# Patient Record
Sex: MALE | Race: WHITE | NOT HISPANIC OR LATINO | Employment: PART TIME | URBAN - METROPOLITAN AREA
[De-identification: names, ages, dates, MRNs, and addresses within clinical notes are randomized per-mention and may not be internally consistent; named-entity substitution may affect disease eponyms.]

---

## 2019-01-06 ENCOUNTER — OFFICE VISIT (OUTPATIENT)
Dept: URGENT CARE | Facility: CLINIC | Age: 24
End: 2019-01-06
Payer: COMMERCIAL

## 2019-01-06 VITALS
OXYGEN SATURATION: 98 % | DIASTOLIC BLOOD PRESSURE: 72 MMHG | TEMPERATURE: 101.9 F | BODY MASS INDEX: 21.13 KG/M2 | WEIGHT: 156 LBS | RESPIRATION RATE: 18 BRPM | SYSTOLIC BLOOD PRESSURE: 113 MMHG | HEART RATE: 119 BPM | HEIGHT: 72 IN

## 2019-01-06 DIAGNOSIS — J20.9 ACUTE BRONCHITIS, UNSPECIFIED ORGANISM: Primary | ICD-10-CM

## 2019-01-06 PROCEDURE — 99203 OFFICE O/P NEW LOW 30 MIN: CPT | Performed by: FAMILY MEDICINE

## 2019-01-06 RX ORDER — PANTOPRAZOLE SODIUM 40 MG/1
40 TABLET, DELAYED RELEASE ORAL DAILY
COMMUNITY
End: 2019-12-26

## 2019-01-06 RX ORDER — AZITHROMYCIN 250 MG/1
TABLET, FILM COATED ORAL
Qty: 6 TABLET | Refills: 0 | Status: SHIPPED | OUTPATIENT
Start: 2019-01-06 | End: 2019-01-11

## 2019-01-06 NOTE — PROGRESS NOTES
Assessment/Plan:         Diagnoses and all orders for this visit:    Acute bronchitis, unspecified organism  -     azithromycin (ZITHROMAX) 250 mg tablet; Take 2 tablets today then 1 tablet daily x 4 days    Other orders  -     pantoprazole (PROTONIX) 40 mg tablet; Take 40 mg by mouth daily          Subjective:      Patient ID: Eduar Garnica is a 26 y o  male  Patient presents with:  Cold Like Symptoms: nasal and chest congestion   cough very hard and this am there was blood in it            The following portions of the patient's history were reviewed and updated as appropriate: allergies, current medications, past family history, past medical history, past social history, past surgical history and problem list     Review of Systems   Constitutional: Positive for activity change, fatigue and fever  HENT: Positive for congestion, rhinorrhea, sinus pain, sore throat and voice change  Eyes: Negative  Respiratory: Positive for cough  Cardiovascular: Negative  Gastrointestinal: Negative  Endocrine: Negative  Genitourinary: Negative  Musculoskeletal: Negative  Skin: Negative  Allergic/Immunologic: Negative  Neurological: Negative  Hematological: Negative  Psychiatric/Behavioral: Negative  All other systems reviewed and are negative  Objective:      /72   Pulse (!) 119   Temp (!) 101 9 °F (38 8 °C) (Tympanic)   Resp 18   Ht 6' (1 829 m)   Wt 70 8 kg (156 lb)   SpO2 98%   BMI 21 16 kg/m²          Physical Exam   Constitutional: He is oriented to person, place, and time  He appears well-developed and well-nourished  HENT:   Head: Normocephalic and atraumatic  Right Ear: External ear normal    Left Ear: External ear normal    Nose: Nose normal    Mouth/Throat: Oropharynx is clear and moist    Eyes: Pupils are equal, round, and reactive to light  Conjunctivae and EOM are normal    Neck: Normal range of motion  Neck supple     Cardiovascular: Normal rate, regular rhythm and normal heart sounds  Pulmonary/Chest: Effort normal  He has wheezes  He has rales  Abdominal: Soft  Bowel sounds are normal    Musculoskeletal: Normal range of motion  Neurological: He is alert and oriented to person, place, and time  He has normal reflexes  Skin: Skin is warm and dry  Psychiatric: He has a normal mood and affect  His behavior is normal    Nursing note and vitals reviewed

## 2019-12-26 ENCOUNTER — OFFICE VISIT (OUTPATIENT)
Dept: URGENT CARE | Facility: CLINIC | Age: 24
End: 2019-12-26
Payer: COMMERCIAL

## 2019-12-26 VITALS
SYSTOLIC BLOOD PRESSURE: 149 MMHG | HEART RATE: 102 BPM | WEIGHT: 181.88 LBS | BODY MASS INDEX: 26.04 KG/M2 | DIASTOLIC BLOOD PRESSURE: 92 MMHG | HEIGHT: 70 IN | RESPIRATION RATE: 16 BRPM | TEMPERATURE: 98 F

## 2019-12-26 DIAGNOSIS — M79.672 LEFT FOOT PAIN: Primary | ICD-10-CM

## 2019-12-26 PROCEDURE — 99213 OFFICE O/P EST LOW 20 MIN: CPT | Performed by: PHYSICIAN ASSISTANT

## 2019-12-26 RX ORDER — SIMETHICONE 125 MG
125 TABLET,CHEWABLE ORAL EVERY 6 HOURS PRN
COMMUNITY

## 2019-12-26 NOTE — LETTER
December 26, 2019     Patient: Estephania Baron   YOB: 1995   Date of Visit: 12/26/2019       To Whom It May Concern: It is my medical opinion that Estephania Baron may return to work on 12/27/2019  If you have any questions or concerns, please don't hesitate to call           Sincerely,        Melvina Patient, SRIRAM    CC: No Recipients

## 2019-12-26 NOTE — PROGRESS NOTES
[facility]        NAME: Lisette Pace is a 22 y o  male  : 1995    MRN: [id number]  DATE: 2019  TIME: 5:06 PM    Assessment and Plan   Left foot pain [M79 672]  1  Left foot pain  XR foot 3+ vw left     XR: no fracture or dislocation  Patient Instructions     Tylenol/Ibuprofen for pain  Wear shoe arch support for foot injuries (ex: superfeet insoles)  Ice 20 minutes 3-4 times per day for 3 days  Insulate the skin from the ice to prevent frostbite  Rest and Elevate  Follow up with PCP in 3-5 days   Go to ED if symptoms worsen  Chief Complaint     Chief Complaint   Patient presents with    Foot Injury     pain and swelling in left foot  fell down cellar steps 2 weeks ago but foot didn't start to hurt until 5 dyas ago         History of Present Illness       Ankle Pain    Incident onset: 2 weeks  Pain began 5 days ago  The injury mechanism was a fall (2 cellar steps)  Pain location: L foot  The quality of the pain is described as aching  The pain is moderate  Pertinent negatives include no inability to bear weight, loss of motion, loss of sensation, numbness or tingling  Review of Systems   Review of Systems   Musculoskeletal: Positive for joint swelling  Negative for gait problem  Skin: Negative for color change  Neurological: Negative for tingling, weakness and numbness           Current Medications       Current Outpatient Medications:     simethicone (MYLICON) 268 MG chewable tablet, Chew 125 mg every 6 (six) hours as needed for flatulence, Disp: , Rfl:     Current Allergies     Allergies as of 2019 - Reviewed 2019   Allergen Reaction Noted    Pepto-bismol [bismuth subsalicylate] GI Intolerance 2019    Amoxicillin Rash 2019    Latex Rash 2019            The following portions of the patient's history were reviewed and updated as appropriate: allergies, current medications, past family history, past medical history, past social history, past surgical history and problem list      Past Medical History:   Diagnosis Date    Gastric paresis     Known health problems: none        Past Surgical History:   Procedure Laterality Date    EYE SURGERY         Family History   Problem Relation Age of Onset    No Known Problems Mother     No Known Problems Father          Medications have been verified  Objective   /92   Pulse 102   Temp 98 °F (36 7 °C) (Tympanic)   Resp 16   Ht 5' 10" (1 778 m)   Wt 82 5 kg (181 lb 14 1 oz)   BMI 26 10 kg/m²        Physical Exam     Physical Exam   Constitutional: He appears well-developed and well-nourished  No distress  HENT:   Head: Normocephalic and atraumatic  Cardiovascular: Normal rate, regular rhythm, normal heart sounds and intact distal pulses  Exam reveals no gallop and no friction rub  No murmur heard  Pulmonary/Chest: Effort normal and breath sounds normal  No respiratory distress  He has no wheezes  He has no rales  He exhibits no tenderness  Musculoskeletal: Normal range of motion  He exhibits tenderness (L foot mid 1,2,4,5th metatarsal)  He exhibits no edema or deformity  Neurological: He is alert  No sensory deficit  LE light touch sensation intact bilaterally  Skin: Skin is warm  Psychiatric: He has a normal mood and affect  His behavior is normal  Judgment and thought content normal    Vitals reviewed

## 2019-12-26 NOTE — PATIENT INSTRUCTIONS
Tylenol/Ibuprofen for pain  Wear shoe arch support for foot injuries (ex: superfeet insoles)  Ice 20 minutes 3-4 times per day for 3 days  Insulate the skin from the ice to prevent frostbite  Rest and Elevate  Follow up with PCP in 3-5 days   Go to ED if symptoms worsen  Foot Contusion   WHAT YOU NEED TO KNOW:   A foot contusion is a bruise to the foot  DISCHARGE INSTRUCTIONS:   Medicines:   · NSAIDs:  These medicines decrease swelling and pain  NSAIDs are available without a doctor's order  Ask your healthcare provider which medicine is right for you  Ask how much to take and when to take it  Take as directed  NSAIDs can cause stomach bleeding and kidney problems if not taken correctly  · Take your medicine as directed  Contact your healthcare provider if you think your medicine is not helping or if you have side effects  Tell him of her if you are allergic to any medicine  Keep a list of the medicines, vitamins, and herbs you take  Include the amounts, and when and why you take them  Bring the list or the pill bottles to follow-up visits  Carry your medicine list with you in case of an emergency  Follow up with your healthcare provider as directed:  Write down your questions so you remember to ask them during your visits  Care for your foot: Follow your treatment plan to help decrease your pain and improve your muscle movement  · Rest:  You will need to rest your foot for 1 to 2 days after your injury  This will help decrease the risk of more damage  · Ice:  Ice helps decrease swelling and pain  Ice may also help prevent tissue damage  Use an ice pack, or put crushed ice in a plastic bag  Cover it with a towel and place it on your foot for 15 to 20 minutes every hour or as directed  · Compression:  Compression (tight hold) provides support and helps decrease swelling and movement so your foot can heal  You may be told to keep your foot wrapped with a tight elastic bandage   Follow instructions about how to apply your bandage  Do not massage your foot  You could cause more damage or pain  · Elevation:  Keep your foot raised above the level of your heart while you are sitting or lying down  This will help decrease or limit swelling  Use pillows, blankets, or rolled towels to elevate your foot comfortably  Exercise your foot:  You may be given gentle exercises to improve your foot movement and help decrease stiffness  Ask when you can return to your normal activities or sports  Prevent another injury:   · Wear equipment to protect yourself when you play sports  · Make sure your shoes fit properly  · Always wear shoes on streets or sidewalks  · Clean spills off the floor right away to avoid slipping or hitting your foot  · Make sure your home is well lit when you get up during the night  This will help you avoid hurting your foot in the dark  Contact your healthcare provider if:   · You have increased swelling on your foot  · You have severe foot pain  · You are not able to move your foot  · You have questions or concerns about your injury or treatment  © 2017 [facility] [facility] Information is for End User's use only and may not be sold, redistributed or otherwise used for commercial purposes  All illustrations and images included in CareNotes® are the copyrighted property of [employer]  or [employer]  The above information is an  only  It is not intended as medical advice for individual conditions or treatments  Talk to your doctor, nurse or pharmacist before following any medical regimen to see if it is safe and effective for you

## 2020-12-18 ENCOUNTER — OFFICE VISIT (OUTPATIENT)
Dept: URGENT CARE | Facility: CLINIC | Age: 25
End: 2020-12-18
Payer: COMMERCIAL

## 2020-12-18 VITALS
HEART RATE: 90 BPM | RESPIRATION RATE: 16 BRPM | WEIGHT: 185 LBS | TEMPERATURE: 97.9 F | HEIGHT: 70 IN | OXYGEN SATURATION: 99 % | BODY MASS INDEX: 26.48 KG/M2

## 2020-12-18 DIAGNOSIS — Z11.59 SCREENING FOR VIRAL DISEASE: Primary | ICD-10-CM

## 2020-12-18 PROCEDURE — U0003 INFECTIOUS AGENT DETECTION BY NUCLEIC ACID (DNA OR RNA); SEVERE ACUTE RESPIRATORY SYNDROME CORONAVIRUS 2 (SARS-COV-2) (CORONAVIRUS DISEASE [COVID-19]), AMPLIFIED PROBE TECHNIQUE, MAKING USE OF HIGH THROUGHPUT TECHNOLOGIES AS DESCRIBED BY CMS-2020-01-R: HCPCS | Performed by: PHYSICIAN ASSISTANT

## 2020-12-18 PROCEDURE — 99213 OFFICE O/P EST LOW 20 MIN: CPT | Performed by: PHYSICIAN ASSISTANT

## 2020-12-18 RX ORDER — LORATADINE 10 MG/1
TABLET ORAL
COMMUNITY

## 2020-12-19 LAB — SARS-COV-2 RNA SPEC QL NAA+PROBE: NOT DETECTED

## 2021-03-10 DIAGNOSIS — Z23 ENCOUNTER FOR IMMUNIZATION: ICD-10-CM

## 2021-04-13 ENCOUNTER — OFFICE VISIT (OUTPATIENT)
Dept: URGENT CARE | Facility: CLINIC | Age: 26
End: 2021-04-13
Payer: COMMERCIAL

## 2021-04-13 VITALS
TEMPERATURE: 98.6 F | WEIGHT: 195 LBS | OXYGEN SATURATION: 99 % | HEART RATE: 86 BPM | BODY MASS INDEX: 28.88 KG/M2 | HEIGHT: 69 IN | RESPIRATION RATE: 16 BRPM

## 2021-04-13 DIAGNOSIS — J06.9 VIRAL URI: Primary | ICD-10-CM

## 2021-04-13 PROCEDURE — U0005 INFEC AGEN DETEC AMPLI PROBE: HCPCS | Performed by: PHYSICIAN ASSISTANT

## 2021-04-13 PROCEDURE — U0003 INFECTIOUS AGENT DETECTION BY NUCLEIC ACID (DNA OR RNA); SEVERE ACUTE RESPIRATORY SYNDROME CORONAVIRUS 2 (SARS-COV-2) (CORONAVIRUS DISEASE [COVID-19]), AMPLIFIED PROBE TECHNIQUE, MAKING USE OF HIGH THROUGHPUT TECHNOLOGIES AS DESCRIBED BY CMS-2020-01-R: HCPCS | Performed by: PHYSICIAN ASSISTANT

## 2021-04-13 PROCEDURE — 99213 OFFICE O/P EST LOW 20 MIN: CPT | Performed by: PHYSICIAN ASSISTANT

## 2021-04-13 NOTE — PROGRESS NOTES
[facility]        NAME: Tona Jc is a 23 y o  male  : 1995    MRN: [id number]  DATE: 2021  TIME: 3:08 PM    Assessment and Plan   Viral URI [J06 9]  1  Viral URI  Novel Coron[facility]Covid-19),[facility]       Consider antibiotic if negative for COVID-19  Patient Instructions   Covid 19 results will return in a 3-5 days  We will call you with both negative or positive results  Prophylactically self quarantine  Department of health's newest recommendations state patient should self quarantine for 10 days since symptom onset or 24 hours fever free without the use of fever reducing drugs (Tylenol and ibuprofen), whichever is longer AND overall improvement of symptoms  Drink lots of fluids to maintain hydration  Do not touch your face, wash hands often, and practice social distancing  Call your family doctor to have a follow-up appointment in next few days  Go to ER if he began experiencing chest pain, shortness of breath, fever that is not responding to antipyretics or other severe symptoms  Follow up with PCP in 3-5 days  Proceed to  ER if symptoms worsen  Chief Complaint     Chief Complaint   Patient presents with    COVID-19     Nasal congestion  Can'tsmell or taste anything  History of Present Illness        Patient is a 26-year-old male with no significant past medical history presents the office complaining of congestion, rhinorrhea, mild cough and intermittently decreased sense loss of taste and smell for 6 days  He denies fever, chills, SOB, CP, difficulty breathing, or weakness  Denies any known exposure to COVID-19  Denies prior COVID-19 infection  He has been using over-the-counter Coricidin cold and flu with relief of symptoms  Review of Systems   Review of Systems   Constitutional: Positive for fatigue  Negative for chills and fever  HENT: Positive for congestion, postnasal drip and rhinorrhea   Negative for sore throat  Respiratory: Positive for cough  Negative for shortness of breath  Cardiovascular: Negative for chest pain and palpitations  Gastrointestinal: Negative for abdominal pain, diarrhea, nausea and vomiting  Musculoskeletal: Negative for myalgias  Neurological: Negative for dizziness, light-headedness and headaches  Current Medications       Current Outpatient Medications:     loratadine (CLARITIN) 10 mg tablet, Take by mouth, Disp: , Rfl:     simethicone (MYLICON) 880 MG chewable tablet, Chew 125 mg every 6 (six) hours as needed for flatulence, Disp: , Rfl:     Current Allergies     Allergies as of 04/13/2021 - Reviewed 04/13/2021   Allergen Reaction Noted    Pepto-bismol [bismuth subsalicylate] GI Intolerance 01/06/2019    Amoxicillin Rash 01/06/2019    Latex Rash 01/06/2019            The following portions of the patient's history were reviewed and updated as appropriate: allergies, current medications, past family history, past medical history, past social history, past surgical history and problem list      Past Medical History:   Diagnosis Date    Gastric paresis     Known health problems: none        Past Surgical History:   Procedure Laterality Date    EYE SURGERY      FOOT SURGERY         Family History   Problem Relation Age of Onset    No Known Problems Mother     No Known Problems Father          Medications have been verified  Objective   Pulse 86   Temp 98 6 °F (37 °C)   Resp 16   Ht 5' 9" (1 753 m)   Wt 88 5 kg (195 lb)   SpO2 99%   BMI 28 80 kg/m²   No LMP for male patient  Physical Exam     Physical Exam  Vitals signs and nursing note reviewed  Constitutional:       Appearance: Normal appearance  He is well-developed  HENT:      Head: Normocephalic and atraumatic  Right Ear: Tympanic membrane, ear canal and external ear normal       Left Ear: Tympanic membrane, ear canal and external ear normal       Nose: Congestion present  Mouth/Throat:      Pharynx: Uvula midline  Eyes:      General: Lids are normal       Conjunctiva/sclera: Conjunctivae normal       Pupils: Pupils are equal, round, and reactive to light  Neck:      Musculoskeletal: Neck supple  Cardiovascular:      Rate and Rhythm: Normal rate and regular rhythm  Heart sounds: Normal heart sounds  No murmur  No friction rub  No gallop  Pulmonary:      Effort: Pulmonary effort is normal       Breath sounds: Normal breath sounds  No stridor  No wheezing or rales  Musculoskeletal: Normal range of motion  Skin:     General: Skin is warm and dry  Capillary Refill: Capillary refill takes less than 2 seconds  Neurological:      Mental Status: He is alert

## 2021-04-13 NOTE — PATIENT INSTRUCTIONS
Covid 19 results will return in a 3-5 days  We will call you with both negative or positive results  Prophylactically self quarantine  Department of health's newest recommendations state patient should self quarantine for 10 days since symptom onset or 24 hours fever free without the use of fever reducing drugs (Tylenol and ibuprofen), whichever is longer AND overall improvement of symptoms  Drink lots of fluids to maintain hydration  Do not touch your face, wash hands often, and practice social distancing  Call your family doctor to have a follow-up appointment in next few days  Go to ER if he began experiencing chest pain, shortness of breath, fever that is not responding to antipyretics or other severe symptoms  COVID-19 (Coronavirus Disease 2019)   WHAT YOU NEED TO KNOW:   COVID-19 is the disease caused by the novel (new) coronavirus first discovered in December 2019  Coronaviruses generally cause upper respiratory (nose, throat, and lung) infections, such as a cold  The new virus can also cause serious lower respiratory conditions, such as pneumonia or acute respiratory distress syndrome (ARDS)  Anyone can develop serious problems from the new virus, but your risk is higher if you are 65 or older  A weak immune system, diabetes, or a heart or lung condition can also increase your risk  DISCHARGE INSTRUCTIONS:   If you think you or someone you know may be infected:  Do the following to protect others:  · If emergency care is needed,  tell the  about the possible infection, or call ahead and tell the emergency department  · Call a healthcare provider  for instructions if symptoms are mild  Anyone who may be infected should not  arrive without calling first  The provider will need to protect staff members and other patients  · The person who may be infected needs to wear a face covering  while getting medical care  This will help lower the risk of infecting others   Coverings are not used for anyone who is younger than 2 years, has breathing problems, or cannot remove it  The provider can give you instructions for anyone who cannot wear a covering  Call your local emergency number (911 in the [address] [address]) or go to the emergency department if:   · You have trouble breathing or shortness of breath at rest     · You have chest pain or pressure that lasts longer than 5 minutes  · You become confused or hard to wake  · Your lips or face are blue  · You have a fever of 104°F (40°C) or higher  Call your doctor if:   · You do not  have symptoms of COVID-19 but had close physical contact within 14 days with someone who tested positive  · You have questions or concerns about your condition or care  Medicines: You may need any of the following for mild symptoms:  · Decongestants  help reduce nasal congestion and help you breathe more easily  If you take decongestant pills, they may make you feel restless or cause problems with your sleep  Do not use decongestant sprays for more than a few days  · Cough suppressants  help reduce coughing  Ask your healthcare provider which type of cough medicine is best for you  · Acetaminophen  decreases pain and fever  It is available without a doctor's order  Ask how much to take and how often to take it  Follow directions  Read the labels of all other medicines you are using to see if they also contain acetaminophen, or ask your doctor or pharmacist  Acetaminophen can cause liver damage if not taken correctly  Do not use more than 4 grams (4,000 milligrams) total of acetaminophen in one day  · NSAIDs , such as ibuprofen, help decrease swelling, pain, and fever  NSAIDs can cause stomach bleeding or kidney problems in certain people  If you take blood thinner medicine, always ask your healthcare provider if NSAIDs are safe for you  Always read the medicine label and follow directions  · Take your medicine as directed    Contact your healthcare provider if you think your medicine is not helping or if you have side effects  Tell him or her if you are allergic to any medicine  Keep a list of the medicines, vitamins, and herbs you take  Include the amounts, and when and why you take them  Bring the list or the pill bottles to follow-up visits  Carry your medicine list with you in case of an emergency  How the 2019 coronavirus spreads: The virus spreads quickly and easily  You can become infected if you are in contact with a large amount of the virus, even for a short time  You can also become infected by being around a small amount of virus for a long time  The following are ways the virus is thought to spread, but more information may be coming:  · Droplets are the most common way all coronaviruses spread  The virus can travel in droplets that form when a person talks, coughs, or sneezes  Anyone who breathes in the droplets or gets them in his or her eyes can become infected with the virus  Close personal contact with an infected person is thought to be the main way the virus spreads  Close personal contact means you are within 6 feet (2 meters) of the person  · Person-to-person contact can spread the virus  For example, a person with the virus on his or her hands can spread it by shaking hands with someone  At this time, it does not appear that the virus can be passed to a baby during pregnancy or delivery  The baby can be infected after he or she is born through person-to-person contact  The virus also does not appear to spread in breast milk  If you are pregnant or breastfeeding, talk to your healthcare provider or obstetrician about any concerns you have  · The virus can stay on objects and surfaces  A person can get the virus on his or her hands by touching the object or surface  Infection happens if the person then touches his or her eyes or mouth with unwashed hands  It is not yet known how long the virus can stay on an object or surface   That is why it is important to clean all surfaces that are used regularly  · An infected animal may be able to infect a person who touches it  This may happen at live markets or on a farm  How everyone can lower the risk for COVID-19:  The best way to prevent infection is to avoid anyone who is infected, but this can be hard to do  An infected person can spread the virus before signs or symptoms begin, or even if signs or symptoms never develop  The following can help lower the risk for infection:      · Wash your hands often throughout the day  Use soap and water  Rub your soapy hands together, lacing your fingers  Wash the front and back of each hand, and in between your fingers  Use the fingers of one hand to scrub under the fingernails of the other hand  Wash for at least 20 seconds  Rinse with warm, running water for several seconds  Then dry your hands with a clean towel or paper towel  Use hand  that contains alcohol if soap and water are not available  Do not touch your eyes, nose, or mouth without washing your hands first  Teach children how to wash their hands and use hand   · Cover a sneeze or cough  This prevents droplets from traveling from you to others  Turn your face away and cover your mouth and nose with a tissue  Throw the tissue away  Use the bend of your arm if a tissue is not available  Then wash your hands well with soap and water or use hand   Turn and cover your face if you are around someone who is sneezing or coughing  Teach children how to cover a cough or sneeze  · Follow worldwide, national, and local social distancing guidelines  Social distancing means people avoid close physical contact so the virus cannot spread from one person to another  Keep at least 6 feet (2 meters) between you and others  Also keep this distance from anyone who comes to your home, such as someone making a delivery  · Make a habit of not touching your face    It is not known how long the virus can stay on objects and surfaces  If you get the virus on your hands, you can transfer it to your eyes, nose, or mouth and become infected  You can also transfer it to objects, surfaces, or people  Be aware of what you touch when you go out  Examples include handrails and elevator buttons  Try not to touch anything with bare hands unless it is necessary  Wash your hands before you leave your home and when you return  · Clean and disinfect high-touch surfaces and objects often  Use a disinfecting solution or wipes  You can make a solution by diluting 4 teaspoons of bleach in 1 quart (4 cups) of water  Clean and disinfect even if you think no one living in or coming to your home is infected with the virus  You can wipe items with a disinfecting cloth before you bring them into your home  Wash your hands after you handle anything you bring into your home  · Make your immune system as healthy as possible  A weakened immune system makes you more vulnerable to the new coronavirus  No COVID-19 vaccine is available yet  Vaccines such as the flu and pneumonia vaccines can help your immune system  Your healthcare provider can tell you which vaccines to get, and when to get them  Keep your immune system as strong as possible  Do not smoke  Eat healthy foods, exercise regularly, and try to manage stress  Go to bed and wake up at the same times each day  Social distancing guidelines:  National and local social distancing rules vary  Rules may change over time as restrictions are lifted  Restrictions may return if an outbreak happens where you live  It is important to know and follow all current social distancing rules in your area  The following are general guidelines:  · Limit trips out of your home  You may be able to have food, medicines, and other supplies delivered  If possible, have delivered items left at your door or other area  Try not to have someone hand you an item   You will be so close to the person that the virus can spread between you  · Do not have close physical contact with anyone who does not live in your home  Do not shake hands with, hug, or kiss a person as a greeting  Stand or walk as far from others as possible  If you must use public transportation (such as a bus or subway), try to sit or stand away from others  You can stay safely connected with others through phone calls, e-mail messages, social media websites, and video chats  Check in on anyone who may be having a hard time socially distancing, or who lives alone  Ask administrators at nursing homes or long-term care facilities how you can safely communicate with someone living there  · Wear a cloth face covering around others who do not live in your home  Face coverings help prevent the virus from spreading to others in droplets  You can use a clear face covering if someone needs to read your lips  This is a cloth covering that has plastic over the mouth area so your lips can be seen  Do not use coverings that have breathing valves or vents  The virus can travel out of the valve or vent and be spread to others  Do not take your covering off to talk, cough, or sneeze  Do not use coverings on children younger than 2 years or on anyone who has breathing problems or cannot remove it  · Only allow medical or other necessary professionals into your home  Wear your face covering, and remind professionals to wear a face covering  Remind them to wash their hands when they arrive and before they leave  Do not  let anyone who does not live in your home in, even if the person is not sick  A person can pass the virus to others before symptoms of COVID-19 begin  Some people never even develop symptoms  Children commonly have mild symptoms or no symptoms  It may be hard to tell a child not to hug or kiss you  Explain that this is how he or she can help you stay healthy      · Do not go to someone else's home unless it is necessary  Do not go over to visit, even if the person is lonely  Only go if you need to help him or her  Make sure you both wear face coverings while you are there  · Avoid large gatherings and crowds  Gatherings or crowds of 10 or more individuals can cause the virus to spread  Examples of gatherings include parties, sporting events, Hol[employer] services, and conferences  Crowds may form at beaches, chen, and tourist attractions  Protect yourself by staying away from large gatherings and crowds  · Ask your healthcare provider for other ways to have appointments  You may be able to have appointments without having to go into the provider's office  Some providers offer phone, video, or other types of appointments  You may also be able to get prescriptions for a few months of your medicines at a time  · Stay safe if you must go out to work  You may have a job that can only be done outside your home  Keep physical distance between you and other workers as much as possible  Follow your employer's rules so everyone stays safe  If you have COVID-19 and are recovering at home:  Healthcare providers will give you specific instructions to follow  The following are general guidelines to remind you how to keep others safe until you are well:  · Wash your hands often  Use soap and water as much as possible  You can use hand  that contains alcohol if soap and water are not available  Do not share towels with anyone  If you use paper towels, throw them away in a lined trash can kept in your room or area  Use a covered trash can, if possible  · Do not go out of your home unless it is necessary  You may have to go to your healthcare provider's office for check-ups or to get prescription refills  Do not arrive at the provider's office without an appointment  Providers have to make their offices safe for staff and other patients  · Do not have close physical contact with anyone unless it is necessary  Only have close physical contact with a person giving direct care, or a baby or child you must care for  Family members and friends should not visit you  If possible, stay in a separate area or room of your home if you live with others  No one should go into the area or room except to give you care  You can visit with others by phone, video chat, e-mail, or similar systems  It is important to stay connected with others in your life while you recover  · Wear a face covering while others are near you  This can help prevent droplets from spreading the virus when you talk, sneeze, or cough  Put the covering on before anyone comes into your room or area  Remind the person to cover his or her nose and mouth before going in to provide care for you  · Do not share items  Do not share dishes, towels, or other items with anyone  Items need to be washed after you use them  · Protect your baby  Wash your hands with soap and water often throughout the day  Wear a clean face covering while you breastfeed, or while you express or pump breast milk  If possible, ask someone who is well to care for your baby  You can put breast milk in bottles for the person to use, if needed  Talk to your healthcare provider if you have any questions or concerns about caring for or bonding with your baby  He or she will tell you when to bring your baby in for check-ups and vaccines  He or she will also tell you what to do if you think your baby was infected with the new virus  · Do not handle live animals  Until more is known, it is best not to touch, play with, or handle live animals  Some animals, including pets, have been infected with the new coronavirus  Do not handle or care for animals until you are well  Care includes feeding, petting, and cuddling your pet  Do not let your pet lick you or share your food  Ask someone who is not infected to take care of your pet, if possible  If you must care for a pet, wear a face covering  Wash your hands before and after you give care  · Follow directions from your healthcare provider for being around others after you recover  You will need to wait at least 10 days after symptoms first appeared  Then you will need to have no fever for 24 hours without fever medicine, and no other symptoms  A loss of taste or smell may continue for several months  It is considered okay to be around others if this is your only symptom  It is not known for sure if or for how long a recovered person can pass the virus to others  Your provider may give you instructions, such as continuing social distancing or wearing a face covering around others  How to take care of someone who has COVID-19:  If the person lives in another home, arrange for a time to give care  Remember to bring a few pairs of disposable gloves and a cloth face covering  The following are general guidelines to help you safely care for anyone who has COVID-19:  · Wash your hands often  Wash before and after you go into the person's home, area, or room  Throw paper towels away in a lined trash can that has a lid, if possible  · Do not allow others to go near the person  No one should come into the person's home unless it is necessary  If possible, the person should be in a separate area or room if he or she lives with others  Keep the room's door shut unless you need to go in or out  Have others call, video chat, or e-mail the person if he or she is feeling well enough  The person may feel lonely if he or she is kept separate for a long period of time  Safe communication can help him or her stay connected to family and friends  · Make sure the person's room has good air flow  You may be able to open the window if the weather allows  An air conditioner can also be turned on to help air move  · Contact the person before you go in to give care  Make sure the person is wearing a face covering   Remind him or her to wash his or her hands with soap and water  He or she can use hand  that contains alcohol if soap and water are not available  Put on a face covering before you go in to give care  · Wear gloves while you give care and clean  Clean items the person uses often  Clean countertops, cooking surfaces, and the fronts and insides of the microwave and refrigerator  Clean the shower, toilet, the area around the toilet, the sink, the area around the sink, and faucets  Gather used laundry or bedding  Wash and dry items on the warmest settings the fabric allows  Wash dishes and silverware in hot, soapy water or in a   · Anything you throw away needs to go into a lined trash can  When you need to empty the trash, close the open end of the lining and tie it closed  This helps prevent items the virus is on from spilling out of the trash  Remove your gloves and throw them away  Wash your hands  Follow up with your doctor as directed:  Write down your questions so you remember to ask them during your visits  For more information:   · Centers for Disease Control and Prevention  [address] , [address]  Phone: 4- 296 - 089-6359  Web Address: [employer] com br    © Copyright [employer] Information is for End User's use only and may not be sold, redistributed or otherwise used for commercial purposes  All illustrations and images included in CareNotes® are the copyrighted property of A D A M [employer] Inc  or [facility] Darrick Franco   The above information is an  only  It is not intended as medical advice for individual conditions or treatments  Talk to your doctor, nurse or pharmacist before following any medical regimen to see if it is safe and effective for you

## 2021-04-13 NOTE — LETTER
April 13, 2021     Patient: Sathish Miranda   YOB: 1995   Date of Visit: 4/13/2021       To Whom It May Concern: It is my medical opinion that Sathish Miranda should remain out of work for 10 days since symptom onset or 24 hours fever free without the use of fever reducing drugs, whichever is longer AND overall general improvement in symptoms OR 10 days since last exposure OR negative results  If you have any questions or concerns, please don't hesitate to call           Sincerely,        Sheryle Chambers, PA-C

## 2021-04-14 LAB — SARS-COV-2 RNA RESP QL NAA+PROBE: POSITIVE

## 2021-04-18 ENCOUNTER — HOSPITAL ENCOUNTER (EMERGENCY)
Facility: HOSPITAL | Age: 26
Discharge: HOME | End: 2021-04-18
Attending: EMERGENCY MEDICINE
Payer: COMMERCIAL

## 2021-04-18 VITALS
SYSTOLIC BLOOD PRESSURE: 110 MMHG | HEART RATE: 72 BPM | TEMPERATURE: 98.4 F | OXYGEN SATURATION: 98 % | BODY MASS INDEX: 26.94 KG/M2 | WEIGHT: 181.88 LBS | HEIGHT: 69 IN | DIASTOLIC BLOOD PRESSURE: 62 MMHG | RESPIRATION RATE: 16 BRPM

## 2021-04-18 DIAGNOSIS — U07.1 COVID-19: Primary | ICD-10-CM

## 2021-04-18 PROCEDURE — 99283 EMERGENCY DEPT VISIT LOW MDM: CPT

## 2021-04-18 PROCEDURE — 99284 EMERGENCY DEPT VISIT MOD MDM: CPT | Performed by: PHYSICIAN ASSISTANT

## 2021-04-18 RX ORDER — ONDANSETRON 4 MG/1
4 TABLET, ORALLY DISINTEGRATING ORAL ONCE
Status: COMPLETED | OUTPATIENT
Start: 2021-04-18 | End: 2021-04-18

## 2021-04-18 RX ADMIN — ONDANSETRON 4 MG: 4 TABLET, ORALLY DISINTEGRATING ORAL at 18:28

## 2021-04-18 NOTE — ED PROVIDER NOTES
History  Chief Complaint   Patient presents with    Generalized Body Aches     pt is covid positive and c/o body aches and loss of taste and smell  taking tylenol and zofran at home     27-year-old male presents the emergency department with grandfather who is primary caretaker for evaluation of generalized body aches  Patient with past medical history of autism  Tested positive for COVID last week  Had 1 day of vomiting this has resolved  Continues with nausea and body aches  Reports decreased sense of taste and smell  States he took Tylenol this morning at 8:00 a m  Which only results symptoms for approximately 10 minutes  Additionally took Zofran once did not improve symptoms  [facility] for the same complaint  Patient denies any chest pain, shortness of breath, difficulty breathing  He denies any cough congestion  He denies any abdominal pain  Denies diarrhea  Grandfather reports patient is eating at home however less than normal         History provided by:  Patient and caregiver  Generalized Body Aches  Location:  Generalized  Severity:  Mild  Onset quality:  Gradual  Progression:  Unchanged  Chronicity:  New  Context:  COVID  Ineffective treatments:  Tylenol  Associated symptoms: myalgias and nausea    Associated symptoms: no abdominal pain, no chest pain, no congestion, no cough, no diarrhea, no ear pain, no fatigue, no fever, no headaches, no loss of consciousness, no rash, no rhinorrhea, no shortness of breath, no sore throat, no vomiting and no wheezing        Prior to Admission Medications   Prescriptions Last Dose Informant Patient Reported?  Taking?   loratadine (CLARITIN) 10 mg tablet   Yes Yes   Sig: Take by mouth   simethicone (MYLICON) 176 MG chewable tablet   Yes Yes   Sig: Chew 125 mg every 6 (six) hours as needed for flatulence      Facility-Administered Medications: None       Past Medical History:   Diagnosis Date    Autistic disorder     Gastric paresis     Known health problems: none        Past Surgical History:   Procedure Laterality Date    EYE SURGERY      FOOT SURGERY         Family History   Problem Relation Age of Onset    No Known Problems Mother     No Known Problems Father      I have reviewed and agree with the history as documented  E-Cigarette/Vaping    E-Cigarette Use Never User      E-Cigarette/Vaping Substances     Social History     Tobacco Use    Smoking status: Never Smoker    Smokeless tobacco: Never Used   Substance Use Topics    Alcohol use: Not Currently    Drug use: Never       Review of Systems   Constitutional: Positive for appetite change (Decreased)  Negative for chills, diaphoresis, fatigue and fever  HENT: Negative for congestion, ear pain, rhinorrhea and sore throat  Decreased taste and smell   Eyes: Negative for visual disturbance  Respiratory: Negative  Negative for cough, choking, chest tightness, shortness of breath, wheezing and stridor  Cardiovascular: Negative  Negative for chest pain, palpitations and leg swelling  Gastrointestinal: Positive for nausea  Negative for abdominal distention, abdominal pain, anal bleeding, blood in stool, constipation, diarrhea, rectal pain and vomiting  Genitourinary: Negative  Musculoskeletal: Positive for myalgias  Negative for arthralgias, back pain, gait problem, joint swelling, neck pain and neck stiffness  Skin: Negative  Negative for color change, pallor, rash and wound  Neurological: Negative  Negative for loss of consciousness and headaches  All other systems reviewed and are negative  Physical Exam  Physical Exam  Vitals signs and nursing note reviewed  Constitutional:       General: He is not in acute distress  Appearance: Normal appearance  He is normal weight  He is not ill-appearing or toxic-appearing  HENT:      Head: Normocephalic and atraumatic  Nose: Nose normal  No congestion or rhinorrhea  Mouth/Throat:      Mouth: Mucous membranes are moist       Pharynx: Oropharynx is clear  No oropharyngeal exudate or posterior oropharyngeal erythema  Eyes:      Extraocular Movements: Extraocular movements intact  Conjunctiva/sclera: Conjunctivae normal       Pupils: Pupils are equal, round, and reactive to light  Neck:      Musculoskeletal: Normal range of motion and neck supple  No muscular tenderness  Cardiovascular:      Rate and Rhythm: Normal rate and regular rhythm  Heart sounds: No murmur  Pulmonary:      Effort: Pulmonary effort is normal  No respiratory distress  Breath sounds: Normal breath sounds  No stridor  No wheezing, rhonchi or rales  Chest:      Chest wall: No tenderness  Abdominal:      General: Abdomen is flat  Bowel sounds are normal  There is no distension  Palpations: Abdomen is soft  Tenderness: There is no abdominal tenderness  There is no right CVA tenderness, left CVA tenderness or guarding  Musculoskeletal: Normal range of motion  General: No swelling, tenderness or deformity  Right lower leg: No edema  Skin:     General: Skin is warm and dry  Capillary Refill: Capillary refill takes less than 2 seconds  Coloration: Skin is not pale  Findings: No bruising, erythema or rash  Neurological:      General: No focal deficit present  Mental Status: He is alert and oriented to person, place, and time  Sensory: No sensory deficit  Motor: No weakness        Coordination: Coordination normal       Deep Tendon Reflexes: Reflexes normal    Psychiatric:         Mood and Affect: Mood normal          Behavior: Behavior normal          Vital Signs  ED Triage Vitals   Temperature Pulse Respirations Blood Pressure SpO2   04/18/21 1746 04/18/21 1745 04/18/21 1746 04/18/21 1745 04/18/21 1745   98 4 °F (36 9 °C) 75 16 117/71 100 %      Temp Source Heart Rate Source Patient Position - Orthostatic VS BP Location FiO2 (%) 04/18/21 1746 04/18/21 1746 04/18/21 1746 04/18/21 1746 --   Temporal Monitor Lying Left arm       Pain Score       --                  Vitals:    04/18/21 1745 04/18/21 1746 04/18/21 1858   BP: 117/71 117/71 110/62   Pulse: 75 75 72   Patient Position - Orthostatic VS:  Lying Lying         Visual Acuity      ED Medications  Medications   ondansetron (ZOFRAN-ODT) dispersible tablet 4 mg (4 mg Oral Given 4/18/21 1828)       Diagnostic Studies  Results Reviewed     None                 No orders to display              Procedures  Procedures         ED Course  ED Course as of Apr 18 1925   Sun Apr 18, 2021 1824 Reviewed patient's blood work from [facility] emergency department yesterday  CBC and chemistry unremarkable  Troponin negative  Chest x-ray report from yesterday negative for acute findings      1844 Patient tolerated PO intake  I discussed all results and findings with patient and grandfather  Patient clinically and hemodynamically stable for discharge  Oxygen saturation 98% on room air  Afebrile  Normal heart rate  Normotensive  We discussed symptomatic control at home and symptoms that require prompt return to the ED for further evaluation both verbalized understanding  We discussed taking vitamin-D, vitamin-C, but multivitamin  We discussed adequate rest, alternate Tylenol and ibuprofen as well as uses Zofran which patient has at home  SBIRT 22yo+      Most Recent Value   SBIRT (22 yo +)   In order to provide better care to our patients, we are screening all of our patients for alcohol and drug use  Would it be okay to ask you these screening questions? No Filed at: 04/18/2021 1805                    MDM  Number of Diagnoses or Management Options  COVID-19: new and requires workup  Diagnosis management comments: 69-year-old male presents emergency department with caretaker for evaluation of body aches  Patient known COVID positive    Seen in local emergency department x2 for same complaints  Has Zofran at home  Vitals and medical record reviewed  Lungs clear auscultation  Abdomen soft and nontender  Heart regular rate rhythm  Labs reviewed from patient's ED visit yesterday, CBC, CMP unremarkable  Patient had troponin which was negative  Chest x-ray report negative for acute findings  Patient treated with Zofran  Able tolerate p o  Intake  We discussed results and findings, symptomatic treatment and symptoms that require prompt return to the ED for further evaluation and both patient and caretaker verbalized understanding  Amount and/or Complexity of Data Reviewed  Review and summarize past medical records: yes        Disposition  Final diagnoses:   COVID-19     Time reflects when diagnosis was documented in both MDM as applicable and the Disposition within this note     Time User Action Codes Description Comment    4/18/2021  6:44 PM Rice Fillers Add [U07 1] COVID-19       ED Disposition     ED Disposition Condition Date/Time Comment    Discharge Stable Sun Apr 18, 2021  6:44 PM Camryn Cease discharge to home/self care  Follow-up Information     Follow up With Specialties Details Why 1 Adam Kan MD Family Medicine In 1 week continued care and evaluation [address] [address]  [phone]            Discharge Medication List as of 4/18/2021  6:47 PM      CONTINUE these medications which have NOT CHANGED    Details   loratadine (CLARITIN) 10 mg tablet Take by mouth, Historical Med      simethicone (MYLICON) 355 MG chewable tablet Chew 125 mg every 6 (six) hours as needed for flatulence, Historical Med           No discharge procedures on file      PDMP Review     None          ED Provider  Electronically Signed by           Sander Middleton PA-C  04/18/21 1925

## 2021-04-19 NOTE — ED ATTENDING ATTESTATION
4/18/2021  I, Lavone Angelucci, DO, saw and evaluated the patient  I have discussed the patient with the resident/non-physician practitioner and agree with the resident's/non-physician practitioner's findings, Plan of Care, and MDM as documented in the resident's/non-physician practitioner's note, except where noted  All available labs and Radiology studies were reviewed  I was present for key portions of any procedure(s) performed by the resident/non-physician practitioner and I was immediately available to provide assistance  At this point I agree with the current assessment done in the Emergency Department    I have conducted an independent evaluation of this patient a history and physical  Very comfortable appearing, conversational, no distress  Grandfather notes eating but not full meals, drinking urinating, but no little darker  Has ondansetron from prior emergency visit voices good understanding of use, will return immediately if worse or new symptoms

## 2021-09-01 ENCOUNTER — HOSPITAL ENCOUNTER (EMERGENCY)
Facility: HOSPITAL | Age: 26
Discharge: HOME | End: 2021-09-01
Attending: EMERGENCY MEDICINE
Payer: COMMERCIAL

## 2021-09-01 ENCOUNTER — APPOINTMENT (EMERGENCY)
Dept: CT IMAGING | Facility: HOSPITAL | Age: 26
End: 2021-09-01
Payer: COMMERCIAL

## 2021-09-01 VITALS
WEIGHT: 198.19 LBS | HEART RATE: 60 BPM | RESPIRATION RATE: 18 BRPM | HEIGHT: 70 IN | BODY MASS INDEX: 28.37 KG/M2 | SYSTOLIC BLOOD PRESSURE: 111 MMHG | DIASTOLIC BLOOD PRESSURE: 68 MMHG | OXYGEN SATURATION: 99 % | TEMPERATURE: 98.3 F

## 2021-09-01 DIAGNOSIS — M54.50 ACUTE BILATERAL LOW BACK PAIN WITHOUT SCIATICA: Primary | ICD-10-CM

## 2021-09-01 LAB
ANION GAP SERPL CALCULATED.3IONS-SCNC: 6 MMOL/L (ref 4–13)
BASOPHILS # BLD AUTO: 0.01 THOUSANDS/ΜL (ref 0–0.1)
BASOPHILS NFR BLD AUTO: 0 % (ref 0–1)
BILIRUB UR QL STRIP: NEGATIVE
BUN SERPL-MCNC: 9 MG/DL (ref 5–25)
CALCIUM SERPL-MCNC: 8.5 MG/DL (ref 8.3–10.1)
CHLORIDE SERPL-SCNC: 105 MMOL/L (ref 100–108)
CLARITY UR: CLEAR
CO2 SERPL-SCNC: 30 MMOL/L (ref 21–32)
COLOR UR: YELLOW
CREAT SERPL-MCNC: 1 MG/DL (ref 0.6–1.3)
EOSINOPHIL # BLD AUTO: 0 THOUSAND/ΜL (ref 0–0.61)
EOSINOPHIL NFR BLD AUTO: 0 % (ref 0–6)
ERYTHROCYTE [DISTWIDTH] IN BLOOD BY AUTOMATED COUNT: 12.2 % (ref 11.6–15.1)
GFR SERPL CREATININE-BSD FRML MDRD: 104 ML/MIN/1.73SQ M
GLUCOSE SERPL-MCNC: 118 MG/DL (ref 65–140)
GLUCOSE UR STRIP-MCNC: NEGATIVE MG/DL
HCT VFR BLD AUTO: 43.6 % (ref 36.5–49.3)
HGB BLD-MCNC: 15 G/DL (ref 12–17)
HGB UR QL STRIP.AUTO: NEGATIVE
IMM GRANULOCYTES # BLD AUTO: 0.01 THOUSAND/UL (ref 0–0.2)
IMM GRANULOCYTES NFR BLD AUTO: 0 % (ref 0–2)
KETONES UR STRIP-MCNC: NEGATIVE MG/DL
LEUKOCYTE ESTERASE UR QL STRIP: NEGATIVE
LYMPHOCYTES # BLD AUTO: 1.55 THOUSANDS/ΜL (ref 0.6–4.47)
LYMPHOCYTES NFR BLD AUTO: 26 % (ref 14–44)
MCH RBC QN AUTO: 30.5 PG (ref 26.8–34.3)
MCHC RBC AUTO-ENTMCNC: 34.4 G/DL (ref 31.4–37.4)
MCV RBC AUTO: 89 FL (ref 82–98)
MONOCYTES # BLD AUTO: 0.42 THOUSAND/ΜL (ref 0.17–1.22)
MONOCYTES NFR BLD AUTO: 7 % (ref 4–12)
NEUTROPHILS # BLD AUTO: 3.98 THOUSANDS/ΜL (ref 1.85–7.62)
NEUTS SEG NFR BLD AUTO: 67 % (ref 43–75)
NITRITE UR QL STRIP: NEGATIVE
NRBC BLD AUTO-RTO: 0 /100 WBCS
PH UR STRIP.AUTO: 6.5 [PH]
PLATELET # BLD AUTO: 158 THOUSANDS/UL (ref 149–390)
PMV BLD AUTO: 10.4 FL (ref 8.9–12.7)
POTASSIUM SERPL-SCNC: 3.8 MMOL/L (ref 3.5–5.3)
PROT UR STRIP-MCNC: NEGATIVE MG/DL
RBC # BLD AUTO: 4.92 MILLION/UL (ref 3.88–5.62)
SODIUM SERPL-SCNC: 141 MMOL/L (ref 136–145)
SP GR UR STRIP.AUTO: <=1.005 (ref 1–1.03)
UROBILINOGEN UR QL STRIP.AUTO: 0.2 E.U./DL
WBC # BLD AUTO: 5.97 THOUSAND/UL (ref 4.31–10.16)

## 2021-09-01 PROCEDURE — 96361 HYDRATE IV INFUSION ADD-ON: CPT

## 2021-09-01 PROCEDURE — 96374 THER/PROPH/DIAG INJ IV PUSH: CPT

## 2021-09-01 PROCEDURE — 85025 COMPLETE CBC W/AUTO DIFF WBC: CPT | Performed by: EMERGENCY MEDICINE

## 2021-09-01 PROCEDURE — 99284 EMERGENCY DEPT VISIT MOD MDM: CPT | Performed by: EMERGENCY MEDICINE

## 2021-09-01 PROCEDURE — 74176 CT ABD & PELVIS W/O CONTRAST: CPT

## 2021-09-01 PROCEDURE — 36415 COLL VENOUS BLD VENIPUNCTURE: CPT | Performed by: EMERGENCY MEDICINE

## 2021-09-01 PROCEDURE — 99284 EMERGENCY DEPT VISIT MOD MDM: CPT

## 2021-09-01 PROCEDURE — 80048 BASIC METABOLIC PNL TOTAL CA: CPT | Performed by: EMERGENCY MEDICINE

## 2021-09-01 PROCEDURE — 81003 URINALYSIS AUTO W/O SCOPE: CPT | Performed by: EMERGENCY MEDICINE

## 2021-09-01 RX ORDER — KETOROLAC TROMETHAMINE 30 MG/ML
15 INJECTION, SOLUTION INTRAMUSCULAR; INTRAVENOUS ONCE
Status: COMPLETED | OUTPATIENT
Start: 2021-09-01 | End: 2021-09-01

## 2021-09-01 RX ORDER — CYCLOBENZAPRINE HCL 5 MG
5 TABLET ORAL 3 TIMES DAILY PRN
Qty: 15 TABLET | Refills: 0 | Status: SHIPPED | OUTPATIENT
Start: 2021-09-01

## 2021-09-01 RX ORDER — CYCLOBENZAPRINE HCL 10 MG
10 TABLET ORAL ONCE
Status: COMPLETED | OUTPATIENT
Start: 2021-09-01 | End: 2021-09-01

## 2021-09-01 RX ORDER — KETOROLAC TROMETHAMINE 10 MG/1
10 TABLET, FILM COATED ORAL EVERY 6 HOURS PRN
Qty: 15 TABLET | Refills: 0 | Status: SHIPPED | OUTPATIENT
Start: 2021-09-01

## 2021-09-01 RX ADMIN — KETOROLAC TROMETHAMINE 15 MG: 30 INJECTION, SOLUTION INTRAMUSCULAR at 20:51

## 2021-09-01 RX ADMIN — CYCLOBENZAPRINE HYDROCHLORIDE 10 MG: 10 TABLET, FILM COATED ORAL at 21:28

## 2021-09-01 RX ADMIN — SODIUM CHLORIDE 1000 ML: 0.9 INJECTION, SOLUTION INTRAVENOUS at 20:51

## 2021-09-02 NOTE — ED PROVIDER NOTES
History  Chief Complaint   Patient presents with    Back Pain     pt c/o lower back pain beginning yesterday that doesn't radiate  states he has black dots in his urine as well and a burning sensation when he pees     Patient complained of lower back pain for the past 3 days  Worse with movement  No known trauma  No fevers or chills  No nausea vomiting or diarrhea  States it hurts when he urinates  Not diabetic  No chronic steroid use  No IV drug abuse  No loss of bladder or bowel functions  No radiation of the pain  No shortness of breath  No cough or cold symptoms  Seen by the family doctor today who told to take Tylenol  Took 1 dose of Tylenol without any relief  States when he urinates he does not have any burning sensation but has pain in his back  Notices black dots in his urine when this happens  History provided by:  Patient   used: No    Back Pain  Location:  Lumbar spine  Quality:  Aching  Radiates to:  Does not radiate  Pain severity:  Mild  Pain is:  Same all the time  Onset quality:  Gradual  Duration:  3 days  Timing:  Constant  Progression:  Unchanged  Chronicity:  New  Context: not lifting heavy objects, not pedestrian accident and not recent illness    Relieved by:  Nothing  Exacerbated by: Hurts to urinate  Ineffective treatments: Tylenol  Associated symptoms: no abdominal pain, no bladder incontinence, no bowel incontinence, no chest pain, no dysuria, no fever, no headaches, no leg pain, no perianal numbness and no tingling        Prior to Admission Medications   Prescriptions Last Dose Informant Patient Reported?  Taking?   loratadine (CLARITIN) 10 mg tablet 8/31/2021 at Unknown time  Yes Yes   Sig: Take by mouth   simethicone (MYLICON) 213 MG chewable tablet 8/31/2021 at Unknown time  Yes Yes   Sig: Chew 125 mg every 6 (six) hours as needed for flatulence      Facility-Administered Medications: None       Past Medical History:   Diagnosis Date    Autistic disorder     Gastric paresis     Known health problems: none        Past Surgical History:   Procedure Laterality Date    EYE SURGERY      FOOT SURGERY         Family History   Problem Relation Age of Onset    No Known Problems Mother     No Known Problems Father      I have reviewed and agree with the history as documented  E-Cigarette/Vaping    E-Cigarette Use Never User      E-Cigarette/Vaping Substances     Social History     Tobacco Use    Smoking status: Never Smoker    Smokeless tobacco: Never Used   Vaping Use    Vaping Use: Never used   Substance Use Topics    Alcohol use: Not Currently    Drug use: Never       Review of Systems   Constitutional: Negative for chills and fever  HENT: Negative for ear pain, hearing loss, sore throat, trouble swallowing and voice change  Eyes: Negative for pain and discharge  Respiratory: Negative for cough, shortness of breath and wheezing  Cardiovascular: Negative for chest pain and palpitations  Gastrointestinal: Negative for abdominal pain, blood in stool, bowel incontinence, constipation, diarrhea, nausea and vomiting  Genitourinary: Negative for bladder incontinence, dysuria, flank pain, frequency and hematuria  Musculoskeletal: Positive for back pain  Negative for joint swelling, neck pain and neck stiffness  Skin: Negative for rash and wound  Neurological: Negative for dizziness, tingling, seizures, syncope, facial asymmetry and headaches  Psychiatric/Behavioral: Negative for hallucinations, self-injury and suicidal ideas  All other systems reviewed and are negative  Physical Exam  Physical Exam  Vitals and nursing note reviewed  Constitutional:       General: He is not in acute distress  Appearance: He is well-developed  HENT:      Head: Normocephalic and atraumatic  Right Ear: External ear normal       Left Ear: External ear normal    Eyes:      General: No scleral icterus          Right eye: No discharge  Left eye: No discharge  Extraocular Movements: Extraocular movements intact  Conjunctiva/sclera: Conjunctivae normal    Cardiovascular:      Rate and Rhythm: Normal rate and regular rhythm  Heart sounds: Normal heart sounds  No murmur heard  Pulmonary:      Effort: Pulmonary effort is normal       Breath sounds: Normal breath sounds  No wheezing or rales  Abdominal:      General: Bowel sounds are normal  There is no distension  Palpations: Abdomen is soft  Tenderness: There is no abdominal tenderness  There is no guarding or rebound  Musculoskeletal:         General: No deformity  Normal range of motion  Cervical back: Normal range of motion and neck supple  Comments: Diffusely tender in the paralumbar region  Skin:     General: Skin is warm and dry  Findings: No rash  Neurological:      General: No focal deficit present  Mental Status: He is alert and oriented to person, place, and time  Cranial Nerves: No cranial nerve deficit  Psychiatric:         Mood and Affect: Mood normal          Behavior: Behavior normal          Thought Content:  Thought content normal          Judgment: Judgment normal          Vital Signs  ED Triage Vitals   Temperature Pulse Respirations Blood Pressure SpO2   09/01/21 2016 09/01/21 2016 09/01/21 2016 09/01/21 2016 09/01/21 2016   98 3 °F (36 8 °C) 83 18 127/73 98 %      Temp Source Heart Rate Source Patient Position - Orthostatic VS BP Location FiO2 (%)   09/01/21 2016 09/01/21 2016 09/01/21 2016 09/01/21 2016 --   Oral Monitor Lying Left arm       Pain Score       09/01/21 2051       9           Vitals:    09/01/21 2016 09/01/21 2100   BP: 127/73 111/68   Pulse: 83 60   Patient Position - Orthostatic VS: Lying          Visual Acuity      ED Medications  Medications   sodium chloride 0 9 % bolus 1,000 mL (1,000 mL Intravenous New Bag 9/1/21 2051)   cyclobenzaprine (FLEXERIL) tablet 10 mg (has no administration in time range)   ketorolac (TORADOL) injection 15 mg (15 mg Intravenous Given 9/1/21 2051)       Diagnostic Studies  Results Reviewed     Procedure Component Value Units Date/Time    Basic metabolic panel [[id number]] Collected: 09/01/21 2045    Lab Status: Final result Specimen: Blood from Arm, Left Updated: 09/01/21 2116     Sodium 141 mmol/L      Potassium 3 8 mmol/L      Chloride 105 mmol/L      CO2 30 mmol/L      ANION GAP 6 mmol/L      BUN 9 mg/dL      Creatinine 1 00 mg/dL      Glucose 118 mg/dL      Calcium 8 5 mg/dL      eGFR 104 ml/min/1 73sq m     Narrative:      [place] guidelines for Chronic Kidney Disease (CKD):     Stage 1 with normal or high GFR (GFR > 90 mL/min/1 73 square meters)    Stage 2 Mild CKD (GFR = 60-89 mL/min/1 73 square meters)    Stage 3A Moderate CKD (GFR = 45-59 mL/min/1 73 square meters)    Stage 3B Moderate CKD (GFR = 30-44 mL/min/1 73 square meters)    Stage 4 Severe CKD (GFR = 15-29 mL/min/1 73 square meters)    Stage 5 End Stage CKD (GFR <15 mL/min/1 73 square meters)  Note: GFR calculation is accurate only with a steady state creatinine    UA w Reflex to Microscopic w Reflex to Culture [[id number]] Collected: 09/01/21 2045    Lab Status: Final result Specimen: Urine, Clean Catch Updated: 09/01/21 2052     Color, UA Yellow     Clarity, UA Clear     Specific Gravity, UA <=1 005     pH, UA 6 5     Leukocytes, UA Negative     Nitrite, UA Negative     Protein, UA Negative mg/dl      Glucose, UA Negative mg/dl      Ketones, UA Negative mg/dl      Urobilinogen, UA 0 2 E U /dl      Bilirubin, UA Negative     Blood, UA Negative    CBC and differential [[id number]] Collected: 09/01/21 2045    Lab Status: Final result Specimen: Blood from Arm, Left Updated: 09/01/21 2052     WBC 5 97 Thousand/uL      RBC 4 92 Million/uL      Hemoglobin 15 0 g/dL      Hematocrit 43 6 %      MCV 89 fL      MCH 30 5 pg      MCHC 34 4 g/dL      RDW 12 2 %      MPV 10 4 fL      Platelets 164 Thousands/uL      nRBC 0 /100 WBCs      Neutrophils Relative 67 %      Immat GRANS % 0 %      Lymphocytes Relative 26 %      Monocytes Relative 7 %      Eosinophils Relative 0 %      Basophils Relative 0 %      Neutrophils Absolute 3 98 Thousands/µL      Immature Grans Absolute 0 01 Thousand/uL      Lymphocytes Absolute 1 55 Thousands/µL      Monocytes Absolute 0 42 Thousand/µL      Eosinophils Absolute 0 00 Thousand/µL      Basophils Absolute 0 01 Thousands/µL                  CT abdomen pelvis wo contrast   Final Result by Olga Good MD (09/01 2101)         1  No evidence of bowel obstruction, colitis or diverticulitis  Normal appendix  2   No nephrolithiasis or obstructive uropathy  Workstation performed: [id number]         CT recon only lumbar spine   Final Result by Olga Good MD (09/01 2057)      No evidence of lumbar spine fracture or disc herniation  Workstation performed: [id number]                    Procedures  Procedures         ED Course  ED Course as of Sep 01 2120   Wed Sep 01, 2021   2116 Patient tolerated IV Toradol well  Improved his symptoms slightly  Symptoms are reproducible  Most likely musculoskeletal in origin  SBIRT 20yo+      Most Recent Value   SBIRT (24 yo +)   In order to provide better care to our patients, we are screening all of our patients for alcohol and drug use  Would it be okay to ask you these screening questions? Yes Filed at: 09/01/2021 2018   Initial Alcohol Screen: US AUDIT-C    1  How often do you have a drink containing alcohol?  0 Filed at: 09/01/2021 2018   2  How many drinks containing alcohol do you have on a typical day you are drinking? 0 Filed at: 09/01/2021 2018   3a  Male UNDER 65: How often do you have five or more drinks on one occasion? 0 Filed at: 09/01/2021 2018   3b  FEMALE Any Age, or MALE 65+:  How often do you have 4 or more drinks on one occassion? 0 Filed at: 09/01/2021 2018   Audit-C Score  0 Filed at: 09/01/2021 2018   BATSHEVA: How many times in the past year have you    Used an illegal drug or used a prescription medication for non-medical reasons? Never Filed at: 09/01/2021 2018                    [facility]  Number of Diagnoses or Management Options     Amount and/or Complexity of Data Reviewed  Clinical lab tests: reviewed  Review and summarize past medical records: yes        Disposition  Final diagnoses:   Acute bilateral low back pain without sciatica     Time reflects when diagnosis was documented in both MDM as applicable and the Disposition within this note     Time User Action Codes Description Comment    9/1/2021  9:18 PM Leslye Villasenor Eis Add [M54 5] Acute bilateral low back pain without sciatica       ED Disposition     ED Disposition Condition Date/Time Comment    Discharge Stable Wed Sep 1, 2021  9:18 PM Frances Matters discharge to home/self care  Follow-up Information     Follow up With Specialties Details Why 1 Adam Kan MD Family Medicine Call in 2 days  [address]  [phone]            Patient's Medications   Discharge Prescriptions    CYCLOBENZAPRINE (FLEXERIL) 5 MG TABLET    Take 1 tablet (5 mg total) by mouth 3 (three) times a day as needed for muscle spasms for up to 15 doses       Start Date: 9/1/2021  End Date: --       Order Dose: 5 mg       Quantity: 15 tablet    Refills: 0    KETOROLAC (TORADOL) 10 MG TABLET    Take 1 tablet (10 mg total) by mouth every 6 (six) hours as needed for moderate pain for up to 15 doses       Start Date: 9/1/2021  End Date: --       Order Dose: 10 mg       Quantity: 15 tablet    Refills: 0     No discharge procedures on file      PDMP Review     None          ED Provider  Electronically Signed by           Domingo Sotelo MD  09/01/21 0625

## 2022-02-20 ENCOUNTER — HOSPITAL ENCOUNTER (EMERGENCY)
Facility: HOSPITAL | Age: 27
Discharge: HOME | End: 2022-02-20
Attending: EMERGENCY MEDICINE | Admitting: EMERGENCY MEDICINE
Payer: COMMERCIAL

## 2022-02-20 VITALS
HEART RATE: 76 BPM | TEMPERATURE: 98.4 F | BODY MASS INDEX: 28.6 KG/M2 | WEIGHT: 188.71 LBS | HEIGHT: 68 IN | OXYGEN SATURATION: 99 % | SYSTOLIC BLOOD PRESSURE: 114 MMHG | DIASTOLIC BLOOD PRESSURE: 72 MMHG | RESPIRATION RATE: 18 BRPM

## 2022-02-20 DIAGNOSIS — R11.2 NAUSEA AND VOMITING: Primary | ICD-10-CM

## 2022-02-20 LAB
ALBUMIN SERPL BCP-MCNC: 4.3 G/DL (ref 3.5–5)
ALP SERPL-CCNC: 73 U/L (ref 46–116)
ALT SERPL W P-5'-P-CCNC: 33 U/L (ref 12–78)
ANION GAP SERPL CALCULATED.3IONS-SCNC: 9 MMOL/L (ref 4–13)
AST SERPL W P-5'-P-CCNC: 16 U/L (ref 5–45)
BASOPHILS # BLD AUTO: 0.01 THOUSANDS/ΜL (ref 0–0.1)
BASOPHILS NFR BLD AUTO: 0 % (ref 0–1)
BILIRUB SERPL-MCNC: 0.83 MG/DL (ref 0.2–1)
BUN SERPL-MCNC: 16 MG/DL (ref 5–25)
CALCIUM SERPL-MCNC: 9 MG/DL (ref 8.3–10.1)
CHLORIDE SERPL-SCNC: 103 MMOL/L (ref 100–108)
CO2 SERPL-SCNC: 30 MMOL/L (ref 21–32)
CREAT SERPL-MCNC: 0.98 MG/DL (ref 0.6–1.3)
EOSINOPHIL # BLD AUTO: 0.01 THOUSAND/ΜL (ref 0–0.61)
EOSINOPHIL NFR BLD AUTO: 0 % (ref 0–6)
ERYTHROCYTE [DISTWIDTH] IN BLOOD BY AUTOMATED COUNT: 12.3 % (ref 11.6–15.1)
GFR SERPL CREATININE-BSD FRML MDRD: 106 ML/MIN/1.73SQ M
GLUCOSE SERPL-MCNC: 110 MG/DL (ref 65–140)
HCT VFR BLD AUTO: 44 % (ref 36.5–49.3)
HGB BLD-MCNC: 15.2 G/DL (ref 12–17)
IMM GRANULOCYTES # BLD AUTO: 0.01 THOUSAND/UL (ref 0–0.2)
IMM GRANULOCYTES NFR BLD AUTO: 0 % (ref 0–2)
LIPASE SERPL-CCNC: 48 U/L (ref 73–393)
LYMPHOCYTES # BLD AUTO: 1.4 THOUSANDS/ΜL (ref 0.6–4.47)
LYMPHOCYTES NFR BLD AUTO: 25 % (ref 14–44)
MCH RBC QN AUTO: 30.4 PG (ref 26.8–34.3)
MCHC RBC AUTO-ENTMCNC: 34.5 G/DL (ref 31.4–37.4)
MCV RBC AUTO: 88 FL (ref 82–98)
MONOCYTES # BLD AUTO: 0.41 THOUSAND/ΜL (ref 0.17–1.22)
MONOCYTES NFR BLD AUTO: 7 % (ref 4–12)
NEUTROPHILS # BLD AUTO: 3.8 THOUSANDS/ΜL (ref 1.85–7.62)
NEUTS SEG NFR BLD AUTO: 68 % (ref 43–75)
NRBC BLD AUTO-RTO: 0 /100 WBCS
PLATELET # BLD AUTO: 157 THOUSANDS/UL (ref 149–390)
PMV BLD AUTO: 10.1 FL (ref 8.9–12.7)
POTASSIUM SERPL-SCNC: 4 MMOL/L (ref 3.5–5.3)
PROT SERPL-MCNC: 7.5 G/DL (ref 6.4–8.2)
RBC # BLD AUTO: 5 MILLION/UL (ref 3.88–5.62)
SODIUM SERPL-SCNC: 142 MMOL/L (ref 136–145)
WBC # BLD AUTO: 5.64 THOUSAND/UL (ref 4.31–10.16)

## 2022-02-20 PROCEDURE — 99284 EMERGENCY DEPT VISIT MOD MDM: CPT | Performed by: EMERGENCY MEDICINE

## 2022-02-20 PROCEDURE — 36415 COLL VENOUS BLD VENIPUNCTURE: CPT | Performed by: EMERGENCY MEDICINE

## 2022-02-20 PROCEDURE — 83690 ASSAY OF LIPASE: CPT | Performed by: EMERGENCY MEDICINE

## 2022-02-20 PROCEDURE — 96361 HYDRATE IV INFUSION ADD-ON: CPT

## 2022-02-20 PROCEDURE — 96374 THER/PROPH/DIAG INJ IV PUSH: CPT

## 2022-02-20 PROCEDURE — 80053 COMPREHEN METABOLIC PANEL: CPT | Performed by: EMERGENCY MEDICINE

## 2022-02-20 PROCEDURE — C9113 INJ PANTOPRAZOLE SODIUM, VIA: HCPCS | Performed by: EMERGENCY MEDICINE

## 2022-02-20 PROCEDURE — 85025 COMPLETE CBC W/AUTO DIFF WBC: CPT | Performed by: EMERGENCY MEDICINE

## 2022-02-20 PROCEDURE — 96375 TX/PRO/DX INJ NEW DRUG ADDON: CPT

## 2022-02-20 PROCEDURE — 99283 EMERGENCY DEPT VISIT LOW MDM: CPT

## 2022-02-20 RX ORDER — PANTOPRAZOLE SODIUM 40 MG/1
40 INJECTION, POWDER, FOR SOLUTION INTRAVENOUS ONCE
Status: COMPLETED | OUTPATIENT
Start: 2022-02-20 | End: 2022-02-20

## 2022-02-20 RX ORDER — KETOROLAC TROMETHAMINE 30 MG/ML
15 INJECTION, SOLUTION INTRAMUSCULAR; INTRAVENOUS ONCE
Status: COMPLETED | OUTPATIENT
Start: 2022-02-20 | End: 2022-02-20

## 2022-02-20 RX ORDER — METOCLOPRAMIDE HYDROCHLORIDE 5 MG/ML
10 INJECTION INTRAMUSCULAR; INTRAVENOUS ONCE
Status: COMPLETED | OUTPATIENT
Start: 2022-02-20 | End: 2022-02-20

## 2022-02-20 RX ORDER — METOCLOPRAMIDE 10 MG/1
10 TABLET ORAL EVERY 6 HOURS PRN
Qty: 30 TABLET | Refills: 0 | Status: SHIPPED | OUTPATIENT
Start: 2022-02-20

## 2022-02-20 RX ORDER — ONDANSETRON 2 MG/ML
4 INJECTION INTRAMUSCULAR; INTRAVENOUS ONCE
Status: COMPLETED | OUTPATIENT
Start: 2022-02-20 | End: 2022-02-20

## 2022-02-20 RX ADMIN — SODIUM CHLORIDE 1000 ML: 0.9 INJECTION, SOLUTION INTRAVENOUS at 21:33

## 2022-02-20 RX ADMIN — KETOROLAC TROMETHAMINE 15 MG: 30 INJECTION, SOLUTION INTRAMUSCULAR at 22:56

## 2022-02-20 RX ADMIN — PANTOPRAZOLE SODIUM 40 MG: 40 INJECTION, POWDER, FOR SOLUTION INTRAVENOUS at 22:06

## 2022-02-20 RX ADMIN — ONDANSETRON 4 MG: 2 INJECTION INTRAMUSCULAR; INTRAVENOUS at 21:30

## 2022-02-20 RX ADMIN — METOCLOPRAMIDE HYDROCHLORIDE 10 MG: 5 INJECTION INTRAMUSCULAR; INTRAVENOUS at 22:05

## 2022-02-21 NOTE — ED PROVIDER NOTES
History  Chief Complaint   Patient presents with    Vomiting     for past 24 hrs patient has N/V, cannot keep food or drink down, patient states vomit most recently was "red and then had coffee ground appearance" patient's mom was just sick with stomach bug earlier in denise week      Very pleasant 28-year-old male presents emergency room with chief complaint of nausea vomiting for last 24 hours  Reports he cannot keep down any food or drink and has notice what he believes to be coffee-ground appearance of his vomit  Patient reports that this occurred after he noticed some red streaks after several episodes of vomiting  Denies any diarrhea  Denies any fever  Was having a mild abdominal discomfort which has since subsided  Reports that his mother was ill with same  Denies any fevers chills  No urinary complaints  Denies any other significant past medical history other than some but surgery in the past   Patient appears to be resting comfortably in the emergency room at this time the no acute distress  History provided by:  Patient  Vomiting  Severity:  Moderate  Duration:  24 hours  Timing:  Intermittent  Number of daily episodes:  Cannot specify  Quality:  Unable to specify  Able to tolerate:  Liquids and solids  Progression:  Unchanged  Chronicity:  New  Recent urination:  Normal  Relieved by:  None tried  Worsened by:  Nothing  Ineffective treatments:  None tried  Associated symptoms: abdominal pain    Associated symptoms: no arthralgias, no chills, no cough, no diarrhea, no headaches and no myalgias    Abdominal pain:     Location:  Generalized    Progression:  Resolved      Prior to Admission Medications   Prescriptions Last Dose Informant Patient Reported? Taking?    cyclobenzaprine (FLEXERIL) 5 mg tablet Past Week at Unknown time  No Yes   Sig: Take 1 tablet (5 mg total) by mouth 3 (three) times a day as needed for muscle spasms for up to 15 doses   ketorolac (TORADOL) 10 mg tablet Past Week at Unknown time  No Yes   Sig: Take 1 tablet (10 mg total) by mouth every 6 (six) hours as needed for moderate pain for up to 15 doses   loratadine (CLARITIN) 10 mg tablet 2/19/2022 at Unknown time  Yes Yes   Sig: Take by mouth   simethicone (MYLICON) 454 MG chewable tablet 2/19/2022 at Unknown time  Yes Yes   Sig: Chew 125 mg every 6 (six) hours as needed for flatulence      Facility-Administered Medications: None       Past Medical History:   Diagnosis Date    Autistic disorder     Gastric paresis     Known health problems: none        Past Surgical History:   Procedure Laterality Date    EYE SURGERY      FOOT SURGERY         Family History   Problem Relation Age of Onset    No Known Problems Mother     No Known Problems Father      I have reviewed and agree with the history as documented  E-Cigarette/Vaping    E-Cigarette Use Never User      E-Cigarette/Vaping Substances     Social History     Tobacco Use    Smoking status: Never Smoker    Smokeless tobacco: Never Used   Vaping Use    Vaping Use: Never used   Substance Use Topics    Alcohol use: Not Currently    Drug use: Never       Review of Systems   Constitutional: Negative  Negative for chills  HENT: Negative  Eyes: Negative  Respiratory: Negative  Negative for cough, shortness of breath and wheezing  Cardiovascular: Negative  Gastrointestinal: Positive for abdominal pain, nausea and vomiting  Negative for constipation and diarrhea  Endocrine: Negative  Genitourinary: Negative for dysuria, flank pain and frequency  Musculoskeletal: Negative for arthralgias, back pain and myalgias  Skin: Negative  Negative for pallor and rash  Allergic/Immunologic: Negative  Neurological: Negative for headaches  Hematological: Negative  Psychiatric/Behavioral: Negative  All other systems reviewed and are negative  Physical Exam  Physical Exam  Vitals and nursing note reviewed     Constitutional:       General: He is not in acute distress  Appearance: Normal appearance  He is well-developed  He is not toxic-appearing  HENT:      Head: Normocephalic and atraumatic  Hair is normal       Jaw: No pain on movement  Right Ear: External ear normal       Left Ear: External ear normal       Nose: Nose normal       Mouth/Throat:      Mouth: Mucous membranes are moist       Pharynx: Oropharynx is clear  Eyes:      General: Lids are normal  No scleral icterus  Extraocular Movements: Extraocular movements intact  Conjunctiva/sclera: Conjunctivae normal       Pupils: Pupils are equal, round, and reactive to light  Cardiovascular:      Rate and Rhythm: Normal rate and regular rhythm  Heart sounds: Normal heart sounds  No murmur heard  Pulmonary:      Effort: Pulmonary effort is normal  No respiratory distress  Breath sounds: Normal breath sounds  No decreased breath sounds, wheezing, rhonchi or rales  Abdominal:      General: Abdomen is flat  There is no distension  Palpations: Abdomen is soft  Abdomen is not rigid  Tenderness: There is no abdominal tenderness  Musculoskeletal:         General: No deformity or signs of injury  Normal range of motion  Cervical back: Normal range of motion and neck supple  Skin:     General: Skin is warm and dry  Coloration: Skin is not pale  Findings: No rash  Neurological:      General: No focal deficit present  Mental Status: He is alert and oriented to person, place, and time  Mental status is at baseline     Psychiatric:         Attention and Perception: Attention normal          Mood and Affect: Mood normal          Speech: Speech normal          Behavior: Behavior normal          Vital Signs  ED Triage Vitals [02/20/22 2101]   Temperature Pulse Respirations Blood Pressure SpO2   98 4 °F (36 9 °C) 60 18 126/73 100 %      Temp Source Heart Rate Source Patient Position - Orthostatic VS BP Location FiO2 (%)   Temporal Monitor Lying Left arm --      Pain Score       10 - Worst Possible Pain           Vitals:    02/20/22 2101 02/20/22 2145 02/20/22 2200   BP: 126/73 113/70 114/72   Pulse: 60 60 76   Patient Position - Orthostatic VS: Lying           Visual Acuity      ED Medications  Medications   sodium chloride 0 9 % bolus 1,000 mL (0 mL Intravenous Stopped 2/20/22 2316)   ondansetron (ZOFRAN) injection 4 mg (4 mg Intravenous Given 2/20/22 2130)   pantoprazole (PROTONIX) injection 40 mg (40 mg Intravenous Given 2/20/22 2206)   metoclopramide (REGLAN) injection 10 mg (10 mg Intravenous Given 2/20/22 2205)   ketorolac (TORADOL) injection 15 mg (15 mg Intravenous Given 2/20/22 2256)       Diagnostic Studies  Results Reviewed     Procedure Component Value Units Date/Time    Comprehensive metabolic panel [[id number]] Collected: 02/20/22 2130    Lab Status: Final result Specimen: Blood from Arm, Right Updated: 02/20/22 2204     Sodium 142 mmol/L      Potassium 4 0 mmol/L      Chloride 103 mmol/L      CO2 30 mmol/L      ANION GAP 9 mmol/L      BUN 16 mg/dL      Creatinine 0 98 mg/dL      Glucose 110 mg/dL      Calcium 9 0 mg/dL      AST 16 U/L      ALT 33 U/L      Alkaline Phosphatase 73 U/L      Total Protein 7 5 g/dL      Albumin 4 3 g/dL      Total Bilirubin 0 83 mg/dL      eGFR 106 ml/min/1 73sq m     Narrative:      [place] guidelines for Chronic Kidney Disease (CKD):     Stage 1 with normal or high GFR (GFR > 90 mL/min/1 73 square meters)    Stage 2 Mild CKD (GFR = 60-89 mL/min/1 73 square meters)    Stage 3A Moderate CKD (GFR = 45-59 mL/min/1 73 square meters)    Stage 3B Moderate CKD (GFR = 30-44 mL/min/1 73 square meters)    Stage 4 Severe CKD (GFR = 15-29 mL/min/1 73 square meters)    Stage 5 End Stage CKD (GFR <15 mL/min/1 73 square meters)  Note: GFR calculation is accurate only with a steady state creatinine    Lipase [[id number]]  (Abnormal) Collected: 02/20/22 2130    Lab Status: Final result Specimen: Blood from Arm, Right Updated: 22     Lipase 48 u/L     CBC and differential [[id number]] Collected: 22    Lab Status: Final result Specimen: Blood from Arm, Right Updated: 22     WBC 5 64 Thousand/uL      RBC 5 00 Million/uL      Hemoglobin 15 2 g/dL      Hematocrit 44 0 %      MCV 88 fL      MCH 30 4 pg      MCHC 34 5 g/dL      RDW 12 3 %      MPV 10 1 fL      Platelets 297 Thousands/uL      nRBC 0 /100 WBCs      Neutrophils Relative 68 %      Immat GRANS % 0 %      Lymphocytes Relative 25 %      Monocytes Relative 7 %      Eosinophils Relative 0 %      Basophils Relative 0 %      Neutrophils Absolute 3 80 Thousands/µL      Immature Grans Absolute 0 01 Thousand/uL      Lymphocytes Absolute 1 40 Thousands/µL      Monocytes Absolute 0 41 Thousand/µL      Eosinophils Absolute 0 01 Thousand/µL      Basophils Absolute 0 01 Thousands/µL                  No orders to display              Procedures  Procedures         ED Course  ED Course as of 22   Sun  Patient feels better  Will try p o  Challenge and if tolerates p o  Will be discharged home                               SBIRT 20yo+      Most Recent Value   SBIRT (24 yo +)    In order to provide better care to our patients, we are screening all of our patients for alcohol and drug use  Would it be okay to ask you these screening questions? No Filed at: 2022                    MDM      Disposition  Final diagnoses:   Nausea and vomiting     Time reflects when diagnosis was documented in both MDM as applicable and the Disposition within this note     Time User Action Codes Description Comment    2022 11:02 PM Lo  Add [R11 2] Nausea and vomiting       ED Disposition     ED Disposition Condition Date/Time Comment    Discharge Stable Sun 2022 11:02 PM Corrinne Malay discharge to home/self care              Follow-up Information     Follow up With Specialties Details Why Contact Info Brian Ornelas MD Family Medicine  [facility]  [facility]  [phone]            Discharge Medication List as of 2/20/2022 11:04 PM      START taking these medications    Details   metoclopramide (Reglan) 10 mg tablet Take 1 tablet (10 mg total) by mouth every 6 (six) hours as needed (Nausea or vomiting), Starting Sun 2/20/2022, Normal         CONTINUE these medications which have NOT CHANGED    Details   cyclobenzaprine (FLEXERIL) 5 mg tablet Take 1 tablet (5 mg total) by mouth 3 (three) times a day as needed for muscle spasms for up to 15 doses, Starting Wed 9/1/2021, Normal      ketorolac (TORADOL) 10 mg tablet Take 1 tablet (10 mg total) by mouth every 6 (six) hours as needed for moderate pain for up to 15 doses, Starting Wed 9/1/2021, Normal      loratadine (CLARITIN) 10 mg tablet Take by mouth, Historical Med      simethicone (MYLICON) 701 MG chewable tablet Chew 125 mg every 6 (six) hours as needed for flatulence, Historical Med             No discharge procedures on file      PDMP Review     None          ED Provider  Electronically Signed by           Stewart Gonzales DO  02/21/22 1995

## 2022-02-21 NOTE — DISCHARGE INSTRUCTIONS
Your symptoms are likely due to a viral illness  Have a light diet consisting of bland foods for the next several days and then slowly advance  Use the medications as prescribed      You may also use medications over-the-counter such as Pepcid Complete

## 2022-02-22 ENCOUNTER — HOSPITAL ENCOUNTER (EMERGENCY)
Facility: HOSPITAL | Age: 27
Discharge: HOME | End: 2022-02-22
Attending: EMERGENCY MEDICINE
Payer: COMMERCIAL

## 2022-02-22 VITALS
HEART RATE: 92 BPM | WEIGHT: 188 LBS | TEMPERATURE: 98.2 F | RESPIRATION RATE: 20 BRPM | HEIGHT: 68 IN | BODY MASS INDEX: 28.49 KG/M2 | OXYGEN SATURATION: 98 % | SYSTOLIC BLOOD PRESSURE: 120 MMHG | DIASTOLIC BLOOD PRESSURE: 77 MMHG

## 2022-02-22 DIAGNOSIS — K29.70 GASTRITIS: Primary | ICD-10-CM

## 2022-02-22 LAB
ALBUMIN SERPL BCP-MCNC: 4.4 G/DL (ref 3.5–5)
ALP SERPL-CCNC: 76 U/L (ref 46–116)
ALT SERPL W P-5'-P-CCNC: 30 U/L (ref 12–78)
ANION GAP SERPL CALCULATED.3IONS-SCNC: 11 MMOL/L (ref 4–13)
APTT PPP: 30 SECONDS (ref 23–37)
AST SERPL W P-5'-P-CCNC: 15 U/L (ref 5–45)
BASOPHILS # BLD AUTO: 0.01 THOUSANDS/ΜL (ref 0–0.1)
BASOPHILS NFR BLD AUTO: 0 % (ref 0–1)
BILIRUB SERPL-MCNC: 0.7 MG/DL (ref 0.2–1)
BUN SERPL-MCNC: 16 MG/DL (ref 5–25)
CALCIUM SERPL-MCNC: 9 MG/DL (ref 8.3–10.1)
CHLORIDE SERPL-SCNC: 103 MMOL/L (ref 100–108)
CO2 SERPL-SCNC: 26 MMOL/L (ref 21–32)
CREAT SERPL-MCNC: 0.93 MG/DL (ref 0.6–1.3)
EOSINOPHIL # BLD AUTO: 0.01 THOUSAND/ΜL (ref 0–0.61)
EOSINOPHIL NFR BLD AUTO: 0 % (ref 0–6)
ERYTHROCYTE [DISTWIDTH] IN BLOOD BY AUTOMATED COUNT: 12.2 % (ref 11.6–15.1)
GFR SERPL CREATININE-BSD FRML MDRD: 112 ML/MIN/1.73SQ M
GLUCOSE SERPL-MCNC: 89 MG/DL (ref 65–140)
HCT VFR BLD AUTO: 44 % (ref 36.5–49.3)
HGB BLD-MCNC: 15.5 G/DL (ref 12–17)
IMM GRANULOCYTES # BLD AUTO: 0.01 THOUSAND/UL (ref 0–0.2)
IMM GRANULOCYTES NFR BLD AUTO: 0 % (ref 0–2)
INR PPP: 1.03 (ref 0.84–1.19)
LACTATE SERPL-SCNC: 0.8 MMOL/L (ref 0.5–2)
LIPASE SERPL-CCNC: 80 U/L (ref 73–393)
LYMPHOCYTES # BLD AUTO: 1.23 THOUSANDS/ΜL (ref 0.6–4.47)
LYMPHOCYTES NFR BLD AUTO: 22 % (ref 14–44)
MCH RBC QN AUTO: 30.9 PG (ref 26.8–34.3)
MCHC RBC AUTO-ENTMCNC: 35.2 G/DL (ref 31.4–37.4)
MCV RBC AUTO: 88 FL (ref 82–98)
MONOCYTES # BLD AUTO: 0.46 THOUSAND/ΜL (ref 0.17–1.22)
MONOCYTES NFR BLD AUTO: 8 % (ref 4–12)
NEUTROPHILS # BLD AUTO: 3.83 THOUSANDS/ΜL (ref 1.85–7.62)
NEUTS SEG NFR BLD AUTO: 70 % (ref 43–75)
NRBC BLD AUTO-RTO: 0 /100 WBCS
PLATELET # BLD AUTO: 169 THOUSANDS/UL (ref 149–390)
PMV BLD AUTO: 10.1 FL (ref 8.9–12.7)
POTASSIUM SERPL-SCNC: 4.1 MMOL/L (ref 3.5–5.3)
PROT SERPL-MCNC: 7.7 G/DL (ref 6.4–8.2)
PROTHROMBIN TIME: 13.4 SECONDS (ref 11.6–14.5)
RBC # BLD AUTO: 5.02 MILLION/UL (ref 3.88–5.62)
SODIUM SERPL-SCNC: 140 MMOL/L (ref 136–145)
WBC # BLD AUTO: 5.55 THOUSAND/UL (ref 4.31–10.16)

## 2022-02-22 PROCEDURE — 83690 ASSAY OF LIPASE: CPT | Performed by: EMERGENCY MEDICINE

## 2022-02-22 PROCEDURE — 96375 TX/PRO/DX INJ NEW DRUG ADDON: CPT

## 2022-02-22 PROCEDURE — 85730 THROMBOPLASTIN TIME PARTIAL: CPT | Performed by: EMERGENCY MEDICINE

## 2022-02-22 PROCEDURE — 99284 EMERGENCY DEPT VISIT MOD MDM: CPT | Performed by: EMERGENCY MEDICINE

## 2022-02-22 PROCEDURE — C9113 INJ PANTOPRAZOLE SODIUM, VIA: HCPCS | Performed by: EMERGENCY MEDICINE

## 2022-02-22 PROCEDURE — 80053 COMPREHEN METABOLIC PANEL: CPT | Performed by: EMERGENCY MEDICINE

## 2022-02-22 PROCEDURE — 83605 ASSAY OF LACTIC ACID: CPT | Performed by: EMERGENCY MEDICINE

## 2022-02-22 PROCEDURE — 85025 COMPLETE CBC W/AUTO DIFF WBC: CPT | Performed by: EMERGENCY MEDICINE

## 2022-02-22 PROCEDURE — 85610 PROTHROMBIN TIME: CPT | Performed by: EMERGENCY MEDICINE

## 2022-02-22 PROCEDURE — 99284 EMERGENCY DEPT VISIT MOD MDM: CPT

## 2022-02-22 PROCEDURE — 96374 THER/PROPH/DIAG INJ IV PUSH: CPT

## 2022-02-22 PROCEDURE — 96361 HYDRATE IV INFUSION ADD-ON: CPT

## 2022-02-22 PROCEDURE — 36415 COLL VENOUS BLD VENIPUNCTURE: CPT | Performed by: EMERGENCY MEDICINE

## 2022-02-22 RX ORDER — ONDANSETRON 2 MG/ML
4 INJECTION INTRAMUSCULAR; INTRAVENOUS ONCE
Status: COMPLETED | OUTPATIENT
Start: 2022-02-22 | End: 2022-02-22

## 2022-02-22 RX ORDER — PANTOPRAZOLE SODIUM 40 MG/1
40 INJECTION, POWDER, FOR SOLUTION INTRAVENOUS ONCE
Status: COMPLETED | OUTPATIENT
Start: 2022-02-22 | End: 2022-02-22

## 2022-02-22 RX ORDER — ONDANSETRON 4 MG/1
4 TABLET, FILM COATED ORAL EVERY 6 HOURS
Qty: 12 TABLET | Refills: 0 | Status: SHIPPED | OUTPATIENT
Start: 2022-02-22

## 2022-02-22 RX ORDER — PANTOPRAZOLE SODIUM 20 MG/1
20 TABLET, DELAYED RELEASE ORAL DAILY
Qty: 20 TABLET | Refills: 0 | Status: SHIPPED | OUTPATIENT
Start: 2022-02-22

## 2022-02-22 RX ADMIN — SODIUM CHLORIDE 1000 ML: 0.9 INJECTION, SOLUTION INTRAVENOUS at 17:40

## 2022-02-22 RX ADMIN — ONDANSETRON 4 MG: 2 INJECTION INTRAMUSCULAR; INTRAVENOUS at 17:39

## 2022-02-22 RX ADMIN — PANTOPRAZOLE SODIUM 40 MG: 40 INJECTION, POWDER, FOR SOLUTION INTRAVENOUS at 17:40

## 2022-02-22 NOTE — ED PROVIDER NOTES
History  Chief Complaint   Patient presents with    Vomiting Blood     Patient vomiting for past 4 days, states was black when vomited today  C/O abdominal pain, no diarrhea, denies burning/pain with urination     Patient was seen here 2 days ago for nausea vomiting  States that today when he vomited was dark in color  Last bowel was a few days ago and normal   Took regular without any relief  No fevers or chills  No diarrhea  History provided by:  Patient   used: No    Vomiting  Severity:  Mild  Duration:  4 days  Timing:  Intermittent  Quality:  Coffee grounds  Progression:  Unchanged  Chronicity:  New  Recent urination:  Normal  Relieved by:  Nothing  Worsened by:  Nothing  Associated symptoms: abdominal pain    Associated symptoms: no chills, no cough, no diarrhea, no fever, no headaches, no myalgias, no sore throat and no URI        Prior to Admission Medications   Prescriptions Last Dose Informant Patient Reported? Taking?    cyclobenzaprine (FLEXERIL) 5 mg tablet   No No   Sig: Take 1 tablet (5 mg total) by mouth 3 (three) times a day as needed for muscle spasms for up to 15 doses   ketorolac (TORADOL) 10 mg tablet   No No   Sig: Take 1 tablet (10 mg total) by mouth every 6 (six) hours as needed for moderate pain for up to 15 doses   loratadine (CLARITIN) 10 mg tablet   Yes No   Sig: Take by mouth   metoclopramide (Reglan) 10 mg tablet   No No   Sig: Take 1 tablet (10 mg total) by mouth every 6 (six) hours as needed (Nausea or vomiting)   simethicone (MYLICON) 908 MG chewable tablet   Yes No   Sig: Chew 125 mg every 6 (six) hours as needed for flatulence      Facility-Administered Medications: None       Past Medical History:   Diagnosis Date    Autistic disorder     Gastric paresis     Known health problems: none        Past Surgical History:   Procedure Laterality Date    EYE SURGERY      FOOT SURGERY         Family History   Problem Relation Age of Onset    No Known Problems Mother     No Known Problems Father      I have reviewed and agree with the history as documented  E-Cigarette/Vaping    E-Cigarette Use Never User      E-Cigarette/Vaping Substances     Social History     Tobacco Use    Smoking status: Never Smoker    Smokeless tobacco: Never Used   Vaping Use    Vaping Use: Never used   Substance Use Topics    Alcohol use: Not Currently    Drug use: Never       Review of Systems   Constitutional: Negative for chills and fever  HENT: Negative for ear pain, hearing loss, sore throat, trouble swallowing and voice change  Eyes: Negative for pain and discharge  Respiratory: Negative for cough, shortness of breath and wheezing  Cardiovascular: Negative for chest pain and palpitations  Gastrointestinal: Positive for abdominal pain and vomiting  Negative for blood in stool, constipation, diarrhea and nausea  Genitourinary: Negative for dysuria, flank pain, frequency and hematuria  Musculoskeletal: Negative for joint swelling, myalgias, neck pain and neck stiffness  Skin: Negative for rash and wound  Neurological: Negative for dizziness, seizures, syncope, facial asymmetry and headaches  Psychiatric/Behavioral: Negative for hallucinations, self-injury and suicidal ideas  All other systems reviewed and are negative  Physical Exam  Physical Exam  Vitals and nursing note reviewed  Constitutional:       General: He is not in acute distress  Appearance: He is well-developed  HENT:      Head: Normocephalic and atraumatic  Right Ear: External ear normal       Left Ear: External ear normal    Eyes:      General: No scleral icterus  Right eye: No discharge  Left eye: No discharge  Extraocular Movements: Extraocular movements intact  Conjunctiva/sclera: Conjunctivae normal    Cardiovascular:      Rate and Rhythm: Normal rate and regular rhythm  Heart sounds: Normal heart sounds   No murmur heard       Pulmonary:      Effort: Pulmonary effort is normal       Breath sounds: Normal breath sounds  No wheezing or rales  Abdominal:      General: Bowel sounds are normal  There is no distension  Palpations: Abdomen is soft  Tenderness: There is abdominal tenderness (Tender epigastric region  )  There is no guarding or rebound  Musculoskeletal:         General: No deformity  Normal range of motion  Cervical back: Normal range of motion and neck supple  Skin:     General: Skin is warm and dry  Findings: No rash  Neurological:      General: No focal deficit present  Mental Status: He is alert and oriented to person, place, and time  Cranial Nerves: No cranial nerve deficit  Psychiatric:         Mood and Affect: Mood normal          Behavior: Behavior normal          Thought Content:  Thought content normal          Judgment: Judgment normal          Vital Signs  ED Triage Vitals [02/22/22 1614]   Temperature Pulse Respirations Blood Pressure SpO2   98 2 °F (36 8 °C) 92 20 120/77 98 %      Temp Source Heart Rate Source Patient Position - Orthostatic VS BP Location FiO2 (%)   Temporal Monitor Sitting Right arm --      Pain Score       10 - Worst Possible Pain           Vitals:    02/22/22 1614   BP: 120/77   Pulse: 92   Patient Position - Orthostatic VS: Sitting         Visual Acuity      ED Medications  Medications   sodium chloride 0 9 % bolus 1,000 mL (1,000 mL Intravenous New Bag 2/22/22 1740)   pantoprazole (PROTONIX) injection 40 mg (40 mg Intravenous Given 2/22/22 1740)   ondansetron (ZOFRAN) injection 4 mg (4 mg Intravenous Given 2/22/22 1739)       Diagnostic Studies  Results Reviewed     Procedure Component Value Units Date/Time    Lactic acid [[id number]]  (Normal) Collected: 02/22/22 1739    Lab Status: Final result Specimen: Blood from Arm, Right Updated: 02/22/22 1816     LACTIC ACID 0 8 mmol/L     Narrative:      Result may be elevated if tourniquet was used during collection      Lipase [[id number]]  (Normal) Collected: 02/22/22 1739    Lab Status: Final result Specimen: Blood from Arm, Right Updated: 02/22/22 1811     Lipase 80 u/L     Comprehensive metabolic panel [[id number]] Collected: 02/22/22 1739    Lab Status: Final result Specimen: Blood from Arm, Right Updated: 02/22/22 1811     Sodium 140 mmol/L      Potassium 4 1 mmol/L      Chloride 103 mmol/L      CO2 26 mmol/L      ANION GAP 11 mmol/L      BUN 16 mg/dL      Creatinine 0 93 mg/dL      Glucose 89 mg/dL      Calcium 9 0 mg/dL      AST 15 U/L      ALT 30 U/L      Alkaline Phosphatase 76 U/L      Total Protein 7 7 g/dL      Albumin 4 4 g/dL      Total Bilirubin 0 70 mg/dL      eGFR 112 ml/min/1 73sq m     Narrative:      [place] guidelines for Chronic Kidney Disease (CKD):     Stage 1 with normal or high GFR (GFR > 90 mL/min/1 73 square meters)    Stage 2 Mild CKD (GFR = 60-89 mL/min/1 73 square meters)    Stage 3A Moderate CKD (GFR = 45-59 mL/min/1 73 square meters)    Stage 3B Moderate CKD (GFR = 30-44 mL/min/1 73 square meters)    Stage 4 Severe CKD (GFR = 15-29 mL/min/1 73 square meters)    Stage 5 End Stage CKD (GFR <15 mL/min/1 73 square meters)  Note: GFR calculation is accurate only with a steady state creatinine    Protime-INR [[id number]]  (Normal) Collected: 02/22/22 1739    Lab Status: Final result Specimen: Blood from Arm, Right Updated: 02/22/22 1802     Protime 13 4 seconds      INR 1 03    APTT [[id number]]  (Normal) Collected: 02/22/22 1739    Lab Status: Final result Specimen: Blood from Arm, Right Updated: 02/22/22 1802     PTT 30 seconds     CBC and differential [[id number]] Collected: 02/22/22 1739    Lab Status: Final result Specimen: Blood from Arm, Right Updated: 02/22/22 1749     WBC 5 55 Thousand/uL      RBC 5 02 Million/uL      Hemoglobin 15 5 g/dL      Hematocrit 44 0 %      MCV 88 fL      MCH 30 9 pg      MCHC 35 2 g/dL      RDW 12 2 %      MPV 10 1 fL Platelets 073 Thousands/uL      nRBC 0 /100 WBCs      Neutrophils Relative 70 %      Immat GRANS % 0 %      Lymphocytes Relative 22 %      Monocytes Relative 8 %      Eosinophils Relative 0 %      Basophils Relative 0 %      Neutrophils Absolute 3 83 Thousands/µL      Immature Grans Absolute 0 01 Thousand/uL      Lymphocytes Absolute 1 23 Thousands/µL      Monocytes Absolute 0 46 Thousand/µL      Eosinophils Absolute 0 01 Thousand/µL      Basophils Absolute 0 01 Thousands/µL                  No orders to display              Procedures  Procedures         ED Course  ED Course as of 02/22/22 1818   Tue Feb 22, 2022   1816 Patient is not tachycardic  Blood pressure is stable  Hemoglobin is stable  Okay for discharge  MDM  Number of Diagnoses or Management Options     Amount and/or Complexity of Data Reviewed  Clinical lab tests: reviewed  Review and summarize past medical records: yes        Disposition  Final diagnoses:   Gastritis     Time reflects when diagnosis was documented in both MDM as applicable and the Disposition within this note     Time User Action Codes Description Comment    2/22/2022  6:08 PM Alysa Vásquez Add [K29 70] Gastritis       ED Disposition     ED Disposition Condition Date/Time Comment    Discharge Stable Tue Feb 22, 2022  6:08 PM Caro Mckoy discharge to home/self care              Follow-up Information     Follow up With Specialties Details Why 1 Adam Kan MD Family Medicine Call in 2 days  [address]  [phone]            Patient's Medications   Discharge Prescriptions    ONDANSETRON (ZOFRAN) 4 MG TABLET    Take 1 tablet (4 mg total) by mouth every 6 (six) hours       Start Date: 2/22/2022 End Date: --       Order Dose: 4 mg       Quantity: 12 tablet    Refills: 0    PANTOPRAZOLE (PROTONIX) 20 MG TABLET    Take 1 tablet (20 mg total) by mouth daily       Start Date: 2/22/2022 End Date: --       Order Dose: 20 mg       Quantity: 20 tablet    Refills: 0       No discharge procedures on file      PDMP Review     None          ED Provider  Electronically Signed by           Victor Manuel Bosch MD  02/22/22 0330

## 2022-09-03 ENCOUNTER — APPOINTMENT (EMERGENCY)
Dept: CT IMAGING | Facility: HOSPITAL | Age: 27
DRG: 720 | End: 2022-09-03
Payer: COMMERCIAL

## 2022-09-03 ENCOUNTER — HOSPITAL ENCOUNTER (OUTPATIENT)
Facility: HOSPITAL | Age: 27
Setting detail: OBSERVATION
Discharge: HOME | DRG: 720 | End: 2022-09-06
Attending: INTERNAL MEDICINE | Admitting: INTERNAL MEDICINE
Payer: COMMERCIAL

## 2022-09-03 DIAGNOSIS — R11.2 N&V (NAUSEA AND VOMITING): ICD-10-CM

## 2022-09-03 DIAGNOSIS — R11.10 VOMITING: ICD-10-CM

## 2022-09-03 DIAGNOSIS — R10.13 EPIGASTRIC PAIN: Primary | ICD-10-CM

## 2022-09-03 DIAGNOSIS — K29.70 GASTRITIS: ICD-10-CM

## 2022-09-03 DIAGNOSIS — K92.0 COFFEE GROUND EMESIS: ICD-10-CM

## 2022-09-03 DIAGNOSIS — R33.9 URINARY RETENTION: ICD-10-CM

## 2022-09-03 LAB
ABO GROUP BLD: NORMAL
ABO GROUP BLD: NORMAL
ALBUMIN SERPL BCP-MCNC: 4.3 G/DL (ref 3.5–5)
ALP SERPL-CCNC: 67 U/L (ref 46–116)
ALT SERPL W P-5'-P-CCNC: 25 U/L (ref 12–78)
ANION GAP SERPL CALCULATED.3IONS-SCNC: 15 MMOL/L (ref 4–13)
APTT PPP: 28 SECONDS (ref 23–37)
AST SERPL W P-5'-P-CCNC: 19 U/L (ref 5–45)
BASOPHILS # BLD AUTO: 0.01 THOUSANDS/ΜL (ref 0–0.1)
BASOPHILS NFR BLD AUTO: 0 % (ref 0–1)
BILIRUB SERPL-MCNC: 0.71 MG/DL (ref 0.2–1)
BILIRUB UR QL STRIP: ABNORMAL
BLD GP AB SCN SERPL QL: NEGATIVE
BUN SERPL-MCNC: 17 MG/DL (ref 5–25)
CALCIUM SERPL-MCNC: 9.1 MG/DL (ref 8.3–10.1)
CHLORIDE SERPL-SCNC: 102 MMOL/L (ref 96–108)
CLARITY UR: CLEAR
CO2 SERPL-SCNC: 24 MMOL/L (ref 21–32)
COLOR UR: YELLOW
CREAT SERPL-MCNC: 0.98 MG/DL (ref 0.6–1.3)
EOSINOPHIL # BLD AUTO: 0.01 THOUSAND/ΜL (ref 0–0.61)
EOSINOPHIL NFR BLD AUTO: 0 % (ref 0–6)
ERYTHROCYTE [DISTWIDTH] IN BLOOD BY AUTOMATED COUNT: 12.2 % (ref 11.6–15.1)
FLUAV RNA RESP QL NAA+PROBE: NEGATIVE
FLUBV RNA RESP QL NAA+PROBE: NEGATIVE
GFR SERPL CREATININE-BSD FRML MDRD: 105 ML/MIN/1.73SQ M
GLUCOSE SERPL-MCNC: 82 MG/DL (ref 65–140)
GLUCOSE UR STRIP-MCNC: NEGATIVE MG/DL
HCT VFR BLD AUTO: 45 % (ref 36.5–49.3)
HGB BLD-MCNC: 15.4 G/DL (ref 12–17)
HGB UR QL STRIP.AUTO: NEGATIVE
IMM GRANULOCYTES # BLD AUTO: 0.01 THOUSAND/UL (ref 0–0.2)
IMM GRANULOCYTES NFR BLD AUTO: 0 % (ref 0–2)
INR PPP: 0.98 (ref 0.84–1.19)
KETONES UR STRIP-MCNC: ABNORMAL MG/DL
LACTATE SERPL-SCNC: 1 MMOL/L (ref 0.5–2)
LEUKOCYTE ESTERASE UR QL STRIP: NEGATIVE
LIPASE SERPL-CCNC: 61 U/L (ref 73–393)
LYMPHOCYTES # BLD AUTO: 1.32 THOUSANDS/ΜL (ref 0.6–4.47)
LYMPHOCYTES NFR BLD AUTO: 24 % (ref 14–44)
MAGNESIUM SERPL-MCNC: 2.2 MG/DL (ref 1.6–2.6)
MCH RBC QN AUTO: 30.2 PG (ref 26.8–34.3)
MCHC RBC AUTO-ENTMCNC: 34.2 G/DL (ref 31.4–37.4)
MCV RBC AUTO: 88 FL (ref 82–98)
MONOCYTES # BLD AUTO: 0.48 THOUSAND/ΜL (ref 0.17–1.22)
MONOCYTES NFR BLD AUTO: 9 % (ref 4–12)
NEUTROPHILS # BLD AUTO: 3.66 THOUSANDS/ΜL (ref 1.85–7.62)
NEUTS SEG NFR BLD AUTO: 67 % (ref 43–75)
NITRITE UR QL STRIP: NEGATIVE
NRBC BLD AUTO-RTO: 0 /100 WBCS
PH UR STRIP.AUTO: 5.5 [PH]
PLATELET # BLD AUTO: 167 THOUSANDS/UL (ref 149–390)
PMV BLD AUTO: 10 FL (ref 8.9–12.7)
POTASSIUM SERPL-SCNC: 3.9 MMOL/L (ref 3.5–5.3)
PROT SERPL-MCNC: 7.6 G/DL (ref 6.4–8.4)
PROT UR STRIP-MCNC: NEGATIVE MG/DL
PROTHROMBIN TIME: 13.1 SECONDS (ref 11.6–14.5)
RBC # BLD AUTO: 5.1 MILLION/UL (ref 3.88–5.62)
RH BLD: POSITIVE
RH BLD: POSITIVE
RSV RNA RESP QL NAA+PROBE: NEGATIVE
SARS-COV-2 RNA RESP QL NAA+PROBE: NEGATIVE
SODIUM SERPL-SCNC: 141 MMOL/L (ref 135–147)
SP GR UR STRIP.AUTO: 1.02 (ref 1–1.03)
SPECIMEN EXPIRATION DATE: NORMAL
UROBILINOGEN UR QL STRIP.AUTO: 0.2 E.U./DL
WBC # BLD AUTO: 5.49 THOUSAND/UL (ref 4.31–10.16)

## 2022-09-03 PROCEDURE — 85025 COMPLETE CBC W/AUTO DIFF WBC: CPT | Performed by: PHYSICIAN ASSISTANT

## 2022-09-03 PROCEDURE — 96375 TX/PRO/DX INJ NEW DRUG ADDON: CPT

## 2022-09-03 PROCEDURE — 83605 ASSAY OF LACTIC ACID: CPT | Performed by: PHYSICIAN ASSISTANT

## 2022-09-03 PROCEDURE — 83735 ASSAY OF MAGNESIUM: CPT | Performed by: PHYSICIAN ASSISTANT

## 2022-09-03 PROCEDURE — 96361 HYDRATE IV INFUSION ADD-ON: CPT

## 2022-09-03 PROCEDURE — G1004 CDSM NDSC: HCPCS

## 2022-09-03 PROCEDURE — 36415 COLL VENOUS BLD VENIPUNCTURE: CPT | Performed by: PHYSICIAN ASSISTANT

## 2022-09-03 PROCEDURE — 96365 THER/PROPH/DIAG IV INF INIT: CPT

## 2022-09-03 PROCEDURE — 85610 PROTHROMBIN TIME: CPT | Performed by: PHYSICIAN ASSISTANT

## 2022-09-03 PROCEDURE — 99285 EMERGENCY DEPT VISIT HI MDM: CPT

## 2022-09-03 PROCEDURE — 99285 EMERGENCY DEPT VISIT HI MDM: CPT | Performed by: PHYSICIAN ASSISTANT

## 2022-09-03 PROCEDURE — 86901 BLOOD TYPING SEROLOGIC RH(D): CPT | Performed by: PHYSICIAN ASSISTANT

## 2022-09-03 PROCEDURE — 85730 THROMBOPLASTIN TIME PARTIAL: CPT | Performed by: PHYSICIAN ASSISTANT

## 2022-09-03 PROCEDURE — 86850 RBC ANTIBODY SCREEN: CPT | Performed by: PHYSICIAN ASSISTANT

## 2022-09-03 PROCEDURE — 86900 BLOOD TYPING SEROLOGIC ABO: CPT | Performed by: PHYSICIAN ASSISTANT

## 2022-09-03 PROCEDURE — 74177 CT ABD & PELVIS W/CONTRAST: CPT

## 2022-09-03 PROCEDURE — 96366 THER/PROPH/DIAG IV INF ADDON: CPT

## 2022-09-03 PROCEDURE — 81003 URINALYSIS AUTO W/O SCOPE: CPT | Performed by: PHYSICIAN ASSISTANT

## 2022-09-03 PROCEDURE — 80053 COMPREHEN METABOLIC PANEL: CPT | Performed by: PHYSICIAN ASSISTANT

## 2022-09-03 PROCEDURE — 0241U HB NFCT DS VIR RESP RNA 4 TRGT: CPT | Performed by: PHYSICIAN ASSISTANT

## 2022-09-03 PROCEDURE — C9113 INJ PANTOPRAZOLE SODIUM, VIA: HCPCS | Performed by: PHYSICIAN ASSISTANT

## 2022-09-03 PROCEDURE — 83690 ASSAY OF LIPASE: CPT | Performed by: PHYSICIAN ASSISTANT

## 2022-09-03 RX ORDER — METOCLOPRAMIDE HYDROCHLORIDE 5 MG/ML
10 INJECTION INTRAMUSCULAR; INTRAVENOUS ONCE
Status: COMPLETED | OUTPATIENT
Start: 2022-09-03 | End: 2022-09-03

## 2022-09-03 RX ORDER — MORPHINE SULFATE 4 MG/ML
4 INJECTION, SOLUTION INTRAMUSCULAR; INTRAVENOUS ONCE
Status: COMPLETED | OUTPATIENT
Start: 2022-09-03 | End: 2022-09-03

## 2022-09-03 RX ORDER — ONDANSETRON 2 MG/ML
4 INJECTION INTRAMUSCULAR; INTRAVENOUS ONCE
Status: DISCONTINUED | OUTPATIENT
Start: 2022-09-03 | End: 2022-09-03

## 2022-09-03 RX ORDER — ONDANSETRON 2 MG/ML
4 INJECTION INTRAMUSCULAR; INTRAVENOUS ONCE
Status: COMPLETED | OUTPATIENT
Start: 2022-09-03 | End: 2022-09-03

## 2022-09-03 RX ORDER — DIPHENHYDRAMINE HYDROCHLORIDE 50 MG/ML
25 INJECTION INTRAMUSCULAR; INTRAVENOUS ONCE
Status: COMPLETED | OUTPATIENT
Start: 2022-09-03 | End: 2022-09-03

## 2022-09-03 RX ADMIN — SODIUM CHLORIDE 80 MG: 9 INJECTION, SOLUTION INTRAVENOUS at 20:57

## 2022-09-03 RX ADMIN — METOCLOPRAMIDE 10 MG: 5 INJECTION, SOLUTION INTRAMUSCULAR; INTRAVENOUS at 23:02

## 2022-09-03 RX ADMIN — ONDANSETRON 4 MG: 2 INJECTION INTRAMUSCULAR; INTRAVENOUS at 19:29

## 2022-09-03 RX ADMIN — DIPHENHYDRAMINE HYDROCHLORIDE 25 MG: 50 INJECTION, SOLUTION INTRAMUSCULAR; INTRAVENOUS at 23:01

## 2022-09-03 RX ADMIN — MORPHINE SULFATE 4 MG: 4 INJECTION INTRAVENOUS at 20:51

## 2022-09-03 RX ADMIN — SODIUM CHLORIDE 8 MG/HR: 9 INJECTION, SOLUTION INTRAVENOUS at 21:46

## 2022-09-03 RX ADMIN — SODIUM CHLORIDE 1000 ML: 0.9 INJECTION, SOLUTION INTRAVENOUS at 22:57

## 2022-09-03 RX ADMIN — IOHEXOL 64 ML: 350 INJECTION, SOLUTION INTRAVENOUS at 21:34

## 2022-09-03 RX ADMIN — SODIUM CHLORIDE 1000 ML: 0.9 INJECTION, SOLUTION INTRAVENOUS at 19:24

## 2022-09-03 NOTE — LETTER
[facility]  [address] [place] [address]  Dept: [phone]    September 6, 2022     Patient: Aria Gomes   YOB: 1995   Date of Visit: 9/3/2022       To Whom it May Concern:    Aria Gomes is under my professional care  He was seen in the hospital from 9/3/2022   to 09/06/22  He may return to work on 9/7 without limitations  If you have any questions or concerns, please don't hesitate to call           Sincerely,          Andrey Claros MD

## 2022-09-04 PROBLEM — K29.00 ACUTE GASTRITIS: Status: ACTIVE | Noted: 2022-09-04

## 2022-09-04 LAB
ANION GAP SERPL CALCULATED.3IONS-SCNC: 15 MMOL/L (ref 4–13)
BUN SERPL-MCNC: 15 MG/DL (ref 5–25)
CALCIUM SERPL-MCNC: 8 MG/DL (ref 8.3–10.1)
CHLORIDE SERPL-SCNC: 107 MMOL/L (ref 96–108)
CO2 SERPL-SCNC: 20 MMOL/L (ref 21–32)
CREAT SERPL-MCNC: 0.95 MG/DL (ref 0.6–1.3)
ERYTHROCYTE [DISTWIDTH] IN BLOOD BY AUTOMATED COUNT: 12.3 % (ref 11.6–15.1)
GFR SERPL CREATININE-BSD FRML MDRD: 110 ML/MIN/1.73SQ M
GLUCOSE P FAST SERPL-MCNC: 69 MG/DL (ref 65–99)
GLUCOSE SERPL-MCNC: 69 MG/DL (ref 65–140)
HCT VFR BLD AUTO: 39.1 % (ref 36.5–49.3)
HGB BLD-MCNC: 13.1 G/DL (ref 12–17)
MAGNESIUM SERPL-MCNC: 2 MG/DL (ref 1.6–2.6)
MCH RBC QN AUTO: 29.9 PG (ref 26.8–34.3)
MCHC RBC AUTO-ENTMCNC: 33.5 G/DL (ref 31.4–37.4)
MCV RBC AUTO: 89 FL (ref 82–98)
PHOSPHATE SERPL-MCNC: 3.9 MG/DL (ref 2.7–4.5)
PLATELET # BLD AUTO: 140 THOUSANDS/UL (ref 149–390)
PMV BLD AUTO: 9.9 FL (ref 8.9–12.7)
POTASSIUM SERPL-SCNC: 3.9 MMOL/L (ref 3.5–5.3)
RBC # BLD AUTO: 4.38 MILLION/UL (ref 3.88–5.62)
SODIUM SERPL-SCNC: 142 MMOL/L (ref 135–147)
WBC # BLD AUTO: 4.91 THOUSAND/UL (ref 4.31–10.16)

## 2022-09-04 PROCEDURE — C9113 INJ PANTOPRAZOLE SODIUM, VIA: HCPCS | Performed by: INTERNAL MEDICINE

## 2022-09-04 PROCEDURE — 85027 COMPLETE CBC AUTOMATED: CPT | Performed by: NURSE PRACTITIONER

## 2022-09-04 PROCEDURE — 99220 PR INITIAL OBSERVATION CARE/DAY 70 MINUTES: CPT | Performed by: INTERNAL MEDICINE

## 2022-09-04 PROCEDURE — 84100 ASSAY OF PHOSPHORUS: CPT | Performed by: NURSE PRACTITIONER

## 2022-09-04 PROCEDURE — 83735 ASSAY OF MAGNESIUM: CPT | Performed by: NURSE PRACTITIONER

## 2022-09-04 PROCEDURE — C9113 INJ PANTOPRAZOLE SODIUM, VIA: HCPCS | Performed by: PHYSICIAN ASSISTANT

## 2022-09-04 PROCEDURE — 80048 BASIC METABOLIC PNL TOTAL CA: CPT | Performed by: NURSE PRACTITIONER

## 2022-09-04 RX ORDER — PANTOPRAZOLE SODIUM 40 MG/10ML
40 INJECTION, POWDER, LYOPHILIZED, FOR SOLUTION INTRAVENOUS EVERY 12 HOURS SCHEDULED
Status: DISCONTINUED | OUTPATIENT
Start: 2022-09-04 | End: 2022-09-06

## 2022-09-04 RX ORDER — ACETAMINOPHEN 325 MG/1
650 TABLET ORAL EVERY 6 HOURS PRN
Status: DISCONTINUED | OUTPATIENT
Start: 2022-09-04 | End: 2022-09-06 | Stop reason: HOSPADM

## 2022-09-04 RX ORDER — TAMSULOSIN HYDROCHLORIDE 0.4 MG/1
0.4 CAPSULE ORAL
Status: DISCONTINUED | OUTPATIENT
Start: 2022-09-04 | End: 2022-09-06 | Stop reason: HOSPADM

## 2022-09-04 RX ORDER — SODIUM CHLORIDE 9 MG/ML
100 INJECTION, SOLUTION INTRAVENOUS CONTINUOUS
Status: DISCONTINUED | OUTPATIENT
Start: 2022-09-04 | End: 2022-09-06

## 2022-09-04 RX ORDER — ONDANSETRON 2 MG/ML
4 INJECTION INTRAMUSCULAR; INTRAVENOUS EVERY 6 HOURS PRN
Status: DISCONTINUED | OUTPATIENT
Start: 2022-09-04 | End: 2022-09-06 | Stop reason: HOSPADM

## 2022-09-04 RX ADMIN — ACETAMINOPHEN 650 MG: 325 TABLET ORAL at 20:34

## 2022-09-04 RX ADMIN — PANTOPRAZOLE SODIUM 40 MG: 40 INJECTION, POWDER, FOR SOLUTION INTRAVENOUS at 12:11

## 2022-09-04 RX ADMIN — SODIUM CHLORIDE 125 ML/HR: 0.9 INJECTION, SOLUTION INTRAVENOUS at 00:25

## 2022-09-04 RX ADMIN — ONDANSETRON 4 MG: 2 INJECTION INTRAMUSCULAR; INTRAVENOUS at 06:16

## 2022-09-04 RX ADMIN — SODIUM CHLORIDE 8 MG/HR: 9 INJECTION, SOLUTION INTRAVENOUS at 08:22

## 2022-09-04 RX ADMIN — PANTOPRAZOLE SODIUM 40 MG: 40 INJECTION, POWDER, FOR SOLUTION INTRAVENOUS at 20:34

## 2022-09-04 RX ADMIN — SODIUM CHLORIDE 125 ML/HR: 0.9 INJECTION, SOLUTION INTRAVENOUS at 08:22

## 2022-09-04 RX ADMIN — SODIUM CHLORIDE 125 ML/HR: 0.9 INJECTION, SOLUTION INTRAVENOUS at 16:05

## 2022-09-04 RX ADMIN — SODIUM CHLORIDE 125 ML/HR: 0.9 INJECTION, SOLUTION INTRAVENOUS at 23:30

## 2022-09-04 RX ADMIN — TAMSULOSIN HYDROCHLORIDE 0.4 MG: 0.4 CAPSULE ORAL at 16:05

## 2022-09-04 RX ADMIN — ACETAMINOPHEN 650 MG: 325 TABLET ORAL at 12:14

## 2022-09-04 NOTE — PLAN OF CARE
Problem: PAIN - ADULT  Goal: Verbalizes/displays adequate comfort level or baseline comfort level  Description: Interventions:  - Encourage patient to monitor pain and request assistance  - Assess pain using appropriate pain scale  - Administer analgesics based on type and severity of pain and evaluate response  - Implement non-pharmacological measures as appropriate and evaluate response  - Consider cultural and social influences on pain and pain management  - Notify physician/advanced practitioner if interventions unsuccessful or patient reports new pain  Outcome: Progressing     Problem: INFECTION - ADULT  Goal: Absence or prevention of progression during hospitalization  Description: INTERVENTIONS:  - Assess and monitor for signs and symptoms of infection  - Monitor lab/diagnostic results  - Monitor all insertion sites, i e  indwelling lines, tubes, and drains  - Monitor endotracheal if appropriate and nasal secretions for changes in amount and color  - [place] appropriate cooling/warming therapies per order  - Administer medications as ordered  - Instruct and encourage patient and family to use good hand hygiene technique  - Identify and instruct in appropriate isolation precautions for identified infection/condition  Outcome: Progressing  Goal: Absence of fever/infection during neutropenic period  Description: INTERVENTIONS:  - Monitor WBC    Outcome: Progressing     Problem: SAFETY ADULT  Goal: Patient will remain free of falls  Description: INTERVENTIONS:  - Educate patient/family on patient safety including physical limitations  - Instruct patient to call for assistance with activity   - Consult OT/PT to assist with strengthening/mobility   - Keep Call bell within reach  - Keep bed low and locked with side rails adjusted as appropriate  - Keep care items and personal belongings within reach  - Initiate and maintain comfort rounds  - Make Fall Risk Sign visible to staff  - Offer Toileting every  Hours, [facility]... in advance of need  - Initiate/Maintain alarm  - Obtain necessary fall risk management equipment:   - Apply yellow socks and bracelet for high fall risk patients  - Consider moving patient to room near nurses station  Outcome: Progressing  Goal: Maintain or return to baseline ADL function  Description: INTERVENTIONS:  -  Assess patient's ability to carry out ADLs; assess patient's baseline for ADL function and identify physical deficits which impact ability to perform ADLs (bathing, care of mouth/teeth, toileting, grooming, dressing, etc )  - Assess/evaluate cause of self-care deficits   - Assess range of motion  - Assess patient's mobility; develop plan if impaired  - Assess patient's need for assistive devices and provide as appropriate  - Encourage maximum independence but intervene and supervise when necessary  - Involve family in performance of ADLs  - Assess for home care needs following discharge   - Consider OT consult to assist with ADL evaluation and planning for discharge  - Provide patient education as appropriate  Outcome: Progressing  Goal: Maintains/Returns to pre admission functional level  Description: INTERVENTIONS:  - Perform BMAT or MOVE assessment daily    - Set and communicate daily mobility goal to care team and patient/family/caregiver  - Collaborate with rehabilitation services on mobility goals if consulted  - Perform Range of Motion  times a day  - Reposition patient every  hours    - Dangle patient  times a day  - Stand patient  times a day  - Ambulate patient  times a day  - Out of bed to chair  times a day   - Out of bed for meals  times a day  - Out of bed for toileting  - Record patient progress and toleration of activity level   Outcome: Progressing     Problem: DISCHARGE PLANNING  Goal: Discharge to home or other facility with appropriate resources  Description: INTERVENTIONS:  - Identify barriers to discharge w/patient and caregiver  - Arrange for needed discharge resources and transportation as appropriate  - Identify discharge learning needs (meds, wound care, etc )  - Arrange for interpretive services to assist at discharge as needed  - Refer to Case Management Department for coordinating discharge planning if the patient needs post-hospital services based on physician/advanced practitioner order or complex needs related to functional status, cognitive ability, or social support system  Outcome: Progressing     Problem: Knowledge Deficit  Goal: Patient/family/caregiver demonstrates understanding of disease process, treatment plan, medications, and discharge instructions  Description: Complete learning assessment and assess knowledge base    Interventions:  - Provide teaching at level of understanding  - Provide teaching via preferred learning methods  Outcome: Progressing

## 2022-09-04 NOTE — ED PROVIDER NOTES
History  Chief Complaint   Patient presents with    Vomiting Blood     Pt c/o vomiting blood upon awakening this morning at 0700  Pt also c/o weakness since and knees giving out falling to knees  Denies hitting head/loc  Pt seen at urgent care and instructed to come to ED per further evaluation  The patient is a 44-year-old male with a past medical history of acid reflux, who presents emergency department today with a chief complaint of epigastric abdominal pain with nausea vomiting  Patient has had 2 bouts of coffee-ground like emesis today prior to arrival   Patient states that he is having significant pain in his epigastric area of his abdomen that radiates into his periumbilical area  Patient states that the pain was so bad today his knees gave out and he fell to his knees on the ground  Patient is escorted to the emergency department with family member  No head injury or loss of consciousness  Patient is on any blood thinners  Patient has had a past medical history of acid reflux and has had this happen in the past   Has not taken anything prior to arrival for his symptoms  Unable to tolerate foods at this time per family  Patient denies any hematemesis, hematochezia, melena stools, rectal pain diarrhea constipation  Denies any chest pain shortness of breath  Abdominal Pain  Pain location:  Epigastric  Pain radiates to:  Periumbilical region  Pain severity:  Unable to specify  Onset quality:  Gradual  Duration:  1 day  Timing:  Constant  Progression:  Worsening  Chronicity:  New  Relieved by:  Nothing  Worsened by:   Movement, palpation, position changes, vomiting and eating  Ineffective treatments:  Lying down and liquids  Associated symptoms: vomiting    Associated symptoms: no anorexia, no belching, no constipation, no cough, no diarrhea, no dysuria, no fatigue, no fever, no hematochezia and no hematuria    Vomiting:     Quality:  Coffee grounds    Number of occurrences:  2    Severity: Mild    Duration:  1 day    Timing:  Unable to specify    Progression:  Unable to specify  Risk factors: not elderly, not obese and no recent hospitalization        Prior to Admission Medications   Prescriptions Last Dose Informant Patient Reported? Taking? cyclobenzaprine (FLEXERIL) 5 mg tablet   No No   Sig: Take 1 tablet (5 mg total) by mouth 3 (three) times a day as needed for muscle spasms for up to 15 doses   ketorolac (TORADOL) 10 mg tablet   No No   Sig: Take 1 tablet (10 mg total) by mouth every 6 (six) hours as needed for moderate pain for up to 15 doses   loratadine (CLARITIN) 10 mg tablet   Yes No   Sig: Take by mouth   metoclopramide (Reglan) 10 mg tablet   No No   Sig: Take 1 tablet (10 mg total) by mouth every 6 (six) hours as needed (Nausea or vomiting)   ondansetron (ZOFRAN) 4 mg tablet   No No   Sig: Take 1 tablet (4 mg total) by mouth every 6 (six) hours   pantoprazole (PROTONIX) 20 mg tablet   No No   Sig: Take 1 tablet (20 mg total) by mouth daily   simethicone (MYLICON) 553 MG chewable tablet   Yes No   Sig: Chew 125 mg every 6 (six) hours as needed for flatulence      Facility-Administered Medications: None       Past Medical History:   Diagnosis Date    Autistic disorder     Gastric paresis     Known health problems: none        Past Surgical History:   Procedure Laterality Date    EYE SURGERY      FOOT SURGERY         Family History   Problem Relation Age of Onset    No Known Problems Mother     No Known Problems Father      I have reviewed and agree with the history as documented  E-Cigarette/Vaping    E-Cigarette Use Never User      E-Cigarette/Vaping Substances     Social History     Tobacco Use    Smoking status: Never Smoker    Smokeless tobacco: Never Used   Vaping Use    Vaping Use: Never used   Substance Use Topics    Alcohol use: Not Currently    Drug use: Never       Review of Systems   Constitutional: Negative for fatigue and fever     Respiratory: Negative for cough  Gastrointestinal: Positive for abdominal pain and vomiting  Negative for anorexia, constipation, diarrhea and hematochezia  Genitourinary: Negative for dysuria and hematuria  All other systems reviewed and are negative  Physical Exam  Physical Exam  Vitals and nursing note reviewed  Constitutional:       Appearance: He is well-developed  HENT:      Head: Normocephalic and atraumatic  Nose: Nose normal       Mouth/Throat:      Mouth: Mucous membranes are dry  Eyes:      Extraocular Movements: Extraocular movements intact  Conjunctiva/sclera: Conjunctivae normal       Pupils: Pupils are equal, round, and reactive to light  Cardiovascular:      Rate and Rhythm: Normal rate and regular rhythm  Pulses: Normal pulses  Heart sounds: No murmur heard  Pulmonary:      Effort: Pulmonary effort is normal  No respiratory distress  Breath sounds: Normal breath sounds  Abdominal:      Palpations: Abdomen is soft  Tenderness: There is abdominal tenderness in the epigastric area  There is no right CVA tenderness or left CVA tenderness  Musculoskeletal:      Cervical back: Normal range of motion and neck supple  Right lower leg: No edema  Left lower leg: No edema  Skin:     General: Skin is warm and dry  Capillary Refill: Capillary refill takes less than 2 seconds  Neurological:      General: No focal deficit present  Mental Status: He is alert and oriented to person, place, and time           Vital Signs  ED Triage Vitals [09/03/22 1857]   Temperature Pulse Respirations Blood Pressure SpO2   98 °F (36 7 °C) 75 17 108/84 97 %      Temp Source Heart Rate Source Patient Position - Orthostatic VS BP Location FiO2 (%)   Temporal Monitor Sitting Right arm --      Pain Score       10 - Worst Possible Pain           Vitals:    09/03/22 2100 09/03/22 2200 09/03/22 2300 09/04/22 0000   BP: 112/66 113/63 112/59 111/66   Pulse: 81 82 73 82   Patient Position - Orthostatic VS:             Visual Acuity      ED Medications  Medications   pantoprazole (PROTONIX) 80 mg in sodium chloride 0 9 % 100 mL infusion (8 mg/hr Intravenous New Bag 9/3/22 2146)   sodium chloride 0 9 % bolus 1,000 mL (0 mL Intravenous Stopped 9/3/22 2047)   ondansetron (ZOFRAN) injection 4 mg (4 mg Intravenous Given 9/3/22 1929)   pantoprazole (PROTONIX) 80 mg in sodium chloride 0 9 % 100 mL IVPB (0 mg Intravenous Stopped 9/3/22 2119)   morphine injection 4 mg (4 mg Intravenous Given 9/3/22 2051)   iohexol (OMNIPAQUE) 350 MG/ML injection (MULTI-DOSE) 64 mL (64 mL Intravenous Given 9/3/22 2134)   sodium chloride 0 9 % bolus 1,000 mL (1,000 mL Intravenous New Bag 9/3/22 2257)   metoclopramide (REGLAN) injection 10 mg (10 mg Intravenous Given 9/3/22 2302)   diphenhydrAMINE (BENADRYL) injection 25 mg (25 mg Intravenous Given 9/3/22 2301)       Diagnostic Studies  Results Reviewed     Procedure Component Value Units Date/Time    FLU/RSV/COVID - if FLU/RSV clinically relevant [[id number]]  (Normal) Collected: 09/03/22 1920    Lab Status: Final result Specimen: Nares from Nose Updated: 09/03/22 2110     SARS-CoV-2 Negative     INFLUENZA A PCR Negative     INFLUENZA B PCR Negative     RSV PCR Negative    Narrative:      FOR PEDIATRIC PATIENTS - copy/paste COVID Guidelines URL to browser: [email or web address]/  [employer]    SARS-CoV-2 assay is a Nucleic Acid Amplification assay intended for the  qualitative detection of nucleic acid from SARS-CoV-2 in nasopharyngeal  swabs  Results are for the presumptive identification of SARS-CoV-2 RNA  Positive results are indicative of infection with SARS-CoV-2, the virus  causing COVID-19, but do not rule out bacterial infection or co-infection  with other viruses  Laboratories within the [place] and its  territories are required to report all positive results to the appropriate  public health authorities   Negative results do not preclude SARS-CoV-2  infection and should not be used as the sole basis for treatment or other  patient management decisions  Negative results must be combined with  clinical observations, patient history, and epidemiological information  This test has not been FDA cleared or approved  This test has been authorized by FDA under an Emergency Use Authorization  (EUA)  This test is only authorized for the duration of time the  declaration that circumstances exist justifying the authorization of the  emergency use of an in vitro diagnostic tests for detection of SARS-CoV-2  virus and/or diagnosis of COVID-19 infection under section 564(b)(1) of  the Act, 21 U  S C  433HKD-6(K)(6), unless the authorization is terminated  or revoked sooner  The test has been validated but independent review by FDA  and CLIA is pending  Test performed using [employer] Gene[employer]pert: This RT-PCR assay targets N2,  a region unique to SARS-CoV-2  A conserved region in the E-gene was chosen  for pan-Sarbecovirus detection which includes SARS-CoV-2      Comprehensive metabolic panel [[id number]]  (Abnormal) Collected: 09/03/22 1920    Lab Status: Final result Specimen: Blood from Arm, Left Updated: 09/03/22 2032     Sodium 141 mmol/L      Potassium 3 9 mmol/L      Chloride 102 mmol/L      CO2 24 mmol/L      ANION GAP 15 mmol/L      BUN 17 mg/dL      Creatinine 0 98 mg/dL      Glucose 82 mg/dL      Calcium 9 1 mg/dL      AST 19 U/L      ALT 25 U/L      Alkaline Phosphatase 67 U/L      Total Protein 7 6 g/dL      Albumin 4 3 g/dL      Total Bilirubin 0 71 mg/dL      eGFR 105 ml/min/1 73sq m     Narrative:      [place] guidelines for Chronic Kidney Disease (CKD):     Stage 1 with normal or high GFR (GFR > 90 mL/min/1 73 square meters)    Stage 2 Mild CKD (GFR = 60-89 mL/min/1 73 square meters)    Stage 3A Moderate CKD (GFR = 45-59 mL/min/1 73 square meters)    Stage 3B Moderate CKD (GFR = 30-44 mL/min/1 73 square meters)    Stage 4 Severe CKD (GFR = 15-29 mL/min/1 73 square meters)    Stage 5 End Stage CKD (GFR <15 mL/min/1 73 square meters)  Note: GFR calculation is accurate only with a steady state creatinine    Magnesium [[id number]]  (Normal) Collected: 09/03/22 1920    Lab Status: Final result Specimen: Blood from Arm, Left Updated: 09/03/22 2032     Magnesium 2 2 mg/dL     Lipase [[id number]]  (Abnormal) Collected: 09/03/22 1920    Lab Status: Final result Specimen: Blood from Arm, Left Updated: 09/03/22 2032     Lipase 61 u/L     Lactic acid [[id number]]  (Normal) Collected: 09/03/22 1920    Lab Status: Final result Specimen: Blood from Arm, Left Updated: 09/03/22 2025     LACTIC ACID 1 0 mmol/L     Narrative:      Result may be elevated if tourniquet was used during collection      Protime-INR [[id number]]  (Normal) Collected: 09/03/22 1920    Lab Status: Final result Specimen: Blood from Arm, Left Updated: 09/03/22 2016     Protime 13 1 seconds      INR 0 98    APTT [[id number]]  (Normal) Collected: 09/03/22 1920    Lab Status: Final result Specimen: Blood from Arm, Left Updated: 09/03/22 2016     PTT 28 seconds     UA w Reflex to Microscopic w Reflex to Culture [[id number]]  (Abnormal) Collected: 09/03/22 1929    Lab Status: Final result Specimen: Urine, Clean Catch Updated: 09/03/22 2013     Color, UA Yellow     Clarity, UA Clear     Specific Gravity, UA 1 025     pH, UA 5 5     Leukocytes, UA Negative     Nitrite, UA Negative     Protein, UA Negative mg/dl      Glucose, UA Negative mg/dl      Ketones, UA >=80 (3+) mg/dl      Urobilinogen, UA 0 2 E U /dl      Bilirubin, UA Small     Occult Blood, UA Negative    CBC and differential [[id number]] Collected: 09/03/22 1920    Lab Status: Final result Specimen: Blood from Arm, Left Updated: 09/03/22 2002     WBC 5 49 Thousand/uL      RBC 5 10 Million/uL      Hemoglobin 15 4 g/dL      Hematocrit 45 0 %      MCV 88 fL      MCH 30 2 pg      MCHC 34 2 g/dL      RDW 12 2 % MPV 10 0 fL      Platelets 338 Thousands/uL      nRBC 0 /100 WBCs      Neutrophils Relative 67 %      Immat GRANS % 0 %      Lymphocytes Relative 24 %      Monocytes Relative 9 %      Eosinophils Relative 0 %      Basophils Relative 0 %      Neutrophils Absolute 3 66 Thousands/µL      Immature Grans Absolute 0 01 Thousand/uL      Lymphocytes Absolute 1 32 Thousands/µL      Monocytes Absolute 0 48 Thousand/µL      Eosinophils Absolute 0 01 Thousand/µL      Basophils Absolute 0 01 Thousands/µL                  CT abdomen pelvis with contrast   Final Result by Shanel Jackson MD (09/03 2155)      No evidence of acute intra-abdominal or pelvic pathology            Workstation performed: [id number]                    Procedures  Procedures         ED Course  ED Course as of 09/04/22 0012   Sat Sep 03, 2022   2146 Hemoglobin: 15 4   2248 Reaching out to Dr Arabella Brooks with GI    2346 Recommendation from GI was to observe the patient at least overnight with IV Protonix, IV fluids and IV antiemetics  Patient had no recurrent bouts of coffee-ground emesis in the emergency department, did have 1 bout of emesis which was clear  Patient was discussed with hospitalist service for observation and was in agreement with observation at this time  SBIRT 20yo+    Flowsheet Row Most Recent Value   SBIRT (23 yo +)    In order to provide better care to our patients, we are screening all of our patients for alcohol and drug use  Would it be okay to ask you these screening questions? Yes Filed at: 09/03/2022 2115   Initial Alcohol Screen: US AUDIT-C     1  How often do you have a drink containing alcohol? 0 Filed at: 09/03/2022 2115   2  How many drinks containing alcohol do you have on a typical day you are drinking? 0 Filed at: 09/03/2022 2115   3a  Male UNDER 65: How often do you have five or more drinks on one occasion? 0 Filed at: 09/03/2022 2115   3b  FEMALE Any Age, or MALE 65+:  How often do you have 4 or more drinks on one occassion? 0 Filed at: 09/03/2022 2115   Audit-C Score 0 Filed at: 09/03/2022 2115   B[place]HEVA: How many times in the past year have you    Used an illegal drug or used a prescription medication for non-medical reasons? Never Filed at: 09/03/2022 2115                    [facility]  Number of Diagnoses or Management Options  Coffee ground emesis  Epigastric pain  N&V (nausea and vomiting)  Vomiting  Diagnosis management comments: Patient had 1 bout of emesis emergency department, clear no coffee-ground emesis  Patient was hemodynamically stable  Patient was discussed with GI, Dr Felicia Orona and recommendation was not to transfer at this time  Was recommended to observe, fluids, IV Protonix, IV antiemetics and observation    Discussed with hospitalist service observation patient and family in agreement treatment plan       Amount and/or Complexity of Data Reviewed  Clinical lab tests: reviewed and ordered  Tests in the radiology section of CPT®: ordered and reviewed  Obtain history from someone other than the patient: yes  Review and summarize past medical records: yes  Discuss the patient with other providers: yes  Independent visualization of images, tracings, or specimens: yes    Risk of Complications, Morbidity, and/or Mortality  Presenting problems: moderate  Diagnostic procedures: moderate  Management options: moderate    Patient Progress  Patient progress: stable      Disposition  Final diagnoses:   Epigastric pain   Coffee ground emesis   Vomiting   N&V (nausea and vomiting)     Time reflects when diagnosis was documented in both MDM as applicable and the Disposition within this note     Time User Action Codes Description Comment    9/3/2022 10:44 PM Laroy Pottier Add [K59 00] Constipation     9/3/2022 11:44 PM Josef Des [K59 00] Constipation     9/3/2022 11:44 PM Laroy Pottier Add [R10 13] Epigastric pain     9/3/2022 11:44 PM Laroy Pottier Add [K92 0] Coffee ground emesis     9/3/2022 11:45 PM Joyce Lundborg Add [R11 10] Vomiting     9/3/2022 11:45 PM Isiah Troybasil Add [R11 2] N&V (nausea and vomiting)       ED Disposition     ED Disposition   Admit    Condition   Stable    Date/Time   Sat Sep 3, 2022 11:45 PM    Comment   Case was discussed with rajan roach and the patient's admission status was agreed to be Admission Status: observation status to the service of Dr Anna Metzger    Follow-up Information    None         Patient's Medications   Discharge Prescriptions    No medications on file       No discharge procedures on file      PDMP Review     None          ED Provider  Electronically Signed by           Otto Islas PA-C  09/04/22 0013

## 2022-09-04 NOTE — ASSESSMENT & PLAN NOTE
· Chronic gastritis and GERD  · Taking Protonix daily without relief  · Presents to ED with severe intractable abdominal pain, vomiting with coffee-ground emesis over the past few days  · Recent EGD 4/26 reported normal in Care everywhere  · Admit to medicine service  · Clear liquids  · IV PPI- as discussed with Gastroenterology by ER provider  · IV hydration  · Trend hemoglobin  · Advanced diet as tolerated

## 2022-09-04 NOTE — NURSING NOTE
Pt bladder scanned at approximately 1229 for 481 after voiding  Pt does not want to be catheterized again  I verbalized understanding and asked pt if we could try and void again in about an hour or so and then repeat the bladder scan  Explained that if pt unable to void and/or bladder scan remains over 400 ml, I would encourage that we catheterize him  Pt verbalized understanding and is agreeable

## 2022-09-04 NOTE — ED NOTES
Correction - soap suds enema not completed  Richi Max   [facility]  09/03/22 5380

## 2022-09-04 NOTE — H&P
[address]  H&P- Lisa Freitas 1995, 33 y o  male MRN: [id number]  Unit/Bed#: -01 Encounter: [id number]  Primary Care Provider: Lopez Maria MD   Date and time admitted to hospital: 9/3/2022  7:00 PM    * Acute gastritis  Assessment & Plan  · Chronic gastritis and GERD  · Taking Protonix daily without relief  · Presents to ED with severe intractable abdominal pain, vomiting with coffee-ground emesis over the past few days  · Recent EGD 4/26 reported normal in Care everywhere  · Admit to medicine service  · Clear liquids  · IV PPI- as discussed with Gastroenterology by ER provider  · IV hydration  · Trend hemoglobin  · Advanced diet as tolerated    VTE Pharmacologic Prophylaxis: VTE Score: 1 Low Risk (Score 0-2) - Encourage Ambulation  Code Status: Level 1 - Full Code     Anticipated Length of Stay: Patient will be admitted on an observation basis with an anticipated length of stay of less than 2 midnights secondary to Vomiting  Total Time for Visit, including Counseling / Coordination of Care: 30 minutes Greater than 50% of this total time spent on direct patient counseling and coordination of care  Chief Complaint:  Vomiting abdominal pain    History of Present Illness:  Lisa Freitas is a 33 y o  male with a PMH of autistic disorder, gastric paresis, GERD, anxiety, recurrent abdominal pain with gastritis presents with severe abdominal pain with intractable vomiting over the past few days  Patient reports significant amount of coffee-ground emesis and was evaluated in urgent care with referral to ED  History of gastritis with coffee-ground emesis and underwent upper endoscopy in April, report available in care everywhere is normal   Admitted to the medicine service for intractable vomiting unrelieved by multiple antiemetics, continue IV hydration and pain control    Patient was discussed with on-call Gastroenterology by ER provider due to reported coffee-ground emesis, recommendations for PPI and monitoring       Review of Systems:  Review of Systems   Constitutional: Positive for activity change and appetite change  Negative for chills and fever  HENT: Negative for ear pain and sore throat  Eyes: Negative for pain and visual disturbance  Respiratory: Negative for cough and shortness of breath  Cardiovascular: Negative for chest pain and palpitations  Gastrointestinal: Positive for abdominal pain, nausea and vomiting  Genitourinary: Negative for dysuria and hematuria  Musculoskeletal: Positive for arthralgias and myalgias  Negative for back pain  Skin: Negative for color change and rash  Neurological: Negative for seizures and syncope  All other systems reviewed and are negative  Past Medical and Surgical History:   Past Medical History:   Diagnosis Date    Autistic disorder     Gastric paresis     GERD (gastroesophageal reflux disease)     Known health problems: none        Past Surgical History:   Procedure Laterality Date    EYE SURGERY      FOOT SURGERY         Meds/Allergies:  Prior to Admission medications    Medication Sig Start Date End Date Taking?  Authorizing Provider   cyclobenzaprine (FLEXERIL) 5 mg tablet Take 1 tablet (5 mg total) by mouth 3 (three) times a day as needed for muscle spasms for up to 15 doses 9/1/21   Bee Lane MD   ketorolac (TORADOL) 10 mg tablet Take 1 tablet (10 mg total) by mouth every 6 (six) hours as needed for moderate pain for up to 15 doses 9/1/21   Bee Lane MD   loratadine (CLARITIN) 10 mg tablet Take by mouth    Historical Provider, MD   metoclopramide (Reglan) 10 mg tablet Take 1 tablet (10 mg total) by mouth every 6 (six) hours as needed (Nausea or vomiting) 2/20/22   Marija Montes DO   ondansetron (ZOFRAN) 4 mg tablet Take 1 tablet (4 mg total) by mouth every 6 (six) hours 2/22/22   Bee Lane MD   pantoprazole (PROTONIX) 20 mg tablet Take 1 tablet (20 mg total) by mouth daily 2/22/22   Sherine Christine MD   simethicone (MYLICON) 845 MG chewable tablet Chew 125 mg every 6 (six) hours as needed for flatulence    Historical Provider, MD     I have reviewed home medications with patient personally  Allergies: Allergies   Allergen Reactions    Pepto-Bismol [Bismuth Subsalicylate] GI Intolerance    Amoxicillin Rash    Latex Rash       Social History:  Marital Status: Single   Patient Pre-hospital Living Situation: Home  Patient Pre-hospital Level of Mobility: walks  Patient Pre-hospital Diet Restrictions:  None  Substance Use History:   Social History     Substance and Sexual Activity   Alcohol Use Not Currently     Social History     Tobacco Use   Smoking Status Never Smoker   Smokeless Tobacco Never Used     Social History     Substance and Sexual Activity   Drug Use Never       Family History:  Family History   Problem Relation Age of Onset    No Known Problems Mother     No Known Problems Father        Physical Exam:     Vitals:   Blood Pressure: 128/70 (09/04/22 0029)  Pulse: 70 (09/04/22 0029)  Temperature: 98 3 °F (36 8 °C) (09/04/22 0029)  Temp Source: Temporal (09/03/22 1857)  Respirations: 16 (09/04/22 0029)  Height: 5' 8" (172 7 cm) (09/03/22 1857)  Weight - Scale: 85 5 kg (188 lb 6 4 oz) (09/04/22 0019)  SpO2: 96 % (09/04/22 0029)    Physical Exam  Vitals and nursing note reviewed  Constitutional:       Appearance: Normal appearance  He is well-developed  HENT:      Head: Normocephalic and atraumatic  Mouth/Throat:      Mouth: Mucous membranes are dry  Eyes:      Conjunctiva/sclera: Conjunctivae normal    Cardiovascular:      Rate and Rhythm: Normal rate and regular rhythm  Heart sounds: No murmur heard  Pulmonary:      Effort: Pulmonary effort is normal  No respiratory distress  Breath sounds: Normal breath sounds  Abdominal:      Palpations: Abdomen is soft  Tenderness: There is abdominal tenderness   There is no guarding or rebound  Hernia: No hernia is present  Comments: Generalized abdominal tenderness below umbilicus   Musculoskeletal:         General: No swelling or tenderness  Normal range of motion  Cervical back: Neck supple  Skin:     General: Skin is warm and dry  Capillary Refill: Capillary refill takes less than 2 seconds  Neurological:      General: No focal deficit present  Mental Status: He is alert and oriented to person, place, and time  Cranial Nerves: No cranial nerve deficit  Psychiatric:         Mood and Affect: Mood normal          Behavior: Behavior normal         Additional Data:     Lab Results:  Results from last 7 days   Lab Units 09/03/22  1920   WBC Thousand/uL 5 49   HEMOGLOBIN g/dL 15 4   HEMATOCRIT % 45 0   PLATELETS Thousands/uL 167   NEUTROS PCT % 67   LYMPHS PCT % 24   MONOS PCT % 9   EOS PCT % 0     Results from last 7 days   Lab Units 09/03/22  1920   SODIUM mmol/L 141   POTASSIUM mmol/L 3 9   CHLORIDE mmol/L 102   CO2 mmol/L 24   BUN mg/dL 17   CREATININE mg/dL 0 98   ANION GAP mmol/L 15*   CALCIUM mg/dL 9 1   ALBUMIN g/dL 4 3   TOTAL BILIRUBIN mg/dL 0 71   ALK PHOS U/L 67   ALT U/L 25   AST U/L 19   GLUCOSE RANDOM mg/dL 82     Results from last 7 days   Lab Units 09/03/22  1920   INR  0 98             Results from last 7 days   Lab Units 09/03/22  1920   LACTIC ACID mmol/L 1 0       Imaging: Reviewed radiology reports from this admission including: abdominal/pelvic CT  CT abdomen pelvis with contrast   Final Result by Sia Salas MD (09/03 2155)      No evidence of acute intra-abdominal or pelvic pathology            Workstation performed: [id number]             EKG and Other Studies Reviewed on Admission:   All    ** Please Note: This note has been constructed using a voice recognition system   **

## 2022-09-04 NOTE — UTILIZATION REVIEW
Initial Clinical Review    Admission: Date/Time/Statement:   Admission Orders (From admission, onward)     Ordered        09/03/22 2346  Place in Observation  Once                      Orders Placed This Encounter   Procedures    Place in Observation     Standing Status:   Standing     Number of Occurrences:   1     Order Specific Question:   Level of Care     Answer:   Med Surg [16]     ED Arrival Information     Expected   -    Arrival   9/3/2022 18:21    Acuity   Urgent            Means of arrival   Walk-In    Escorted by   Family Member    Service   Hospitalist    Admission type   Urgent            Arrival complaint   vomiting blood           Chief Complaint   Patient presents with    Vomiting Blood     Pt c/o vomiting blood upon awakening this morning at 0700  Pt also c/o weakness since and knees giving out falling to knees  Denies hitting head/loc  Pt seen at urgent care and instructed to come to ED per further evaluation  Initial Presentation: 33 y o  male with PMH of Asperger's syndrome, autistic disorder, gastric paresis, GERD, anxiety, and ecurrent abdominal pain with gastritis presents to the ED from home with with severe abdominal pain with intractable vomiting over the past few days  Patient reports significant amount of coffee-ground emesis, was evaluated at  urgent care with referral to ED  History of gastritis with coffee-ground emesis and underwent upper endoscopy in April which was normal    PE: Generalized abdominal tenderness below umbilicus  9/3 Plan: Admit to Observation for evaluation and treatment of acute gastritis:  Clear liquids, IVF, IV PPI, trend hemoglobin, consult Gastroenterology  9/4 Internal Medicine: Patient has not had any further episodes of vomiting, but c/o nausea  Tolerating clear liquid diet  Patient will maintain on PPI    Does not appear to have any active GI bleed so will discontinue Protonix drip and switch him to Protonix 40 mg b i d   Continue to follow hemoglobin closely  Continue with IV hydration and antiemetic therapy  Will advance diet to full liquid diet  Gastroenterology consultation awaited  He also appears to have urinary retention and has been placed on urinary retention protocol after straight catheterization x1  Will initiate Flomax           ED Triage Vitals [09/03/22 1857]   Temperature Pulse Respirations Blood Pressure SpO2   98 °F (36 7 °C) 75 17 108/84 97 %      Temp Source Heart Rate Source Patient Position - Orthostatic VS BP Location FiO2 (%)   Temporal Monitor Sitting Right arm --      Pain Score       10 - Worst Possible Pain          Wt Readings from Last 1 Encounters:   09/04/22 85 5 kg (188 lb 6 4 oz)     Additional Vital Signs:       Date/Time Temp Pulse Resp BP MAP (mmHg) SpO2 O2 Device   09/04/22 07:31:44 98 3 °F (36 8 °C) 77 18 124/70 88 96 % --   09/04/22 00:29:16 98 3 °F (36 8 °C) 70 16 128/70 89 96 % --   09/04/22 0000 -- 82 -- 111/66 83 96 % --   09/03/22 2300 -- 73 18 112/59 78 95 % --   09/03/22 2200 -- 82 18 113/63 82 93 % --   09/03/22 2100 -- 81 -- 112/66 83 96 % None (Room air)   09/03/22 2000 -- 72 18 112/63 82 98 % --         Pertinent Labs/Diagnostic Test Results:     CT abdomen pelvis with contrast   Final Result by Berlin Steinberg MD (09/03 2155)      No evidence of acute intra-abdominal or pelvic pathology        Results from last 7 days   Lab Units 09/03/22 1920   SARS-COV-2  Negative     Results from last 7 days   Lab Units 09/04/22  0611 09/03/22 1920   WBC Thousand/uL 4 91 5 49   HEMOGLOBIN g/dL 13 1 15 4   HEMATOCRIT % 39 1 45 0   PLATELETS Thousands/uL 140* 167   NEUTROS ABS Thousands/µL  --  3 66         Results from last 7 days   Lab Units 09/04/22 0611 09/03/22 1920   SODIUM mmol/L 142 141   POTASSIUM mmol/L 3 9 3 9   CHLORIDE mmol/L 107 102   CO2 mmol/L 20* 24   ANION GAP mmol/L 15* 15*   BUN mg/dL 15 17   CREATININE mg/dL 0 95 0 98   EGFR ml/min/1 73sq m 110 105   CALCIUM mg/dL 8 0* 9 1   MAGNESIUM mg/dL 2 0 2 2   PHOSPHORUS mg/dL 3 9  --      Results from last 7 days   Lab Units 09/03/22  1920   AST U/L 19   ALT U/L 25   ALK PHOS U/L 67   TOTAL PROTEIN g/dL 7 6   ALBUMIN g/dL 4 3   TOTAL BILIRUBIN mg/dL 0 71         Results from last 7 days   Lab Units 09/04/22  0611 09/03/22  1920   GLUCOSE RANDOM mg/dL 69 82         Results from last 7 days   Lab Units 09/03/22  1920   PROTIME seconds 13 1   INR  0 98   PTT seconds 28             Results from last 7 days   Lab Units 09/03/22  1920   LACTIC ACID mmol/L 1 0         Results from last 7 days   Lab Units 09/03/22  1920   LIPASE u/L 61*     Results from last 7 days   Lab Units 09/03/22  1929   CLARITY UA  Clear   COLOR UA  Yellow   SPEC GRAV UA  1 025   PH UA  5 5   GLUCOSE UA mg/dl Negative   KETONES UA mg/dl >=80 (3+)*   BLOOD UA  Negative   PROTEIN UA mg/dl Negative   NITRITE UA  Negative   BILIRUBIN UA  Small*   UROBILINOGEN UA E U /dl 0 2   LEUKOCYTES UA  Negative     Results from last 7 days   Lab Units 09/03/22  1920   INFLUENZA A PCR  Negative   INFLUENZA B PCR  Negative   RSV PCR  Negative       ED Treatment:   Medication Administration from 09/03/2022 1821 to 09/04/2022 0018       Date/Time Order Dose Route Action     09/03/2022 2047 sodium chloride 0 9 % bolus 1,000 mL 0 mL Intravenous Stopped     09/03/2022 1924 sodium chloride 0 9 % bolus 1,000 mL 1,000 mL Intravenous New Bag     09/03/2022 1929 ondansetron (ZOFRAN) injection 4 mg 4 mg Intravenous Given     09/03/2022 2146 pantoprazole (PROTONIX) 80 mg in sodium chloride 0 9 % 100 mL infusion 8 mg/hr Intravenous New Bag     09/03/2022 2119 pantoprazole (PROTONIX) 80 mg in sodium chloride 0 9 % 100 mL IVPB 0 mg Intravenous Stopped     09/03/2022 2057 pantoprazole (PROTONIX) 80 mg in sodium chloride 0 9 % 100 mL IVPB 80 mg Intravenous New Bag     09/03/2022 2051 morphine injection 4 mg 4 mg Intravenous Given     09/03/2022 2134 iohexol (OMNIPAQUE) 350 MG/ML injection (MULTI-DOSE) 64 mL 64 mL Intravenous Given     09/03/2022 2257 sodium chloride 0 9 % bolus 1,000 mL 1,000 mL Intravenous New Bag     09/03/2022 2302 metoclopramide (REGLAN) injection 10 mg 10 mg Intravenous Given     09/03/2022 2301 diphenhydrAMINE (BENADRYL) injection 25 mg 25 mg Intravenous Given        Past Medical History:   Diagnosis Date    Autistic disorder     Gastric paresis     GERD (gastroesophageal reflux disease)     Known health problems: none          Admitting Diagnosis: Vomiting blood [K92 0]  Vomiting [R11 10]  Epigastric pain [R10 13]  Coffee ground emesis [K92 0]  N&V (nausea and vomiting) [R11 2]  Age/Sex: 33 y o  male       Admission Orders: Clear liquid diet  Scheduled Medications: None  Continuous IV Infusions:    pantoprozole (PROTONIX) infusion (Continuous), 8 mg/hr, Intravenous, Continuous  sodium chloride, 125 mL/hr, Intravenous, Continuous      PRN Meds:  ondansetron, 4 mg, Intravenous, Q6H PRN x 1 dose 9/4 @ 0616        IP CONSULT TO GASTROENTEROLOGY    Network Utilization Review Department  ATTENTION: Please call with any questions or concerns to [phone] and carefully listen to the prompts so that you are directed to the right person  All voicemails are confidential   Martha Pritchett all requests for admission clinical reviews, approved or denied determinations and any other requests to dedicated fax number below belonging to the campus where the patient is receiving treatment   List of dedicated fax numbers for the Facilities:  [address] DENIALS (Administrative/Medical Necessity) [phone]   [address] (Maternity/NICU/Pediatrics) [phone]   [address] [place] [address]  [address] [facility] [address] [phone] [address] [place] [address] Naveed Aleman [address] [phone]

## 2022-09-05 PROBLEM — R33.9 URINARY RETENTION: Status: ACTIVE | Noted: 2022-09-05

## 2022-09-05 LAB
ANION GAP SERPL CALCULATED.3IONS-SCNC: 11 MMOL/L (ref 4–13)
BASOPHILS # BLD AUTO: 0.01 THOUSANDS/ΜL (ref 0–0.1)
BASOPHILS NFR BLD AUTO: 0 % (ref 0–1)
BUN SERPL-MCNC: 7 MG/DL (ref 5–25)
CALCIUM SERPL-MCNC: 7.5 MG/DL (ref 8.3–10.1)
CHLORIDE SERPL-SCNC: 107 MMOL/L (ref 96–108)
CO2 SERPL-SCNC: 24 MMOL/L (ref 21–32)
CREAT SERPL-MCNC: 0.81 MG/DL (ref 0.6–1.3)
EOSINOPHIL # BLD AUTO: 0.02 THOUSAND/ΜL (ref 0–0.61)
EOSINOPHIL NFR BLD AUTO: 1 % (ref 0–6)
ERYTHROCYTE [DISTWIDTH] IN BLOOD BY AUTOMATED COUNT: 12.3 % (ref 11.6–15.1)
GFR SERPL CREATININE-BSD FRML MDRD: 122 ML/MIN/1.73SQ M
GLUCOSE P FAST SERPL-MCNC: 92 MG/DL (ref 65–99)
GLUCOSE SERPL-MCNC: 92 MG/DL (ref 65–140)
HCT VFR BLD AUTO: 36.9 % (ref 36.5–49.3)
HGB BLD-MCNC: 12.7 G/DL (ref 12–17)
IMM GRANULOCYTES # BLD AUTO: 0.01 THOUSAND/UL (ref 0–0.2)
IMM GRANULOCYTES NFR BLD AUTO: 0 % (ref 0–2)
LYMPHOCYTES # BLD AUTO: 1.28 THOUSANDS/ΜL (ref 0.6–4.47)
LYMPHOCYTES NFR BLD AUTO: 36 % (ref 14–44)
MCH RBC QN AUTO: 30.7 PG (ref 26.8–34.3)
MCHC RBC AUTO-ENTMCNC: 34.4 G/DL (ref 31.4–37.4)
MCV RBC AUTO: 89 FL (ref 82–98)
MONOCYTES # BLD AUTO: 0.41 THOUSAND/ΜL (ref 0.17–1.22)
MONOCYTES NFR BLD AUTO: 12 % (ref 4–12)
NEUTROPHILS # BLD AUTO: 1.82 THOUSANDS/ΜL (ref 1.85–7.62)
NEUTS SEG NFR BLD AUTO: 51 % (ref 43–75)
NRBC BLD AUTO-RTO: 0 /100 WBCS
PLATELET # BLD AUTO: 134 THOUSANDS/UL (ref 149–390)
PMV BLD AUTO: 10.1 FL (ref 8.9–12.7)
POTASSIUM SERPL-SCNC: 3.7 MMOL/L (ref 3.5–5.3)
RBC # BLD AUTO: 4.14 MILLION/UL (ref 3.88–5.62)
SODIUM SERPL-SCNC: 142 MMOL/L (ref 135–147)
WBC # BLD AUTO: 3.55 THOUSAND/UL (ref 4.31–10.16)

## 2022-09-05 PROCEDURE — C9113 INJ PANTOPRAZOLE SODIUM, VIA: HCPCS | Performed by: INTERNAL MEDICINE

## 2022-09-05 PROCEDURE — 99225 PR SBSQ OBSERVATION CARE/DAY 25 MINUTES: CPT | Performed by: INTERNAL MEDICINE

## 2022-09-05 PROCEDURE — 80048 BASIC METABOLIC PNL TOTAL CA: CPT | Performed by: INTERNAL MEDICINE

## 2022-09-05 PROCEDURE — 85025 COMPLETE CBC W/AUTO DIFF WBC: CPT | Performed by: INTERNAL MEDICINE

## 2022-09-05 RX ORDER — LORATADINE 10 MG/1
10 TABLET ORAL ONCE
Status: COMPLETED | OUTPATIENT
Start: 2022-09-05 | End: 2022-09-05

## 2022-09-05 RX ORDER — LORATADINE 10 MG/1
10 TABLET ORAL DAILY
Status: DISCONTINUED | OUTPATIENT
Start: 2022-09-07 | End: 2022-09-06 | Stop reason: HOSPADM

## 2022-09-05 RX ADMIN — TAMSULOSIN HYDROCHLORIDE 0.4 MG: 0.4 CAPSULE ORAL at 16:36

## 2022-09-05 RX ADMIN — PANTOPRAZOLE SODIUM 40 MG: 40 INJECTION, POWDER, FOR SOLUTION INTRAVENOUS at 08:59

## 2022-09-05 RX ADMIN — SODIUM CHLORIDE 125 ML/HR: 0.9 INJECTION, SOLUTION INTRAVENOUS at 07:48

## 2022-09-05 RX ADMIN — LORATADINE 10 MG: 10 TABLET ORAL at 22:35

## 2022-09-05 RX ADMIN — PANTOPRAZOLE SODIUM 40 MG: 40 INJECTION, POWDER, FOR SOLUTION INTRAVENOUS at 20:22

## 2022-09-05 RX ADMIN — SODIUM CHLORIDE 100 ML/HR: 0.9 INJECTION, SOLUTION INTRAVENOUS at 16:38

## 2022-09-05 NOTE — PLAN OF CARE
Problem: PAIN - ADULT  Goal: Verbalizes/displays adequate comfort level or baseline comfort level  Description: Interventions:  - Encourage patient to monitor pain and request assistance  - Assess pain using appropriate pain scale  - Administer analgesics based on type and severity of pain and evaluate response  - Implement non-pharmacological measures as appropriate and evaluate response  - Consider cultural and social influences on pain and pain management  - Notify physician/advanced practitioner if interventions unsuccessful or patient reports new pain  Outcome: Progressing     Problem: INFECTION - ADULT  Goal: Absence or prevention of progression during hospitalization  Description: INTERVENTIONS:  - Assess and monitor for signs and symptoms of infection  - Monitor lab/diagnostic results  - Monitor all insertion sites, i e  indwelling lines, tubes, and drains  - Monitor endotracheal if appropriate and nasal secretions for changes in amount and color  - [place] appropriate cooling/warming therapies per order  - Administer medications as ordered  - Instruct and encourage patient and family to use good hand hygiene technique  - Identify and instruct in appropriate isolation precautions for identified infection/condition  Outcome: Progressing  Goal: Absence of fever/infection during neutropenic period  Description: INTERVENTIONS:  - Monitor WBC    Outcome: Progressing     Problem: SAFETY ADULT  Goal: Patient will remain free of falls  Description: INTERVENTIONS:  - Educate patient/family on patient safety including physical limitations  - Instruct patient to call for assistance with activity   - Consult OT/PT to assist with strengthening/mobility   - Keep Call bell within reach  - Keep bed low and locked with side rails adjusted as appropriate  - Keep care items and personal belongings within reach  - Initiate and maintain comfort rounds  - Make Fall Risk Sign visible to staff  - Offer Toileting every 2 Hours, in advance of need  - Initiate/Maintain bed/chair alarm  - Obtain necessary fall risk management equipment: bed/chair  - Apply yellow socks and bracelet for high fall risk patients  - Consider moving patient to room near nurses station  Outcome: Progressing  Goal: Maintain or return to baseline ADL function  Description: INTERVENTIONS:  -  Assess patient's ability to carry out ADLs; assess patient's baseline for ADL function and identify physical deficits which impact ability to perform ADLs (bathing, care of mouth/teeth, toileting, grooming, dressing, etc )  - Assess/evaluate cause of self-care deficits   - Assess range of motion  - Assess patient's mobility; develop plan if impaired  - Assess patient's need for assistive devices and provide as appropriate  - Encourage maximum independence but intervene and supervise when necessary  - Involve family in performance of ADLs  - Assess for home care needs following discharge   - Consider OT consult to assist with ADL evaluation and planning for discharge  - Provide patient education as appropriate  Outcome: Progressing  Goal: Maintains/Returns to pre admission functional level  Description: INTERVENTIONS:  - Perform BMAT or MOVE assessment daily    - Set and communicate daily mobility goal to care team and patient/family/caregiver     - Collaborate with rehabilitation services on mobility goals if consulted  - Ambulate patient 2 times a day  - Out of bed to chair 2 times a day   - Out of bed for meals 2 times a day  - Out of bed for toileting  - Record patient progress and toleration of activity level   Outcome: Progressing     Problem: DISCHARGE PLANNING  Goal: Discharge to home or other facility with appropriate resources  Description: INTERVENTIONS:  - Identify barriers to discharge w/patient and caregiver  - Arrange for needed discharge resources and transportation as appropriate  - Identify discharge learning needs (meds, wound care, etc )  - Arrange for interpretive services to assist at discharge as needed  - Refer to Case Management Department for coordinating discharge planning if the patient needs post-hospital services based on physician/advanced practitioner order or complex needs related to functional status, cognitive ability, or social support system  Outcome: Progressing     Problem: Knowledge Deficit  Goal: Patient/family/caregiver demonstrates understanding of disease process, treatment plan, medications, and discharge instructions  Description: Complete learning assessment and assess knowledge base    Interventions:  - Provide teaching at level of understanding  - Provide teaching via preferred learning methods  Outcome: Progressing

## 2022-09-05 NOTE — UTILIZATION REVIEW
Continued Stay Review    Date: 09/05/22, 09/06/22                          Current Patient Class: OBS  Current Level of Care: Med/Surg    HPI:27 y o  male initially admitted on 9/3 for acute gastritis  Assessment/Plan:   9/5: Pt reports epigastric abdominal discomfort, but denies further emesis  Joseph in place for urinary retention  Exam otherwise unremarkable  Plan: continue full liquid diet advanced to GI diet as tolerated, trend Hgb, Trend labs, replete electrolytes as needed; I&O, continue PPI, voiding trial tomorrow morning       9/6:      Vital Signs:   Date/Time Temp Pulse Resp BP MAP (mmHg) SpO2 O2 Device   09/05/22 0900 -- -- -- -- -- 99 % None (Room air)   09/05/22 07:16:11 97 6 °F (36 4 °C) 63 18 126/71 89 98 % --   09/04/22 22:03:20 98 2 °F (36 8 °C) 80 19 117/72 87 97 % --   09/04/22 2034 -- -- -- -- -- -- None (Room air)   09/04/22 14:00:19 98 7 °F (37 1 °C) -- 18 115/77 90 -- --         Pertinent Labs/Diagnostic Results:   Results from last 7 days   Lab Units 09/03/22 1920   SARS-COV-2  Negative     Results from last 7 days   Lab Units 09/06/22  0555 09/05/22 0449 09/04/22  0611 09/03/22  1920   WBC Thousand/uL 6 45 3 55* 4 91 5 49   HEMOGLOBIN g/dL 13 2 12 7 13 1 15 4   HEMATOCRIT % 38 2 36 9 39 1 45 0   PLATELETS Thousands/uL 126* 134* 140* 167   NEUTROS ABS Thousands/µL 4 34 1 82*  --  3 66         Results from last 7 days   Lab Units 09/05/22 0449 09/04/22  0611 09/03/22 1920   SODIUM mmol/L 142 142 141   POTASSIUM mmol/L 3 7 3 9 3 9   CHLORIDE mmol/L 107 107 102   CO2 mmol/L 24 20* 24   ANION GAP mmol/L 11 15* 15*   BUN mg/dL 7 15 17   CREATININE mg/dL 0 81 0 95 0 98   EGFR ml/min/1 73sq m 122 110 105   CALCIUM mg/dL 7 5* 8 0* 9 1   MAGNESIUM mg/dL  --  2 0 2 2   PHOSPHORUS mg/dL  --  3 9  --      Results from last 7 days   Lab Units 09/03/22  1920   AST U/L 19   ALT U/L 25   ALK PHOS U/L 67   TOTAL PROTEIN g/dL 7 6   ALBUMIN g/dL 4 3   TOTAL BILIRUBIN mg/dL 0 71         Results from last 7 days   Lab Units 09/05/22  0449 09/04/22  0611 09/03/22 1920   GLUCOSE RANDOM mg/dL 92 69 82     Results from last 7 days   Lab Units 09/03/22 1920   PROTIME seconds 13 1   INR  0 98   PTT seconds 28             Results from last 7 days   Lab Units 09/03/22 1920   LACTIC ACID mmol/L 1 0       Results from last 7 days   Lab Units 09/03/22 1920   LIPASE u/L 61*                 Results from last 7 days   Lab Units 09/03/22 1929   CLARITY UA  Clear   COLOR UA  Yellow   SPEC GRAV UA  1 025   PH UA  5 5   GLUCOSE UA mg/dl Negative   KETONES UA mg/dl >=80 (3+)*   BLOOD UA  Negative   PROTEIN UA mg/dl Negative   NITRITE UA  Negative   BILIRUBIN UA  Small*   UROBILINOGEN UA E U /dl 0 2   LEUKOCYTES UA  Negative     Results from last 7 days   Lab Units 09/03/22 1920   INFLUENZA A PCR  Negative   INFLUENZA B PCR  Negative   RSV PCR  Negative       Medications:   Scheduled Medications:  [START ON 9/7/2022] loratadine, 10 mg, Oral, Daily  pantoprazole, 40 mg, Intravenous, Q12H TK  tamsulosin, 0 4 mg, Oral, Daily With Dinner    Continuous IV Infusions:  sodium chloride, 100 mL/hr, Intravenous, Continuous    PRN Meds:  acetaminophen, 650 mg, Oral, Q6H PRN  ondansetron, 4 mg, Intravenous, Q6H PRN            Network Utilization Review Department  ATTENTION: Please call with any questions or concerns to [phone] and carefully listen to the prompts so that you are directed to the right person  All voicemails are confidential   Candia Siemens all requests for admission clinical reviews, approved or denied determinations and any other requests to dedicated fax number below belonging to the campus where the patient is receiving treatment   List of dedicated fax numbers for the Facilities:  [address] DENIALS (Administrative/Medical Necessity) [phone]   [address] (Maternity/NICU/Pediatrics) [phone]   [address] [place] [address] [facility] [address] [address] [place] [address] Naveed Aleman [address] [place] [id number] [phone]

## 2022-09-05 NOTE — PLAN OF CARE
Problem: PAIN - ADULT  Goal: Verbalizes/displays adequate comfort level or baseline comfort level  Description: Interventions:  - Encourage patient to monitor pain and request assistance  - Assess pain using appropriate pain scale  - Administer analgesics based on type and severity of pain and evaluate response  - Implement non-pharmacological measures as appropriate and evaluate response  - Consider cultural and social influences on pain and pain management  - Notify physician/advanced practitioner if interventions unsuccessful or patient reports new pain  Outcome: Progressing     Problem: INFECTION - ADULT  Goal: Absence or prevention of progression during hospitalization  Description: INTERVENTIONS:  - Assess and monitor for signs and symptoms of infection  - Monitor lab/diagnostic results  - Monitor all insertion sites, i e  indwelling lines, tubes, and drains  - Monitor endotracheal if appropriate and nasal secretions for changes in amount and color  - [place] appropriate cooling/warming therapies per order  - Administer medications as ordered  - Instruct and encourage patient and family to use good hand hygiene technique  - Identify and instruct in appropriate isolation precautions for identified infection/condition  Outcome: Progressing  Goal: Absence of fever/infection during neutropenic period  Description: INTERVENTIONS:  - Monitor WBC    Outcome: Progressing     Problem: DISCHARGE PLANNING  Goal: Discharge to home or other facility with appropriate resources  Description: INTERVENTIONS:  - Identify barriers to discharge w/patient and caregiver  - Arrange for needed discharge resources and transportation as appropriate  - Identify discharge learning needs (meds, wound care, etc )  - Arrange for interpretive services to assist at discharge as needed  - Refer to Case Management Department for coordinating discharge planning if the patient needs post-hospital services based on physician/advanced practitioner order or complex needs related to functional status, cognitive ability, or social support system  Outcome: Progressing     Problem: Knowledge Deficit  Goal: Patient/family/caregiver demonstrates understanding of disease process, treatment plan, medications, and discharge instructions  Description: Complete learning assessment and assess knowledge base    Interventions:  - Provide teaching at level of understanding  - Provide teaching via preferred learning methods  Outcome: Progressing

## 2022-09-05 NOTE — PROGRESS NOTES
[address]  Progress Note Thelma Green 1995, 33 y o  male MRN: [id number]  Unit/Bed#: -01 Encounter: [id number]  Primary Care Provider: Johnnye Kussmaul, MD   Date and time admitted to hospital: 9/3/2022  7:00 PM    * Acute gastritis  Assessment & Plan  · Chronic gastritis and GERD  · Taking Protonix daily without relief  · Presents to ED with severe intractable abdominal pain, vomiting with coffee-ground emesis over the past few days  · Recent EGD 4/26 reported normal in Care everywhere  · Patient started initially on Protonix GTT and subsequently switchedTo Protonix IV b i d  · He has not had any further episode of coffee-ground emesis, hematemesis or melena  · Still complains of epigastric abdominal discomfort  · Tolerating full liquid diet well  Will advance to non ulcerogenic diet today  · Will keep NPO past midnight in the in event that GI wants to pursue EGD in a m  · Hemoglobin toe trending down has remained stable  Likely hemodilution  · GI input awaited  · Advanced diet as tolerated    Urinary retention  Assessment & Plan  Required straight catheterization x2 and subsequent Joseph catheter placement  Started on Flomax  UA negative for UTI  Increase ambulation  Voiding trial in a m  If patient fails voiding trial, may need Urology evaluation  VTE Pharmacologic Prophylaxis: VTE Score: 1 Low Risk (Score 0-2) - Encourage Ambulation  Patient Centered Rounds: I performed bedside rounds with nursing staff today  Discussions with Specialists or Other Care Team Provider:  Discussed with nursing    Education and Discussions with Family / Patient: Attempted to update  (Grandfather) via phone  Unable to contact  Time Spent for Care: 20 minutes  More than 50% of total time spent on counseling and coordination of care as described above      Current Length of Stay: 0 day(s)  Current Patient Status: Observation   Certification Statement: The patient will continue to require additional inpatient hospital stay due to Ongoing management of possible GI bleed  Discharge Plan: Anticipate discharge tomorrow to home  Code Status: Level 1 - Full Code    Subjective:   Patient seen and examined  Still complains of epigastric abdominal discomfort but denies any  coffee-ground emesis or hematemesis  Complains of intermittent nausea  Joseph catheter  placed yesterday for ongoing urinary retention    Objective:     Vitals:   Temp (24hrs), Av 2 °F (36 8 °C), Min:97 6 °F (36 4 °C), Max:98 7 °F (37 1 °C)    Temp:  [97 6 °F (36 4 °C)-98 7 °F (37 1 °C)] 97 6 °F (36 4 °C)  HR:  [63-80] 63  Resp:  [18-19] 18  BP: (115-126)/(71-77) 126/71  SpO2:  [97 %-99 %] 99 %  Body mass index is 28 65 kg/m²  Input and Output Summary (last 24 hours): Intake/Output Summary (Last 24 hours) at 2022 1115  Last data filed at 2022 0243  Gross per 24 hour   Intake 2280 ml   Output 4250 ml   Net -1970 ml       Physical Exam:   Physical Exam  Constitutional:       General: He is not in acute distress  Appearance: He is not ill-appearing  HENT:      Head: Normocephalic  Nose: Nose normal       Mouth/Throat:      Mouth: Mucous membranes are moist    Eyes:      Extraocular Movements: Extraocular movements intact  Pupils: Pupils are equal, round, and reactive to light  Cardiovascular:      Rate and Rhythm: Normal rate and regular rhythm  Pulses: Normal pulses  Pulmonary:      Effort: Pulmonary effort is normal    Abdominal:      General: Abdomen is flat  Bowel sounds are normal       Palpations: Abdomen is soft  Genitourinary:     Comments: Indwelling Joseph catheter in place  Musculoskeletal:      Cervical back: Neck supple  Right lower leg: No edema  Left lower leg: No edema  Skin:     Coloration: Skin is not jaundiced or pale  Neurological:      General: No focal deficit present  Mental Status: He is alert   Mental status is at baseline  Additional Data:     Labs:  Results from last 7 days   Lab Units 09/05/22  0449   WBC Thousand/uL 3 55*   HEMOGLOBIN g/dL 12 7   HEMATOCRIT % 36 9   PLATELETS Thousands/uL 134*   NEUTROS PCT % 51   LYMPHS PCT % 36   MONOS PCT % 12   EOS PCT % 1     Results from last 7 days   Lab Units 09/05/22  0449 09/04/22  0611 09/03/22  1920   SODIUM mmol/L 142   < > 141   POTASSIUM mmol/L 3 7   < > 3 9   CHLORIDE mmol/L 107   < > 102   CO2 mmol/L 24   < > 24   BUN mg/dL 7   < > 17   CREATININE mg/dL 0 81   < > 0 98   ANION GAP mmol/L 11   < > 15*   CALCIUM mg/dL 7 5*   < > 9 1   ALBUMIN g/dL  --   --  4 3   TOTAL BILIRUBIN mg/dL  --   --  0 71   ALK PHOS U/L  --   --  67   ALT U/L  --   --  25   AST U/L  --   --  19   GLUCOSE RANDOM mg/dL 92   < > 82    < > = values in this interval not displayed  Results from last 7 days   Lab Units 09/03/22  1920   INR  0 98             Results from last 7 days   Lab Units 09/03/22  1920   LACTIC ACID mmol/L 1 0       Lines/Drains:  Invasive Devices  Report    Peripheral Intravenous Line  Duration           Peripheral IV 09/03/22 Distal;Left;Upper;Ventral (anterior) Arm 1 day          Drain  Duration           Urethral Catheter Coude 18 Fr  <1 day              Urinary Catheter:  Goal for removal: Plan for voiding trial on 09/06               Imaging: No pertinent imaging reviewed      Recent Cultures (last 7 days):         Last 24 Hours Medication List:   Current Facility-Administered Medications   Medication Dose Route Frequency Provider Last Rate    acetaminophen  650 mg Oral Q6H PRN Dima Moraes MD      ondansetron  4 mg Intravenous Q6H PRN SENDY Ryan      pantoprazole  40 mg Intravenous Q12H Eula Moon MD      sodium chloride  100 mL/hr Intravenous Continuous Dima Moraes  mL/hr (09/05/22 0042)    tamsulosin  0 4 mg Oral Daily With Sierra Garcia MD          Today, Patient Was Seen By: Dima Moraes MD    **Please Note: This note may have been constructed using a voice recognition system  **

## 2022-09-05 NOTE — ASSESSMENT & PLAN NOTE
Required straight catheterization x2 and subsequent Joseph catheter placement  Started on Flomax  UA negative for UTI  Increase ambulation  Voiding trial in a m  If patient fails voiding trial, may need Urology evaluation

## 2022-09-05 NOTE — ASSESSMENT & PLAN NOTE
· Chronic gastritis and GERD  · Taking Protonix daily without relief  · Presents to ED with severe intractable abdominal pain, vomiting with coffee-ground emesis over the past few days  · Recent EGD 4/26 reported normal in Care everywhere  · Patient started initially on Protonix GTT and subsequently switchedTo Protonix IV b i d  · He has not had any further episode of coffee-ground emesis, hematemesis or melena  · Still complains of epigastric abdominal discomfort  · Tolerating full liquid diet well  Will advance to non ulcerogenic diet today  · Will keep NPO past midnight in the in event that GI wants to pursue EGD in a m  · Hemoglobin toe trending down has remained stable  Likely hemodilution    · GI input awaited  · Advanced diet as tolerated

## 2022-09-06 VITALS
DIASTOLIC BLOOD PRESSURE: 71 MMHG | HEIGHT: 68 IN | OXYGEN SATURATION: 97 % | SYSTOLIC BLOOD PRESSURE: 121 MMHG | BODY MASS INDEX: 28.55 KG/M2 | TEMPERATURE: 98.4 F | RESPIRATION RATE: 18 BRPM | WEIGHT: 188.4 LBS | HEART RATE: 74 BPM

## 2022-09-06 LAB
BASOPHILS # BLD AUTO: 0.01 THOUSANDS/ΜL (ref 0–0.1)
BASOPHILS NFR BLD AUTO: 0 % (ref 0–1)
EOSINOPHIL # BLD AUTO: 0.05 THOUSAND/ΜL (ref 0–0.61)
EOSINOPHIL NFR BLD AUTO: 1 % (ref 0–6)
ERYTHROCYTE [DISTWIDTH] IN BLOOD BY AUTOMATED COUNT: 12.3 % (ref 11.6–15.1)
HCT VFR BLD AUTO: 38.2 % (ref 36.5–49.3)
HGB BLD-MCNC: 13.2 G/DL (ref 12–17)
IMM GRANULOCYTES # BLD AUTO: 0.01 THOUSAND/UL (ref 0–0.2)
IMM GRANULOCYTES NFR BLD AUTO: 0 % (ref 0–2)
LYMPHOCYTES # BLD AUTO: 1.28 THOUSANDS/ΜL (ref 0.6–4.47)
LYMPHOCYTES NFR BLD AUTO: 20 % (ref 14–44)
MCH RBC QN AUTO: 30.3 PG (ref 26.8–34.3)
MCHC RBC AUTO-ENTMCNC: 34.6 G/DL (ref 31.4–37.4)
MCV RBC AUTO: 88 FL (ref 82–98)
MONOCYTES # BLD AUTO: 0.76 THOUSAND/ΜL (ref 0.17–1.22)
MONOCYTES NFR BLD AUTO: 12 % (ref 4–12)
NEUTROPHILS # BLD AUTO: 4.34 THOUSANDS/ΜL (ref 1.85–7.62)
NEUTS SEG NFR BLD AUTO: 67 % (ref 43–75)
NRBC BLD AUTO-RTO: 0 /100 WBCS
PLATELET # BLD AUTO: 126 THOUSANDS/UL (ref 149–390)
PMV BLD AUTO: 9.8 FL (ref 8.9–12.7)
RBC # BLD AUTO: 4.36 MILLION/UL (ref 3.88–5.62)
WBC # BLD AUTO: 6.45 THOUSAND/UL (ref 4.31–10.16)

## 2022-09-06 PROCEDURE — 99217 PR OBSERVATION CARE DISCHARGE MANAGEMENT: CPT | Performed by: INTERNAL MEDICINE

## 2022-09-06 PROCEDURE — 85025 COMPLETE CBC W/AUTO DIFF WBC: CPT | Performed by: INTERNAL MEDICINE

## 2022-09-06 RX ORDER — PANTOPRAZOLE SODIUM 40 MG/1
40 TABLET, DELAYED RELEASE ORAL
Status: DISCONTINUED | OUTPATIENT
Start: 2022-09-06 | End: 2022-09-06 | Stop reason: HOSPADM

## 2022-09-06 RX ORDER — TAMSULOSIN HYDROCHLORIDE 0.4 MG/1
0.4 CAPSULE ORAL
Qty: 30 CAPSULE | Refills: 0 | Status: ON HOLD | OUTPATIENT
Start: 2022-09-06 | End: 2022-09-08 | Stop reason: SDUPTHER

## 2022-09-06 RX ORDER — PANTOPRAZOLE SODIUM 20 MG/1
40 TABLET, DELAYED RELEASE ORAL 2 TIMES DAILY
Qty: 20 TABLET | Refills: 0 | Status: SHIPPED | OUTPATIENT
Start: 2022-09-06

## 2022-09-06 RX ADMIN — PANTOPRAZOLE SODIUM 40 MG: 40 TABLET, DELAYED RELEASE ORAL at 08:21

## 2022-09-06 RX ADMIN — SODIUM CHLORIDE 100 ML/HR: 0.9 INJECTION, SOLUTION INTRAVENOUS at 02:42

## 2022-09-06 RX ADMIN — ACETAMINOPHEN 650 MG: 325 TABLET ORAL at 09:33

## 2022-09-06 NOTE — ASSESSMENT & PLAN NOTE
Required straight catheterization x2 and subsequent Joseph catheter placement  Started on Flomax  UA negative for UTI   Joseph catheter was discontinued this morning and patient has voided well subsequently with minimal postvoid residual  Continue with Flomax on discharge  Discussed with patient's grandfather to obtain urology referral through his family doctor if patient continues to have issues with urinary retention

## 2022-09-06 NOTE — PLAN OF CARE
Problem: PAIN - ADULT  Goal: Verbalizes/displays adequate comfort level or baseline comfort level  Description: Interventions:  - Encourage patient to monitor pain and request assistance  - Assess pain using appropriate pain scale  - Administer analgesics based on type and severity of pain and evaluate response  - Implement non-pharmacological measures as appropriate and evaluate response  - Consider cultural and social influences on pain and pain management  - Notify physician/advanced practitioner if interventions unsuccessful or patient reports new pain  9/6/2022 1212 by Fern Bunn RN  Outcome: Adequate for Discharge  9/6/2022 0933 by Fern Bunn RN  Outcome: Progressing

## 2022-09-06 NOTE — DISCHARGE INSTR - AVS FIRST PAGE
Dear Sepideh Loaiza,     It was our pleasure to care for you here at [facility]  It is our hope that we were always able to exceed the expected standards for your care during your stay  You were hospitalized due to abdominal pain You were cared for on the medical floor under the service of Noah Abbott MD with the [facility] who covers for your primary care physician (PCP), Brian Ornelas MD, while you were hospitalized  If you have any questions or concerns related to this hospitalization, you may contact us at [phone]  For follow up as well as medication refills, we recommend that you follow up with your primary care physician  A registered nurse will reach out to you by phone within a few days after your discharge to answer any additional questions that you may have after going home  However, at this time we provide for you here, the most important instructions / recommendations at discharge:     Notable Medication Adjustments -   Take Protonix twice a day as prescribed  Take Flomax once daily every night  Important follow up information -   Follow-up with your gastroenterologist for further management  Follow-up with p o  Family doctor within 1 week of discharge  Other Instructions -   Continue to take non ulcerogenic diet avoiding excessive tea, alcohol, chocolate, citrus loads and soda  Please review this entire after visit summary as additional general instructions including medication list, appointments, activity, diet, any pertinent wound care, and other additional recommendations from your care team that may be provided for you        Sincerely,     Noah Abbott MD

## 2022-09-06 NOTE — NURSING NOTE
IV site removed  Discharge instructions provided to patient and grandfather  All questions and concerns were answered at the time of discharge

## 2022-09-06 NOTE — ASSESSMENT & PLAN NOTE
· Chronic gastritis and GERD  · Taking Protonix daily without relief  · Presents to ED with severe intractable abdominal pain, vomiting with coffee-ground emesis over the past few days  · Recent EGD 4/26 reported normal in Care everywhere  · Patient started initially on Protonix GTT and subsequently switchedTo Protonix IV b i d  · He has not had any further episode of coffee-ground emesis, hematemesis or melena  · Hemoglobin has remained stable and he was tolerating on ulcerogenic diet well  · Discussed with gastroenterology  No recommendation for any EGD during this hospitalization  Patient will follow-up with primary gastroenterologist on discharge    Plan discussed with patient's caretaker/grandfather at length

## 2022-09-06 NOTE — DISCHARGE SUMMARY
[address]  Discharge- Jen Rucker 1995, 33 y o  male MRN: [id number]  Unit/Bed#: -01 Encounter: [id number]  Primary Care Provider: Dari Oviedo MD   Date and time admitted to hospital: 9/3/2022  7:00 PM    * Acute gastritis  Assessment & Plan  · Chronic gastritis and GERD  · Taking Protonix daily without relief  · Presents to ED with severe intractable abdominal pain, vomiting with coffee-ground emesis over the past few days  · Recent EGD 4/26 reported normal in Care everywhere  · Patient started initially on Protonix GTT and subsequently switchedTo Protonix IV b i d  · He has not had any further episode of coffee-ground emesis, hematemesis or melena  · Hemoglobin has remained stable and he was tolerating on ulcerogenic diet well  · Discussed with gastroenterology  No recommendation for any EGD during this hospitalization  Patient will follow-up with primary gastroenterologist on discharge  Plan discussed with patient's caretaker/grandfather at length    Urinary retention  Assessment & Plan  Required straight catheterization x2 and subsequent Joseph catheter placement  Started on Flomax  UA negative for UTI   Joseph catheter was discontinued this morning and patient has voided well subsequently with minimal postvoid residual  Continue with Flomax on discharge  Discussed with patient's grandfather to obtain urology referral through his family doctor if patient continues to have issues with urinary retention          Medical Problems             Resolved Problems  Date Reviewed: 2/22/2022   None               Discharging Physician / Practitioner: Seferino Balderas MD  PCP: Dari Oviedo MD  Admission Date:   Admission Orders (From admission, onward)     Ordered        09/03/22 2346  Place in Observation  Once                      Discharge Date: 09/06/22    Consultations During Hospital Stay:  · None    Procedures Performed:   · CT abdomen pelvis with no acute intra-abdominal pathology    Significant Findings / Test Results:   · Urinalysis negative for UTI    Incidental Findings:   · None    Test Results Pending at Discharge (will require follow up): · None     Outpatient Tests Requested:  · GI follow-up    Complications:  None    Reason for Admission:  Coffee-ground emesis/ abdominal pain    Hospital Course:   Yaz Valentine is a 33 y o  male with history of chronic GERD, autism spectrum disorder with Asperger's syndrome patient who originally presented to the hospital on 9/3/2022 due to abdominal pain, nausea and 2 episodes of coffee-ground emesis  Was admitted to rule out GI bleed  He did never had any episode of vomiting, hematemesis, coffee-ground emesis, melena during hospitalization  Hemoglobin continued to remain stable  Diet was gradually advanced to non ulcerogenic diet which she continued to tolerate well without abdominal discomfort  Discussed with GI who recommended outpatient GI follow-up and no endoscopy during this hospitalization  Patient had urinary retention requiring indwelling Joseph catheter placement  Was started on Flomax  UA negative for UTI  Successfully passed a voiding trial and was deemed stable for discharge  Discussed with patient's grandfather that if he has recurrent issues with retention, may need to see Urology as an outpatient  Patient was discharged in a stable condition  Please see above list of diagnoses and related plan for additional information  Condition at Discharge: stable    Discharge Day Visit / Exam:   Subjective:  Patient seen and examined  Denies any abdominal discomfort nausea vomiting  Tolerating diet well    Vitals: Blood Pressure: 121/71 (09/06/22 0712)  Pulse: 74 (09/06/22 0712)  Temperature: 98 4 °F (36 9 °C) (09/06/22 0712)  Temp Source: Temporal (09/03/22 1857)  Respirations: 18 (09/06/22 0712)  Height: 5' 8" (172 7 cm) (09/03/22 1857)  Weight - Scale: 85 5 kg (188 lb 6 4 oz) (09/04/22 0019)  SpO2: 97 % (09/06/22 4522)  Exam:   Physical Exam  Constitutional:       General: He is not in acute distress  Appearance: He is not ill-appearing  HENT:      Head: Normocephalic  Nose: Nose normal       Mouth/Throat:      Mouth: Mucous membranes are moist    Eyes:      Extraocular Movements: Extraocular movements intact  Pupils: Pupils are equal, round, and reactive to light  Cardiovascular:      Rate and Rhythm: Normal rate and regular rhythm  Pulmonary:      Effort: Pulmonary effort is normal       Breath sounds: Normal breath sounds  Abdominal:      General: Abdomen is flat  Bowel sounds are normal       Palpations: Abdomen is soft  Musculoskeletal:      Cervical back: Normal range of motion  Right lower leg: No edema  Left lower leg: No edema  Skin:     General: Skin is warm  Neurological:      General: No focal deficit present  Mental Status: He is alert and oriented to person, place, and time  Mental status is at baseline  Psychiatric:         Mood and Affect: Mood normal        Discussion with Family: Updated  (Grandfather) via phone  Discharge instructions/Information to patient and family:   See after visit summary for information provided to patient and family  Provisions for Follow-Up Care:  See after visit summary for information related to follow-up care and any pertinent home health orders  Disposition:   Home    Planned Readmission: No     Discharge Statement:  I spent 40 minutes discharging the patient  This time was spent on the day of discharge  I had direct contact with the patient on the day of discharge  Greater than 50% of the total time was spent examining patient, answering all patient questions, arranging and discussing plan of care with patient as well as directly providing post-discharge instructions  Additional time then spent on discharge activities      Discharge Medications:  See after visit summary for reconciled discharge medications provided to patient and/or family        **Please Note: This note may have been constructed using a voice recognition system**

## 2022-09-07 ENCOUNTER — HOSPITAL ENCOUNTER (INPATIENT)
Facility: HOSPITAL | Age: 27
LOS: 3 days | Discharge: HOME | DRG: 720 | End: 2022-09-11
Attending: EMERGENCY MEDICINE | Admitting: FAMILY MEDICINE
Payer: COMMERCIAL

## 2022-09-07 ENCOUNTER — APPOINTMENT (EMERGENCY)
Dept: CT IMAGING | Facility: HOSPITAL | Age: 27
DRG: 720 | End: 2022-09-07
Payer: COMMERCIAL

## 2022-09-07 DIAGNOSIS — R33.9 URINARY RETENTION: ICD-10-CM

## 2022-09-07 DIAGNOSIS — N39.0 UTI (URINARY TRACT INFECTION): Primary | ICD-10-CM

## 2022-09-07 DIAGNOSIS — E87.6 HYPOKALEMIA: ICD-10-CM

## 2022-09-07 DIAGNOSIS — N49.0 SEMINAL VESICULITIS: ICD-10-CM

## 2022-09-07 DIAGNOSIS — N41.0 ACUTE PROSTATITIS: ICD-10-CM

## 2022-09-07 DIAGNOSIS — E87.1 HYPONATREMIA: ICD-10-CM

## 2022-09-07 PROBLEM — N30.00 ACUTE CYSTITIS: Status: ACTIVE | Noted: 2022-09-07

## 2022-09-07 PROBLEM — N17.9 AKI (ACUTE KIDNEY INJURY) (HCC): Status: ACTIVE | Noted: 2022-09-07

## 2022-09-07 LAB
ALBUMIN SERPL BCP-MCNC: 3.7 G/DL (ref 3.5–5)
ALP SERPL-CCNC: 64 U/L (ref 46–116)
ALT SERPL W P-5'-P-CCNC: 17 U/L (ref 12–78)
ANION GAP SERPL CALCULATED.3IONS-SCNC: 12 MMOL/L (ref 4–13)
APTT PPP: 30 SECONDS (ref 23–37)
AST SERPL W P-5'-P-CCNC: 14 U/L (ref 5–45)
BACTERIA UR QL AUTO: ABNORMAL /HPF
BASOPHILS # BLD AUTO: 0.02 THOUSANDS/ΜL (ref 0–0.1)
BASOPHILS NFR BLD AUTO: 0 % (ref 0–1)
BILIRUB SERPL-MCNC: 1.13 MG/DL (ref 0.2–1)
BILIRUB UR QL STRIP: NEGATIVE
BUN SERPL-MCNC: 6 MG/DL (ref 5–25)
CALCIUM SERPL-MCNC: 8.4 MG/DL (ref 8.3–10.1)
CHLORIDE SERPL-SCNC: 97 MMOL/L (ref 96–108)
CLARITY UR: ABNORMAL
CO2 SERPL-SCNC: 25 MMOL/L (ref 21–32)
COLOR UR: YELLOW
CREAT SERPL-MCNC: 1.19 MG/DL (ref 0.6–1.3)
EOSINOPHIL # BLD AUTO: 0 THOUSAND/ΜL (ref 0–0.61)
EOSINOPHIL NFR BLD AUTO: 0 % (ref 0–6)
ERYTHROCYTE [DISTWIDTH] IN BLOOD BY AUTOMATED COUNT: 12.4 % (ref 11.6–15.1)
GFR SERPL CREATININE-BSD FRML MDRD: 83 ML/MIN/1.73SQ M
GLUCOSE SERPL-MCNC: 123 MG/DL (ref 65–140)
GLUCOSE UR STRIP-MCNC: NEGATIVE MG/DL
HCT VFR BLD AUTO: 40 % (ref 36.5–49.3)
HGB BLD-MCNC: 13.8 G/DL (ref 12–17)
HGB UR QL STRIP.AUTO: ABNORMAL
IMM GRANULOCYTES # BLD AUTO: 0.09 THOUSAND/UL (ref 0–0.2)
IMM GRANULOCYTES NFR BLD AUTO: 1 % (ref 0–2)
INR PPP: 1.27 (ref 0.84–1.19)
KETONES UR STRIP-MCNC: ABNORMAL MG/DL
LACTATE SERPL-SCNC: 1.6 MMOL/L (ref 0.5–2)
LEUKOCYTE ESTERASE UR QL STRIP: ABNORMAL
LIPASE SERPL-CCNC: 73 U/L (ref 73–393)
LYMPHOCYTES # BLD AUTO: 1.17 THOUSANDS/ΜL (ref 0.6–4.47)
LYMPHOCYTES NFR BLD AUTO: 9 % (ref 14–44)
MAGNESIUM SERPL-MCNC: 1.4 MG/DL (ref 1.6–2.6)
MCH RBC QN AUTO: 30 PG (ref 26.8–34.3)
MCHC RBC AUTO-ENTMCNC: 34.5 G/DL (ref 31.4–37.4)
MCV RBC AUTO: 87 FL (ref 82–98)
MONOCYTES # BLD AUTO: 1.72 THOUSAND/ΜL (ref 0.17–1.22)
MONOCYTES NFR BLD AUTO: 13 % (ref 4–12)
NEUTROPHILS # BLD AUTO: 10.11 THOUSANDS/ΜL (ref 1.85–7.62)
NEUTS SEG NFR BLD AUTO: 77 % (ref 43–75)
NITRITE UR QL STRIP: POSITIVE
NON-SQ EPI CELLS URNS QL MICRO: ABNORMAL /HPF
NRBC BLD AUTO-RTO: 0 /100 WBCS
OTHER STN SPEC: ABNORMAL
PH UR STRIP.AUTO: 5.5 [PH]
PLATELET # BLD AUTO: 111 THOUSANDS/UL (ref 149–390)
PMV BLD AUTO: 10.1 FL (ref 8.9–12.7)
POTASSIUM SERPL-SCNC: 2.9 MMOL/L (ref 3.5–5.3)
PROCALCITONIN SERPL-MCNC: 0.55 NG/ML
PROT SERPL-MCNC: 7.2 G/DL (ref 6.4–8.4)
PROT UR STRIP-MCNC: ABNORMAL MG/DL
PROTHROMBIN TIME: 16 SECONDS (ref 11.6–14.5)
RBC # BLD AUTO: 4.6 MILLION/UL (ref 3.88–5.62)
RBC #/AREA URNS AUTO: ABNORMAL /HPF
SODIUM SERPL-SCNC: 134 MMOL/L (ref 135–147)
SP GR UR STRIP.AUTO: 1.01 (ref 1–1.03)
UROBILINOGEN UR QL STRIP.AUTO: 0.2 E.U./DL
WBC # BLD AUTO: 13.11 THOUSAND/UL (ref 4.31–10.16)
WBC #/AREA URNS AUTO: ABNORMAL /HPF

## 2022-09-07 PROCEDURE — 87086 URINE CULTURE/COLONY COUNT: CPT | Performed by: PHYSICIAN ASSISTANT

## 2022-09-07 PROCEDURE — 84145 PROCALCITONIN (PCT): CPT | Performed by: PHYSICIAN ASSISTANT

## 2022-09-07 PROCEDURE — 83605 ASSAY OF LACTIC ACID: CPT | Performed by: PHYSICIAN ASSISTANT

## 2022-09-07 PROCEDURE — 99284 EMERGENCY DEPT VISIT MOD MDM: CPT | Performed by: PHYSICIAN ASSISTANT

## 2022-09-07 PROCEDURE — 74177 CT ABD & PELVIS W/CONTRAST: CPT

## 2022-09-07 PROCEDURE — 85730 THROMBOPLASTIN TIME PARTIAL: CPT | Performed by: PHYSICIAN ASSISTANT

## 2022-09-07 PROCEDURE — 36415 COLL VENOUS BLD VENIPUNCTURE: CPT | Performed by: PHYSICIAN ASSISTANT

## 2022-09-07 PROCEDURE — 99219 PR INITIAL OBSERVATION CARE/DAY 50 MINUTES: CPT | Performed by: NURSE PRACTITIONER

## 2022-09-07 PROCEDURE — 81001 URINALYSIS AUTO W/SCOPE: CPT | Performed by: PHYSICIAN ASSISTANT

## 2022-09-07 PROCEDURE — 87040 BLOOD CULTURE FOR BACTERIA: CPT | Performed by: PHYSICIAN ASSISTANT

## 2022-09-07 PROCEDURE — 96375 TX/PRO/DX INJ NEW DRUG ADDON: CPT

## 2022-09-07 PROCEDURE — 87186 SC STD MICRODIL/AGAR DIL: CPT | Performed by: PHYSICIAN ASSISTANT

## 2022-09-07 PROCEDURE — 99285 EMERGENCY DEPT VISIT HI MDM: CPT

## 2022-09-07 PROCEDURE — 80053 COMPREHEN METABOLIC PANEL: CPT | Performed by: PHYSICIAN ASSISTANT

## 2022-09-07 PROCEDURE — 96365 THER/PROPH/DIAG IV INF INIT: CPT

## 2022-09-07 PROCEDURE — 83690 ASSAY OF LIPASE: CPT | Performed by: PHYSICIAN ASSISTANT

## 2022-09-07 PROCEDURE — 85610 PROTHROMBIN TIME: CPT | Performed by: PHYSICIAN ASSISTANT

## 2022-09-07 PROCEDURE — 83735 ASSAY OF MAGNESIUM: CPT | Performed by: NURSE PRACTITIONER

## 2022-09-07 PROCEDURE — 96361 HYDRATE IV INFUSION ADD-ON: CPT

## 2022-09-07 PROCEDURE — 85025 COMPLETE CBC W/AUTO DIFF WBC: CPT | Performed by: PHYSICIAN ASSISTANT

## 2022-09-07 PROCEDURE — 87077 CULTURE AEROBIC IDENTIFY: CPT | Performed by: PHYSICIAN ASSISTANT

## 2022-09-07 RX ORDER — ONDANSETRON 2 MG/ML
4 INJECTION INTRAMUSCULAR; INTRAVENOUS ONCE
Status: COMPLETED | OUTPATIENT
Start: 2022-09-07 | End: 2022-09-07

## 2022-09-07 RX ORDER — TAMSULOSIN HYDROCHLORIDE 0.4 MG/1
0.4 CAPSULE ORAL
Status: DISCONTINUED | OUTPATIENT
Start: 2022-09-08 | End: 2022-09-11 | Stop reason: HOSPADM

## 2022-09-07 RX ORDER — SODIUM CHLORIDE, SODIUM GLUCONATE, SODIUM ACETATE, POTASSIUM CHLORIDE, MAGNESIUM CHLORIDE, SODIUM PHOSPHATE, DIBASIC, AND POTASSIUM PHOSPHATE .53; .5; .37; .037; .03; .012; .00082 G/100ML; G/100ML; G/100ML; G/100ML; G/100ML; G/100ML; G/100ML
125 INJECTION, SOLUTION INTRAVENOUS CONTINUOUS
Status: DISCONTINUED | OUTPATIENT
Start: 2022-09-07 | End: 2022-09-08

## 2022-09-07 RX ORDER — ACETAMINOPHEN 325 MG/1
650 TABLET ORAL ONCE
Status: COMPLETED | OUTPATIENT
Start: 2022-09-07 | End: 2022-09-07

## 2022-09-07 RX ORDER — CEFTRIAXONE 2 G/50ML
2000 INJECTION, SOLUTION INTRAVENOUS ONCE
Status: COMPLETED | OUTPATIENT
Start: 2022-09-07 | End: 2022-09-07

## 2022-09-07 RX ORDER — POTASSIUM CHLORIDE 20 MEQ/1
40 TABLET, EXTENDED RELEASE ORAL ONCE
Status: COMPLETED | OUTPATIENT
Start: 2022-09-07 | End: 2022-09-07

## 2022-09-07 RX ORDER — PANTOPRAZOLE SODIUM 40 MG/1
40 TABLET, DELAYED RELEASE ORAL
Status: DISCONTINUED | OUTPATIENT
Start: 2022-09-08 | End: 2022-09-11 | Stop reason: HOSPADM

## 2022-09-07 RX ORDER — ESCITALOPRAM OXALATE 10 MG/1
1 TABLET ORAL EVERY MORNING
COMMUNITY
Start: 2022-08-04

## 2022-09-07 RX ADMIN — CEFTRIAXONE 2000 MG: 2 INJECTION, SOLUTION INTRAVENOUS at 19:57

## 2022-09-07 RX ADMIN — IOHEXOL 85 ML: 350 INJECTION, SOLUTION INTRAVENOUS at 19:36

## 2022-09-07 RX ADMIN — POTASSIUM CHLORIDE 40 MEQ: 1500 TABLET, EXTENDED RELEASE ORAL at 19:10

## 2022-09-07 RX ADMIN — SODIUM CHLORIDE, SODIUM GLUCONATE, SODIUM ACETATE, POTASSIUM CHLORIDE, MAGNESIUM CHLORIDE, SODIUM PHOSPHATE, DIBASIC, AND POTASSIUM PHOSPHATE 125 ML/HR: .53; .5; .37; .037; .03; .012; .00082 INJECTION, SOLUTION INTRAVENOUS at 22:10

## 2022-09-07 RX ADMIN — SODIUM CHLORIDE 1000 ML: 0.9 INJECTION, SOLUTION INTRAVENOUS at 19:57

## 2022-09-07 RX ADMIN — SODIUM CHLORIDE 1000 ML: 0.9 INJECTION, SOLUTION INTRAVENOUS at 20:29

## 2022-09-07 RX ADMIN — ACETAMINOPHEN 650 MG: 325 TABLET ORAL at 18:19

## 2022-09-07 RX ADMIN — SODIUM CHLORIDE 1000 ML: 0.9 INJECTION, SOLUTION INTRAVENOUS at 18:20

## 2022-09-07 RX ADMIN — ONDANSETRON 4 MG: 2 INJECTION INTRAMUSCULAR; INTRAVENOUS at 18:17

## 2022-09-07 NOTE — ED PROVIDER NOTES
History  Chief Complaint   Patient presents with    Abdominal Pain     Pt reports abd pain, N/V, fever today  States he was Dc'd from this hospital yesterday following admission for gastritis      52-year-old male presents emergency department for evaluation of abdominal pain, nausea, vomiting, fever  Patient discharged from our inpatient service yesterday diagnosed with gastritis  During stay patient additionally had acute urinary retention requiring catheterization x2  UA was negative for UTI and patient was discharged with urology referral   Patient states since yesterday he has had significant abdominal discomfort  Reports he has urinated with cloudy foul-smelling urine  Denies any dysuria or hematuria  Reports fevers today  States he has been unable to get out of bed  Reports he has not had a bowel movement in several days  Denies any history of abdominal surgeries  History provided by:  Patient  Abdominal Pain  Pain location:  Epigastric and suprapubic  Pain quality: sharp    Pain radiates to:  Does not radiate  Pain severity:  Moderate  Onset quality:  Gradual  Timing:  Constant  Progression:  Worsening  Chronicity:  New  Relieved by:  None tried  Worsened by:  Nothing  Ineffective treatments:  None tried  Associated symptoms: anorexia, constipation, fever, nausea and vomiting    Associated symptoms: no belching, no chest pain, no chills, no cough, no diarrhea, no dysuria, no fatigue, no flatus, no hematemesis, no hematochezia, no hematuria, no melena, no shortness of breath and no sore throat        Prior to Admission Medications   Prescriptions Last Dose Informant Patient Reported?  Taking?   loratadine (CLARITIN) 10 mg tablet   Yes No   Sig: Take by mouth   ondansetron (ZOFRAN) 4 mg tablet   No No   Sig: Take 1 tablet (4 mg total) by mouth every 6 (six) hours   pantoprazole (PROTONIX) 20 mg tablet   No No   Sig: Take 2 tablets (40 mg total) by mouth 2 (two) times a day   simethicone (MYLICON) 125 MG chewable tablet   Yes No   Sig: Chew 125 mg every 6 (six) hours as needed for flatulence   tamsulosin (FLOMAX) 0 4 mg   No No   Sig: Take 1 capsule (0 4 mg total) by mouth daily with dinner      Facility-Administered Medications: None       Past Medical History:   Diagnosis Date    Autistic disorder     Gastric paresis     GERD (gastroesophageal reflux disease)     Known health problems: none        Past Surgical History:   Procedure Laterality Date    EYE SURGERY      FOOT SURGERY         Family History   Problem Relation Age of Onset    No Known Problems Mother     No Known Problems Father      I have reviewed and agree with the history as documented  E-Cigarette/Vaping    E-Cigarette Use Never User      E-Cigarette/Vaping Substances     Social History     Tobacco Use    Smoking status: Never Smoker    Smokeless tobacco: Never Used   Vaping Use    Vaping Use: Never used   Substance Use Topics    Alcohol use: Not Currently    Drug use: Never       Review of Systems   Constitutional: Positive for appetite change and fever  Negative for chills, diaphoresis and fatigue  HENT: Negative  Negative for sore throat  Respiratory: Negative for cough and shortness of breath  Cardiovascular: Negative for chest pain, palpitations and leg swelling  Gastrointestinal: Positive for abdominal pain, anorexia, constipation, nausea and vomiting  Negative for abdominal distention, diarrhea, flatus, hematemesis, hematochezia and melena  Genitourinary: Positive for frequency  Negative for dysuria and hematuria  Foul smelling urine   Musculoskeletal: Negative  Skin: Negative  Neurological: Negative  All other systems reviewed and are negative  Physical Exam  Physical Exam  Vitals and nursing note reviewed  Constitutional:       General: He is not in acute distress  Appearance: He is well-developed and normal weight  He is not ill-appearing, toxic-appearing or diaphoretic  HENT:      Head: Normocephalic  Mouth/Throat:      Mouth: Mucous membranes are moist    Eyes:      Extraocular Movements: Extraocular movements intact  Cardiovascular:      Rate and Rhythm: Normal rate and regular rhythm  Pulmonary:      Effort: Pulmonary effort is normal       Breath sounds: No stridor  No wheezing, rhonchi or rales  Chest:      Chest wall: No tenderness  Abdominal:      General: Abdomen is flat  Bowel sounds are normal       Palpations: Abdomen is soft  Tenderness: There is abdominal tenderness in the epigastric area and suprapubic area  There is no right CVA tenderness, left CVA tenderness or guarding  Negative signs include Nathan's sign, Rovsing's sign, McBurney's sign and psoas sign  Skin:     General: Skin is warm and dry  Capillary Refill: Capillary refill takes less than 2 seconds  Coloration: Skin is not mottled or pale  Findings: No erythema or rash  Neurological:      General: No focal deficit present  Mental Status: He is alert and oriented to person, place, and time     Psychiatric:         Mood and Affect: Mood normal          Behavior: Behavior normal          Vital Signs  ED Triage Vitals [09/07/22 1755]   Temperature Pulse Respirations Blood Pressure SpO2   (!) 101 °F (38 3 °C) (!) 113 16 124/78 96 %      Temp Source Heart Rate Source Patient Position - Orthostatic VS BP Location FiO2 (%)   Temporal -- Lying Right arm --      Pain Score       7           Vitals:    09/07/22 1945 09/07/22 2000 09/07/22 2015 09/07/22 2024   BP:   120/63 120/63   Pulse: 89 96 82    Patient Position - Orthostatic VS:             Visual Acuity      ED Medications  Medications   sodium chloride 0 9 % bolus 1,000 mL (0 mL Intravenous Stopped 9/7/22 2028)     Followed by   sodium chloride 0 9 % bolus 1,000 mL (has no administration in time range)   sodium chloride 0 9 % bolus 1,000 mL (0 mL Intravenous Stopped 9/7/22 1925)   acetaminophen (TYLENOL) tablet 650 mg (650 mg Oral Given 9/7/22 1819)   ondansetron (ZOFRAN) injection 4 mg (4 mg Intravenous Given 9/7/22 1817)   potassium chloride (K-DUR,KLOR-CON) CR tablet 40 mEq (40 mEq Oral Given 9/7/22 1910)   iohexol (OMNIPAQUE) 350 MG/ML injection (MULTI-DOSE) 85 mL (85 mL Intravenous Given 9/7/22 1936)   cefTRIAXone (ROCEPHIN) IVPB (premix in dextrose) 2,000 mg 50 mL (0 mg Intravenous Stopped 9/7/22 2029)       Diagnostic Studies  Results Reviewed     Procedure Component Value Units Date/Time    UA w Reflex to Microscopic w Reflex to Culture [[id number]]  (Abnormal) Collected: 09/07/22 2020    Lab Status: Final result Specimen: Urine, Clean Catch Updated: 09/07/22 2026     Color, UA Yellow     Clarity, UA Slightly Cloudy     Specific [place], UA 1 010     pH, UA 5 5     Leukocytes, UA Moderate     Nitrite, UA Positive     Protein, UA 30 (1+) mg/dl      Glucose, UA Negative mg/dl      Ketones, UA 40 (2+) mg/dl      Urobilinogen, UA 0 2 E U /dl      Bilirubin, UA Negative     Occult Blood, UA Moderate    Urine Microscopic [[id number]] Collected: 09/07/22 2020    Lab Status: In process Specimen: Urine, Clean Catch Updated: 09/07/22 2026    Procalcitonin [[id number]]  (Abnormal) Collected: 09/07/22 1815    Lab Status: Final result Specimen: Blood from Arm, Left Updated: 09/07/22 1854     Procalcitonin 0 55 ng/ml     Protime-INR [[id number]]  (Abnormal) Collected: 09/07/22 1815    Lab Status: Final result Specimen: Blood from Arm, Left Updated: 09/07/22 1853     Protime 16 0 seconds      INR 1 27    APTT [[id number]]  (Normal) Collected: 09/07/22 1815    Lab Status: Final result Specimen: Blood from Arm, Left Updated: 09/07/22 1853     PTT 30 seconds     Lactic acid [[id number]]  (Normal) Collected: 09/07/22 1815    Lab Status: Final result Specimen: Blood from Arm, Left Updated: 09/07/22 1849     LACTIC ACID 1 6 mmol/L     Narrative:      Result may be elevated if tourniquet was used during collection      Comprehensive metabolic panel [[id number]]  (Abnormal) Collected: 09/07/22 1815    Lab Status: Final result Specimen: Blood from Arm, Left Updated: 09/07/22 1846     Sodium 134 mmol/L      Potassium 2 9 mmol/L      Chloride 97 mmol/L      CO2 25 mmol/L      ANION GAP 12 mmol/L      BUN 6 mg/dL      Creatinine 1 19 mg/dL      Glucose 123 mg/dL      Calcium 8 4 mg/dL      AST 14 U/L      ALT 17 U/L      Alkaline Phosphatase 64 U/L      Total Protein 7 2 g/dL      Albumin 3 7 g/dL      Total Bilirubin 1 13 mg/dL      eGFR 83 ml/min/1 73sq m     Narrative:      [place] guidelines for Chronic Kidney Disease (CKD):     Stage 1 with normal or high GFR (GFR > 90 mL/min/1 73 square meters)    Stage 2 Mild CKD (GFR = 60-89 mL/min/1 73 square meters)    Stage 3A Moderate CKD (GFR = 45-59 mL/min/1 73 square meters)    Stage 3B Moderate CKD (GFR = 30-44 mL/min/1 73 square meters)    Stage 4 Severe CKD (GFR = 15-29 mL/min/1 73 square meters)    Stage 5 End Stage CKD (GFR <15 mL/min/1 73 square meters)  Note: GFR calculation is accurate only with a steady state creatinine    Lipase [[id number]]  (Normal) Collected: 09/07/22 1815    Lab Status: Final result Specimen: Blood from Arm, Left Updated: 09/07/22 1846     Lipase 73 u/L     CBC and differential [[id number]]  (Abnormal) Collected: 09/07/22 1815    Lab Status: Final result Specimen: Blood from Arm, Left Updated: 09/07/22 1832     WBC 13 11 Thousand/uL      RBC 4 60 Million/uL      Hemoglobin 13 8 g/dL      Hematocrit 40 0 %      MCV 87 fL      MCH 30 0 pg      MCHC 34 5 g/dL      RDW 12 4 %      MPV 10 1 fL      Platelets 826 Thousands/uL      nRBC 0 /100 WBCs      Neutrophils Relative 77 %      Immat GRANS % 1 %      Lymphocytes Relative 9 %      Monocytes Relative 13 %      Eosinophils Relative 0 %      Basophils Relative 0 %      Neutrophils Absolute 10 11 Thousands/µL      Immature Grans Absolute 0 09 Thousand/uL      Lymphocytes Absolute 1 17 Thousands/µL      Monocytes Absolute 1 72 Thousand/µL      Eosinophils Absolute 0 00 Thousand/µL      Basophils Absolute 0 02 Thousands/µL     Blood culture #2 [[id number]] Collected: 09/07/22 1815    Lab Status: In process Specimen: Blood from Line, Venous Updated: 09/07/22 1825    Blood culture #1 [[id number]] Collected: 09/07/22 1815    Lab Status: In process Specimen: Blood from Line, Venous Updated: 09/07/22 1825                 CT abdomen pelvis with contrast   Final Result by Arline Baumgarten, MD (09/07 2011)      Inflammation surrounding the seminal vesicles and possibly the prostate suspicious for a seminal vesiculitis and possible prostatitis  Slight fullness of the renal collecting systems bilaterally without rosalie hydronephrosis  This could be secondary to inflammation surrounding the seminal vesicles as mentioned above  Workstation performed: [id number]                    Procedures  Procedures         ED Course  ED Course as of 09/07/22 2030   Wed Sep 07, 2022   1805 Temperature(!): 101 °F (38 3 °C)   1805 Pulse(!): 113   1842 WBC(!): 13 11   1902 LACTIC ACID: 1 6   1902 Potassium(!): 2 9   1902 Sodium(!): 134   2013 CT abd:   Inflammation surrounding the seminal vesicles and possibly the prostate suspicious for a seminal vesiculitis and possible prostatitis      Slight fullness of the renal collecting systems bilaterally without rosalie hydronephrosis  This could be secondary to inflammation surrounding the seminal vesicles as mentioned above     2026 Nitrite, UA(!): Positive  UA + for UTI   2028 TT sent to medicine requesting admission               MDM  Number of Diagnoses or Management Options  Hypokalemia: new and requires workup  Hyponatremia: new and requires workup  Seminal vesiculitis: new and requires workup  UTI (urinary tract infection): new and requires workup  Diagnosis management comments: 33year old male presenting emergency department for evaluation of abdominal pain nausea, vomiting, cloudy foul-smelling urine  Febrile and tachycardic on arrival   None have leukocytosis, hypokalemia, hyponatremia  Positive for UTI  CT scan suspicious seminal vesiculitis and possibly prostatitis  Patient received IV fluids, antibiotics while in the emergency department  He has accepted the medicine service  Amount and/or Complexity of Data Reviewed  Clinical lab tests: ordered and reviewed  Tests in the radiology section of CPT®: ordered and reviewed  Decide to obtain previous medical records or to obtain history from someone other than the patient: yes  Review and summarize past medical records: yes  Independent visualization of images, tracings, or specimens: yes        Disposition  Final diagnoses:   UTI (urinary tract infection)   Seminal vesiculitis   Hypokalemia   Hyponatremia     Time reflects when diagnosis was documented in both MDM as applicable and the Disposition within this note     Time User Action Codes Description Comment    9/7/2022  8:29 PM Paulene Elio Add [N39 0] UTI (urinary tract infection)     9/7/2022  8:29 PM Paulene Elio Add [N49 0] Seminal vesiculitis     9/7/2022  8:29 PM Paulene Leio Add [E87 6] Hypokalemia     9/7/2022  8:29 PM Paulene Elio Add [E87 1] Hyponatremia       ED Disposition     None      Follow-up Information    None         Patient's Medications   Discharge Prescriptions    No medications on file       No discharge procedures on file      PDMP Review     None          ED Provider  Electronically Signed by           Kacie Reyes PA-C  09/07/22 6990

## 2022-09-08 ENCOUNTER — TELEPHONE (OUTPATIENT)
Dept: OTHER | Facility: HOSPITAL | Age: 27
End: 2022-09-08

## 2022-09-08 PROBLEM — R33.8 ACUTE URINARY RETENTION: Status: ACTIVE | Noted: 2022-09-05

## 2022-09-08 PROBLEM — E87.6 HYPOKALEMIA: Status: ACTIVE | Noted: 2022-09-08

## 2022-09-08 PROBLEM — F84.0 AUTISM: Status: ACTIVE | Noted: 2022-09-08

## 2022-09-08 PROBLEM — N41.0 ACUTE PROSTATITIS: Status: ACTIVE | Noted: 2022-09-07

## 2022-09-08 LAB
ALBUMIN SERPL BCP-MCNC: 2.9 G/DL (ref 3.5–5)
ALP SERPL-CCNC: 54 U/L (ref 46–116)
ALT SERPL W P-5'-P-CCNC: 15 U/L (ref 12–78)
ANION GAP SERPL CALCULATED.3IONS-SCNC: 10 MMOL/L (ref 4–13)
ANION GAP SERPL CALCULATED.3IONS-SCNC: 11 MMOL/L (ref 4–13)
AST SERPL W P-5'-P-CCNC: 13 U/L (ref 5–45)
BASOPHILS # BLD AUTO: 0.01 THOUSANDS/ΜL (ref 0–0.1)
BASOPHILS NFR BLD AUTO: 0 % (ref 0–1)
BILIRUB SERPL-MCNC: 0.72 MG/DL (ref 0.2–1)
BUN SERPL-MCNC: 5 MG/DL (ref 5–25)
BUN SERPL-MCNC: 5 MG/DL (ref 5–25)
CALCIUM ALBUM COR SERPL-MCNC: 8.6 MG/DL (ref 8.3–10.1)
CALCIUM SERPL-MCNC: 7.7 MG/DL (ref 8.3–10.1)
CALCIUM SERPL-MCNC: 8.2 MG/DL (ref 8.3–10.1)
CHLORIDE SERPL-SCNC: 101 MMOL/L (ref 96–108)
CHLORIDE SERPL-SCNC: 103 MMOL/L (ref 96–108)
CO2 SERPL-SCNC: 24 MMOL/L (ref 21–32)
CO2 SERPL-SCNC: 28 MMOL/L (ref 21–32)
CREAT SERPL-MCNC: 0.89 MG/DL (ref 0.6–1.3)
CREAT SERPL-MCNC: 0.95 MG/DL (ref 0.6–1.3)
EOSINOPHIL # BLD AUTO: 0 THOUSAND/ΜL (ref 0–0.61)
EOSINOPHIL NFR BLD AUTO: 0 % (ref 0–6)
ERYTHROCYTE [DISTWIDTH] IN BLOOD BY AUTOMATED COUNT: 12.8 % (ref 11.6–15.1)
GFR SERPL CREATININE-BSD FRML MDRD: 110 ML/MIN/1.73SQ M
GFR SERPL CREATININE-BSD FRML MDRD: 118 ML/MIN/1.73SQ M
GLUCOSE P FAST SERPL-MCNC: 96 MG/DL (ref 65–99)
GLUCOSE SERPL-MCNC: 144 MG/DL (ref 65–140)
GLUCOSE SERPL-MCNC: 96 MG/DL (ref 65–140)
HAV IGM SER QL: NORMAL
HBV CORE IGM SER QL: NORMAL
HBV SURFACE AG SER QL: NORMAL
HCT VFR BLD AUTO: 36.1 % (ref 36.5–49.3)
HCV AB SER QL: NORMAL
HGB BLD-MCNC: 12.5 G/DL (ref 12–17)
HIV 1+2 AB+HIV1 P24 AG SERPL QL IA: NORMAL
HIV1 P24 AG SER QL: NORMAL
IMM GRANULOCYTES # BLD AUTO: 0.08 THOUSAND/UL (ref 0–0.2)
IMM GRANULOCYTES NFR BLD AUTO: 1 % (ref 0–2)
LYMPHOCYTES # BLD AUTO: 1.27 THOUSANDS/ΜL (ref 0.6–4.47)
LYMPHOCYTES NFR BLD AUTO: 11 % (ref 14–44)
MAGNESIUM SERPL-MCNC: 1.6 MG/DL (ref 1.6–2.6)
MCH RBC QN AUTO: 30.4 PG (ref 26.8–34.3)
MCHC RBC AUTO-ENTMCNC: 34.6 G/DL (ref 31.4–37.4)
MCV RBC AUTO: 88 FL (ref 82–98)
MONOCYTES # BLD AUTO: 1.38 THOUSAND/ΜL (ref 0.17–1.22)
MONOCYTES NFR BLD AUTO: 12 % (ref 4–12)
NEUTROPHILS # BLD AUTO: 8.72 THOUSANDS/ΜL (ref 1.85–7.62)
NEUTS SEG NFR BLD AUTO: 76 % (ref 43–75)
NRBC BLD AUTO-RTO: 0 /100 WBCS
PLATELET # BLD AUTO: 115 THOUSANDS/UL (ref 149–390)
PMV BLD AUTO: 9.9 FL (ref 8.9–12.7)
POTASSIUM SERPL-SCNC: 3.2 MMOL/L (ref 3.5–5.3)
POTASSIUM SERPL-SCNC: 3.4 MMOL/L (ref 3.5–5.3)
PROT SERPL-MCNC: 6.2 G/DL (ref 6.4–8.4)
RBC # BLD AUTO: 4.11 MILLION/UL (ref 3.88–5.62)
SODIUM SERPL-SCNC: 138 MMOL/L (ref 135–147)
SODIUM SERPL-SCNC: 139 MMOL/L (ref 135–147)
WBC # BLD AUTO: 11.46 THOUSAND/UL (ref 4.31–10.16)

## 2022-09-08 PROCEDURE — 99238 HOSP IP/OBS DSCHRG MGMT 30/<: CPT | Performed by: FAMILY MEDICINE

## 2022-09-08 PROCEDURE — NC001 PR NO CHARGE: Performed by: PHYSICIAN ASSISTANT

## 2022-09-08 PROCEDURE — 80048 BASIC METABOLIC PNL TOTAL CA: CPT | Performed by: FAMILY MEDICINE

## 2022-09-08 PROCEDURE — 87591 N.GONORRHOEAE DNA AMP PROB: CPT | Performed by: FAMILY MEDICINE

## 2022-09-08 PROCEDURE — 87806 HIV AG W/HIV1&2 ANTB W/OPTIC: CPT | Performed by: FAMILY MEDICINE

## 2022-09-08 PROCEDURE — 80074 ACUTE HEPATITIS PANEL: CPT | Performed by: FAMILY MEDICINE

## 2022-09-08 PROCEDURE — 80053 COMPREHEN METABOLIC PANEL: CPT | Performed by: NURSE PRACTITIONER

## 2022-09-08 PROCEDURE — 99254 IP/OBS CNSLTJ NEW/EST MOD 60: CPT | Performed by: PHYSICIAN ASSISTANT

## 2022-09-08 PROCEDURE — 85025 COMPLETE CBC W/AUTO DIFF WBC: CPT | Performed by: NURSE PRACTITIONER

## 2022-09-08 PROCEDURE — 83735 ASSAY OF MAGNESIUM: CPT | Performed by: FAMILY MEDICINE

## 2022-09-08 PROCEDURE — 87491 CHLMYD TRACH DNA AMP PROBE: CPT | Performed by: FAMILY MEDICINE

## 2022-09-08 PROCEDURE — 0T9B70Z DRAINAGE OF BLADDER WITH DRAINAGE DEVICE, VIA NATURAL OR ARTIFICIAL OPENING: ICD-10-PCS | Performed by: UROLOGY

## 2022-09-08 RX ORDER — LEVOFLOXACIN 500 MG/1
500 TABLET, FILM COATED ORAL EVERY 24 HOURS
Qty: 30 TABLET | Refills: 0 | Status: SHIPPED | OUTPATIENT
Start: 2022-09-08 | End: 2022-09-11

## 2022-09-08 RX ORDER — TAMSULOSIN HYDROCHLORIDE 0.4 MG/1
0.4 CAPSULE ORAL
Qty: 30 CAPSULE | Refills: 0 | Status: SHIPPED | OUTPATIENT
Start: 2022-09-08 | End: 2022-09-14 | Stop reason: SDUPTHER

## 2022-09-08 RX ORDER — POTASSIUM CHLORIDE 20 MEQ/1
40 TABLET, EXTENDED RELEASE ORAL ONCE
Status: COMPLETED | OUTPATIENT
Start: 2022-09-08 | End: 2022-09-08

## 2022-09-08 RX ORDER — MAGNESIUM SULFATE HEPTAHYDRATE 40 MG/ML
2 INJECTION, SOLUTION INTRAVENOUS ONCE
Status: COMPLETED | OUTPATIENT
Start: 2022-09-08 | End: 2022-09-09

## 2022-09-08 RX ORDER — LEVOFLOXACIN 500 MG/1
500 TABLET, FILM COATED ORAL EVERY 24 HOURS
Status: DISCONTINUED | OUTPATIENT
Start: 2022-09-08 | End: 2022-09-09

## 2022-09-08 RX ORDER — ACETAMINOPHEN 325 MG/1
650 TABLET ORAL EVERY 6 HOURS PRN
Status: DISCONTINUED | OUTPATIENT
Start: 2022-09-08 | End: 2022-09-11 | Stop reason: HOSPADM

## 2022-09-08 RX ORDER — LIDOCAINE HYDROCHLORIDE 20 MG/ML
1 JELLY TOPICAL ONCE
Status: COMPLETED | OUTPATIENT
Start: 2022-09-08 | End: 2022-09-08

## 2022-09-08 RX ADMIN — ACETAMINOPHEN 650 MG: 325 TABLET ORAL at 14:49

## 2022-09-08 RX ADMIN — POTASSIUM CHLORIDE 40 MEQ: 1500 TABLET, EXTENDED RELEASE ORAL at 13:06

## 2022-09-08 RX ADMIN — LEVOFLOXACIN 500 MG: 500 TABLET, FILM COATED ORAL at 13:06

## 2022-09-08 RX ADMIN — LIDOCAINE HYDROCHLORIDE 1 APPLICATION: 20 JELLY TOPICAL at 11:31

## 2022-09-08 RX ADMIN — TAMSULOSIN HYDROCHLORIDE 0.4 MG: 0.4 CAPSULE ORAL at 16:04

## 2022-09-08 RX ADMIN — MAGNESIUM SULFATE HEPTAHYDRATE 2 G: 40 INJECTION, SOLUTION INTRAVENOUS at 12:57

## 2022-09-08 RX ADMIN — ACETAMINOPHEN 650 MG: 325 TABLET ORAL at 20:51

## 2022-09-08 RX ADMIN — PANTOPRAZOLE SODIUM 40 MG: 40 TABLET, DELAYED RELEASE ORAL at 05:20

## 2022-09-08 NOTE — ASSESSMENT & PLAN NOTE
· Retaining urine   · Unable to be catheterized by nursing staff    Joseph catheter placed by Urology  ·  specialty coude latex free catheter placed  · Continue flomax  · Urology consultation appreciated recommend outpatient follow-up in 10 days with Dr Kiana Dickey

## 2022-09-08 NOTE — ASSESSMENT & PLAN NOTE
· CR 1 19 on admisssion from baseline of 0 95  · UTI- empiric ceftriaxone  · Urinary retention protocol  · IV hydration   · Trend CR  · Renally dose medications

## 2022-09-08 NOTE — TELEPHONE ENCOUNTER
Kortneyjorge luis Joshjorge luis, is a 52-year-old male seen in urologic consultation for retention at L/OW  Discharge with Joseph catheter  Contact patient within 10 days for trial of void  Thank you

## 2022-09-08 NOTE — DISCHARGE SUMMARY
[address]  Discharge- Mendy Thomas 1995, 33 y o  male MRN: [id number]  Unit/Bed#: -01 Encounter: [id number]  Primary Care Provider: Yenny Kelsey MD   Date and time admitted to hospital: 9/7/2022  5:58 PM    * JHONNY (acute kidney injury) ([facility] [address] )  Assessment & Plan  · CR 1 19-1 27 on admisssion from baseline of 0 95  · Urinary retention protocol from it patient is retaining more than 450 cc  Joseph catheter placed and around 1200 cc of urine drained  · Renally dose medications    Acute prostatitis  Assessment & Plan  · UA is abnormal   Patient states that he is not sexually active  · Trying to reach his family to get further history in regards to any STDs in the past   · Will check HIV, acute hepatitis panel and urine gonorrhea chlamydia antigen  CT abdomen pelvis shows Inflammation surrounding the seminal vesicles and possibly the prostate suspicious for a seminal vesiculitis and possible prostatitis  · Empiric ceftriaxone will transition to Levaquin 500 mg oral daily for total of 4 weeks with outpatient follow-up with Urology  · Cx pending    Acute urinary retention  Assessment & Plan  · Retaining urine   · Unable to be catheterized by nursing staff  Joseph catheter placed by Urology  ·  specialty coude latex free catheter placed  · Continue flomax  · Urology consultation appreciated recommend outpatient follow-up in 10 days with Dr Anneliese Gaspar  At baseline    Hypokalemia  [address] [place] [address]  · Potassium 2 9 on admission  Replaced and repeat potassium is 3 4    Also noted to have low magnesium and hence will replace magnesium as well        Discharging Physician / Practitioner: Edgardo Constantino MD  PCP: Yenny Kelsey MD  Admission Date:   Admission Orders (From admission, onward)     Ordered        09/07/22 2032  Place in Observation  Once                      Discharge Date: 09/08/22    Medical Problems             Resolved Problems  Date Reviewed: 9/8/2022   None                 Consultations During Hospital Stay:  · urology    Procedures Performed:   · Latex free coude Joseph placed    Significant Findings / Test Results:   CT abdomen pelvis with contrast    Result Date: 9/7/2022  Impression: Inflammation surrounding the seminal vesicles and possibly the prostate suspicious for a seminal vesiculitis and possible prostatitis  Slight fullness of the renal collecting systems bilaterally without rosalie hydronephrosis  This could be secondary to inflammation surrounding the seminal vesicles as mentioned above  Workstation performed: [id number]     Incidental Findings:   · none     Test Results Pending at Discharge (will require follow up):   · Urine culture,hiv,acute hep panel  Gonorrhea chlamydia urine antigen     Outpatient Tests Requested:  · CBC and BMP in 1 week    Complications:  None    Reason for Admission:  Abdominal pain    Hospital Course:     Sathish Miranda is a 33 y o  male patient who originally presented to the hospital on 9/7/2022 due to acute urinary tract infection and acute urinary retention  Had abnormal UA and CT abdomen pelvis for some for acute prostatitis  Patient found have urinary retention again latex-free coude catheter placed and 1200 cc of urine removed  Will be discharged with Joseph catheter with outpatient follow-up with Urology  I have also ordered STD panel and placed him on a 4 week course of Levaquin 500 mg daily due to concern for acute prostatitis  Repeat labs outpatient 1 week including CBC and BMP  Please see above list of diagnoses and related plan for additional information  Condition at Discharge: good     Discharge Day Visit / Exam:     Subjective:  Patient denies any complaints    Denies any abdominal pain nausea or fever  Vitals: Blood Pressure: 108/63 (09/08/22 0640)  Pulse: 82 (09/08/22 0640)  Temperature: 99 3 °F (37 4 °C) (09/08/22 0640)  Temp Source: Temporal (09/07/22 1755)  Respirations: 19 (09/08/22 0640)  Height: 5' 9" (175 3 cm) (09/07/22 2108)  Weight - Scale: 86 6 kg (190 lb 14 7 oz) (09/08/22 0456)  SpO2: 99 % (09/08/22 0900)  Exam:   Physical Exam  Vitals and nursing note reviewed  Constitutional:       Appearance: Normal appearance  HENT:      Head: Normocephalic and atraumatic  Right Ear: External ear normal       Left Ear: External ear normal       Nose: Nose normal       Mouth/Throat:      Pharynx: Oropharynx is clear  Cardiovascular:      Rate and Rhythm: Normal rate and regular rhythm  Heart sounds: Normal heart sounds  Pulmonary:      Effort: Pulmonary effort is normal       Breath sounds: Normal breath sounds  Abdominal:      General: Bowel sounds are normal       Palpations: Abdomen is soft  Tenderness: There is no abdominal tenderness  Genitourinary:     Comments: Joseph catheter  Musculoskeletal:         General: Normal range of motion  Cervical back: Normal range of motion and neck supple  Skin:     General: Skin is warm and dry  Capillary Refill: Capillary refill takes less than 2 seconds  Neurological:      General: No focal deficit present  Mental Status: He is alert and oriented to person, place, and time  Psychiatric:         Mood and Affect: Mood normal          Discussion with Family:  Will update family    Discharge instructions/Information to patient and family:   See after visit summary for information provided to patient and family  Provisions for Follow-Up Care:  See after visit summary for information related to follow-up care and any pertinent home health orders  Disposition:     Home    For Discharges to [facility]F:   · Not Applicable to this Patient - Not Applicable to this Patient    Planned Readmission:  None     Discharge Statement:  I spent 35 minutes discharging the patient  This time was spent on the day of discharge   I had direct contact with the patient on the day of discharge  Greater than 50% of the total time was spent examining patient, answering all patient questions, arranging and discussing plan of care with patient as well as directly providing post-discharge instructions  Additional time then spent on discharge activities  Discharge Medications:  See after visit summary for reconciled discharge medications provided to patient and family        ** Please Note: This note has been constructed using a voice recognition system **

## 2022-09-08 NOTE — PLAN OF CARE
Problem: Nutrition/Hydration-ADULT  Goal: Nutrient/Hydration intake appropriate for improving, restoring or maintaining nutritional needs  Description: Monitor and assess patient's nutrition/hydration status for malnutrition  Collaborate with interdisciplinary team and initiate plan and interventions as ordered  Monitor patient's weight and dietary intake as ordered or per policy  Utilize nutrition screening tool and intervene as necessary  Determine patient's food preferences and provide high-protein, high-caloric foods as appropriate       INTERVENTIONS:  - Monitor oral intake, urinary output, labs, and treatment plans  - Assess nutrition and hydration status and recommend course of action  - Evaluate amount of meals eaten  - Assist patient with eating if necessary   - Allow adequate time for meals  - Recommend/ encourage appropriate diets, oral nutritional supplements, and vitamin/mineral supplements  - Order, calculate, and assess calorie counts as needed  - Recommend, monitor, and adjust tube feedings and TPN/PPN based on assessed needs  - Assess need for intravenous fluids  - Provide specific nutrition/hydration education as appropriate  - Include patient/family/caregiver in decisions related to nutrition  Outcome: Progressing     Problem: PAIN - ADULT  Goal: Verbalizes/displays adequate comfort level or baseline comfort level  Description: Interventions:  - Encourage patient to monitor pain and request assistance  - Assess pain using appropriate pain scale  - Administer analgesics based on type and severity of pain and evaluate response  - Implement non-pharmacological measures as appropriate and evaluate response  - Consider cultural and social influences on pain and pain management  - Notify physician/advanced practitioner if interventions unsuccessful or patient reports new pain  Outcome: Progressing     Problem: INFECTION - ADULT  Goal: Absence or prevention of progression during hospitalization  Description: INTERVENTIONS:  - Assess and monitor for signs and symptoms of infection  - Monitor lab/diagnostic results  - Monitor all insertion sites, i e  indwelling lines, tubes, and drains  - Monitor endotracheal if appropriate and nasal secretions for changes in amount and color  - [place] appropriate cooling/warming therapies per order  - Administer medications as ordered  - Instruct and encourage patient and family to use good hand hygiene technique  - Identify and instruct in appropriate isolation precautions for identified infection/condition  Outcome: Progressing     Problem: SAFETY ADULT  Goal: Patient will remain free of falls  Description: INTERVENTIONS:  - Educate patient/family on patient safety including physical limitations  - Instruct patient to call for assistance with activity   - Consult OT/PT to assist with strengthening/mobility   - Keep Call bell within reach  - Keep bed low and locked with side rails adjusted as appropriate  - Keep care items and personal belongings within reach  - Initiate and maintain comfort rounds  - Make Fall Risk Sign visible to staff  - Offer Toileting every 2 Hours, in advance of need  - Initiate/Maintain bed alarm  - Obtain necessary fall risk management equipment  - Apply yellow socks and bracelet for high fall risk patients  - Consider moving patient to room near nurses station  Outcome: Progressing     Problem: DISCHARGE PLANNING  Goal: Discharge to home or other facility with appropriate resources  Description: INTERVENTIONS:  - Identify barriers to discharge w/patient and caregiver  - Arrange for needed discharge resources and transportation as appropriate  - Identify discharge learning needs (meds, wound care, etc )  - Arrange for interpretive services to assist at discharge as needed  - Refer to Case Management Department for coordinating discharge planning if the patient needs post-hospital services based on physician/advanced practitioner order or complex needs related to functional status, cognitive ability, or social support system  Outcome: Progressing     Problem: Knowledge Deficit  Goal: Patient/family/caregiver demonstrates understanding of disease process, treatment plan, medications, and discharge instructions  Description: Complete learning assessment and assess knowledge base    Interventions:  - Provide teaching at level of understanding  - Provide teaching via preferred learning methods  Outcome: Progressing     Problem: GENITOURINARY - ADULT  Goal: Maintains or returns to baseline urinary function  Description: INTERVENTIONS:  - Assess urinary function  - Encourage oral fluids to ensure adequate hydration if ordered  - Administer IV fluids as ordered to ensure adequate hydration  - Administer ordered medications as needed  - Offer frequent toileting  - Follow urinary retention protocol if ordered  Outcome: Progressing  Goal: Absence of urinary retention  Description: INTERVENTIONS:  - Assess patient's ability to void and empty bladder  - Monitor I/O  - Bladder scan as needed  - Discuss with physician/AP medications to alleviate retention as needed  - Discuss catheterization for long term situations as appropriate  Outcome: Progressing

## 2022-09-08 NOTE — ASSESSMENT & PLAN NOTE
· CR 1 19-1 27 on admisssion from baseline of 0 95  · Urinary retention protocol from it patient is retaining more than 450 cc   Joseph catheter placed and around 1200 cc of urine drained  · Renally dose medications

## 2022-09-08 NOTE — ED NOTES
PT transported to unit, no s/s of distress, VS stable, A&Ox4     [facility]  09/07/22 2058

## 2022-09-08 NOTE — ASSESSMENT & PLAN NOTE
· Potassium 2 9 on admission  Replaced and repeat potassium is 3 4    Also noted to have low magnesium and hence will replace magnesium as well

## 2022-09-08 NOTE — H&P
[address]  H&P- Lynnell Hodgkin 1995, 33 y o  male MRN: [id number]  Unit/Bed#: -01 Encounter: [id number]  Primary Care Provider: Félix Harding MD   Date and time admitted to hospital: 9/7/2022  5:58 PM    * JHONNY (acute kidney injury) ([address] )  Assessment & Plan  · CR 1 19 on admisssion from baseline of 0 95  · UTI- empiric ceftriaxone  · Urinary retention protocol  · IV hydration   · Trend CR  · Renally dose medications    Hypokalemia  Assessment & Plan  · Potassium 2 9  · Replete and recheck  · Check magnesium    Acute cystitis  Assessment & Plan  · UTI  · Empiric ceftriaxone  · Cx pending    Urinary retention  Assessment & Plan  · Retaining urine   · Unable to be catheterized by nursing staff  · Awaiting specialty coude latex free catheter  · Continue flomax  · Urology consultation    VTE Pharmacologic Prophylaxis: VTE Score: 2 Low Risk (Score 0-2) - Encourage Ambulation  Code Status: Level 1 - Full Code   Anticipated Length of Stay: Patient will be admitted on an observation basis with an anticipated length of stay of less than 2 midnights secondary to JHONNY  Total Time for Visit, including Counseling / Coordination of Care: 30 minutes Greater than 50% of this total time spent on direct patient counseling and coordination of care  Chief Complaint:  Abdominal pain    History of Present Illness:  Lynnell Hodgkin is a 33 y o  male with a PMH of gastritis, chronic abdominal pain, autism recently diagnosed from inpatient hospitalization for acute gastritis and urinary retention requiring catheterization who presents with nausea, vomiting, fever, abdominal pain found to have UTI and JHONNY in ED  Presented to hospital Medical Service for admission and further evaluation  CT scan abdomen pelvis without hydronephrosis but worrisome for prostatitis and bladder thickening  Review of Systems:  Review of Systems   Constitutional: Positive for fever  Negative for chills  HENT: Negative for ear pain and sore throat  Eyes: Negative for pain and visual disturbance  Respiratory: Negative for cough and shortness of breath  Cardiovascular: Negative for chest pain and palpitations  Gastrointestinal: Positive for abdominal pain, nausea and vomiting  Genitourinary: Positive for difficulty urinating and dysuria  Negative for hematuria  Musculoskeletal: Negative for arthralgias and back pain  Skin: Negative for color change and rash  Neurological: Negative for seizures and syncope  All other systems reviewed and are negative  Past Medical and Surgical History:   Past Medical History:   Diagnosis Date    Autistic disorder     Gastric paresis     GERD (gastroesophageal reflux disease)     Known health problems: none        Past Surgical History:   Procedure Laterality Date    EYE SURGERY      FOOT SURGERY         Meds/Allergies:  Prior to Admission medications    Medication Sig Start Date End Date Taking? Authorizing Provider   escitalopram (LEXAPRO) 10 mg tablet Take 1 tablet by mouth every morning 8/4/22  Yes Historical Provider, MD   loratadine (CLARITIN) 10 mg tablet Take by mouth   Yes Historical Provider, MD   ondansetron (ZOFRAN) 4 mg tablet Take 1 tablet (4 mg total) by mouth every 6 (six) hours 2/22/22  Yes Wendy Jewell MD   pantoprazole (PROTONIX) 20 mg tablet Take 2 tablets (40 mg total) by mouth 2 (two) times a day 9/6/22  Yes Jasson Ryan MD   simethicone (MYLICON) 503 MG chewable tablet Chew 125 mg every 6 (six) hours as needed for flatulence   Yes Historical Provider, MD   tamsulosin (FLOMAX) 0 4 mg Take 1 capsule (0 4 mg total) by mouth daily with dinner 9/6/22  Yes Jasson Ryan MD     I have reviewed home medications with patient personally  Allergies:    Allergies   Allergen Reactions    Pepto-Bismol [Bismuth Subsalicylate] GI Intolerance    Amoxicillin Rash    Latex Rash       Social History:  Marital Status: Single   Patient Pre-hospital Living Situation: Home  Patient Pre-hospital Level of Mobility: walks  Patient Pre-hospital Diet Restrictions: none  Substance Use History:   Social History     Substance and Sexual Activity   Alcohol Use Not Currently     Social History     Tobacco Use   Smoking Status Never Smoker   Smokeless Tobacco Never Used     Social History     Substance and Sexual Activity   Drug Use Never       Family History:  Family History   Problem Relation Age of Onset    No Known Problems Mother     No Known Problems Father        Physical Exam:     Vitals:   Blood Pressure: 107/61 (09/07/22 2108)  Pulse: 93 (09/07/22 2108)  Temperature: 98 8 °F (37 1 °C) (09/07/22 2108)  Temp Source: Temporal (09/07/22 1755)  Respirations: 15 (09/07/22 2108)  Height: 5' 9" (175 3 cm) (09/07/22 2108)  Weight - Scale: 86 6 kg (190 lb 14 7 oz) (09/08/22 0456)  SpO2: 99 % (09/07/22 2108)    Physical Exam  Vitals and nursing note reviewed  Constitutional:       Appearance: Normal appearance  He is well-developed  HENT:      Head: Normocephalic and atraumatic  Mouth/Throat:      Mouth: Mucous membranes are moist    Eyes:      Conjunctiva/sclera: Conjunctivae normal    Cardiovascular:      Rate and Rhythm: Normal rate and regular rhythm  Heart sounds: No murmur heard  Pulmonary:      Effort: Pulmonary effort is normal  No respiratory distress  Breath sounds: Normal breath sounds  Abdominal:      General: There is distension  Palpations: Abdomen is soft  Tenderness: There is abdominal tenderness  There is no guarding  Musculoskeletal:         General: No swelling or tenderness  Normal range of motion  Cervical back: Neck supple  Skin:     General: Skin is warm and dry  Capillary Refill: Capillary refill takes less than 2 seconds  Neurological:      General: No focal deficit present  Mental Status: He is alert and oriented to person, place, and time     Psychiatric:         Mood and Affect: Mood normal          Behavior: Behavior normal         Additional Data:     Lab Results:  Results from last 7 days   Lab Units 09/08/22  0455   WBC Thousand/uL 11 46*   HEMOGLOBIN g/dL 12 5   HEMATOCRIT % 36 1*   PLATELETS Thousands/uL 115*   NEUTROS PCT % 76*   LYMPHS PCT % 11*   MONOS PCT % 12   EOS PCT % 0     Results from last 7 days   Lab Units 09/07/22  1815   SODIUM mmol/L 134*   POTASSIUM mmol/L 2 9*   CHLORIDE mmol/L 97   CO2 mmol/L 25   BUN mg/dL 6   CREATININE mg/dL 1 19   ANION GAP mmol/L 12   CALCIUM mg/dL 8 4   ALBUMIN g/dL 3 7   TOTAL BILIRUBIN mg/dL 1 13*   ALK PHOS U/L 64   ALT U/L 17   AST U/L 14   GLUCOSE RANDOM mg/dL 123     Results from last 7 days   Lab Units 09/07/22  1815   INR  1 27*             Results from last 7 days   Lab Units 09/07/22  1815 09/03/22  1920   LACTIC ACID mmol/L 1 6 1 0   PROCALCITONIN ng/ml 0 55*  --        Imaging: Reviewed radiology reports from this admission including: abdominal/pelvic CT  CT abdomen pelvis with contrast   Final Result by Carmen Luong MD (09/07 2011)      Inflammation surrounding the seminal vesicles and possibly the prostate suspicious for a seminal vesiculitis and possible prostatitis  Slight fullness of the renal collecting systems bilaterally without rosalie hydronephrosis  This could be secondary to inflammation surrounding the seminal vesicles as mentioned above  Workstation performed: [id number]             EKG and Other Studies Reviewed on Admission:     ** Please Note: This note has been constructed using a voice recognition system   **

## 2022-09-08 NOTE — PROGRESS NOTES
States he is feeling ill and spiked fever of 102 4  Will cancel discharge and continue in the hospital and continue antibiotics and Tylenol and monitor for now    Patient probably spike fever secondary to Joseph catheter being placed today along with underlying acute prostatitis

## 2022-09-08 NOTE — PLAN OF CARE
Problem: Nutrition/Hydration-ADULT  Goal: Nutrient/Hydration intake appropriate for improving, restoring or maintaining nutritional needs  Description: Monitor and assess patient's nutrition/hydration status for malnutrition  Collaborate with interdisciplinary team and initiate plan and interventions as ordered  Monitor patient's weight and dietary intake as ordered or per policy  Utilize nutrition screening tool and intervene as necessary  Determine patient's food preferences and provide high-protein, high-caloric foods as appropriate       INTERVENTIONS:  - Monitor oral intake, urinary output, labs, and treatment plans  - Assess nutrition and hydration status and recommend course of action  - Evaluate amount of meals eaten  - Assist patient with eating if necessary   - Allow adequate time for meals  - Recommend/ encourage appropriate diets, oral nutritional supplements, and vitamin/mineral supplements  - Order, calculate, and assess calorie counts as needed  - Recommend, monitor, and adjust tube feedings and TPN/PPN based on assessed needs  - Assess need for intravenous fluids  - Provide specific nutrition/hydration education as appropriate  - Include patient/family/caregiver in decisions related to nutrition  Outcome: Progressing     Problem: PAIN - ADULT  Goal: Verbalizes/displays adequate comfort level or baseline comfort level  Description: Interventions:  - Encourage patient to monitor pain and request assistance  - Assess pain using appropriate pain scale  - Administer analgesics based on type and severity of pain and evaluate response  - Implement non-pharmacological measures as appropriate and evaluate response  - Consider cultural and social influences on pain and pain management  - Notify physician/advanced practitioner if interventions unsuccessful or patient reports new pain  Outcome: Progressing     Problem: INFECTION - ADULT  Goal: Absence or prevention of progression during hospitalization  Description: INTERVENTIONS:  - Assess and monitor for signs and symptoms of infection  - Monitor lab/diagnostic results  - Monitor all insertion sites, i e  indwelling lines, tubes, and drains  - Monitor endotracheal if appropriate and nasal secretions for changes in amount and color  - [place] appropriate cooling/warming therapies per order  - Administer medications as ordered  - Instruct and encourage patient and family to use good hand hygiene technique  - Identify and instruct in appropriate isolation precautions for identified infection/condition  Outcome: Progressing     Problem: SAFETY ADULT  Goal: Patient will remain free of falls  Description: INTERVENTIONS:  - Educate patient/family on patient safety including physical limitations  - Instruct patient to call for assistance with activity   - Consult OT/PT to assist with strengthening/mobility   - Keep Call bell within reach  - Keep bed low and locked with side rails adjusted as appropriate  - Keep care items and personal belongings within reach  - Initiate and maintain comfort rounds  - Make Fall Risk Sign visible to staff  - Offer Toileting every 2 Hours, in advance of need  - Initiate/Maintain bed alarm  - Obtain necessary fall risk management equipment  - Apply yellow socks and bracelet for high fall risk patients  - Consider moving patient to room near nurses station  Outcome: Progressing     Problem: DISCHARGE PLANNING  Goal: Discharge to home or other facility with appropriate resources  Description: INTERVENTIONS:  - Identify barriers to discharge w/patient and caregiver  - Arrange for needed discharge resources and transportation as appropriate  - Identify discharge learning needs (meds, wound care, etc )  - Arrange for interpretive services to assist at discharge as needed  - Refer to Case Management Department for coordinating discharge planning if the patient needs post-hospital services based on physician/advanced practitioner order or complex needs related to functional status, cognitive ability, or social support system  Outcome: Progressing     Problem: Knowledge Deficit  Goal: Patient/family/caregiver demonstrates understanding of disease process, treatment plan, medications, and discharge instructions  Description: Complete learning assessment and assess knowledge base    Interventions:  - Provide teaching at level of understanding  - Provide teaching via preferred learning methods  Outcome: Progressing     Problem: GENITOURINARY - ADULT  Goal: Maintains or returns to baseline urinary function  Description: INTERVENTIONS:  - Assess urinary function  - Encourage oral fluids to ensure adequate hydration if ordered  - Administer IV fluids as ordered to ensure adequate hydration  - Administer ordered medications as needed  - Offer frequent toileting  - Follow urinary retention protocol if ordered  Outcome: Progressing  Goal: Absence of urinary retention  Description: INTERVENTIONS:  - Assess patient's ability to void and empty bladder  - Monitor I/O  - Bladder scan as needed  - Discuss with physician/AP medications to alleviate retention as needed  - Discuss catheterization for long term situations as appropriate  Outcome: Progressing

## 2022-09-08 NOTE — CONSULTS
Consultation - Urology   Sathish Miranda 33 y o  male MRN: [id number]  Unit/Bed#: -01 Encounter: [id number]      Assessment/Plan      Assessment:  - Urinary retention  - Cystitis present on admission  - Inflammation surrounding the seminal vesicles and possibly the prostate suspicious for a seminal vesiculitis and possible prostatitis  - Recent castaneda catheterization from 9/4 to 9/6  - Slight fullness of the renal collecting systems bilaterally without rosalie hydronephrosis  - Leukocytosis 11 46 (13 11)  - JHONNY resolved 0 89 (1 19), 0 8-0 9 prior admission  - Latex allergy "rash"  - Autism      Plan:  Placed 16f latex free coude catheter, continue at discharge  [address] for anesthetic and lubrication prior to insertion  Continue Flomax during admission and at discharge  Antibiotics as indicated, continue post discharge  Follow CBC and BMP  Follow urine culture  Follow blood cutlures  - Follow up with Dr Diana Villarreal at [facility] in the [facility] or at [place] office 10 days after discharge for voiding trial     History of Present Illness   Attending: Tiki Lopez MD  Reason for Consult / Principal Problem: urinary retention    HPI: Sathish Miranda is a 33y o  year old male admitted through the ED after presenting with abdominal pain, n/v, and fever  Urology has been consulted d/t urinary retention  Imaging in the ED showed "Inflammation surrounding the seminal vesicles and possibly the prostate suspicious for a seminal vesiculitis and possible prostatitis  Slight fullness of the renal collecting systems bilaterally without rosalie hydronephrosis " Since admission last evening the patient was was bladder scanned multiple times with volumes in excess of 500cc  Nursing attempted straight cath x2 without success  Nursing attempted to locate non-latex coude without success   He was recently admitted to this facility from 9/4 to 9/6 d/t gastritis and urinary retention which required castaneda placement and Flomax  Joseph was discontinued on morning of discharge and patient was instructed to continue flomax  At time of exam this morning the patient complains of mild suprapubic discomfort  Denies hematuria or pyuria  Inpatient consult to Urology  Consult performed by: Tereza Barreto PA-C  Consult ordered by: Marylene Skeeters Hix, CRNP        Review of Systems   Constitutional: Positive for activity change, appetite change and fever  HENT: Negative  Eyes: Negative  Respiratory: Negative  Cardiovascular: Negative  Gastrointestinal: Positive for abdominal pain, constipation and nausea  Endocrine: Negative  Genitourinary: Positive for frequency  Musculoskeletal: Negative  Skin: Negative  Neurological: Negative  Hematological: Negative  Psychiatric/Behavioral: Negative  Historical Information   Past Medical History:   Diagnosis Date    Autistic disorder     Gastric paresis     GERD (gastroesophageal reflux disease)     Known health problems: none      Past Surgical History:   Procedure Laterality Date    EYE SURGERY      FOOT SURGERY       Social History   Social History     Substance and Sexual Activity   Alcohol Use Not Currently     @DRUGHX  E-Cigarette/Vaping    E-Cigarette Use Never User      E-Cigarette/Vaping Substances     Social History     Tobacco Use   Smoking Status Never Smoker   Smokeless Tobacco Never Used     Family History: non-contributory    Meds/Allergies   all current active meds have been reviewed, current meds:   Current Facility-Administered Medications   Medication Dose Route Frequency    pantoprazole (PROTONIX) EC tablet 40 mg  40 mg Oral Early Morning    tamsulosin (FLOMAX) capsule 0 4 mg  0 4 mg Oral Daily With Dinner    and PTA meds:   Prior to Admission Medications   Prescriptions Last Dose Informant Patient Reported?  Taking?   escitalopram (LEXAPRO) 10 mg tablet Past Week at Unknown time  Yes Yes   Sig: Take 1 tablet by mouth every morning loratadine (CLARITIN) 10 mg tablet Past Week at Unknown time  Yes Yes   Sig: Take by mouth   ondansetron (ZOFRAN) 4 mg tablet 9/6/2022 at Unknown time  No Yes   Sig: Take 1 tablet (4 mg total) by mouth every 6 (six) hours   pantoprazole (PROTONIX) 20 mg tablet 9/6/2022 at Unknown time  No Yes   Sig: Take 2 tablets (40 mg total) by mouth 2 (two) times a day   simethicone (MYLICON) 600 MG chewable tablet Past Week at Unknown time  Yes Yes   Sig: Chew 125 mg every 6 (six) hours as needed for flatulence   tamsulosin (FLOMAX) 0 4 mg 9/6/2022 at Unknown time  No Yes   Sig: Take 1 capsule (0 4 mg total) by mouth daily with dinner      Facility-Administered Medications: None     Allergies   Allergen Reactions    Pepto-Bismol [Bismuth Subsalicylate] GI Intolerance    Amoxicillin Rash    Latex Rash       Objective   Vitals: Blood pressure 108/63, pulse 82, temperature 99 3 °F (37 4 °C), resp  rate 19, height 5' 9" (1 753 m), weight 86 6 kg (190 lb 14 7 oz), SpO2 99 %  I/O last 24 hours: In: 477 1 [I V :477 1]  Out: 2700 [Urine:2700]    Invasive Devices  Report    Peripheral Intravenous Line  Duration           Peripheral IV 09/07/22 Left Antecubital <1 day                Physical Exam  Vitals and nursing note reviewed  Constitutional:       General: He is not in acute distress  Appearance: He is normal weight  HENT:      Head: Normocephalic and atraumatic  Right Ear: External ear normal       Left Ear: External ear normal       Nose: Nose normal       Mouth/Throat:      Mouth: Mucous membranes are moist       Pharynx: Oropharynx is clear  Eyes:      General: No scleral icterus  Right eye: No discharge  Left eye: No discharge  Conjunctiva/sclera: Conjunctivae normal    Cardiovascular:      Rate and Rhythm: Normal rate and regular rhythm  Pulses: Normal pulses  Heart sounds: Normal heart sounds  Pulmonary:      Effort: Pulmonary effort is normal  No respiratory distress  Breath sounds: Normal breath sounds  Abdominal:      General: Abdomen is flat  Bowel sounds are normal  There is no distension  Palpations: Abdomen is soft  Tenderness: There is no right CVA tenderness or left CVA tenderness  Comments: Mild suprapubic discomfort on palpation   Genitourinary:     Penis: Normal        Testes: Normal       Comments: A 16f latex free coude was easily placed, immediate return of clear yellow urine, no obvious pyuria or hematuria  Musculoskeletal:         General: Normal range of motion  Cervical back: Normal range of motion and neck supple  Right lower leg: No edema  Left lower leg: No edema  Skin:     General: Skin is warm and dry  Capillary Refill: Capillary refill takes less than 2 seconds  Coloration: Skin is not jaundiced  Neurological:      General: No focal deficit present  Mental Status: He is alert  Mental status is at baseline  Psychiatric:         Mood and Affect: Mood normal          Behavior: Behavior normal          Thought Content: Thought content normal          Judgment: Judgment normal          Lab Results:   I have personally reviewed pertinent reports      CBC:   Lab Results   Component Value Date    WBC 11 46 (H) 09/08/2022    HGB 12 5 09/08/2022    HCT 36 1 (L) 09/08/2022    MCV 88 09/08/2022     (L) 09/08/2022    MCH 30 4 09/08/2022    MCHC 34 6 09/08/2022    RDW 12 8 09/08/2022    MPV 9 9 09/08/2022    NRBC 0 09/08/2022     CMP:   Lab Results   Component Value Date    SODIUM 138 09/08/2022     09/08/2022    CO2 24 09/08/2022    BUN 5 09/08/2022    CREATININE 0 89 09/08/2022    CALCIUM 7 7 (L) 09/08/2022    AST 13 09/08/2022    ALT 15 09/08/2022    ALKPHOS 54 09/08/2022    EGFR 118 09/08/2022     Urinalysis:   Lab Results   Component Value Date    COLORU Yellow 09/07/2022    CLARITYU Slightly Cloudy 09/07/2022    SPECGRAV 1 010 09/07/2022    PHUR 5 5 09/07/2022    LEUKOCYTESUR Moderate (A) 09/07/2022    NITRITE Positive (A) 09/07/2022    GLUCOSEU Negative 09/07/2022    KETONESU 40 (2+) (A) 09/07/2022    BILIRUBINUR Negative 09/07/2022    BLOODU Moderate (A) 09/07/2022     Imaging Studies: I have personally reviewed pertinent reports  CT abd/pelvis w IV contrast 9/7/22  Impression:     Inflammation surrounding the seminal vesicles and possibly the prostate suspicious for a seminal vesiculitis and possible prostatitis  Slight fullness of the renal collecting systems bilaterally without rosalie hydronephrosis   This could be secondary to inflammation surrounding the seminal vesicles as mentioned above  EKG, Pathology, and Other Studies: I have personally reviewed pertinent reports  VTE Prophylaxis: Reason for no pharmacologic prophylaxis low risk    Code Status: Level 1 - Full Code    Counseling / Coordination of Care  Total floor / unit time spent today 40 minutes  Greater than 50% of total time was spent with the patient and / or family counseling and / or coordination of care  A description of the counseling / coordination of care: review of labs and imaging, obtaining history, performing exam, discussion of treatment plan        Melissa Melendez PA-C

## 2022-09-08 NOTE — NURSING NOTE
Pt stated that he continues with headache, nausea, not feeling well  Vs obtained, temp 102 4  Dr Liz Metz aware, discharge cancelled

## 2022-09-08 NOTE — NURSING NOTE
Pt with urinary retention  Most recent bladder scan 452 mL PVR  Straith cath attempted 2 times without success  Pt needs non-latex coude cath, which is unavailable on unit  Rosalind Deng, nursing supervisor, searched for non-latex coude cath without success  SENDY Orlando aware of same  Consult for urology placed  IV fluids stopped  Will continue to monitor

## 2022-09-08 NOTE — ASSESSMENT & PLAN NOTE
· UA is abnormal   Patient states that he is not sexually active  · Trying to reach his family to get further history in regards to any STDs in the past   · Will check HIV, acute hepatitis panel and urine gonorrhea chlamydia antigen  CT abdomen pelvis shows Inflammation surrounding the seminal vesicles and possibly the prostate suspicious for a seminal vesiculitis and possible prostatitis  · Empiric ceftriaxone will transition to Levaquin 500 mg oral daily for total of 4 weeks with outpatient follow-up with Urology    · Cx pending

## 2022-09-08 NOTE — ASSESSMENT & PLAN NOTE
· Retaining urine   · Unable to be catheterized by nursing staff  · Awaiting specialty coude latex free catheter  · Continue flomax  · Urology consultation

## 2022-09-08 NOTE — UTILIZATION REVIEW
Initial Clinical Review  Observation on 09/07 @ 2023 upgraded to Inpatient on 09/08 @ 1551  Pt requiring continued stay d/t acute prostatitis w/ fever    Admission: Date/Time/Statement:   Admission Orders (From admission, onward)     Ordered        09/08/22 1551  Inpatient Admission  Once            09/07/22 2032  Place in Observation  Once                      Orders Placed This Encounter   Procedures    Inpatient Admission     Standing Status:   Standing     Number of Occurrences:   1     Order Specific Question:   Level of Care     Answer:   Med Surg [16]     Order Specific Question:   Estimated length of stay     Answer:   More than 2 Midnights     Order Specific Question:   Certification     Answer:   I certify that inpatient services are medically necessary for this patient for a duration of greater than two midnights  See H&P and MD Progress Notes for additional information about the patient's course of treatment  Comments:   fever     ED Arrival Information     Expected   -    Arrival   9/7/2022 17:46    Acuity   Urgent            Means of arrival   Walk-In    Escorted by   Family Member    Service   Hospitalist    Admission type   Urgent            Arrival complaint   fever           Chief Complaint   Patient presents with    Abdominal Pain     Pt reports abd pain, N/V, fever today  States he was Dc'd from this hospital yesterday following admission for gastritis        Initial Presentation: 33 y o  male with PMH of gastritis, chronic abd pain and autism presented to the ED from home with nausea, vomiting, fever and abdominal pain  Pt was recently admitted,dx w/ gastritis and discharged yesterday  He had urinary retention during the admission requiring catheterization x 2  He had significant abdominal pain since yesterday  Urinated cloudy foul-smelling urine  Had fever today  Unable to get out of bed and no BM for several days     In the ED, CT scan abdomen pelvis without hydronephrosis but worrisome for prostatitis and bladder thickening  Cr 1 19, baseline 0 95  K 2 9  he was retaining urine  Unable to be catheterized by [facility] staff, Awaiting specialty coude latex free catheter    Plan: Inpatient admission for evaluation and treatment of urinary retention, JHONNY, hypokalemia, acute cystitis: IV hydration   Trend CR  Empiric ceftriaxone  Cx pending  Replete and recheck  Check magnesium  Continue flomax  Urology consultation    09/08   Urology Consult: Urinary retention, cystitis: Pt has inflammation surrounding the seminal vesicles and possibly the prostate suspicious for a seminal vesiculitis and possible prostatitis  He recently had castaneda cath from 9/4- 9/6  Plan:   Placed 16f latex free coude catheter, cont to dc  Utilized urojet for anesthetic and lubrication prior to insertion  Continue Flomax during admission and at discharge  Antibiotics  Follow CBC and BMP  Follow urine culture  Follow blood cutlures     IM Notes: Pt's discharge was cancelled d/t feeling ill and spiked a fever of 102 4  Cont abx and Tylenol  Mon for now  Patient probably spike fever secondary to Castaneda catheter being placed today along with underlying acute prostatitis    Date 09/09 Day 2:  IM Notes: Pt w/ prostatitis  UA is abnormal   had a fever yesterday and felt very sick and tired  Reports poor appetite  neg HIV, acute hepatitis panel and pending urine gonorrhea chlamydia antigen  Cont castaneda cath for now  K 3 2 today  Repeat BMP today  Repeat mag level  Mon fever  On exam, pt aaox3, ill appearing, castaneda cath         ED Triage Vitals   Temperature Pulse Respirations Blood Pressure SpO2   09/07/22 1755 09/07/22 1755 09/07/22 1755 09/07/22 1755 09/07/22 1755   (!) 101 °F (38 3 °C) (!) 113 16 124/78 96 %      Temp Source Heart Rate Source Patient Position - Orthostatic VS BP Location FiO2 (%)   09/07/22 1755 09/08/22 1543 09/07/22 1755 09/07/22 1755 --   Temporal Monitor Lying Right arm       Pain Score       09/07/22 1755       7 Wt Readings from Last 1 Encounters:   09/09/22 82 8 kg (182 lb 9 6 oz)     Additional Vital Signs:   Date/Time Temp Pulse Resp BP MAP (mmHg) SpO2 O2 Device Patient Position - Orthostatic VS   09/09/22 15:04:06 98 8 °F (37 1 °C) 80 16 113/64 80 100 % -- --   09/09/22 11:29:08 99 °F (37 2 °C) 86 -- -- -- 98 % -- --   09/09/22 0956 -- -- -- -- -- -- None (Room air) --   09/09/22 0725 98 8 °F (37 1 °C) -- -- -- -- -- -- --   09/09/22 0700 -- 70 -- -- -- 96 % -- --   09/09/22 05:39:01 99 1 °F (37 3 °C) 85 -- -- -- 95 % -- --   09/09/22 03:35:12 100 °F (37 8 °C) 97 -- -- -- 96 % -- --   09/09/22 01:36:45 99 7 °F (37 6 °C) 96 -- -- -- 96 % -- --   09/09/22 00:25:50 100 °F (37 8 °C) 85 16 112/72 85 99 % -- --   09/08/22 2247 99 7 °F (37 6 °C) 91 -- -- -- 96 % -- --   09/08/22 22:05:28 101 1 °F (38 4 °C) Abnormal  84 -- 108/73 85 97 % -- --   09/08/22 22:05:16 101 1 °F (38 4 °C) Abnormal  -- 18 108/73 85 -- -- --   09/08/22 2051 -- -- -- -- -- 99 % None (Room air) --   09/08/22 20:31:14 103 1 °F (39 5 °C) Abnormal  84 -- -- -- 98 % -- --   09/08/22 1702 99 3 °F (37 4 °C) -- -- -- -- -- -- --   09/08/22 16:02:16 101 3 °F (38 5 °C) Abnormal  92 18 100/71 81 97 % -- --   09/08/22 15:43:19 102 4 °F (39 1 °C) Abnormal  107 Abnormal  20 121/68 86 99 % -- Lying   09/08/22 0900 -- -- -- -- -- 99 % None (Room air) --   09/08/22 06:40:34 99 3 °F (37 4 °C) 82 19 108/63 78 99 % -- --   09/07/22 21:08:39 98 8 °F (37 1 °C) 93 15 107/61 76 99 % None (Room air) --   09/07/22 2045 -- 82 -- 121/71 91 99 % -- --   09/07/22 2030 -- 83 -- 113/65 82 99 % -- --   09/07/22 2024 -- -- -- 120/63 -- -- -- --   09/07/22 2015 -- 82 -- 120/63 84 99 % -- --   09/07/22 2000 -- 96 -- -- -- 98 % -- --   09/07/22 1945 -- 89 -- -- -- 99 % -- --   09/07/22 1930 -- 94 -- 93/55 69 96 % -- --   09/07/22 1915 -- 99 -- 96/51 70 96 % -- --   09/07/22 1900 -- 102 -- 91/50 67 96 % -- --   09/07/22 1845 -- 102 -- 100/57 75 98 % -- --   09/07/22 1830 -- 100 -- 108/59 76 99 % -- --       Pertinent Labs/Diagnostic Test Results:   CT abdomen pelvis with contrast   Final Result by Carmen Luong MD (09/07 2011)      Inflammation surrounding the seminal vesicles and possibly the prostate suspicious for a seminal vesiculitis and possible prostatitis  Slight fullness of the renal collecting systems bilaterally without rosalie hydronephrosis  This could be secondary to inflammation surrounding the seminal vesicles as mentioned above                    Workstation performed: [id number]           Results from last 7 days   Lab Units 09/03/22  1920   SARS-COV-2  Negative     Results from last 7 days   Lab Units 09/09/22  1133 09/08/22  0455 09/07/22  1815 09/06/22  0555 09/05/22  0449   WBC Thousand/uL 3 95* 11 46* 13 11* 6 45 3 55*   HEMOGLOBIN g/dL 13 6 12 5 13 8 13 2 12 7   HEMATOCRIT % 39 2 36 1* 40 0 38 2 36 9   PLATELETS Thousands/uL 113* 115* 111* 126* 134*   NEUTROS ABS Thousands/µL  --  8 72* 10 11* 4 34 1 82*         Results from last 7 days   Lab Units 09/09/22  1133 09/08/22  1257 09/08/22  0455 09/07/22  1815 09/05/22  0449 09/04/22  0611 09/03/22  1920   SODIUM mmol/L 136 139 138 134* 142 142 141   POTASSIUM mmol/L 3 7 3 2* 3 4* 2 9* 3 7 3 9 3 9   CHLORIDE mmol/L 98 101 103 97 107 107 102   CO2 mmol/L 27 28 24 25 24 20* 24   ANION GAP mmol/L 11 10 11 12 11 15* 15*   BUN mg/dL 7 5 5 6 7 15 17   CREATININE mg/dL 0 91 0 95 0 89 1 19 0 81 0 95 0 98   EGFR ml/min/1 73sq m 115 110 118 83 122 110 105   CALCIUM mg/dL 8 6 8 2* 7 7* 8 4 7 5* 8 0* 9 1   MAGNESIUM mg/dL 2 2  --  1 6 1 4*  --  2 0 2 2   PHOSPHORUS mg/dL  --   --   --   --   --  3 9  --      Results from last 7 days   Lab Units 09/08/22  0455 09/07/22  1815 09/03/22  1920   AST U/L 13 14 19   ALT U/L 15 17 25   ALK PHOS U/L 54 64 67   TOTAL PROTEIN g/dL 6 2* 7 2 7 6   ALBUMIN g/dL 2 9* 3 7 4 3   TOTAL BILIRUBIN mg/dL 0 72 1 13* 0 71         Results from last 7 days   Lab Units 09/09/22  1133 09/08/22  1257 09/08/22  0455 09/07/22  1815 09/05/22  0449 09/04/22  0611 09/03/22  1920   GLUCOSE RANDOM mg/dL 142* 144* 96 123 92 69 82       Results from last 7 days   Lab Units 09/07/22  1815 09/03/22  1920   PROTIME seconds 16 0* 13 1   INR  1 27* 0 98   PTT seconds 30 28         Results from last 7 days   Lab Units 09/07/22 1815   PROCALCITONIN ng/ml 0 55*     Results from last 7 days   Lab Units 09/07/22  1815 09/03/22  1920   LACTIC ACID mmol/L 1 6 1 0     Results from last 7 days   Lab Units 09/08/22  1257   HEP B S AG  Non-reactive   HEP C AB  Non-reactive   HEP B C IGM  Non-reactive     Results from last 7 days   Lab Units 09/07/22 1815 09/03/22  1920   LIPASE u/L 73 61*     Results from last 7 days   Lab Units 09/07/22 2020 09/03/22 1929   CLARITY UA  Slightly Cloudy Clear   COLOR UA  Yellow Yellow   SPEC GRAV UA  1 010 1 025   PH UA  5 5 5 5   GLUCOSE UA mg/dl Negative Negative   KETONES UA mg/dl 40 (2+)* >=80 (3+)*   BLOOD UA  Moderate* Negative   PROTEIN UA mg/dl 30 (1+)* Negative   NITRITE UA  Positive* Negative   BILIRUBIN UA  Negative Small*   UROBILINOGEN UA E U /dl 0 2 0 2   LEUKOCYTES UA  Moderate* Negative   WBC UA /hpf 30-50*  --    RBC UA /hpf 4-10*  --    BACTERIA UA /hpf Moderate*  --    EPITHELIAL CELLS WET PREP /hpf None Seen  --      Results from last 7 days   Lab Units 09/03/22 1920   INFLUENZA A PCR  Negative   INFLUENZA B PCR  Negative   RSV PCR  Negative     Results from last 7 days   Lab Units 09/07/22 2020 09/07/22 1815   BLOOD CULTURE   --  No Growth at 24 hrs  No Growth at 24 hrs     URINE CULTURE  10,000-19,000 cfu/ml Escherichia coli*  <10,000 cfu/ml   --    ED Treatment:   Medication Administration from 09/07/2022 1745 to 09/07/2022 2102       Date/Time Order Dose Route Action     09/07/2022 1925 sodium chloride 0 9 % bolus 1,000 mL 0 mL Intravenous Stopped     09/07/2022 1820 sodium chloride 0 9 % bolus 1,000 mL 1,000 mL Intravenous New Bag     09/07/2022 2325 acetaminophen (TYLENOL) tablet 650 mg 650 mg Oral Given     09/07/2022 1817 ondansetron (ZOFRAN) injection 4 mg 4 mg Intravenous Given     09/07/2022 1910 potassium chloride (K-DUR,KLOR-CON) CR tablet 40 mEq 40 mEq Oral Given     09/07/2022 1936 iohexol (OMNIPAQUE) 350 MG/ML injection (MULTI-DOSE) 85 mL 85 mL Intravenous Given     09/07/2022 2028 sodium chloride 0 9 % bolus 1,000 mL 0 mL Intravenous Stopped     09/07/2022 1957 sodium chloride 0 9 % bolus 1,000 mL 1,000 mL Intravenous New Bag     09/07/2022 2029 sodium chloride 0 9 % bolus 1,000 mL 1,000 mL Intravenous New Bag     09/07/2022 2029 cefTRIAXone (ROCEPHIN) IVPB (premix in dextrose) 2,000 mg 50 mL 0 mg Intravenous Stopped     09/07/2022 1957 cefTRIAXone (ROCEPHIN) IVPB (premix in dextrose) 2,000 mg 50 mL 2,000 mg Intravenous New Bag        Past Medical History:   Diagnosis Date    Autistic disorder     Gastric paresis     GERD (gastroesophageal reflux disease)     Known health problems: none      Present on Admission:   JHONNY (acute kidney injury) ([facility] [address] )   Acute prostatitis   Acute urinary retention   Hypokalemia      Admitting Diagnosis: Seminal vesiculitis [N49 0]  Hypokalemia [E87 6]  Hyponatremia [E87 1]  UTI (urinary tract infection) [N39 0]  Abdominal pain [R10 9]  Age/Sex: 33 y o  male  Admission Orders:  SCD  Daily weights  I/O    Scheduled Medications:  levofloxacin, 750 mg, Intravenous, Q24H  pantoprazole, 40 mg, Oral, Early Morning  tamsulosin, 0 4 mg, Oral, Daily With Dinner      Continuous IV Infusions:  multi-electrolyte (PLASMALYTE-A/ISOLYTE-S PH 7 4) IV solution  Rate: 125 mL/hr Dose: 125 mL/hr  Freq: Continuous Route: IV  Indications of Use: IV Hydration  Last Dose: Stopped (09/08/22 0159)  Start: 09/07/22 2130 End: 09/08/22 0153     PRN Meds:  acetaminophen, 650 mg, Oral, Q6H PRN 09/08 x 2, 09/09 x 2      IP CONSULT TO UROLOGY    Network Utilization Review Department  ATTENTION: Please call with any questions or concerns to [phone] and carefully listen to the prompts so that you are directed to the right person  All voicemails are confidential   Harlene Pair all requests for admission clinical reviews, approved or denied determinations and any other requests to dedicated fax number below belonging to the campus where the patient is receiving treatment   List of dedicated fax numbers for the Facilities:  [address] DENIALS (Administrative/Medical Necessity) [phone]   [address] (Maternity/NICU/Pediatrics) [phone]   [address]  [address] [facility] [place] [address] [id number] [address] [facility] [place] [address] [phone]

## 2022-09-09 LAB
ANION GAP SERPL CALCULATED.3IONS-SCNC: 11 MMOL/L (ref 4–13)
BUN SERPL-MCNC: 7 MG/DL (ref 5–25)
C TRACH DNA SPEC QL NAA+PROBE: NEGATIVE
CALCIUM SERPL-MCNC: 8.6 MG/DL (ref 8.3–10.1)
CHLORIDE SERPL-SCNC: 98 MMOL/L (ref 96–108)
CO2 SERPL-SCNC: 27 MMOL/L (ref 21–32)
CREAT SERPL-MCNC: 0.91 MG/DL (ref 0.6–1.3)
ERYTHROCYTE [DISTWIDTH] IN BLOOD BY AUTOMATED COUNT: 12.7 % (ref 11.6–15.1)
GFR SERPL CREATININE-BSD FRML MDRD: 115 ML/MIN/1.73SQ M
GLUCOSE SERPL-MCNC: 142 MG/DL (ref 65–140)
HCT VFR BLD AUTO: 39.2 % (ref 36.5–49.3)
HGB BLD-MCNC: 13.6 G/DL (ref 12–17)
MAGNESIUM SERPL-MCNC: 2.2 MG/DL (ref 1.6–2.6)
MCH RBC QN AUTO: 30.4 PG (ref 26.8–34.3)
MCHC RBC AUTO-ENTMCNC: 34.7 G/DL (ref 31.4–37.4)
MCV RBC AUTO: 88 FL (ref 82–98)
N GONORRHOEA DNA SPEC QL NAA+PROBE: NEGATIVE
PLATELET # BLD AUTO: 113 THOUSANDS/UL (ref 149–390)
PMV BLD AUTO: 9.9 FL (ref 8.9–12.7)
POTASSIUM SERPL-SCNC: 3.7 MMOL/L (ref 3.5–5.3)
RBC # BLD AUTO: 4.47 MILLION/UL (ref 3.88–5.62)
SODIUM SERPL-SCNC: 136 MMOL/L (ref 135–147)
WBC # BLD AUTO: 3.95 THOUSAND/UL (ref 4.31–10.16)

## 2022-09-09 PROCEDURE — 99232 SBSQ HOSP IP/OBS MODERATE 35: CPT | Performed by: FAMILY MEDICINE

## 2022-09-09 PROCEDURE — 85027 COMPLETE CBC AUTOMATED: CPT | Performed by: FAMILY MEDICINE

## 2022-09-09 PROCEDURE — 80048 BASIC METABOLIC PNL TOTAL CA: CPT | Performed by: FAMILY MEDICINE

## 2022-09-09 PROCEDURE — 83735 ASSAY OF MAGNESIUM: CPT | Performed by: FAMILY MEDICINE

## 2022-09-09 RX ORDER — LEVOFLOXACIN 5 MG/ML
750 INJECTION, SOLUTION INTRAVENOUS EVERY 24 HOURS
Status: DISCONTINUED | OUTPATIENT
Start: 2022-09-09 | End: 2022-09-10

## 2022-09-09 RX ORDER — ONDANSETRON 2 MG/ML
4 INJECTION INTRAMUSCULAR; INTRAVENOUS ONCE
Status: COMPLETED | OUTPATIENT
Start: 2022-09-09 | End: 2022-09-09

## 2022-09-09 RX ADMIN — TAMSULOSIN HYDROCHLORIDE 0.4 MG: 0.4 CAPSULE ORAL at 17:15

## 2022-09-09 RX ADMIN — PANTOPRAZOLE SODIUM 40 MG: 40 TABLET, DELAYED RELEASE ORAL at 05:35

## 2022-09-09 RX ADMIN — LEVOFLOXACIN 750 MG: 5 INJECTION, SOLUTION INTRAVENOUS at 12:26

## 2022-09-09 RX ADMIN — ACETAMINOPHEN 650 MG: 325 TABLET ORAL at 17:15

## 2022-09-09 RX ADMIN — ACETAMINOPHEN 650 MG: 325 TABLET ORAL at 03:36

## 2022-09-09 RX ADMIN — ONDANSETRON 4 MG: 2 INJECTION INTRAMUSCULAR; INTRAVENOUS at 01:53

## 2022-09-09 NOTE — PLAN OF CARE
Problem: GENITOURINARY - ADULT  Goal: Maintains or returns to baseline urinary function  Description: INTERVENTIONS:  - Assess urinary function  - Encourage oral fluids to ensure adequate hydration if ordered  - Administer IV fluids as ordered to ensure adequate hydration  - Administer ordered medications as needed  - Offer frequent toileting  - Follow urinary retention protocol if ordered  Outcome: Progressing  Goal: Urinary catheter remains patent  Description: INTERVENTIONS:  - Assess patency of urinary catheter  - If patient has a chronic castaneda, consider changing catheter if non-functioning  - Follow guidelines for intermittent irrigation of non-functioning urinary catheter  Outcome: Progressing     Problem: PAIN - ADULT  Goal: Verbalizes/displays adequate comfort level or baseline comfort level  Description: Interventions:  - Encourage patient to monitor pain and request assistance  - Assess pain using appropriate pain scale  - Administer analgesics based on type and severity of pain and evaluate response  - Implement non-pharmacological measures as appropriate and evaluate response  - Consider cultural and social influences on pain and pain management  - Notify physician/advanced practitioner if interventions unsuccessful or patient reports new pain  Outcome: Progressing     Problem: INFECTION - ADULT  Goal: Absence or prevention of progression during hospitalization  Description: INTERVENTIONS:  - Assess and monitor for signs and symptoms of infection  - Monitor lab/diagnostic results  - Monitor all insertion sites, i e  indwelling lines, tubes, and drains  - Monitor endotracheal if appropriate and nasal secretions for changes in amount and color  - [place] appropriate cooling/warming therapies per order  - Administer medications as ordered  - Instruct and encourage patient and family to use good hand hygiene technique  - Identify and instruct in appropriate isolation precautions for identified infection/condition  Outcome: Progressing  Goal: Absence of fever/infection during neutropenic period  Description: INTERVENTIONS:  - Monitor WBC    Outcome: Progressing     Problem: SAFETY ADULT  Goal: Patient will remain free of falls  Description: INTERVENTIONS:  - Educate patient/family on patient safety including physical limitations  - Instruct patient to call for assistance with activity   - Consult OT/PT to assist with strengthening/mobility   - Keep Call bell within reach  - Keep bed low and locked with side rails adjusted as appropriate  - Keep care items and personal belongings within reach  - Initiate and maintain comfort rounds  - Make Fall Risk Sign visible to staff  - Offer Toileting every  Hours, in advance of need  - Initiate/Maintain alarm  - Obtain necessary fall risk management equipment:  - Apply yellow socks and bracelet for high fall risk patients  - Consider moving patient to room near nurses station  Outcome: Progressing

## 2022-09-09 NOTE — PROGRESS NOTES
[address]  Progress Note Jillian Buckner 1995, 33 y o  male MRN: [id number]  Unit/Bed#: -01 Encounter: [id number]  Primary Care Provider: Romayne Stade, MD   Date and time admitted to hospital: 9/7/2022  5:58 PM    * JHONNY (acute kidney injury) ([facility] [address] )  Assessment & Plan  · CR 1 19-1 27 on admisssion from baseline of 0 95  · Urinary retention protocol from it patient is retaining more than 450 cc  Castaneda catheter placed and around 1200 cc of urine drained  cont castaneda catheter for now  · Renally dose medications    Acute prostatitis  Assessment & Plan  · UA is abnormal   Patient states that he is not sexually active  Spiked fever of 103 yesterday  · neg HIV, acute hepatitis panel and pending urine gonorrhea chlamydia antigen  CT abdomen pelvis shows Inflammation surrounding the seminal vesicles and possibly the prostate suspicious for a seminal vesiculitis and possible prostatitis  · Empiric ceftriaxone will transition to Levaquin 500 mg oral daily for total of 4 weeks with outpatient follow-up with Urology  · Cx pending  · Will discharge once afebrile for 24 hours    Acute urinary retention  Assessment & Plan  · Retaining urine   · Unable to be catheterized by nursing staff  Castaneda catheter placed by Urology  ·  specialty coude latex free catheter placed  · Continue flomax  · Urology consultation appreciated recommend outpatient follow-up in 10 days with Dr Ovidio Hamilton  At baseline    Hypokalemia  [facility]  · Potassium 2 9 on admission  Replaced and repeat potassium is 3 2  Repeat BMP today and observe  Also repeat magnesium level    Sepsis present on admission secondary to acute UTI/acute prostatitis:  Continue antibiotics and observe  Await urine culture results  VTE Pharmacologic Prophylaxis:   Pharmacologic: Pharmacologic VTE Prophylaxis contraindicated due to Low risk    Encourage ambulation  Mechanical VTE Prophylaxis in Place: Yes    Patient Centered Rounds: I have performed bedside rounds with nursing staff today  Discussions with Specialists or Other Care Team Provider:  Will discuss with Urology    Education and Discussions with Family / Patient:  Discussed with patient bedside will update grandfather    Time Spent for Care: 30 minutes  More than 50% of total time spent on counseling and coordination of care as described above  Current Length of Stay: 1 day(s)    Current Patient Status: Inpatient   Certification Statement: The patient will continue to require additional inpatient hospital stay due to Acute prostatitis    Discharge Plan:  Discharge once afebrile for 24 hours    Code Status: Level 1 - Full Code      Subjective:   Patient states he had a rough night had fever yesterday and felt very sick and tired  Feels a little bit better this morning  Poor appetite    Objective:     Vitals:   Temp (24hrs), Av 5 °F (38 1 °C), Min:98 8 °F (37 1 °C), Max:103 1 °F (39 5 °C)    Temp:  [98 8 °F (37 1 °C)-103 1 °F (39 5 °C)] 98 8 °F (37 1 °C)  HR:  [] 70  Resp:  [16-20] 16  BP: (100-121)/(68-73) 112/72  SpO2:  [95 %-99 %] 96 %  Body mass index is 26 97 kg/m²  Input and Output Summary (last 24 hours): Intake/Output Summary (Last 24 hours) at 2022 1123  Last data filed at 2022 0145  Gross per 24 hour   Intake 1680 ml   Output 4676 ml   Net -2996 ml       Physical Exam:     Physical Exam  Vitals and nursing note reviewed  Constitutional:       Appearance: He is ill-appearing  HENT:      Head: Normocephalic and atraumatic  Right Ear: External ear normal       Left Ear: External ear normal       Nose: Nose normal       Mouth/Throat:      Pharynx: Oropharynx is clear  Cardiovascular:      Rate and Rhythm: Normal rate and regular rhythm  Heart sounds: Normal heart sounds  Pulmonary:      Effort: Pulmonary effort is normal       Breath sounds: Normal breath sounds     Abdominal: General: Bowel sounds are normal       Palpations: Abdomen is soft  Tenderness: There is no abdominal tenderness  Genitourinary:     Comments: Joseph catheter  Musculoskeletal:         General: Normal range of motion  Cervical back: Normal range of motion and neck supple  Skin:     General: Skin is warm and dry  Capillary Refill: Capillary refill takes less than 2 seconds  Neurological:      General: No focal deficit present  Mental Status: He is alert and oriented to person, place, and time  Psychiatric:         Mood and Affect: Mood normal            Additional Data:     Labs:    Results from last 7 days   Lab Units 09/08/22  0455   WBC Thousand/uL 11 46*   HEMOGLOBIN g/dL 12 5   HEMATOCRIT % 36 1*   PLATELETS Thousands/uL 115*   NEUTROS PCT % 76*   LYMPHS PCT % 11*   MONOS PCT % 12   EOS PCT % 0     Results from last 7 days   Lab Units 09/08/22  1257 09/08/22  0455   SODIUM mmol/L 139 138   POTASSIUM mmol/L 3 2* 3 4*   CHLORIDE mmol/L 101 103   CO2 mmol/L 28 24   BUN mg/dL 5 5   CREATININE mg/dL 0 95 0 89   ANION GAP mmol/L 10 11   CALCIUM mg/dL 8 2* 7 7*   ALBUMIN g/dL  --  2 9*   TOTAL BILIRUBIN mg/dL  --  0 72   ALK PHOS U/L  --  54   ALT U/L  --  15   AST U/L  --  13   GLUCOSE RANDOM mg/dL 144* 96     Results from last 7 days   Lab Units 09/07/22  1815   INR  1 27*             Results from last 7 days   Lab Units 09/07/22  1815 09/03/22  1920   LACTIC ACID mmol/L 1 6 1 0   PROCALCITONIN ng/ml 0 55*  --            * I Have Reviewed All Lab Data Listed Above  * Additional Pertinent Lab Tests Reviewed: [address] Admission Reviewed    Imaging:    Imaging Reports Reviewed Today Include:  CT abdomen pelvis  Imaging Personally Reviewed by Myself Includes:  CT abdomen pelvis    Recent Cultures (last 7 days):     Results from last 7 days   Lab Units 09/07/22  2020 09/07/22 1815   BLOOD CULTURE   --  Received in Microbiology Lab  Culture in Progress    Received in Microbiology Lab  Culture in Progress  URINE CULTURE  10,000-19,000 cfu/ml Gram Negative Armaan Enteric Like*  <10,000 cfu/ml   --        Last 24 Hours Medication List:   Current Facility-Administered Medications   Medication Dose Route Frequency Provider Last Rate    acetaminophen  650 mg Oral Q6H PRN Mika Hernández MD      levofloxacin  500 mg Oral Q24H Mika Hernández MD      pantoprazole  40 mg Oral Early Morning SENDY Ryan      tamsulosin  0 4 mg Oral Daily With Dinner SENDY Ryan          Today, Patient Was Seen By: Mika Hernández MD    ** Please Note: Dictation voice to text software may have been used in the creation of this document   **

## 2022-09-09 NOTE — ASSESSMENT & PLAN NOTE
· CR 1 19-1 27 on admisssion from baseline of 0 95  · Urinary retention protocol from it patient is retaining more than 450 cc  Castaneda catheter placed and around 1200 cc of urine drained  cont castaneda catheter for now  · Renally dose medications

## 2022-09-09 NOTE — NURSING NOTE
Pt with 1 episode clear/yellow liquid emesis  Pt with c/o nausea and "feeling hot"  Temp checked 99 7  Ice packs placed under arms and neck  SENDY [place], aware of same  1x dose IV zofran given  Will continue to monitor

## 2022-09-09 NOTE — PROGRESS NOTES
Pt with 25% meal completion x 1, 100% x 2 per nursing flowsheets  Pt currently sleeping through lunch, only soup and soda on L tray  Gradual weight loss noted per chart review, nonsignificant timeframe  Continue regular diet as ordered, will trial ensure clear daily and ensure enlive daily given inadequate intake

## 2022-09-09 NOTE — ASSESSMENT & PLAN NOTE
· UA is abnormal   Patient states that he is not sexually active  Spiked fever of 103 yesterday  · neg HIV, acute hepatitis panel and pending urine gonorrhea chlamydia antigen  CT abdomen pelvis shows Inflammation surrounding the seminal vesicles and possibly the prostate suspicious for a seminal vesiculitis and possible prostatitis  · Empiric ceftriaxone will transition to Levaquin 500 mg oral daily for total of 4 weeks with outpatient follow-up with Urology    · Cx pending  · Will discharge once afebrile for 24 hours

## 2022-09-09 NOTE — PLAN OF CARE
Problem: PAIN - ADULT  Goal: Verbalizes/displays adequate comfort level or baseline comfort level  Description: Interventions:  - Encourage patient to monitor pain and request assistance  - Assess pain using appropriate pain scale  - Administer analgesics based on type and severity of pain and evaluate response  - Implement non-pharmacological measures as appropriate and evaluate response  - Consider cultural and social influences on pain and pain management  - Notify physician/advanced practitioner if interventions unsuccessful or patient reports new pain  Outcome: Progressing     Problem: INFECTION - ADULT  Goal: Absence or prevention of progression during hospitalization  Description: INTERVENTIONS:  - Assess and monitor for signs and symptoms of infection  - Monitor lab/diagnostic results  - Monitor all insertion sites, i e  indwelling lines, tubes, and drains  - Monitor endotracheal if appropriate and nasal secretions for changes in amount and color  - [place] appropriate cooling/warming therapies per order  - Administer medications as ordered  - Instruct and encourage patient and family to use good hand hygiene technique  - Identify and instruct in appropriate isolation precautions for identified infection/condition  Outcome: Progressing     Problem: SAFETY ADULT  Goal: Patient will remain free of falls  Description: INTERVENTIONS:  - Educate patient/family on patient safety including physical limitations  - Instruct patient to call for assistance with activity   - Consult OT/PT to assist with strengthening/mobility   - Keep Call bell within reach  - Keep bed low and locked with side rails adjusted as appropriate  - Keep care items and personal belongings within reach  - Initiate and maintain comfort rounds  Outcome: Progressing     Problem: GENITOURINARY - ADULT  Goal: Maintains or returns to baseline urinary function  Description: INTERVENTIONS:  - Assess urinary function  - Encourage oral fluids to ensure adequate hydration if ordered  - Administer IV fluids as ordered to ensure adequate hydration  - Administer ordered medications as needed  - Offer frequent toileting  - Follow urinary retention protocol if ordered  Outcome: Progressing  Goal: Urinary catheter remains patent  Description: INTERVENTIONS:  - Assess patency of urinary catheter  - If patient has a chronic castaneda, consider changing catheter if non-functioning  - Follow guidelines for intermittent irrigation of non-functioning urinary catheter  Outcome: Progressing     Problem: GASTROINTESTINAL - ADULT  Goal: Minimal or absence of nausea and/or vomiting  Description: INTERVENTIONS:  - Administer IV fluids if ordered to ensure adequate hydration  - Maintain NPO status until nausea and vomiting are resolved  - Nasogastric tube if ordered  - Administer ordered antiemetic medications as needed  - Provide nonpharmacologic comfort measures as appropriate  - Advance diet as tolerated, if ordered  - Consider nutrition services referral to assist patient with adequate nutrition and appropriate food choices  Outcome: Progressing  Goal: Maintains adequate nutritional intake  Description: INTERVENTIONS:  - Monitor percentage of each meal consumed  - Identify factors contributing to decreased intake, treat as appropriate  - Assist with meals as needed  - Monitor I&O, weight, and lab values if indicated  - Obtain nutrition services referral as needed  Outcome: Progressing

## 2022-09-09 NOTE — ASSESSMENT & PLAN NOTE
· Potassium 2 9 on admission  Replaced and repeat potassium is 3 2  Repeat BMP today and observe    Also repeat magnesium level

## 2022-09-09 NOTE — ASSESSMENT & PLAN NOTE
· Retaining urine   · Unable to be catheterized by nursing staff    Joseph catheter placed by Urology  ·  specialty coude latex free catheter placed  · Continue flomax  · Urology consultation appreciated recommend outpatient follow-up in 10 days with Dr Chadwick Andrews

## 2022-09-10 ENCOUNTER — APPOINTMENT (OUTPATIENT)
Dept: ULTRASOUND IMAGING | Facility: HOSPITAL | Age: 27
DRG: 720 | End: 2022-09-10
Payer: COMMERCIAL

## 2022-09-10 LAB
BACTERIA UR CULT: ABNORMAL
ERYTHROCYTE [DISTWIDTH] IN BLOOD BY AUTOMATED COUNT: 12.8 % (ref 11.6–15.1)
HCT VFR BLD AUTO: 39.2 % (ref 36.5–49.3)
HGB BLD-MCNC: 13.7 G/DL (ref 12–17)
MCH RBC QN AUTO: 30.4 PG (ref 26.8–34.3)
MCHC RBC AUTO-ENTMCNC: 34.9 G/DL (ref 31.4–37.4)
MCV RBC AUTO: 87 FL (ref 82–98)
PLATELET # BLD AUTO: 148 THOUSANDS/UL (ref 149–390)
PMV BLD AUTO: 9.8 FL (ref 8.9–12.7)
RBC # BLD AUTO: 4.51 MILLION/UL (ref 3.88–5.62)
WBC # BLD AUTO: 2.71 THOUSAND/UL (ref 4.31–10.16)

## 2022-09-10 PROCEDURE — 99232 SBSQ HOSP IP/OBS MODERATE 35: CPT | Performed by: FAMILY MEDICINE

## 2022-09-10 PROCEDURE — 76857 US EXAM PELVIC LIMITED: CPT

## 2022-09-10 PROCEDURE — 85027 COMPLETE CBC AUTOMATED: CPT | Performed by: FAMILY MEDICINE

## 2022-09-10 RX ORDER — SULFAMETHOXAZOLE AND TRIMETHOPRIM 800; 160 MG/1; MG/1
1 TABLET ORAL EVERY 12 HOURS SCHEDULED
Status: DISCONTINUED | OUTPATIENT
Start: 2022-09-10 | End: 2022-09-11 | Stop reason: HOSPADM

## 2022-09-10 RX ADMIN — ACETAMINOPHEN 650 MG: 325 TABLET ORAL at 17:19

## 2022-09-10 RX ADMIN — PANTOPRAZOLE SODIUM 40 MG: 40 TABLET, DELAYED RELEASE ORAL at 05:14

## 2022-09-10 RX ADMIN — ACETAMINOPHEN 650 MG: 325 TABLET ORAL at 02:38

## 2022-09-10 RX ADMIN — TAMSULOSIN HYDROCHLORIDE 0.4 MG: 0.4 CAPSULE ORAL at 15:51

## 2022-09-10 RX ADMIN — SULFAMETHOXAZOLE AND TRIMETHOPRIM 1 TABLET: 800; 160 TABLET ORAL at 20:30

## 2022-09-10 RX ADMIN — SULFAMETHOXAZOLE AND TRIMETHOPRIM 1 TABLET: 800; 160 TABLET ORAL at 11:52

## 2022-09-10 NOTE — ASSESSMENT & PLAN NOTE
· UA is abnormal   Patient states that he is not sexually active  Spiked fever of 103 2 days ago and again speak to fever of 101 1 last night despite being on IV Levaquin  · neg HIV, acute hepatitis panel and urine gonorrhea chlamydia antigen  CT abdomen pelvis shows Inflammation surrounding the seminal vesicles and possibly the prostate suspicious for a seminal vesiculitis and possible prostatitis  · Empiric ceftriaxone will transition to IV Levaquin 750 mg oral daily   Still spiking fevers and patient complains of stomach upset when he gets the antibiotic hence will switch to Bactrim-DS and observe response    Also get ultrasound of scrotum and groin area today for further evaluation  · Urine culture shows E coli/mixed contaminants  · Will discharge once afebrile for 24 hours

## 2022-09-10 NOTE — ASSESSMENT & PLAN NOTE
· Retaining urine   · Unable to be catheterized by nursing staff    Joseph catheter placed by Urology  ·  specialty coude latex free catheter placed  · Continue flomax  · Urology consultation appreciated recommend outpatient follow-up in 10 days with Dr Linda Qiu

## 2022-09-10 NOTE — PLAN OF CARE
Problem: INFECTION - ADULT  Goal: Absence or prevention of progression during hospitalization  Description: INTERVENTIONS:  - Assess and monitor for signs and symptoms of infection  - Monitor lab/diagnostic results  - Monitor all insertion sites, i e  indwelling lines, tubes, and drains  - Monitor endotracheal if appropriate and nasal secretions for changes in amount and color  - [place] appropriate cooling/warming therapies per order  - Administer medications as ordered  - Instruct and encourage patient and family to use good hand hygiene technique  - Identify and instruct in appropriate isolation precautions for identified infection/condition  Outcome: Progressing     Problem: PAIN - ADULT  Goal: Verbalizes/displays adequate comfort level or baseline comfort level  Description: Interventions:  - Encourage patient to monitor pain and request assistance  - Assess pain using appropriate pain scale  - Administer analgesics based on type and severity of pain and evaluate response  - Implement non-pharmacological measures as appropriate and evaluate response  - Consider cultural and social influences on pain and pain management  - Notify physician/advanced practitioner if interventions unsuccessful or patient reports new pain  Outcome: Progressing

## 2022-09-10 NOTE — PROGRESS NOTES
[address]  Progress Note Rosario Manrique 1995, 33 y o  male MRN: [id number]  Unit/Bed#: -01 Encounter: [id number]  Primary Care Provider: Monica Cooper MD   Date and time admitted to hospital: 9/7/2022  5:58 PM    * JHONNY (acute kidney injury) ([facility] [address] )  Assessment & Plan  · CR 1 19-1 27 on admisssion from baseline of 0 95  · Urinary retention protocol from it patient is retaining more than 450 cc  Castaneda catheter placed and around 1200 cc of urine drained  cont castaneda catheter for now  · Renally dose medications  · Now resolved jhonny    Acute prostatitis  Assessment & Plan  · UA is abnormal   Patient states that he is not sexually active  Spiked fever of 103 2 days ago and again speak to fever of 101 1 last night despite being on IV Levaquin  · neg HIV, acute hepatitis panel and urine gonorrhea chlamydia antigen  CT abdomen pelvis shows Inflammation surrounding the seminal vesicles and possibly the prostate suspicious for a seminal vesiculitis and possible prostatitis  · Empiric ceftriaxone will transition to IV Levaquin 750 mg oral daily   Still spiking fevers and patient complains of stomach upset when he gets the antibiotic hence will switch to Bactrim-DS and observe response  Also get ultrasound of scrotum and groin area today for further evaluation  · Urine culture shows E coli/mixed contaminants  · Will discharge once afebrile for 24 hours    Acute urinary retention  Assessment & Plan  · Retaining urine   · Unable to be catheterized by nursing staff    Castaneda catheter placed by Urology  ·  specialty coude latex free catheter placed  · Continue flomax  · Urology consultation appreciated recommend outpatient follow-up in 10 days with Dr Bruce Andrea  At baseline      VTE Pharmacologic Prophylaxis:   Pharmacologic: Pharmacologic VTE Prophylaxis contraindicated due to low risk  Mechanical VTE Prophylaxis in Place: Yes    Patient Centered Rounds: I have performed bedside rounds with nursing staff today  Discussions with Specialists or Other Care Team Provider:  None    Education and Discussions with Family / Patient:  Discussed with patient at bedside will try to update grandfather    Time Spent for Care: 30 minutes  More than 50% of total time spent on counseling and coordination of care as described above  Current Length of Stay: 2 day(s)    Current Patient Status: Inpatient   Certification Statement: The patient will continue to require additional inpatient hospital stay due to Acute prostatitis    Discharge Plan:  Pending progress    Code Status: Level 1 - Full Code      Subjective:   Patient denies any chest pain or shortness of breath or abdominal pain  He states he feels upset stomach  Time he gets the Levaquin antibiotic  Also had a fever of 101 again last night but is feeling better this morning    Objective:     Vitals:   Temp (24hrs), Av 1 °F (37 3 °C), Min:98 1 °F (36 7 °C), Max:101 7 °F (38 7 °C)    Temp:  [98 1 °F (36 7 °C)-101 7 °F (38 7 °C)] 98 6 °F (37 °C)  HR:  [68-93] 70  Resp:  [16-18] 18  BP: (109-114)/(64-71) 114/71  SpO2:  [95 %-100 %] 96 %  Body mass index is 26 97 kg/m²  Input and Output Summary (last 24 hours): Intake/Output Summary (Last 24 hours) at 9/10/2022 1114  Last data filed at 9/10/2022 0900  Gross per 24 hour   Intake 1960 ml   Output 3250 ml   Net -1290 ml       Physical Exam:     Physical Exam  Vitals and nursing note reviewed  Constitutional:       Appearance: Normal appearance  HENT:      Head: Normocephalic and atraumatic  Right Ear: External ear normal       Left Ear: External ear normal       Nose: Nose normal       Mouth/Throat:      Pharynx: Oropharynx is clear  Cardiovascular:      Rate and Rhythm: Normal rate and regular rhythm  Heart sounds: Normal heart sounds  Pulmonary:      Effort: Pulmonary effort is normal       Breath sounds: Normal breath sounds     Abdominal: General: Bowel sounds are normal       Palpations: Abdomen is soft  Tenderness: There is no abdominal tenderness  Genitourinary:     Comments: No scrotal redness swelling or tenderness noted  Musculoskeletal:         General: Normal range of motion  Cervical back: Normal range of motion and neck supple  Skin:     General: Skin is warm and dry  Capillary Refill: Capillary refill takes less than 2 seconds  Neurological:      General: No focal deficit present  Mental Status: He is alert and oriented to person, place, and time  Psychiatric:         Mood and Affect: Mood normal          Additional Data:     Labs:    Results from last 7 days   Lab Units 09/10/22  0445 09/09/22  1133 09/08/22  0455   WBC Thousand/uL 2 71*   < > 11 46*   HEMOGLOBIN g/dL 13 7   < > 12 5   HEMATOCRIT % 39 2   < > 36 1*   PLATELETS Thousands/uL 148*   < > 115*   NEUTROS PCT %  --   --  76*   LYMPHS PCT %  --   --  11*   MONOS PCT %  --   --  12   EOS PCT %  --   --  0    < > = values in this interval not displayed  Results from last 7 days   Lab Units 09/09/22  1133 09/08/22  1257 09/08/22  0455   SODIUM mmol/L 136   < > 138   POTASSIUM mmol/L 3 7   < > 3 4*   CHLORIDE mmol/L 98   < > 103   CO2 mmol/L 27   < > 24   BUN mg/dL 7   < > 5   CREATININE mg/dL 0 91   < > 0 89   ANION GAP mmol/L 11   < > 11   CALCIUM mg/dL 8 6   < > 7 7*   ALBUMIN g/dL  --   --  2 9*   TOTAL BILIRUBIN mg/dL  --   --  0 72   ALK PHOS U/L  --   --  54   ALT U/L  --   --  15   AST U/L  --   --  13   GLUCOSE RANDOM mg/dL 142*   < > 96    < > = values in this interval not displayed  Results from last 7 days   Lab Units 09/07/22  1815   INR  1 27*             Results from last 7 days   Lab Units 09/07/22  1815 09/03/22  1920   LACTIC ACID mmol/L 1 6 1 0   PROCALCITONIN ng/ml 0 55*  --            * I Have Reviewed All Lab Data Listed Above  * Additional Pertinent Lab Tests Reviewed:  [address] Admission Reviewed    Imaging:    Imaging Reports Reviewed Today Include: none  Imaging Personally Reviewed by Myself Includes:  none    Recent Cultures (last 7 days):     Results from last 7 days   Lab Units 09/07/22 2020 09/07/22  1815   BLOOD CULTURE   --  No Growth at 24 hrs  No Growth at 24 hrs  URINE CULTURE  10,000-19,000 cfu/ml Escherichia coli*  <10,000 cfu/ml   --        Last 24 Hours Medication List:   Current Facility-Administered Medications   Medication Dose Route Frequency Provider Last Rate    acetaminophen  650 mg Oral Q6H PRN Mina Ackerman MD      pantoprazole  40 mg Oral Early Morning SENDY Ryan      sulfamethoxazole-trimethoprim  1 tablet Oral Q12H [address] Mina Ackerman MD      tamsulosin  0 4 mg Oral Daily With Dinner SENDY Ryan          Today, Patient Was Seen By: Mina Ackerman MD    ** Please Note: Dictation voice to text software may have been used in the creation of this document   **

## 2022-09-10 NOTE — ASSESSMENT & PLAN NOTE
· CR 1 19-1 27 on admisssion from baseline of 0 95  · Urinary retention protocol from it patient is retaining more than 450 cc  Castaneda catheter placed and around 1200 cc of urine drained  cont castaneda catheter for now  · Renally dose medications  · Now resolved alis

## 2022-09-11 VITALS
OXYGEN SATURATION: 97 % | SYSTOLIC BLOOD PRESSURE: 113 MMHG | RESPIRATION RATE: 19 BRPM | HEIGHT: 69 IN | TEMPERATURE: 98.8 F | DIASTOLIC BLOOD PRESSURE: 66 MMHG | BODY MASS INDEX: 26.31 KG/M2 | WEIGHT: 177.6 LBS | HEART RATE: 71 BPM

## 2022-09-11 LAB
ERYTHROCYTE [DISTWIDTH] IN BLOOD BY AUTOMATED COUNT: 12.9 % (ref 11.6–15.1)
HCT VFR BLD AUTO: 40.9 % (ref 36.5–49.3)
HGB BLD-MCNC: 13.9 G/DL (ref 12–17)
MCH RBC QN AUTO: 29.6 PG (ref 26.8–34.3)
MCHC RBC AUTO-ENTMCNC: 34 G/DL (ref 31.4–37.4)
MCV RBC AUTO: 87 FL (ref 82–98)
PLATELET # BLD AUTO: 147 THOUSANDS/UL (ref 149–390)
PMV BLD AUTO: 9.5 FL (ref 8.9–12.7)
RBC # BLD AUTO: 4.69 MILLION/UL (ref 3.88–5.62)
WBC # BLD AUTO: 4.09 THOUSAND/UL (ref 4.31–10.16)

## 2022-09-11 PROCEDURE — 99239 HOSP IP/OBS DSCHRG MGMT >30: CPT | Performed by: FAMILY MEDICINE

## 2022-09-11 PROCEDURE — 85027 COMPLETE CBC AUTOMATED: CPT | Performed by: FAMILY MEDICINE

## 2022-09-11 RX ORDER — SULFAMETHOXAZOLE AND TRIMETHOPRIM 800; 160 MG/1; MG/1
1 TABLET ORAL EVERY 12 HOURS SCHEDULED
Qty: 28 TABLET | Refills: 0 | Status: SHIPPED | OUTPATIENT
Start: 2022-09-11 | End: 2022-09-25

## 2022-09-11 RX ADMIN — PANTOPRAZOLE SODIUM 40 MG: 40 TABLET, DELAYED RELEASE ORAL at 05:13

## 2022-09-11 RX ADMIN — ACETAMINOPHEN 650 MG: 325 TABLET ORAL at 00:06

## 2022-09-11 RX ADMIN — SULFAMETHOXAZOLE AND TRIMETHOPRIM 1 TABLET: 800; 160 TABLET ORAL at 09:51

## 2022-09-11 NOTE — DISCHARGE SUMMARY
[address]  Discharge- Haresh Trejo 1995, 33 y o  male MRN: [id number]  Unit/Bed#: -01 Encounter: [id number]  Primary Care Provider: Saul Andres MD   Date and time admitted to hospital: 9/7/2022  5:58 PM    * JHONNY (acute kidney injury) ([address] )  Assessment & Plan  · CR 1 19-1 27 on admisssion from baseline of 0 95  · Urinary retention protocol from it patient is retaining more than 450 cc  Castaneda catheter placed and around 1200 cc of urine drained  cont castaneda catheter for now  · Renally dose medications  · Now resolved jhonny    Acute prostatitis  Assessment & Plan  · UA is abnormal   Patient states that he is not sexually active  Spiked fever of 103 2 days ago and again right fever despite being on IV Levaquin  Discontinue Levaquin and placed on Bactrim instead so far afebrile for the last 24 hours  · neg HIV, acute hepatitis panel and urine gonorrhea chlamydia antigen  CT abdomen pelvis shows Inflammation surrounding the seminal vesicles and possibly the prostate suspicious for a seminal vesiculitis and possible prostatitis  · Urine culture shows E coli/mixed contaminants  · Will discharge with 2 weeks of antibiotics and outpatient follow-up with Urology to further continue antibiotics if they feel appropriate and also to assess due to new Castaneda catheter and outpatient voiding trial    Acute urinary retention  Assessment & Plan  · Retaining urine   · Unable to be catheterized by nursing staff    Castaneda catheter placed by Urology  ·  specialty coude latex free catheter placed  · Continue flomax  · Urology consultation appreciated recommend outpatient follow-up in 10 days with Dr Torin Squires Physician / Practitioner: Nicolás Antunez MD  PCP: Saul Andres MD  Admission Date:   Admission Orders (From admission, onward)     Ordered        09/08/22 1551  Inpatient Admission  Once            09/07/22 2032 Place in Observation  Once                      Discharge Date: 09/11/22    Medical Problems             Resolved Problems  Date Reviewed: 9/11/2022   None                 Consultations During Hospital Stay:  · Neurology    Procedures Performed:   · None    Significant Findings / Test Results:   US bladder    Result Date: 9/10/2022  Impression: 1  Limited evaluation of the bladder as it is decompressed by Joseph catheter  2   Prostate measures 3 4 x 3 4 x 4 5 cm  Prostate volume is 27 5 mL  Workstation performed: [id number]     CT abdomen pelvis with contrast    Result Date: 9/7/2022  Impression: Inflammation surrounding the seminal vesicles and possibly the prostate suspicious for a seminal vesiculitis and possible prostatitis  Slight fullness of the renal collecting systems bilaterally without rosalie hydronephrosis  This could be secondary to inflammation surrounding the seminal vesicles as mentioned above  Workstation performed: [id number]     Incidental Findings:   · none    Test Results Pending at Discharge (will require follow up):   · none     Outpatient Tests Requested:  · Cbc,bmp in 1 week    Complications:  none    Reason for Admission:  Dysuria and urinary retention    Hospital Course:     Caro Mckoy is a 33 y o  male patient who originally presented to the hospital on 9/7/2022 due to acute kidney injury, acute cystitis/prostatitis and acute urinary retention  Received IV fluid hydration Joseph catheter was placed and also placed on antibiotics initially on Levaquin still spiking fevers and then transition to Bactrim and now he is afebrile and feeling better and handling it well  Needs outpatient follow-up with Urology for voiding trial and also due to prostatitis  Please see above list of diagnoses and related plan for additional information       Condition at Discharge: good     Discharge Day Visit / Exam:     Subjective:  Patient denies any chest pain shortness of breath fevers chills or dysuria today   Feeling better  Vitals: Blood Pressure: 113/66 (09/11/22 0810)  Pulse: 71 (09/11/22 0810)  Temperature: 98 8 °F (37 1 °C) (09/11/22 0810)  Temp Source: Temporal (09/08/22 1702)  Respirations: 19 (09/11/22 0810)  Height: 5' 9" (175 3 cm) (09/09/22 1247)  Weight - Scale: 80 6 kg (177 lb 9 6 oz) (09/11/22 0500)  SpO2: 97 % (09/11/22 0810)  Exam:   Physical Exam  Vitals and nursing note reviewed  Constitutional:       Appearance: Normal appearance  HENT:      Head: Normocephalic and atraumatic  Right Ear: External ear normal       Left Ear: External ear normal       Nose: Nose normal       Mouth/Throat:      Pharynx: Oropharynx is clear  Eyes:      Pupils: Pupils are equal, round, and reactive to light  Cardiovascular:      Rate and Rhythm: Normal rate and regular rhythm  Heart sounds: Normal heart sounds  Pulmonary:      Effort: Pulmonary effort is normal       Breath sounds: Normal breath sounds  Abdominal:      General: Bowel sounds are normal       Palpations: Abdomen is soft  Tenderness: There is no abdominal tenderness  Musculoskeletal:         General: Normal range of motion  Cervical back: Normal range of motion and neck supple  Skin:     General: Skin is warm and dry  Capillary Refill: Capillary refill takes less than 2 seconds  Neurological:      General: No focal deficit present  Mental Status: He is alert and oriented to person, place, and time  Mental status is at baseline  Psychiatric:         Mood and Affect: Mood normal          Discussion with Family:  Will update grandfather    Discharge instructions/Information to patient and family:   See after visit summary for information provided to patient and family  Provisions for Follow-Up Care:  See after visit summary for information related to follow-up care and any pertinent home health orders        Disposition:     Home    For Discharges to [facility]F:   · Not Applicable to this Patient - Not Applicable to this Patient    Planned Readmission:  None     Discharge Statement:  I spent 35 minutes discharging the patient  This time was spent on the day of discharge  I had direct contact with the patient on the day of discharge  Greater than 50% of the total time was spent examining patient, answering all patient questions, arranging and discussing plan of care with patient as well as directly providing post-discharge instructions  Additional time then spent on discharge activities  Discharge Medications:  See after visit summary for reconciled discharge medications provided to patient and family        ** Please Note: This note has been constructed using a voice recognition system **

## 2022-09-11 NOTE — NURSING NOTE
DC'ed to home, leg bag intact, sent with extra leg bag, 2000ml standard drainage bag; graduated cylinder and detailed instructions re castaneda care--SEE AVS  Pt demonstrated castaneda care prior to DC with grandfather also present and paying attention

## 2022-09-11 NOTE — DISCHARGE INSTR - OTHER ORDERS
ALWAYS KEEP THE BAG THAT'S CONNECTED BELOW HIP LEVEL; so when in bed dangle toward floor    TWIST the small leg bag to empty, then twist it back to make sure it's closed                    *** this bag needs to be emptied more often***      SQUEEZE the GREEN clip on the BIG Night-Time bag to empty it (the silver thing is already open)

## 2022-09-11 NOTE — PLAN OF CARE
Problem: Nutrition/Hydration-ADULT  Goal: Nutrient/Hydration intake appropriate for improving, restoring or maintaining nutritional needs  Description: Monitor and assess patient's nutrition/hydration status for malnutrition  Collaborate with interdisciplinary team and initiate plan and interventions as ordered  Monitor patient's weight and dietary intake as ordered or per policy  Utilize nutrition screening tool and intervene as necessary  Determine patient's food preferences and provide high-protein, high-caloric foods as appropriate       INTERVENTIONS:  - Monitor oral intake, urinary output, labs, and treatment plans  - Assess nutrition and hydration status and recommend course of action  - Evaluate amount of meals eaten  - Assist patient with eating if necessary   - Allow adequate time for meals  - Recommend/ encourage appropriate diets, oral nutritional supplements, and vitamin/mineral supplements  - Order, calculate, and assess calorie counts as needed  - Recommend, monitor, and adjust tube feedings and TPN/PPN based on assessed needs  - Assess need for intravenous fluids  - Provide specific nutrition/hydration education as appropriate  - Include patient/family/caregiver in decisions related to nutrition  Outcome: Progressing     Problem: PAIN - ADULT  Goal: Verbalizes/displays adequate comfort level or baseline comfort level  Description: Interventions:  - Encourage patient to monitor pain and request assistance  - Assess pain using appropriate pain scale  - Administer analgesics based on type and severity of pain and evaluate response  - Implement non-pharmacological measures as appropriate and evaluate response  - Consider cultural and social influences on pain and pain management  - Notify physician/advanced practitioner if interventions unsuccessful or patient reports new pain  Outcome: Progressing     Problem: INFECTION - ADULT  Goal: Absence or prevention of progression during hospitalization  Description: INTERVENTIONS:  - Assess and monitor for signs and symptoms of infection  - Monitor lab/diagnostic results  - Monitor all insertion sites, i e  indwelling lines, tubes, and drains  - Monitor endotracheal if appropriate and nasal secretions for changes in amount and color  - Lovell appropriate cooling/warming therapies per order  - Administer medications as ordered  - Instruct and encourage patient and family to use good hand hygiene technique  - Identify and instruct in appropriate isolation precautions for identified infection/condition  Outcome: Progressing

## 2022-09-11 NOTE — ASSESSMENT & PLAN NOTE
· UA is abnormal   Patient states that he is not sexually active  Spiked fever of 103 2 days ago and again right fever despite being on IV Levaquin  Discontinue Levaquin and placed on Bactrim instead so far afebrile for the last 24 hours  · neg HIV, acute hepatitis panel and urine gonorrhea chlamydia antigen  CT abdomen pelvis shows Inflammation surrounding the seminal vesicles and possibly the prostate suspicious for a seminal vesiculitis and possible prostatitis    · Urine culture shows E coli/mixed contaminants  · Will discharge with 2 weeks of antibiotics and outpatient follow-up with Urology to further continue antibiotics if they feel appropriate and also to assess due to new Joseph catheter and outpatient voiding trial

## 2022-09-12 NOTE — UTILIZATION REVIEW
Inpatient Admission Authorization Request   NOTIFICATION OF INPATIENT ADMISSION/INPATIENT AUTHORIZATION REQUEST   SERVICING FACILITY:   [address] [place] [address]  Louise Prasad [address] [place] [address], [address]  Tax ID: [id number]  NPI: [id number]  Place of Service: Inpatient [address] [facility]  [address]  Place of Service Code: 24     ATTENDING PROVIDER:  Attending Name and NPI#: Salazar Sepulveda Md [[id number]]  Address: Louise Prasad [address] [place] [address], [facility] [address]  Phone: [phone]     UTILIZATION REVIEW CONTACT:  Christine Mackay, Utilization   Network Utilization Review Department  Phone: [phone]  Fax [phone]  Email: [email or web address]  [email or web address]     PHYSICIAN ADVISORY SERVICES:  FOR [id number] REVIEW - MEDICAL NECESSITY DENIAL  Phone: [phone]  Fax: [phone]  Email: [email or web address]     TYPE OF REQUEST:  Inpatient Status     ADMISSION INFORMATION:  ADMISSION DATE/TIME: 9/8/22  3:51 PM  PATIENT DIAGNOSIS CODE/DESCRIPTION:  Seminal vesiculitis [N49 0]  Hypokalemia [E87 6]  Hyponatremia [E87 1]  UTI (urinary tract infection) [N39 0]  Abdominal pain [R10 9]  DISCHARGE DATE/TIME: 9/11/2022  1:15 PM   IMPORTANT INFORMATION:  Please contact Christen Álvarez directly with any questions or concerns regarding this request  Department voicemails are confidential     Send requests for admission clinical reviews, concurrent reviews, approvals, and administrative denials due to lack of clinical to fax [phone]

## 2022-09-12 NOTE — TELEPHONE ENCOUNTER
Voicemail message left for patient to return call to schedule appointment  Wish to coordinate void trial/AP visit in [facility]  Currently holding 8:30 nurse visit for Joseph removal and 2:30 pm appointment with Fe Sky in [facility] on 9/14/22

## 2022-09-12 NOTE — UTILIZATION REVIEW
Notification of Discharge   This is a Notification of Discharge from our facility [address]  Please be advised that this patient has been discharge from our facility  Below you will find the admission and discharge date and time including the patients disposition  UTILIZATION REVIEW CONTACT:  [address] Preet[address]  Utilization   Network Utilization Review Department  Phone: [phone] x carefully listen to the prompts  All voicemails are confidential   Email: [email or web address]     PHYSICIAN ADVISORY SERVICES:  FOR [id number]-[id number]-[id number] REVIEW - MEDICAL NECESSITY DENIAL  Phone: [phone]  Fax: [phone]  Email: [email or web address]     PRESENTATION DATE: 9/7/2022  5:58 PM  OBERVATION ADMISSION DATE:   INPATIENT ADMISSION DATE: 9/8/22  3:51 PM   DISCHARGE DATE: 9/11/2022  1:15 PM  DISPOSITION: Home/Self Care Home/Self Care      IMPORTANT INFORMATION:  Send all requests for admission clinical reviews, approved or denied determinations and any other requests to dedicated fax number below belonging to the campus where the patient is receiving treatment   List of dedicated fax numbers:  [address] DENIALS (Administrative/Medical Necessity) [phone]   [address] (Maternity/NICU/Pediatrics) [phone]   Jonn Seen [phone]   [address] [facility] [address] [phone]   [address] [place] [address] [phone]   2000 [facility] [address] [place] [address],[address] [phone]   [facility]  [phone]   [address] [facility], [facility]  [address] [facility] [address] [facility] [phone]

## 2022-09-12 NOTE — TELEPHONE ENCOUNTER
Patient called back and confirmed that that 09/14/22 at 8:30 and 2:30        Patient can be reached at [phone]

## 2022-09-12 NOTE — TELEPHONE ENCOUNTER
Pt called the office to schedule an appt for hospital follow up and castaneda removal    Pt was discharged yesterday       Pt can be reached at [phone]

## 2022-09-13 LAB
BACTERIA BLD CULT: NORMAL
BACTERIA BLD CULT: NORMAL

## 2022-09-13 NOTE — TELEPHONE ENCOUNTER
Patient scheduled for nurse visit for Joseph removal tomorrow morning at 8:30 am and AP visit with Starling Syeday tomorrow at 2:30 pm  Left VM for patient with times for both appointments and office address  Asked him to call back only if he has questions or concerns

## 2022-09-14 ENCOUNTER — PROCEDURE VISIT (OUTPATIENT)
Dept: UROLOGY | Facility: CLINIC | Age: 27
End: 2022-09-14

## 2022-09-14 ENCOUNTER — OFFICE VISIT (OUTPATIENT)
Dept: UROLOGY | Facility: CLINIC | Age: 27
End: 2022-09-14
Payer: COMMERCIAL

## 2022-09-14 VITALS
DIASTOLIC BLOOD PRESSURE: 70 MMHG | SYSTOLIC BLOOD PRESSURE: 122 MMHG | BODY MASS INDEX: 26.95 KG/M2 | WEIGHT: 177.8 LBS | HEART RATE: 70 BPM | HEIGHT: 68 IN

## 2022-09-14 DIAGNOSIS — R33.9 URINARY RETENTION: Primary | ICD-10-CM

## 2022-09-14 LAB — POST-VOID RESIDUAL VOLUME, ML POC: 22 ML

## 2022-09-14 PROCEDURE — 51798 US URINE CAPACITY MEASURE: CPT

## 2022-09-14 PROCEDURE — 99213 OFFICE O/P EST LOW 20 MIN: CPT

## 2022-09-14 RX ORDER — TAMSULOSIN HYDROCHLORIDE 0.4 MG/1
0.4 CAPSULE ORAL
Qty: 30 CAPSULE | Refills: 4 | Status: SHIPPED | OUTPATIENT
Start: 2022-09-14 | End: 2022-10-07 | Stop reason: SDUPTHER

## 2022-09-14 NOTE — PROGRESS NOTES
9/14/2022    No chief complaint on file  Assessment and Plan    33 y o  male new patient to office  1  Urinary Retention  · Likely secondary to UTI and prostatitis  · Successful void trial today with PVR of 22 mL  · Continue Bactrim for full 14 day course  · Continue tamsulosin until next follow-up appointment in 3 months  · Follow-up 3 months with PVR      History of Present Illness  Selma Fall is a 33 y o  male new patient to office who was seen in consultation by Blake Torres during recent hospitalization on 9/07  Nathaniel was seen for evaluation of urinary retention likely secondary to UTI versus possible seminal vesiculitis and possible prostatitis after recent castaneda catheterization from 9/4 to 9/6  A new castaneda catheter was inserted due to urinary retention and he was empirically treated with ceftriaxone  Castaneda catheter remained in place with instruction to follow-up outpatient for void trial  Review of urine culture from 9/07 was positive for 10,000-19,000 cfu/ml Escherichia coli  Chlamydia/GC testing was negative as well as Rapid HIV and Hepatitis  He had the castaneda catheter removed this morning and presents for follow-up void trial this afternoon  His PVR is 22 mL  He reports being able to urinate at home without any difficulty, he does still experience some dysuria since having the catheter out  He is currently taking Bactrim BID for 14 days with first dose on 9/11  He currently denies any fever, chills, gross hematuria, abdominal pain, or flank pain  Review of Systems   Constitutional: Negative for chills and fever  HENT: Negative for congestion and sore throat  Respiratory: Negative for cough and shortness of breath  Cardiovascular: Negative for chest pain and leg swelling  Gastrointestinal: Negative for abdominal pain, constipation, diarrhea, nausea and vomiting     Genitourinary: Negative for decreased urine volume, difficulty urinating, dysuria, flank pain, frequency, hematuria and urgency  Musculoskeletal: Negative for back pain and gait problem  Skin: Negative for wound  Allergic/Immunologic: Negative for immunocompromised state  Neurological: Negative for dizziness, weakness and numbness  Hematological: Does not bruise/bleed easily  Vitals  There were no vitals filed for this visit  Physical Exam  Vitals reviewed  Constitutional:       General: He is not in acute distress  Appearance: Normal appearance  He is not ill-appearing or toxic-appearing  HENT:      Head: Normocephalic and atraumatic  Eyes:      General: No scleral icterus  Conjunctiva/sclera: Conjunctivae normal    Cardiovascular:      Rate and Rhythm: Normal rate  Pulmonary:      Effort: Pulmonary effort is normal  No respiratory distress  Abdominal:      General: Abdomen is flat  Palpations: Abdomen is soft  Tenderness: There is no abdominal tenderness  There is no right CVA tenderness or left CVA tenderness  Hernia: No hernia is present  Musculoskeletal:      Cervical back: Normal range of motion  Right lower leg: No edema  Left lower leg: No edema  Skin:     General: Skin is warm and dry  Coloration: Skin is not jaundiced or pale  Neurological:      General: No focal deficit present  Mental Status: He is alert and oriented to person, place, and time  Mental status is at baseline  Gait: Gait normal    Psychiatric:         Mood and Affect: Mood normal          Behavior: Behavior normal          Thought Content:  Thought content normal          Judgment: Judgment normal          Past History  Past Medical History:   Diagnosis Date    Autistic disorder     Gastric paresis     GERD (gastroesophageal reflux disease)     Known health problems: none      Social History     Socioeconomic History    Marital status: Single     Spouse name: None    Number of children: None    Years of education: None    Highest education level: None   Occupational History    None   Tobacco Use    Smoking status: Never Smoker    Smokeless tobacco: Never Used   Vaping Use    Vaping Use: Never used   Substance and Sexual Activity    Alcohol use: Not Currently    Drug use: Never    Sexual activity: Not Currently   Other Topics Concern    None   Social History Narrative    None     Social Determinants of Health     Financial Resource Strain: Not on file   Food Insecurity: Not on file   Transportation Needs: Not on file   Physical Activity: Not on file   Stress: Not on file   Social Connections: Not on file   Intimate Partner Violence: Not on file   Housing Stability: Not on file     Social History     Tobacco Use   Smoking Status Never Smoker   Smokeless Tobacco Never Used     Family History   Problem Relation Age of Onset    No Known Problems Mother     No Known Problems Father        The following portions of the patient's history were reviewed and updated as appropriate allergies, current medications, past medical history, past social history, past surgical history and problem list    Imagin/10/2022  BLADDER ULTRASOUND     INDICATION:  Acute prostatitis      COMPARISON: CT abdomen pelvis 2022     TECHNIQUE:  Ultrasound of the bladder was performed with a curvilinear transducer utilizing volumetric sweeps and still imaging techniques       FINDINGS:     BLADDER:   The bladder is decompressed by Joseph catheter which limits evaluation  Ureteral jets are not visualized  Prostate measures 3 4 x 3 4 x 4 5 cm  Prostate volume is 27 5 mL       IMPRESSION:  1  Limited evaluation of the bladder as it is decompressed by Joseph catheter  2   Prostate measures 3 4 x 3 4 x 4 5 cm  Prostate volume is 27 5 mL        2022  CT ABDOMEN AND PELVIS WITH IV CONTRAST     INDICATION:   abdominal pain, N/V      COMPARISON:  9/3/2022      TECHNIQUE:  CT examination of the abdomen and pelvis was performed  9/3/2022    Axial, sagittal, and coronal 2D reformatted images were created from the source data and submitted for interpretation      Radiation dose length product (DLP) for this visit:  675 mGy-cm   This examination, like all CT scans performed in the [facility], was performed utilizing techniques to minimize radiation dose exposure, including the use of iterative   reconstruction and automated exposure control      IV Contrast:  85 mL of iohexol (OMNIPAQUE)  Enteric Contrast:  Enteric contrast was not administered      FINDINGS:     ABDOMEN     LOWER CHEST:  No clinically significant abnormality identified in the visualized lower chest      LIVER/BILIARY TREE:  Unremarkable      GALLBLADDER:  No calcified gallstones  No pericholecystic inflammatory change      SPLEEN:  Unremarkable      PANCREAS:  Unremarkable      ADRENAL GLANDS:  Unremarkable      KIDNEYS/URETERS:  There is slight fullness of the renal collecting systems bilaterally without rosalie hydronephrosis      STOMACH AND BOWEL:  There are fluid-filled loops of small bowel but these are not frankly abnormal dilated to suggest small bowel obstruction  There is some fluid within the right colon  No focal inflammatory process however is seen      APPENDIX:  There are tiny appendicoliths within the appendix but no CT evidence for acute appendicitis      ABDOMINOPELVIC CAVITY:  No ascites  No pneumoperitoneum    No lymphadenopathy      VESSELS:  Unremarkable for patient's age      PELVIS     REPRODUCTIVE ORGANS:  There is inflammation surrounding the seminal vesicles and possibly the prostate suspicious for a seminal vesiculitis and possible prostatitis      URINARY BLADDER:  Unremarkable      ABDOMINAL WALL/INGUINAL REGIONS:  Unremarkable      OSSEOUS STRUCTURES:  No acute fracture or destructive osseous lesion      IMPRESSION:     Inflammation surrounding the seminal vesicles and possibly the prostate suspicious for a seminal vesiculitis and possible prostatitis      Slight fullness of the renal collecting systems bilaterally without rosalie hydronephrosis  This could be secondary to inflammation surrounding the seminal vesicles as mentioned above  Results  Recent Results (from the past 1 hour(s))   POCT Measure PVR    Collection Time: 09/14/22  2:18 PM   Result Value Ref Range    POST-VOID RESIDUAL VOLUME, ML POC 22 mL   ]  No results found for: PSA  Lab Results   Component Value Date    CALCIUM 8 6 09/09/2022    K 3 7 09/09/2022    CO2 27 09/09/2022    CL 98 09/09/2022    BUN 7 09/09/2022    CREATININE 0 91 09/09/2022     Lab Results   Component Value Date    WBC 4 09 (L) 09/11/2022    HGB 13 9 09/11/2022    HCT 40 9 09/11/2022    MCV 87 09/11/2022     (L) 09/11/2022       Please Note:  Voice dictation software has been used to create this document  There may be inadvertent transcriptions errors       Jimbo Urrutia

## 2022-09-14 NOTE — LETTER
September 14, 2022     Patient: Eliseo Rosen  YOB: 1995  Date of Visit: 9/14/2022      To Whom it May Concern:    Eliseo Rosen is under my professional care  Saray Barrientos was seen in my office on 9/14/2022  Saray Barrientos may return to work on 9/15/2022  If you have any questions or concerns, please don't hesitate to call           Sincerely,          SENDY Mosqueda Junior        CC: Eliseo Rosen

## 2022-09-14 NOTE — PROGRESS NOTES
9/14/2022    Tamar Vasquez  1995  [id number]    Diagnosis: Urinary retention   Chief Complaint     Joseph catheter removal          New patient presents to the office for Joseph catheter removal  Patient to see Savi Broussard this afternoon to establish care and for PVR  Plan  Follow up with Savi Broussard this afternoon     Procedure Joseph removal/voiding trial    Joseph catheter removed after deflation of an intact balloon  Patient tolerated well  Encouraged patient to hydrate well and return this afternoon for post void residual   he knows he may return early if uncomfortable and unable to urinate  Patient agrees to this plan  No results found for this or any previous visit (from the past 4 hour(s))          Vitals:    09/14/22 0832   BP: 122/70   Pulse: 70   Weight: 80 6 kg (177 lb 12 8 oz)   Height: 5' 8" (1 727 m)           Garrett Steele RN

## 2022-10-07 DIAGNOSIS — R33.9 URINARY RETENTION: ICD-10-CM

## 2022-10-10 RX ORDER — TAMSULOSIN HYDROCHLORIDE 0.4 MG/1
0.4 CAPSULE ORAL
Qty: 30 CAPSULE | Refills: 0 | Status: SHIPPED | OUTPATIENT
Start: 2022-10-10 | End: 2022-11-09

## 2022-11-04 DIAGNOSIS — R33.9 URINARY RETENTION: ICD-10-CM

## 2022-11-04 RX ORDER — TAMSULOSIN HYDROCHLORIDE 0.4 MG/1
CAPSULE ORAL
Qty: 30 CAPSULE | Refills: 0 | Status: SHIPPED | OUTPATIENT
Start: 2022-11-04

## 2022-11-28 ENCOUNTER — HOSPITAL ENCOUNTER (EMERGENCY)
Facility: HOSPITAL | Age: 27
Discharge: HOME | End: 2022-11-28
Attending: EMERGENCY MEDICINE

## 2022-11-28 ENCOUNTER — APPOINTMENT (EMERGENCY)
Dept: CT IMAGING | Facility: HOSPITAL | Age: 27
End: 2022-11-28

## 2022-11-28 VITALS
WEIGHT: 175 LBS | TEMPERATURE: 98 F | RESPIRATION RATE: 18 BRPM | BODY MASS INDEX: 26.61 KG/M2 | OXYGEN SATURATION: 98 % | DIASTOLIC BLOOD PRESSURE: 82 MMHG | SYSTOLIC BLOOD PRESSURE: 122 MMHG | HEART RATE: 70 BPM

## 2022-11-28 DIAGNOSIS — R11.2 NAUSEA AND VOMITING, UNSPECIFIED VOMITING TYPE: ICD-10-CM

## 2022-11-28 DIAGNOSIS — R10.13 EPIGASTRIC PAIN: Primary | ICD-10-CM

## 2022-11-28 LAB
ALBUMIN SERPL BCP-MCNC: 4.2 G/DL (ref 3.5–5)
ALP SERPL-CCNC: 77 U/L (ref 46–116)
ALT SERPL W P-5'-P-CCNC: 30 U/L (ref 12–78)
ANION GAP SERPL CALCULATED.3IONS-SCNC: 8 MMOL/L (ref 4–13)
AST SERPL W P-5'-P-CCNC: 21 U/L (ref 5–45)
BASOPHILS # BLD AUTO: 0 THOUSANDS/ÂΜL (ref 0–0.1)
BASOPHILS NFR BLD AUTO: 0 % (ref 0–1)
BILIRUB SERPL-MCNC: 0.55 MG/DL (ref 0.2–1)
BUN SERPL-MCNC: 16 MG/DL (ref 5–25)
CALCIUM SERPL-MCNC: 9.2 MG/DL (ref 8.3–10.1)
CHLORIDE SERPL-SCNC: 98 MMOL/L (ref 96–108)
CO2 SERPL-SCNC: 30 MMOL/L (ref 21–32)
CREAT SERPL-MCNC: 1.03 MG/DL (ref 0.6–1.3)
EOSINOPHIL # BLD AUTO: 0.05 THOUSAND/ÂΜL (ref 0–0.61)
EOSINOPHIL NFR BLD AUTO: 1 % (ref 0–6)
ERYTHROCYTE [DISTWIDTH] IN BLOOD BY AUTOMATED COUNT: 13 % (ref 11.6–15.1)
FLUAV RNA RESP QL NAA+PROBE: NEGATIVE
FLUBV RNA RESP QL NAA+PROBE: NEGATIVE
GFR SERPL CREATININE-BSD FRML MDRD: 99 ML/MIN/1.73SQ M
GLUCOSE SERPL-MCNC: 96 MG/DL (ref 65–140)
HCT VFR BLD AUTO: 44.4 % (ref 36.5–49.3)
HGB BLD-MCNC: 15.2 G/DL (ref 12–17)
IMM GRANULOCYTES # BLD AUTO: 0.03 THOUSAND/UL (ref 0–0.2)
IMM GRANULOCYTES NFR BLD AUTO: 1 % (ref 0–2)
LIPASE SERPL-CCNC: 57 U/L (ref 73–393)
LYMPHOCYTES # BLD AUTO: 0.98 THOUSANDS/ÂΜL (ref 0.6–4.47)
LYMPHOCYTES NFR BLD AUTO: 19 % (ref 14–44)
MCH RBC QN AUTO: 30 PG (ref 26.8–34.3)
MCHC RBC AUTO-ENTMCNC: 34.2 G/DL (ref 31.4–37.4)
MCV RBC AUTO: 88 FL (ref 82–98)
MONOCYTES # BLD AUTO: 0.86 THOUSAND/ÂΜL (ref 0.17–1.22)
MONOCYTES NFR BLD AUTO: 16 % (ref 4–12)
NEUTROPHILS # BLD AUTO: 3.39 THOUSANDS/ÂΜL (ref 1.85–7.62)
NEUTS SEG NFR BLD AUTO: 63 % (ref 43–75)
NRBC BLD AUTO-RTO: 0 /100 WBCS
PLATELET # BLD AUTO: 150 THOUSANDS/UL (ref 149–390)
PMV BLD AUTO: 9.9 FL (ref 8.9–12.7)
POTASSIUM SERPL-SCNC: 3.7 MMOL/L (ref 3.5–5.3)
PROT SERPL-MCNC: 7.8 G/DL (ref 6.4–8.4)
RBC # BLD AUTO: 5.07 MILLION/UL (ref 3.88–5.62)
RSV RNA RESP QL NAA+PROBE: NEGATIVE
SARS-COV-2 RNA RESP QL NAA+PROBE: NEGATIVE
SODIUM SERPL-SCNC: 136 MMOL/L (ref 135–147)
WBC # BLD AUTO: 5.31 THOUSAND/UL (ref 4.31–10.16)

## 2022-11-28 RX ORDER — METOCLOPRAMIDE 10 MG/1
10 TABLET ORAL EVERY 6 HOURS PRN
Qty: 10 TABLET | Refills: 0 | Status: SHIPPED | OUTPATIENT
Start: 2022-11-28

## 2022-11-28 RX ORDER — ONDANSETRON 2 MG/ML
4 INJECTION INTRAMUSCULAR; INTRAVENOUS ONCE
Status: COMPLETED | OUTPATIENT
Start: 2022-11-28 | End: 2022-11-28

## 2022-11-28 RX ORDER — DIPHENHYDRAMINE HYDROCHLORIDE 50 MG/ML
12.5 INJECTION INTRAMUSCULAR; INTRAVENOUS ONCE
Status: COMPLETED | OUTPATIENT
Start: 2022-11-28 | End: 2022-11-28

## 2022-11-28 RX ORDER — PANTOPRAZOLE SODIUM 40 MG/10ML
40 INJECTION, POWDER, LYOPHILIZED, FOR SOLUTION INTRAVENOUS ONCE
Status: COMPLETED | OUTPATIENT
Start: 2022-11-28 | End: 2022-11-28

## 2022-11-28 RX ORDER — METOCLOPRAMIDE HYDROCHLORIDE 5 MG/ML
10 INJECTION INTRAMUSCULAR; INTRAVENOUS ONCE
Status: COMPLETED | OUTPATIENT
Start: 2022-11-28 | End: 2022-11-28

## 2022-11-28 RX ORDER — ONDANSETRON 4 MG/1
4 TABLET, ORALLY DISINTEGRATING ORAL ONCE
Status: COMPLETED | OUTPATIENT
Start: 2022-11-28 | End: 2022-11-28

## 2022-11-28 RX ADMIN — DIPHENHYDRAMINE HYDROCHLORIDE 12.5 MG: 50 INJECTION, SOLUTION INTRAMUSCULAR; INTRAVENOUS at 17:25

## 2022-11-28 RX ADMIN — IOHEXOL 100 ML: 350 INJECTION, SOLUTION INTRAVENOUS at 18:23

## 2022-11-28 RX ADMIN — ONDANSETRON 4 MG: 2 INJECTION INTRAMUSCULAR; INTRAVENOUS at 17:25

## 2022-11-28 RX ADMIN — METOCLOPRAMIDE 10 MG: 5 INJECTION, SOLUTION INTRAMUSCULAR; INTRAVENOUS at 17:27

## 2022-11-28 RX ADMIN — ONDANSETRON 4 MG: 4 TABLET, ORALLY DISINTEGRATING ORAL at 15:36

## 2022-11-28 RX ADMIN — SODIUM CHLORIDE 1000 ML: 0.9 INJECTION, SOLUTION INTRAVENOUS at 17:23

## 2022-11-28 RX ADMIN — PANTOPRAZOLE SODIUM 40 MG: 40 INJECTION, POWDER, FOR SOLUTION INTRAVENOUS at 17:24

## 2022-11-28 NOTE — Clinical Note
Jennifer Bermudez was seen and treated in our emergency department on 11/28/2022  Diagnosis:     Elsy Hamm  may return to work on return date  He may return on this date: 11/30/2022         If you have any questions or concerns, please don't hesitate to call        Kavya Hudson MD    ______________________________           _______________          _______________  Hospital Representative                              Date                                Time Home

## 2022-11-28 NOTE — ED PROVIDER NOTES
History  Chief Complaint   Patient presents with   • Vomiting     Pt reports vomiting x1 Thursday night  Reports symptoms improved over weekend  Reports vomiting "coffee ground" emesis this morning      Patient states that after Thanksgiving dinner he felt over fallen vomited  Was fine Friday and Saturday  When at the yesterday and has been having vomiting since then  Had dark brown vomitus  Denies any alcohol or drug use  Complains of epigastric abdominal pain  History provided by:  Patient   used: No    Vomiting  Severity:  Mild  Duration:  4 days  Timing:  Intermittent  Progression:  Worsening  Chronicity:  New  Recent urination:  Normal  Relieved by:  Nothing  Worsened by:  Nothing  Ineffective treatments:  None tried  Associated symptoms: abdominal pain    Associated symptoms: no chills, no cough, no diarrhea, no fever, no headaches, no myalgias, no sore throat and no URI        Prior to Admission Medications   Prescriptions Last Dose Informant Patient Reported? Taking?   escitalopram (LEXAPRO) 10 mg tablet   Yes No   Sig: Take 1 tablet by mouth every morning   loratadine (CLARITIN) 10 mg tablet   Yes No   Sig: Take by mouth   pantoprazole (PROTONIX) 20 mg tablet   No No   Sig: Take 2 tablets (40 mg total) by mouth 2 (two) times a day   tamsulosin (FLOMAX) 0 4 mg   No No   Sig: take 1 capsule by mouth once daily with dinner      Facility-Administered Medications: None       Past Medical History:   Diagnosis Date   • Autistic disorder    • Gastric paresis    • GERD (gastroesophageal reflux disease)    • Known health problems: none        Past Surgical History:   Procedure Laterality Date   • EYE SURGERY     • FOOT SURGERY         Family History   Problem Relation Age of Onset   • No Known Problems Mother    • No Known Problems Father      I have reviewed and agree with the history as documented      E-Cigarette/Vaping   • E-Cigarette Use Never User      E-Cigarette/Vaping Substances • Nicotine No    • THC No    • CBD No    • Flavoring No    • Other No    • Unknown No      Social History     Tobacco Use   • Smoking status: Never   • Smokeless tobacco: Never   Vaping Use   • Vaping Use: Never used   Substance Use Topics   • Alcohol use: Not Currently   • Drug use: Never       Review of Systems   Constitutional: Negative for chills and fever  HENT: Negative for ear pain, hearing loss, sore throat, trouble swallowing and voice change  Eyes: Negative for pain and discharge  Respiratory: Negative for cough, shortness of breath and wheezing  Cardiovascular: Negative for chest pain and palpitations  Gastrointestinal: Positive for abdominal pain and vomiting  Negative for blood in stool, constipation, diarrhea and nausea  Genitourinary: Negative for dysuria, flank pain, frequency and hematuria  Musculoskeletal: Negative for joint swelling, myalgias, neck pain and neck stiffness  Skin: Negative for rash and wound  Neurological: Negative for dizziness, seizures, syncope, facial asymmetry and headaches  Psychiatric/Behavioral: Negative for hallucinations, self-injury and suicidal ideas  All other systems reviewed and are negative  Physical Exam  Physical Exam  Vitals and nursing note reviewed  Constitutional:       General: He is not in acute distress  Appearance: He is well-developed  HENT:      Head: Normocephalic and atraumatic  Right Ear: External ear normal       Left Ear: External ear normal    Eyes:      General: No scleral icterus  Right eye: No discharge  Left eye: No discharge  Extraocular Movements: Extraocular movements intact  Conjunctiva/sclera: Conjunctivae normal    Cardiovascular:      Rate and Rhythm: Normal rate and regular rhythm  Heart sounds: Normal heart sounds  No murmur heard  Pulmonary:      Effort: Pulmonary effort is normal       Breath sounds: Normal breath sounds  No wheezing or rales     Abdominal: General: Bowel sounds are normal  There is no distension  Palpations: Abdomen is soft  Tenderness: There is abdominal tenderness (Tender epigastric region  Tender also in the suprapubic region  )  There is no guarding or rebound  Musculoskeletal:         General: No deformity  Normal range of motion  Cervical back: Normal range of motion and neck supple  Skin:     General: Skin is warm and dry  Findings: No rash  Neurological:      General: No focal deficit present  Mental Status: He is alert and oriented to person, place, and time  Cranial Nerves: No cranial nerve deficit  Psychiatric:         Mood and Affect: Mood normal          Behavior: Behavior normal          Thought Content:  Thought content normal          Judgment: Judgment normal          Vital Signs  ED Triage Vitals [11/28/22 1531]   Temperature Pulse Respirations Blood Pressure SpO2   98 °F (36 7 °C) 73 16 114/79 98 %      Temp src Heart Rate Source Patient Position - Orthostatic VS BP Location FiO2 (%)   -- -- Lying Right arm --      Pain Score       5           Vitals:    11/28/22 1531   BP: 114/79   Pulse: 73   Patient Position - Orthostatic VS: Lying         Visual Acuity      ED Medications  Medications   ondansetron (ZOFRAN-ODT) dispersible tablet 4 mg (4 mg Oral Given 11/28/22 1536)   sodium chloride 0 9 % bolus 1,000 mL (1,000 mL Intravenous New Bag 11/28/22 1723)   ondansetron (ZOFRAN) injection 4 mg (4 mg Intravenous Given 11/28/22 1725)   pantoprazole (PROTONIX) injection 40 mg (40 mg Intravenous Given 11/28/22 1724)   metoclopramide (REGLAN) injection 10 mg (10 mg Intravenous Given 11/28/22 1727)   diphenhydrAMINE (BENADRYL) injection 12 5 mg (12 5 mg Intravenous Given 11/28/22 1725)   iohexol (OMNIPAQUE) 350 MG/ML injection (SINGLE-DOSE) 100 mL (100 mL Intravenous Given 11/28/22 1823)       Diagnostic Studies  Results Reviewed     Procedure Component Value Units Date/Time    FLU/RSV/COVID - if FLU/RSV clinically relevant [[id number]]  (Normal) Collected: 11/28/22 1729    Lab Status: Final result Specimen: Nares from Nose Updated: 11/28/22 1810     SARS-CoV-2 Negative     INFLUENZA A PCR Negative     INFLUENZA B PCR Negative     RSV PCR Negative    Narrative:      FOR PEDIATRIC PATIENTS - copy/paste COVID Guidelines URL to browser: [email or web address]    SARS-CoV-2 assay is a Nucleic Acid Amplification assay intended for the  qualitative detection of nucleic acid from SARS-CoV-2 in nasopharyngeal  swabs  Results are for the presumptive identification of SARS-CoV-2 RNA  Positive results are indicative of infection with SARS-CoV-2, the virus  causing COVID-19, but do not rule out bacterial infection or co-infection  with other viruses  Laboratories within the [place] and its  territories are required to report all positive results to the appropriate  public health authorities  Negative results do not preclude SARS-CoV-2  infection and should not be used as the sole basis for treatment or other  patient management decisions  Negative results must be combined with  clinical observations, patient history, and epidemiological information  This test has not been FDA cleared or approved  This test has been authorized by FDA under an Emergency Use Authorization  (EUA)  This test is only authorized for the duration of time the  declaration that circumstances exist justifying the authorization of the  emergency use of an in vitro diagnostic tests for detection of SARS-CoV-2  virus and/or diagnosis of COVID-19 infection under section 564(b)(1) of  the Act, 21 U  S C  871VBR-4(P)(7), unless the authorization is terminated  or revoked sooner  The test has been validated but independent review by FDA  and CLIA is pending  Test performed using [employer]: This RT-PCR assay targets N2,  a region unique to SARS-CoV-2   A conserved region in the E-gene was chosen  for pan-Sarbecovirus detection which includes SARS-CoV-2  According to CMS-2020-01-R, this platform meets the definition of high-throughput technology      Comprehensive metabolic panel [[id number]] Collected: 11/28/22 1539    Lab Status: Final result Specimen: Blood from Arm, Right Updated: 11/28/22 1604     Sodium 136 mmol/L      Potassium 3 7 mmol/L      Chloride 98 mmol/L      CO2 30 mmol/L      ANION GAP 8 mmol/L      BUN 16 mg/dL      Creatinine 1 03 mg/dL      Glucose 96 mg/dL      Calcium 9 2 mg/dL      AST 21 U/L      ALT 30 U/L      Alkaline Phosphatase 77 U/L      Total Protein 7 8 g/dL      Albumin 4 2 g/dL      Total Bilirubin 0 55 mg/dL      eGFR 99 ml/min/1 73sq m     Narrative:      National Kidney Disease Foundation guidelines for Chronic Kidney Disease (CKD):   •  Stage 1 with normal or high GFR (GFR > 90 mL/min/1 73 square meters)  •  Stage 2 Mild CKD (GFR = 60-89 mL/min/1 73 square meters)  •  Stage 3A Moderate CKD (GFR = 45-59 mL/min/1 73 square meters)  •  Stage 3B Moderate CKD (GFR = 30-44 mL/min/1 73 square meters)  •  Stage 4 Severe CKD (GFR = 15-29 mL/min/1 73 square meters)  •  Stage 5 End Stage CKD (GFR <15 mL/min/1 73 square meters)  Note: GFR calculation is accurate only with a steady state creatinine    Lipase [[id number]]  (Abnormal) Collected: 11/28/22 1539    Lab Status: Final result Specimen: Blood from Arm, Right Updated: 11/28/22 1604     Lipase 57 u/L     CBC and differential [[id number]]  (Abnormal) Collected: 11/28/22 1539    Lab Status: Final result Specimen: Blood from Arm, Right Updated: 11/28/22 1543     WBC 5 31 Thousand/uL      RBC 5 07 Million/uL      Hemoglobin 15 2 g/dL      Hematocrit 44 4 %      MCV 88 fL      MCH 30 0 pg      MCHC 34 2 g/dL      RDW 13 0 %      MPV 9 9 fL      Platelets 079 Thousands/uL      nRBC 0 /100 WBCs      Neutrophils Relative 63 %      Immat GRANS % 1 %      Lymphocytes Relative 19 %      Monocytes Relative 16 %      Eosinophils Relative 1 %      Basophils Relative 0 %      Neutrophils Absolute 3 39 Thousands/µL      Immature Grans Absolute 0 03 Thousand/uL      Lymphocytes Absolute 0 98 Thousands/µL      Monocytes Absolute 0 86 Thousand/µL      Eosinophils Absolute 0 05 Thousand/µL      Basophils Absolute 0 00 Thousands/µL                  CT abdomen pelvis with contrast   Final Result by Maya Meza MD (11/28 1921)      No acute inflammatory process identified in the abdomen or pelvis  Workstation performed: [id number]                    Procedures  Procedures         ED Course                                             MDM    Disposition  Final diagnoses:   Epigastric pain   Nausea and vomiting, unspecified vomiting type     Time reflects when diagnosis was documented in both MDM as applicable and the Disposition within this note     Time User Action Codes Description Comment    11/28/2022  7:24 PM Vinny Lewis Add [R10 13] Epigastric pain     11/28/2022  7:24 PM Wan Villasenor Add [R11 2] Nausea and vomiting, unspecified vomiting type       ED Disposition     ED Disposition   Discharge    Condition   Stable    Date/Time   Mon Nov 28, 2022  7:24 PM    Comment   Hannah Lua discharge to home/self care  Follow-up Information    None         Patient's Medications   Discharge Prescriptions    METOCLOPRAMIDE (REGLAN) 10 MG TABLET    Take 1 tablet (10 mg total) by mouth every 6 (six) hours as needed (Nausea) for up to 10 doses       Start Date: 11/28/2022End Date: --       Order Dose: 10 mg       Quantity: 10 tablet    Refills: 0       No discharge procedures on file      PDMP Review     None          ED Provider  Electronically Signed by           Ashwin Velasco MD  11/28/22 1925

## 2022-11-28 NOTE — Clinical Note
Hannah Stoney was seen and treated in our emergency department on 11/28/2022  Diagnosis:     Tiburcio Shelby  may return to work on return date  He may return on this date: 11/30/2022         If you have any questions or concerns, please don't hesitate to call        Ashwin Velasco MD    ______________________________           _______________          _______________  Hospital Representative                              Date                                Time

## 2022-12-04 DIAGNOSIS — R33.9 URINARY RETENTION: ICD-10-CM

## 2022-12-06 RX ORDER — TAMSULOSIN HYDROCHLORIDE 0.4 MG/1
CAPSULE ORAL
Qty: 30 CAPSULE | Refills: 0 | Status: SHIPPED | OUTPATIENT
Start: 2022-12-06

## 2022-12-14 RX ORDER — PANTOPRAZOLE SODIUM 40 MG/1
TABLET, DELAYED RELEASE ORAL
COMMUNITY
Start: 2022-11-05

## 2022-12-19 ENCOUNTER — OFFICE VISIT (OUTPATIENT)
Dept: UROLOGY | Facility: CLINIC | Age: 27
End: 2022-12-19

## 2022-12-19 VITALS
WEIGHT: 193 LBS | DIASTOLIC BLOOD PRESSURE: 62 MMHG | HEIGHT: 68 IN | HEART RATE: 66 BPM | BODY MASS INDEX: 29.25 KG/M2 | TEMPERATURE: 98.2 F | SYSTOLIC BLOOD PRESSURE: 118 MMHG | OXYGEN SATURATION: 99 %

## 2022-12-19 DIAGNOSIS — R33.9 URINARY RETENTION: Primary | ICD-10-CM

## 2022-12-19 LAB — POST-VOID RESIDUAL VOLUME, ML POC: 14 ML

## 2022-12-19 NOTE — PROGRESS NOTES
12/19/2022    No chief complaint on file  Assessment and Plan    33 y o  male     1  Urinary retention  · Since resolved  · He reports doing very well and feels better when he takes Flomax daily  · I recommend a trail off of Flomax and he will follow-up in 3 months  If he requires the need to continue Flomax, I recommend we schedule him for a cystoscopy  History of Present Illness  Janice Canavan is a 33 y o  male here for follow-up evaluation of urinary retention  He was initially seen by me on 9/14/2022 after being seen in consultation during hospitalization on 9/07  Nathaniel was seen for evaluation of urinary retention likely secondary to UTI versus possible seminal vesiculitis and possible prostatitis after recent castaneda catheterization from 9/4 to 9/6  A new castaneda catheter was inserted due to urinary retention and he was empirically treated with ceftriaxone  During his office visit with me on 914, he was successfully able to pass a void trial with a PVR of 22 mL  I recommended he continue Bactrim for full 14-day course as well as tamsulosin until next follow-up appointment with me  He reports doing very well since his last office  His PVR today was 14 mL  He feels much better when taking the Flomax and feels he empties his bladder better with use  He does complain of occasional incomplete bladder emptying, but denies difficulty or recurrent retention  Review of Systems   Constitutional: Negative for chills and fever  HENT: Negative for congestion and sore throat  Respiratory: Negative for cough and shortness of breath  Cardiovascular: Negative for chest pain and leg swelling  Gastrointestinal: Negative for abdominal pain, constipation and diarrhea  Genitourinary: Negative for difficulty urinating, dysuria, frequency, hematuria and urgency  Musculoskeletal: Negative for back pain and gait problem  Skin: Negative for wound     Allergic/Immunologic: Negative for immunocompromised state  Hematological: Does not bruise/bleed easily  AUA SYMPTOM SCORE    Flowsheet Row Most Recent Value   AUA SYMPTOM SCORE    How often have you had a sensation of not emptying your bladder completely after you finished urinating? 1 (P)     How often have you had to urinate again less than two hours after you finished urinating? 1 (P)     How often have you found you stopped and started again several times when you urinate? 3 (P)     How often have you found it difficult to postpone urination? 0 (P)     How often have you had a weak urinary stream? 1 (P)     How often have you had to push or strain to begin urination? 0 (P)     How many times did you most typically get up to urinate from the time you went to bed at night until the time you got up in the morning? 0 (P)     Quality of Life: If you were to spend the rest of your life with your urinary condition just the way it is now, how would you feel about that? 3 (P)     AUA SYMPTOM SCORE 6 (P)           Vitals  Vitals:    12/19/22 1125   BP: 118/62   BP Location: Left arm   Patient Position: Sitting   Cuff Size: Standard   Pulse: 66   Temp: 98 2 °F (36 8 °C)   SpO2: 99%   Weight: 87 5 kg (193 lb)   Height: 5' 8" (1 727 m)       Physical Exam  Vitals reviewed  Constitutional:       General: He is not in acute distress  Appearance: Normal appearance  He is not ill-appearing or toxic-appearing  HENT:      Head: Normocephalic and atraumatic  Eyes:      General: No scleral icterus  Conjunctiva/sclera: Conjunctivae normal    Cardiovascular:      Rate and Rhythm: Normal rate  Pulmonary:      Effort: Pulmonary effort is normal  No respiratory distress  Abdominal:      Tenderness: There is no right CVA tenderness or left CVA tenderness  Hernia: No hernia is present  Musculoskeletal:      Cervical back: Normal range of motion  Right lower leg: No edema  Left lower leg: No edema     Skin:     General: Skin is warm and dry  Coloration: Skin is not jaundiced or pale  Neurological:      General: No focal deficit present  Mental Status: He is alert and oriented to person, place, and time  Mental status is at baseline  Gait: Gait normal    Psychiatric:         Mood and Affect: Mood normal          Behavior: Behavior normal          Thought Content:  Thought content normal          Judgment: Judgment normal          Past History  Past Medical History:   Diagnosis Date   • Autistic disorder    • Gastric paresis    • GERD (gastroesophageal reflux disease)    • Known health problems: none      Social History     Socioeconomic History   • Marital status: Single     Spouse name: None   • Number of children: None   • Years of education: None   • Highest education level: None   Occupational History   • None   Tobacco Use   • Smoking status: Never   • Smokeless tobacco: Never   Vaping Use   • Vaping Use: Never used   Substance and Sexual Activity   • Alcohol use: Not Currently   • Drug use: Never   • Sexual activity: Not Currently   Other Topics Concern   • None   Social History Narrative   • None     Social Determinants of Health     Financial Resource Strain: Not on file   Food Insecurity: Not on file   Transportation Needs: Not on file   Physical Activity: Not on file   Stress: Not on file   Social Connections: Not on file   Intimate Partner Violence: Not on file   Housing Stability: Not on file     Social History     Tobacco Use   Smoking Status Never   Smokeless Tobacco Never     Family History   Problem Relation Age of Onset   • No Known Problems Mother    • No Known Problems Father        The following portions of the patient's history were reviewed and updated as appropriate allergies, current medications, past medical history, past social history, past surgical history and problem list    Imaging:    Results  Recent Results (from the past 1 hour(s))   POCT Measure PVR    Collection Time: 12/19/22 11:31 AM Result Value Ref Range    POST-VOID RESIDUAL VOLUME, ML POC 14 mL   ]  No results found for: PSA  Lab Results   Component Value Date    CALCIUM 9 2 11/28/2022    K 3 7 11/28/2022    CO2 30 11/28/2022    CL 98 11/28/2022    BUN 16 11/28/2022    CREATININE 1 03 11/28/2022     Lab Results   Component Value Date    WBC 5 31 11/28/2022    HGB 15 2 11/28/2022    HCT 44 4 11/28/2022    MCV 88 11/28/2022     11/28/2022       Please Note:  Voice dictation software has been used to create this document  There may be inadvertent transcriptions errors       Navarro Scruggs

## 2023-01-06 DIAGNOSIS — R33.9 URINARY RETENTION: ICD-10-CM

## 2023-01-06 RX ORDER — TAMSULOSIN HYDROCHLORIDE 0.4 MG/1
CAPSULE ORAL
Qty: 30 CAPSULE | Refills: 0 | Status: SHIPPED | OUTPATIENT
Start: 2023-01-06

## 2023-01-29 ENCOUNTER — PATIENT MESSAGE (OUTPATIENT)
Dept: UROLOGY | Facility: CLINIC | Age: 28
End: 2023-01-29

## 2023-01-29 DIAGNOSIS — R33.9 URINARY RETENTION: ICD-10-CM

## 2023-01-30 NOTE — TELEPHONE ENCOUNTER
Please have patient come to [address] [place] [address] [place] office for PVR and urine testing to ensure proper emptying and rule out infection as possible source of his difficulty urinating

## 2023-01-31 ENCOUNTER — CLINICAL SUPPORT (OUTPATIENT)
Dept: UROLOGY | Facility: CLINIC | Age: 28
End: 2023-01-31

## 2023-01-31 VITALS — WEIGHT: 193 LBS | HEIGHT: 68 IN | BODY MASS INDEX: 29.25 KG/M2

## 2023-01-31 DIAGNOSIS — R33.9 URINARY RETENTION: Primary | ICD-10-CM

## 2023-01-31 LAB — POST-VOID RESIDUAL VOLUME, ML POC: 18 ML

## 2023-01-31 RX ORDER — FLUTICASONE PROPIONATE 50 MCG
SPRAY, SUSPENSION (ML) NASAL
COMMUNITY
Start: 2022-12-29

## 2023-01-31 RX ORDER — TRIAMCINOLONE ACETONIDE 0.1 %
1 PASTE (GRAM) DENTAL
COMMUNITY
Start: 2023-01-16 | End: 2024-01-16

## 2023-01-31 RX ORDER — ONDANSETRON 4 MG/1
4 TABLET, FILM COATED ORAL
COMMUNITY
Start: 2023-01-05

## 2023-01-31 NOTE — PROGRESS NOTES
Patient presented to office today for PVR  Patient was unable to urinate for UA C&S  Provided a urine cup to patient for patient to drop off at [facility]

## 2023-02-02 ENCOUNTER — APPOINTMENT (OUTPATIENT)
Dept: LAB | Facility: CLINIC | Age: 28
End: 2023-02-02

## 2023-02-02 DIAGNOSIS — R33.9 URINARY RETENTION: ICD-10-CM

## 2023-02-02 LAB
BACTERIA UR QL AUTO: ABNORMAL /HPF
BILIRUB UR QL STRIP: NEGATIVE
CLARITY UR: CLEAR
COLOR UR: ABNORMAL
GLUCOSE UR STRIP-MCNC: NEGATIVE MG/DL
HGB UR QL STRIP.AUTO: NEGATIVE
KETONES UR STRIP-MCNC: NEGATIVE MG/DL
LEUKOCYTE ESTERASE UR QL STRIP: NEGATIVE
NITRITE UR QL STRIP: NEGATIVE
NON-SQ EPI CELLS URNS QL MICRO: ABNORMAL /HPF
PH UR STRIP.AUTO: 8 [PH]
PROT UR STRIP-MCNC: ABNORMAL MG/DL
RBC #/AREA URNS AUTO: ABNORMAL /HPF
SP GR UR STRIP.AUTO: 1.02 (ref 1–1.03)
UROBILINOGEN UR STRIP-ACNC: <2 MG/DL
WBC #/AREA URNS AUTO: ABNORMAL /HPF

## 2023-02-03 DIAGNOSIS — R33.9 URINARY RETENTION: ICD-10-CM

## 2023-02-03 LAB — BACTERIA UR CULT: NORMAL

## 2023-02-03 RX ORDER — TAMSULOSIN HYDROCHLORIDE 0.4 MG/1
CAPSULE ORAL
Qty: 30 CAPSULE | Refills: 0 | Status: SHIPPED | OUTPATIENT
Start: 2023-02-03

## 2023-03-06 DIAGNOSIS — R33.9 URINARY RETENTION: ICD-10-CM

## 2023-03-06 RX ORDER — TAMSULOSIN HYDROCHLORIDE 0.4 MG/1
CAPSULE ORAL
Qty: 30 CAPSULE | Refills: 0 | Status: SHIPPED | OUTPATIENT
Start: 2023-03-06

## 2023-05-08 DIAGNOSIS — R33.9 URINARY RETENTION: ICD-10-CM

## 2023-05-08 RX ORDER — TAMSULOSIN HYDROCHLORIDE 0.4 MG/1
CAPSULE ORAL
Qty: 30 CAPSULE | Refills: 0 | Status: SHIPPED | OUTPATIENT
Start: 2023-05-08

## 2023-06-05 ENCOUNTER — OFFICE VISIT (OUTPATIENT)
Dept: UROLOGY | Facility: CLINIC | Age: 28
End: 2023-06-05
Payer: COMMERCIAL

## 2023-06-05 VITALS
DIASTOLIC BLOOD PRESSURE: 56 MMHG | SYSTOLIC BLOOD PRESSURE: 110 MMHG | TEMPERATURE: 98 F | WEIGHT: 199.4 LBS | HEIGHT: 68 IN | OXYGEN SATURATION: 98 % | HEART RATE: 73 BPM | BODY MASS INDEX: 30.22 KG/M2

## 2023-06-05 DIAGNOSIS — R31.29 MICROSCOPIC HEMATURIA: ICD-10-CM

## 2023-06-05 DIAGNOSIS — R33.9 URINARY RETENTION: Primary | ICD-10-CM

## 2023-06-05 PROCEDURE — 99213 OFFICE O/P EST LOW 20 MIN: CPT

## 2023-06-05 NOTE — PROGRESS NOTES
6/5/2023    No chief complaint on file  Assessment and Plan    33 y o  male     1  Urinary retention  · Acute episode; since resolved and was seen in consultation during hospitalization on 9/7/2022  Thought to be from UTI versus possible seminal vasculitis and possible prostatitis after recent Castaneda catheterization from 9/4/2022-9/6/2022  · He continues to take Flomax 0 4 mg daily as this significantly helps his urinary stream  He may have a possible component of pelvic floor dysfunction, however, cannot exclude possible bladder outlet obstruction  I have a low clinical suspicion for this based on his age, but I am recommending we schedule him for a cystoscopy for further evaluation  He is agreeable to this and we will schedule him in the near future  · Urine dip today positive for trace blood negative for leukocytes or nitrates  · Send for UA/micro and culture  Subjective:    He presents today reporting doing well since the last time I saw him  He continues to take Flomax 0 4 mg because he reports a significant improvement in his urinary stream when taking this  He tried to stop taking Flomax following his office visit with me in December, but resumed this as he noticed a significantly weaker stream, urgency, and suprapubic pressure when not taking Flomax  Overall doing well and denies any issues with UTIs or gross hematuria  History of Present Illness  Robyn Odonnell is a 33 y o  male here for 6-month follow-up evaluation of urinary retention  He was initially seen by me on 9/14/2022 after being seen in consultation during hospitalization on 9/07  Nathaniel was seen for evaluation of urinary retention likely secondary to UTI versus possible seminal vesiculitis and possible prostatitis after recent castaneda catheterization from 9/4 to 9/6  A new castaneda catheter was inserted due to urinary retention and he was empirically treated with ceftriaxone    During his office visit with me on 914, he was successfully able to pass a void trial with a PVR of 22 mL  He was then seen by me for follow-up on 12/19/2022 reported continuing to do well however felt much better when taking Flomax daily  He did complain of occasional incomplete bladder emptying but denies any difficulty or recurrent retention  His PVR at that time was 14 mL  Review of Systems   Constitutional: Negative for chills and fever  HENT: Negative for congestion and sore throat  Respiratory: Negative for cough and shortness of breath  Cardiovascular: Negative for chest pain and leg swelling  Gastrointestinal: Negative for abdominal pain, constipation, diarrhea, nausea and vomiting  Genitourinary: Negative for difficulty urinating, dysuria, frequency, hematuria and urgency  Musculoskeletal: Negative for back pain and gait problem  Skin: Negative for wound  Allergic/Immunologic: Negative for immunocompromised state  Neurological: Negative for dizziness, weakness and numbness  Hematological: Does not bruise/bleed easily             AUA SYMPTOM SCORE    Flowsheet Row Most Recent Value   AUA SYMPTOM SCORE    How often have you had a sensation of not emptying your bladder completely after you finished urinating? 1 (P)     How often have you had to urinate again less than two hours after you finished urinating? 2 (P)     How often have you found you stopped and started again several times when you urinate? 2 (P)     How often have you found it difficult to postpone urination? 0 (P)     How often have you had a weak urinary stream? 2 (P)     How often have you had to push or strain to begin urination? 0 (P)     How many times did you most typically get up to urinate from the time you went to bed at night until the time you got up in the morning? 0 (P)     Quality of Life: If you were to spend the rest of your life with your urinary condition just the way it is now, how would you feel about that? 3 (P) "  AUA SYMPTOM SCORE 7 (P)           Vitals  Vitals:    06/05/23 1444   BP: 110/56   BP Location: Left arm   Patient Position: Sitting   Cuff Size: Adult   Pulse: 73   Temp: 98 °F (36 7 °C)   TempSrc: Temporal   SpO2: 98%   Weight: 90 4 kg (199 lb 6 4 oz)   Height: 5' 8\" (1 727 m)       Physical Exam  Vitals reviewed  Constitutional:       General: He is not in acute distress  Appearance: Normal appearance  He is not ill-appearing or toxic-appearing  HENT:      Head: Normocephalic and atraumatic  Eyes:      General: No scleral icterus  Conjunctiva/sclera: Conjunctivae normal    Cardiovascular:      Rate and Rhythm: Normal rate  Pulmonary:      Effort: Pulmonary effort is normal  No respiratory distress  Abdominal:      Tenderness: There is no right CVA tenderness or left CVA tenderness  Hernia: No hernia is present  Musculoskeletal:      Cervical back: Normal range of motion  Right lower leg: No edema  Left lower leg: No edema  Skin:     General: Skin is warm and dry  Coloration: Skin is not jaundiced or pale  Neurological:      General: No focal deficit present  Mental Status: He is alert and oriented to person, place, and time  Mental status is at baseline  Gait: Gait normal    Psychiatric:         Mood and Affect: Mood normal          Behavior: Behavior normal          Thought Content:  Thought content normal          Judgment: Judgment normal          Past History  Past Medical History:   Diagnosis Date   • Autistic disorder    • Gastric paresis    • GERD (gastroesophageal reflux disease)    • Known health problems: none    • Urinary tract infection      Social History     Socioeconomic History   • Marital status: Single     Spouse name: None   • Number of children: None   • Years of education: None   • Highest education level: None   Occupational History   • None   Tobacco Use   • Smoking status: Never   • Smokeless tobacco: Never   Vaping Use   • Vaping Use: " "Never used   Substance and Sexual Activity   • Alcohol use: Not Currently   • Drug use: Never   • Sexual activity: Not Currently   Other Topics Concern   • None   Social History Narrative   • None     Social Determinants of Health     Financial Resource Strain: Not on file   Food Insecurity: Not on file   Transportation Needs: Not on file   Physical Activity: Not on file   Stress: Not on file   Social Connections: Not on file   Intimate Partner Violence: Not on file   Housing Stability: Not on file     Social History     Tobacco Use   Smoking Status Never   Smokeless Tobacco Never     Family History   Problem Relation Age of Onset   • No Known Problems Mother    • No Known Problems Father        The following portions of the patient's history were reviewed and updated as appropriate allergies, current medications, past medical history, past social history, past surgical history and problem list    Imaging:    Results  No results found for this or any previous visit (from the past 1 hour(s))  ]  No results found for: \"PSA\"  Lab Results   Component Value Date    BUN 16 11/28/2022    CALCIUM 9 2 11/28/2022    CL 98 11/28/2022    CO2 30 11/28/2022    CREATININE 1 03 11/28/2022    K 3 7 11/28/2022     Lab Results   Component Value Date    HCT 44 4 11/28/2022    HGB 15 2 11/28/2022    MCV 88 11/28/2022     11/28/2022    WBC 5 31 11/28/2022       Please Note:  Voice dictation software has been used to create this document  There may be inadvertent transcriptions errors       SENDY Almanza 06/05/23   "

## 2023-06-26 ENCOUNTER — TELEPHONE (OUTPATIENT)
Dept: UROLOGY | Facility: CLINIC | Age: 28
End: 2023-06-26

## 2023-06-29 ENCOUNTER — PROCEDURE VISIT (OUTPATIENT)
Dept: UROLOGY | Facility: CLINIC | Age: 28
End: 2023-06-29
Payer: COMMERCIAL

## 2023-06-29 VITALS
DIASTOLIC BLOOD PRESSURE: 78 MMHG | TEMPERATURE: 98.6 F | WEIGHT: 202.4 LBS | BODY MASS INDEX: 30.68 KG/M2 | HEART RATE: 84 BPM | HEIGHT: 68 IN | OXYGEN SATURATION: 99 % | SYSTOLIC BLOOD PRESSURE: 110 MMHG

## 2023-06-29 DIAGNOSIS — R31.29 MICROSCOPIC HEMATURIA: Primary | ICD-10-CM

## 2023-06-29 DIAGNOSIS — N30.01 ACUTE CYSTITIS WITH HEMATURIA: ICD-10-CM

## 2023-06-29 LAB
SL AMB  POCT GLUCOSE, UA: ABNORMAL
SL AMB  POCT GLUCOSE, UA: NORMAL
SL AMB LEUKOCYTE ESTERASE,UA: ABNORMAL
SL AMB LEUKOCYTE ESTERASE,UA: NORMAL
SL AMB POCT BILIRUBIN,UA: ABNORMAL
SL AMB POCT BILIRUBIN,UA: NORMAL
SL AMB POCT BLOOD,UA: ABNORMAL
SL AMB POCT BLOOD,UA: NORMAL
SL AMB POCT CLARITY,UA: CLEAR
SL AMB POCT CLARITY,UA: CLEAR
SL AMB POCT COLOR,UA: YELLOW
SL AMB POCT COLOR,UA: YELLOW
SL AMB POCT KETONES,UA: ABNORMAL
SL AMB POCT KETONES,UA: NORMAL
SL AMB POCT NITRITE,UA: ABNORMAL
SL AMB POCT NITRITE,UA: NORMAL
SL AMB POCT PH,UA: 7
SL AMB POCT PH,UA: 7
SL AMB POCT SPECIFIC GRAVITY,UA: 1
SL AMB POCT SPECIFIC GRAVITY,UA: 1
SL AMB POCT URINE PROTEIN: ABNORMAL
SL AMB POCT URINE PROTEIN: NORMAL
SL AMB POCT UROBILINOGEN: 0.2
SL AMB POCT UROBILINOGEN: 0.2

## 2023-06-29 PROCEDURE — 81003 URINALYSIS AUTO W/O SCOPE: CPT | Performed by: UROLOGY

## 2023-06-29 PROCEDURE — 99213 OFFICE O/P EST LOW 20 MIN: CPT | Performed by: UROLOGY

## 2023-06-29 PROCEDURE — 52000 CYSTOURETHROSCOPY: CPT | Performed by: UROLOGY

## 2023-06-29 RX ORDER — DOXYCYCLINE 100 MG/1
100 TABLET ORAL 2 TIMES DAILY
Qty: 56 TABLET | Refills: 0 | Status: SHIPPED | OUTPATIENT
Start: 2023-06-29 | End: 2023-07-27

## 2023-06-29 NOTE — PROGRESS NOTES
Cystoscopy     Date/Time 6/29/2023 1:30 PM     Performed by  Nicholas Bruce MD   Authorized by Nicholas Bruce MD     Universal Protocol:  Consent: Verbal consent obtained. Written consent obtained. Consent given by: patient  Time out: Immediately prior to procedure a "time out" was called to verify the correct patient, procedure, equipment, support staff and site/side marked as required. Timeout called at: 6/29/2023 2:08 PM.  Patient identity confirmed: verbally with patient        Procedure Details:  Procedure type: cystoscopy    Patient tolerance: Patient tolerated the procedure well with no immediate complications    Additional Procedure Details: Patient underwent flexible digital cystoscopy. He was prepped with Betadine lidocaine jelly. Cystoscopy failed a normal urethra normal prostate and a normal bladder. Orifices were normal.  No stones in the bladder. No erythema in the bladder. Patient was able to visualize the findings. There is no median lobe or median bar.   Patient given 1 dose of Bactrim

## 2023-06-29 NOTE — PROGRESS NOTES
UROLOGY PROGRESS NOTE         NAME: Tj Peraza  AGE: 33 y.o. SEX: male  : 1995   MRN: [id number]    DATE: 2023  TIME: 2:09 PM    Assessment and Plan      Impression:   1. Microscopic hematuria  -     POCT urine dip auto non-scope  -     Cystoscopy    2. Acute cystitis with hematuria      Patient with a history of voiding dysfunction and a history of UTI. No good explanation. He continues to have some intermittent symptoms with slow flow and nocturia. He is Flomax. Patient may have an atypical infection   Plan: Doxycycline 100 mg twice daily for 4 weeks. We will see him back in a month he will take precautions to avoid sunburn. Chief Complaint     Chief Complaint   Patient presents with   • Cystoscopy     History of Present Illness     HPI: Tj Peraza is a 33y.o. year old male who presents with history of UTI and urinary retention. Patient continues to have intermittent symptoms with slow flow and nocturia. He is taking the Flomax. Currently doing well. Urinalysis negative. Exam negative. He presents for cystoscopy. Cystoscopy see separate note              The following portions of the patient's history were reviewed and updated as appropriate: allergies, current medications, past family history, past medical history, past social history, past surgical history and problem list.  Past Medical History:   Diagnosis Date   • Autistic disorder    • BPH (benign prostatic hypertrophy) 22   • Gastric paresis    • GERD (gastroesophageal reflux disease)    • Known health problems: none    • Urinary retention 22   • Urinary tract infection      Past Surgical History:   Procedure Laterality Date   • EYE SURGERY     • FOOT SURGERY       shoulder  Review of Systems     Const: Denies chills, fever and weight loss. CV: Denies chest pain. Resp: Denies SOB. GI: Denies abdominal pain, nausea and vomiting. : Denies symptoms other than stated above.   Musculo: Denies back pain.    Objective   /78 (BP Location: Left arm, Patient Position: Sitting, Cuff Size: Standard)   Pulse 84   Temp 98.6 °F (37 °C)   Ht 5' 8" (1.727 m)   Wt 91.8 kg (202 lb 6.4 oz)   SpO2 99%   BMI 30.77 kg/m²     Physical Exam  Const: Appears healthy and well developed. No signs of acute distress present. Resp: Respirations are regular and unlabored. CV: Rate is regular. Rhythm is regular. Abdomen: Abdomen is soft, nontender, and nondistended. Kidneys are not palpable. : Normal penis testicles and scrotum. No suprapubic pain. No hernias. Psych: Patient's attitude is cooperative.  Mood is normal. Affect is normal.    Current Medications     Current Outpatient Medications:   •  escitalopram (LEXAPRO) 10 mg tablet, Take 1 tablet by mouth every morning, Disp: , Rfl:   •  fluticasone (FLONASE) 50 mcg/act nasal spray, INSTILL 2 SPRAYS TO EACH NOSTRIL DAILY, Disp: , Rfl:   •  loratadine (CLARITIN) 10 mg tablet, Take by mouth, Disp: , Rfl:   •  metoclopramide (Reglan) 10 mg tablet, Take 1 tablet (10 mg total) by mouth every 6 (six) hours as needed (Nausea) for up to 10 doses, Disp: 10 tablet, Rfl: 0  •  ondansetron (ZOFRAN) 4 mg tablet, Take 4 mg by mouth, Disp: , Rfl:   •  pantoprazole (PROTONIX) 20 mg tablet, Take 2 tablets (40 mg total) by mouth 2 (two) times a day, Disp: 20 tablet, Rfl: 0  •  tamsulosin (FLOMAX) 0.4 mg, take 1 capsule by mouth once daily with dinner, Disp: 30 capsule, Rfl: 6  •  triamcinolone (KENALOG) 0.1 % oral topical paste, Apply 1 application to teeth, Disp: , Rfl:   •  pantoprazole (PROTONIX) 40 mg tablet, take 1 tablet by mouth twice a day for 15 MINUTES before meals (Patient not taking: Reported on 12/19/2022), Disp: , Rfl:         Nisha Luciano MD

## 2023-07-10 NOTE — TELEPHONE ENCOUNTER
Left message for pt to take the antibiotic with food and water. Call back tomorrow to report if symptoms have eased up or not.

## 2023-07-10 NOTE — TELEPHONE ENCOUNTER
----- Message from Stew Gardner sent at 7/10/2023  1:15 PM EDT -----  Regarding: Doxycycline   Contact: [phone]  Hello! So I have been taking doxycycline and the last few days Marene Fu been having a headache and my stomach is getting messed up from it. Is this just a side effect or is there something else I can do?     Thank you

## 2023-07-11 ENCOUNTER — TELEPHONE (OUTPATIENT)
Dept: UROLOGY | Facility: CLINIC | Age: 28
End: 2023-07-11

## 2023-07-11 DIAGNOSIS — N30.01 ACUTE CYSTITIS WITH HEMATURIA: Primary | ICD-10-CM

## 2023-07-11 NOTE — TELEPHONE ENCOUNTER
----- Message from Sherri Tamika sent at 7/11/2023 10:56 AM EDT -----  Regarding: Doxycycline   Contact: [phone]  I haven’t really been in the sun much at all. My stomach is still messed up but I did not take it this morning since it upsets my stomach. I woke up with a headache but as time went on that went away. I did not take it yet since it upsets my stomach.

## 2023-07-11 NOTE — TELEPHONE ENCOUNTER
Call to Destiny and he is unavailable. Is working until [employer] today. Juan Daniel Katz asked me to call him back today to discuss the recommendations made by Dr. Eloise Webster. Dr. Priya Lino is not available today. Called Ike at 4:20pm and he is agreeable to try the z pack I will pend the order to Dr. Eloise Webster. Pt will  the med when ready. Pt is to stop the current antibiotic and will start the new med tomorrow. He will try a soft diet due to stomach upset. Order pended to Dr. Eloise Webster.

## 2023-07-12 RX ORDER — AZITHROMYCIN 250 MG/1
TABLET, FILM COATED ORAL
Qty: 6 TABLET | Refills: 0 | Status: CANCELLED | OUTPATIENT
Start: 2023-07-12 | End: 2023-07-16

## 2023-07-12 RX ORDER — AZITHROMYCIN 250 MG/1
TABLET, FILM COATED ORAL
Qty: 6 TABLET | Refills: 0 | Status: SHIPPED | OUTPATIENT
Start: 2023-07-12 | End: 2023-07-15

## 2023-08-01 ENCOUNTER — OFFICE VISIT (OUTPATIENT)
Dept: UROLOGY | Facility: CLINIC | Age: 28
End: 2023-08-01
Payer: COMMERCIAL

## 2023-08-01 VITALS
SYSTOLIC BLOOD PRESSURE: 110 MMHG | HEIGHT: 68 IN | HEART RATE: 80 BPM | DIASTOLIC BLOOD PRESSURE: 68 MMHG | WEIGHT: 207 LBS | BODY MASS INDEX: 31.37 KG/M2

## 2023-08-01 DIAGNOSIS — R33.9 URINARY RETENTION: Primary | ICD-10-CM

## 2023-08-01 LAB
POST-VOID RESIDUAL VOLUME, ML POC: 15 ML
SL AMB  POCT GLUCOSE, UA: NORMAL
SL AMB LEUKOCYTE ESTERASE,UA: NORMAL
SL AMB POCT BILIRUBIN,UA: NORMAL
SL AMB POCT BLOOD,UA: NORMAL
SL AMB POCT CLARITY,UA: CLEAR
SL AMB POCT COLOR,UA: YELLOW
SL AMB POCT KETONES,UA: NORMAL
SL AMB POCT NITRITE,UA: NORMAL
SL AMB POCT PH,UA: 6.5
SL AMB POCT SPECIFIC GRAVITY,UA: 5
SL AMB POCT URINE PROTEIN: NORMAL
SL AMB POCT UROBILINOGEN: 0.2

## 2023-08-01 PROCEDURE — 81002 URINALYSIS NONAUTO W/O SCOPE: CPT | Performed by: UROLOGY

## 2023-08-01 PROCEDURE — 99213 OFFICE O/P EST LOW 20 MIN: CPT | Performed by: UROLOGY

## 2023-08-01 PROCEDURE — 51798 US URINE CAPACITY MEASURE: CPT | Performed by: UROLOGY

## 2023-08-01 RX ORDER — TAMSULOSIN HYDROCHLORIDE 0.4 MG/1
0.4 CAPSULE ORAL
Qty: 90 CAPSULE | Refills: 3 | Status: SHIPPED | OUTPATIENT
Start: 2023-08-01

## 2023-08-01 NOTE — PROGRESS NOTES
UROLOGY PROGRESS NOTE         NAME: Darry Osler  AGE: 33 y.o. SEX: male  : 1995   MRN: [id number]    DATE: 2023  TIME: 1:48 PM    Assessment and Plan      Impression:   1. Urinary retention  -     POCT urine dip  -     POCT Measure PVR  -     tamsulosin (FLOMAX) 0.4 mg; Take 1 capsule (0.4 mg total) by mouth daily with dinner    Resolved urinary retention. Patient still needs his Flomax however. Likely related to anticholinergic medicine he is taking.  Plan: Continue with Flomax once daily. He is welcome to experiment with trying to get rid of it periodically. We will see him in a year. Sooner if he has any troubles. Chief Complaint     Chief Complaint   Patient presents with   • Follow-up   • Urinary Retention     History of Present Illness     HPI: Darry Osler is a 33y.o. year old male who presents with slow flow history urinary retention. PVR today is low. Urinalysis has been negative. The patient has tried stopping his Flomax and his slow flow returns. He is comfortable. No fever chills no nausea or vomiting. He restarted the Flomax and his flow picked up again. His slow flow is likely related to his Reglan and Lexapro. He does need these medications. We will see him yearly. The following portions of the patient's history were reviewed and updated as appropriate: allergies, current medications, past family history, past medical history, past social history, past surgical history and problem list.  Past Medical History:   Diagnosis Date   • Autistic disorder    • BPH (benign prostatic hypertrophy) 22   • Gastric paresis    • GERD (gastroesophageal reflux disease)    • Known health problems: none    • Urinary retention 22   • Urinary tract infection      Past Surgical History:   Procedure Laterality Date   • EYE SURGERY     • FOOT SURGERY       shoulder  Review of Systems     Const: Denies chills, fever and weight loss.   CV: Denies chest pain.  Resp: Denies SOB. GI: Denies abdominal pain, nausea and vomiting. : Denies symptoms other than stated above. Musculo: Denies back pain. Objective   /68 (BP Location: Left arm, Patient Position: Sitting, Cuff Size: Adult)   Pulse 80   Ht 5' 8" (1.727 m)   Wt 93.9 kg (207 lb)   BMI 31.47 kg/m²     Physical Exam  Const: Appears healthy and well developed. No signs of acute distress present. Resp: Respirations are regular and unlabored. CV: Rate is regular. Rhythm is regular. Abdomen: Abdomen is soft, nontender, and nondistended. Kidneys are not palpable. : No exam  Psych: Patient's attitude is cooperative.  Mood is normal. Affect is normal.    Current Medications     Current Outpatient Medications:   •  escitalopram (LEXAPRO) 10 mg tablet, Take 1 tablet by mouth every morning, Disp: , Rfl:   •  fluticasone (FLONASE) 50 mcg/act nasal spray, INSTILL 2 SPRAYS TO EACH NOSTRIL DAILY, Disp: , Rfl:   •  loratadine (CLARITIN) 10 mg tablet, Take by mouth, Disp: , Rfl:   •  metoclopramide (Reglan) 10 mg tablet, Take 1 tablet (10 mg total) by mouth every 6 (six) hours as needed (Nausea) for up to 10 doses, Disp: 10 tablet, Rfl: 0  •  ondansetron (ZOFRAN) 4 mg tablet, Take 4 mg by mouth, Disp: , Rfl:   •  pantoprazole (PROTONIX) 20 mg tablet, Take 2 tablets (40 mg total) by mouth 2 (two) times a day, Disp: 20 tablet, Rfl: 0  •  tamsulosin (FLOMAX) 0.4 mg, Take 1 capsule (0.4 mg total) by mouth daily with dinner, Disp: 90 capsule, Rfl: 3  •  triamcinolone (KENALOG) 0.1 % oral topical paste, Apply 1 application to teeth, Disp: , Rfl:   •  pantoprazole (PROTONIX) 40 mg tablet, take 1 tablet by mouth twice a day for 15 MINUTES before meals (Patient not taking: Reported on 12/19/2022), Disp: , Rfl:         Nisha Luciano MD

## 2023-08-28 PROBLEM — N30.01 ACUTE CYSTITIS WITH HEMATURIA: Status: RESOLVED | Noted: 2023-06-29 | Resolved: 2023-08-28

## 2023-09-02 ENCOUNTER — HOSPITAL ENCOUNTER (OUTPATIENT)
Facility: HOSPITAL | Age: 28
Setting detail: OBSERVATION
Discharge: HOME | End: 2023-09-04
Attending: EMERGENCY MEDICINE | Admitting: FAMILY MEDICINE
Payer: COMMERCIAL

## 2023-09-02 ENCOUNTER — APPOINTMENT (EMERGENCY)
Dept: CT IMAGING | Facility: HOSPITAL | Age: 28
End: 2023-09-02
Payer: COMMERCIAL

## 2023-09-02 DIAGNOSIS — R11.2 NAUSEA AND VOMITING: Primary | ICD-10-CM

## 2023-09-02 LAB
ALBUMIN SERPL BCP-MCNC: 4.6 G/DL (ref 3.5–5)
ALP SERPL-CCNC: 62 U/L (ref 34–104)
ALT SERPL W P-5'-P-CCNC: 15 U/L (ref 7–52)
ANION GAP SERPL CALCULATED.3IONS-SCNC: 7 MMOL/L
AST SERPL W P-5'-P-CCNC: 17 U/L (ref 13–39)
BASOPHILS # BLD AUTO: 0.01 THOUSANDS/ÂΜL (ref 0–0.1)
BASOPHILS NFR BLD AUTO: 0 % (ref 0–1)
BILIRUB SERPL-MCNC: 0.48 MG/DL (ref 0.2–1)
BILIRUB UR QL STRIP: NEGATIVE
BUN SERPL-MCNC: 15 MG/DL (ref 5–25)
CALCIUM SERPL-MCNC: 9.6 MG/DL (ref 8.4–10.2)
CHLORIDE SERPL-SCNC: 104 MMOL/L (ref 96–108)
CLARITY UR: CLEAR
CO2 SERPL-SCNC: 26 MMOL/L (ref 21–32)
COLOR UR: YELLOW
CREAT SERPL-MCNC: 0.95 MG/DL (ref 0.6–1.3)
EOSINOPHIL # BLD AUTO: 0 THOUSAND/ÂΜL (ref 0–0.61)
EOSINOPHIL NFR BLD AUTO: 0 % (ref 0–6)
ERYTHROCYTE [DISTWIDTH] IN BLOOD BY AUTOMATED COUNT: 12.6 % (ref 11.6–15.1)
GFR SERPL CREATININE-BSD FRML MDRD: 109 ML/MIN/1.73SQ M
GLUCOSE SERPL-MCNC: 131 MG/DL (ref 65–140)
GLUCOSE UR STRIP-MCNC: NEGATIVE MG/DL
HCT VFR BLD AUTO: 42.8 % (ref 36.5–49.3)
HGB BLD-MCNC: 14.8 G/DL (ref 12–17)
HGB UR QL STRIP.AUTO: NEGATIVE
IMM GRANULOCYTES # BLD AUTO: 0.03 THOUSAND/UL (ref 0–0.2)
IMM GRANULOCYTES NFR BLD AUTO: 0 % (ref 0–2)
KETONES UR STRIP-MCNC: ABNORMAL MG/DL
LEUKOCYTE ESTERASE UR QL STRIP: NEGATIVE
LIPASE SERPL-CCNC: 7 U/L (ref 11–82)
LYMPHOCYTES # BLD AUTO: 0.86 THOUSANDS/ÂΜL (ref 0.6–4.47)
LYMPHOCYTES NFR BLD AUTO: 12 % (ref 14–44)
MCH RBC QN AUTO: 30.3 PG (ref 26.8–34.3)
MCHC RBC AUTO-ENTMCNC: 34.6 G/DL (ref 31.4–37.4)
MCV RBC AUTO: 88 FL (ref 82–98)
MONOCYTES # BLD AUTO: 0.47 THOUSAND/ÂΜL (ref 0.17–1.22)
MONOCYTES NFR BLD AUTO: 6 % (ref 4–12)
NEUTROPHILS # BLD AUTO: 5.98 THOUSANDS/ÂΜL (ref 1.85–7.62)
NEUTS SEG NFR BLD AUTO: 82 % (ref 43–75)
NITRITE UR QL STRIP: NEGATIVE
NRBC BLD AUTO-RTO: 0 /100 WBCS
PH UR STRIP.AUTO: 7.5 [PH]
PLATELET # BLD AUTO: 171 THOUSANDS/UL (ref 149–390)
PMV BLD AUTO: 10.3 FL (ref 8.9–12.7)
POTASSIUM SERPL-SCNC: 3.6 MMOL/L (ref 3.5–5.3)
PROT SERPL-MCNC: 7.4 G/DL (ref 6.4–8.4)
PROT UR STRIP-MCNC: NEGATIVE MG/DL
RBC # BLD AUTO: 4.88 MILLION/UL (ref 3.88–5.62)
SODIUM SERPL-SCNC: 137 MMOL/L (ref 135–147)
SP GR UR STRIP.AUTO: 1.01 (ref 1–1.03)
UROBILINOGEN UR QL STRIP.AUTO: 0.2 E.U./DL
WBC # BLD AUTO: 7.35 THOUSAND/UL (ref 4.31–10.16)

## 2023-09-02 PROCEDURE — 96374 THER/PROPH/DIAG INJ IV PUSH: CPT

## 2023-09-02 PROCEDURE — 96376 TX/PRO/DX INJ SAME DRUG ADON: CPT

## 2023-09-02 PROCEDURE — 99285 EMERGENCY DEPT VISIT HI MDM: CPT | Performed by: EMERGENCY MEDICINE

## 2023-09-02 PROCEDURE — C9113 INJ PANTOPRAZOLE SODIUM, VIA: HCPCS | Performed by: EMERGENCY MEDICINE

## 2023-09-02 PROCEDURE — 36415 COLL VENOUS BLD VENIPUNCTURE: CPT | Performed by: EMERGENCY MEDICINE

## 2023-09-02 PROCEDURE — 96361 HYDRATE IV INFUSION ADD-ON: CPT

## 2023-09-02 PROCEDURE — 85025 COMPLETE CBC W/AUTO DIFF WBC: CPT | Performed by: EMERGENCY MEDICINE

## 2023-09-02 PROCEDURE — 81003 URINALYSIS AUTO W/O SCOPE: CPT | Performed by: EMERGENCY MEDICINE

## 2023-09-02 PROCEDURE — G1004 CDSM NDSC: HCPCS

## 2023-09-02 PROCEDURE — 83690 ASSAY OF LIPASE: CPT | Performed by: EMERGENCY MEDICINE

## 2023-09-02 PROCEDURE — 80053 COMPREHEN METABOLIC PANEL: CPT | Performed by: EMERGENCY MEDICINE

## 2023-09-02 PROCEDURE — 96375 TX/PRO/DX INJ NEW DRUG ADDON: CPT

## 2023-09-02 PROCEDURE — 99284 EMERGENCY DEPT VISIT MOD MDM: CPT

## 2023-09-02 PROCEDURE — 80307 DRUG TEST PRSMV CHEM ANLYZR: CPT | Performed by: EMERGENCY MEDICINE

## 2023-09-02 PROCEDURE — 74177 CT ABD & PELVIS W/CONTRAST: CPT

## 2023-09-02 RX ORDER — DROPERIDOL 2.5 MG/ML
1.25 INJECTION, SOLUTION INTRAMUSCULAR; INTRAVENOUS ONCE
Status: COMPLETED | OUTPATIENT
Start: 2023-09-02 | End: 2023-09-02

## 2023-09-02 RX ORDER — SODIUM CHLORIDE 9 MG/ML
150 INJECTION, SOLUTION INTRAVENOUS CONTINUOUS
Status: DISCONTINUED | OUTPATIENT
Start: 2023-09-02 | End: 2023-09-03

## 2023-09-02 RX ORDER — DROPERIDOL 2.5 MG/ML
0.62 INJECTION, SOLUTION INTRAMUSCULAR; INTRAVENOUS ONCE
Status: COMPLETED | OUTPATIENT
Start: 2023-09-02 | End: 2023-09-02

## 2023-09-02 RX ORDER — ONDANSETRON 2 MG/ML
4 INJECTION INTRAMUSCULAR; INTRAVENOUS ONCE
Status: COMPLETED | OUTPATIENT
Start: 2023-09-02 | End: 2023-09-02

## 2023-09-02 RX ORDER — KETOROLAC TROMETHAMINE 30 MG/ML
15 INJECTION, SOLUTION INTRAMUSCULAR; INTRAVENOUS ONCE
Status: COMPLETED | OUTPATIENT
Start: 2023-09-02 | End: 2023-09-02

## 2023-09-02 RX ORDER — HALOPERIDOL 5 MG/ML
2 INJECTION INTRAMUSCULAR ONCE
Status: COMPLETED | OUTPATIENT
Start: 2023-09-02 | End: 2023-09-02

## 2023-09-02 RX ORDER — PANTOPRAZOLE SODIUM 40 MG/10ML
40 INJECTION, POWDER, LYOPHILIZED, FOR SOLUTION INTRAVENOUS ONCE
Status: COMPLETED | OUTPATIENT
Start: 2023-09-02 | End: 2023-09-02

## 2023-09-02 RX ORDER — METOCLOPRAMIDE HYDROCHLORIDE 5 MG/ML
10 INJECTION INTRAMUSCULAR; INTRAVENOUS ONCE
Status: COMPLETED | OUTPATIENT
Start: 2023-09-02 | End: 2023-09-02

## 2023-09-02 RX ADMIN — METOCLOPRAMIDE HYDROCHLORIDE 10 MG: 5 INJECTION INTRAMUSCULAR; INTRAVENOUS at 19:52

## 2023-09-02 RX ADMIN — IOHEXOL 99 ML: 350 INJECTION, SOLUTION INTRAVENOUS at 20:40

## 2023-09-02 RX ADMIN — DROPERIDOL 1.25 MG: 2.5 INJECTION, SOLUTION INTRAMUSCULAR; INTRAVENOUS at 20:56

## 2023-09-02 RX ADMIN — KETOROLAC TROMETHAMINE 15 MG: 30 INJECTION, SOLUTION INTRAMUSCULAR at 19:52

## 2023-09-02 RX ADMIN — HALOPERIDOL LACTATE 2 MG: 5 INJECTION, SOLUTION INTRAMUSCULAR at 22:58

## 2023-09-02 RX ADMIN — SODIUM CHLORIDE 1000 ML: 0.9 INJECTION, SOLUTION INTRAVENOUS at 21:44

## 2023-09-02 RX ADMIN — ONDANSETRON 4 MG: 2 INJECTION INTRAMUSCULAR; INTRAVENOUS at 21:43

## 2023-09-02 RX ADMIN — PANTOPRAZOLE SODIUM 40 MG: 40 INJECTION, POWDER, FOR SOLUTION INTRAVENOUS at 19:52

## 2023-09-02 RX ADMIN — DROPERIDOL 0.62 MG: 2.5 INJECTION, SOLUTION INTRAMUSCULAR; INTRAVENOUS at 20:17

## 2023-09-02 NOTE — ED PROVIDER NOTES
History  Chief Complaint   Patient presents with   • Abdominal Pain     Patient presents to the ED with complaints of abdominal pain that began this morning. Patient states he did vomit. No diarrhea noted. Patient is a 50-year-old male to the emergency department with chief complaint of nausea and vomiting and abdominal pain ongoing today. Associated with emotional upset. Has occurred in the past.  Patient tried normal at home Zofran which he has for similar symptoms without significant relief. Patient reports that the pain became more severe and he was subsequently brought to the emergency room for further evaluation by his grandfather. No diarrhea. No fever. Grandfather reports that there is a local festival in [facility] and patient frequently becomes upset during this festival      History provided by:  Patient  Abdominal Pain  Pain location:  Generalized  Pain quality: aching, sharp and shooting    Pain radiates to:  Does not radiate  Pain severity:  Severe  Onset quality:  Unable to specify  Worsened by:  Eating  Associated symptoms: dysuria (Once), nausea and vomiting    Associated symptoms: no cough, no diarrhea, no fatigue, no fever, no flatus, no hematemesis, no hematochezia, no hematuria and no shortness of breath        Prior to Admission Medications   Prescriptions Last Dose Informant Patient Reported?  Taking?   escitalopram (LEXAPRO) 10 mg tablet  Self Yes Yes   Sig: Take 1 tablet by mouth every morning   fluticasone (FLONASE) 50 mcg/act nasal spray  Self Yes No   Sig: INSTILL 2 SPRAYS TO EACH NOSTRIL DAILY   loratadine (CLARITIN) 10 mg tablet  Self Yes Yes   Sig: Take by mouth   metoclopramide (Reglan) 10 mg tablet  Self No No   Sig: Take 1 tablet (10 mg total) by mouth every 6 (six) hours as needed (Nausea) for up to 10 doses   ondansetron (ZOFRAN) 4 mg tablet  Self Yes Yes   Sig: Take 4 mg by mouth   pantoprazole (PROTONIX) 20 mg tablet  Self No Yes   Sig: Take 2 tablets (40 mg total) by mouth 2 (two) times a day   pantoprazole (PROTONIX) 40 mg tablet  Self Yes No   Sig: take 1 tablet by mouth twice a day for 15 MINUTES before meals   Patient not taking: Reported on 12/19/2022   tamsulosin (FLOMAX) 0.4 mg   No Yes   Sig: Take 1 capsule (0.4 mg total) by mouth daily with dinner   triamcinolone (KENALOG) 0.1 % oral topical paste  Self Yes No   Sig: Apply 1 application to teeth      Facility-Administered Medications: None       Past Medical History:   Diagnosis Date   • Autistic disorder    • BPH (benign prostatic hypertrophy) 9/8/22   • Gastric paresis    • GERD (gastroesophageal reflux disease)    • Known health problems: none    • Urinary retention 9/8/22   • Urinary tract infection        Past Surgical History:   Procedure Laterality Date   • EYE SURGERY     • FOOT SURGERY         Family History   Problem Relation Age of Onset   • No Known Problems Mother    • No Known Problems Father    • Heart attack Paternal Grandfather    • Hypertension Paternal Grandfather      I have reviewed and agree with the history as documented. E-Cigarette/Vaping   • E-Cigarette Use Never User      E-Cigarette/Vaping Substances   • Nicotine No    • THC No    • CBD No    • Flavoring No    • Other No    • Unknown No      Social History     Tobacco Use   • Smoking status: Never   • Smokeless tobacco: Never   Vaping Use   • Vaping Use: Never used   Substance Use Topics   • Alcohol use: Not Currently   • Drug use: Never       Review of Systems   Constitutional: Negative for fatigue and fever. HENT: Negative. Respiratory: Negative. Negative for cough and shortness of breath. Cardiovascular: Negative. Gastrointestinal: Positive for abdominal pain, nausea and vomiting. Negative for diarrhea, flatus, hematemesis and hematochezia. Genitourinary: Positive for dysuria (Once). Negative for hematuria. All other systems reviewed and are negative. Physical Exam  Physical Exam  Vitals and nursing note reviewed. Constitutional:       Appearance: Normal appearance. He is well-developed. He is not ill-appearing or toxic-appearing. HENT:      Head: Normocephalic and atraumatic. Hair is normal.      Jaw: No pain on movement. Right Ear: External ear normal.      Left Ear: External ear normal.      Nose: Nose normal. No congestion. Mouth/Throat:      Mouth: Mucous membranes are moist.   Eyes:      General: Lids are normal. No scleral icterus. Extraocular Movements: Extraocular movements intact. Conjunctiva/sclera: Conjunctivae normal.      Pupils: Pupils are equal, round, and reactive to light. Cardiovascular:      Rate and Rhythm: Normal rate and regular rhythm. Heart sounds: Normal heart sounds. No murmur heard. Pulmonary:      Effort: Pulmonary effort is normal. No respiratory distress. Breath sounds: Normal breath sounds. No decreased breath sounds, wheezing, rhonchi or rales. Abdominal:      General: Abdomen is flat. There is no distension. Palpations: Abdomen is soft. Abdomen is not rigid. Tenderness: There is abdominal tenderness in the epigastric area. There is no guarding or rebound. Musculoskeletal:         General: No swelling, tenderness, deformity or signs of injury. Normal range of motion. Cervical back: Normal range of motion and neck supple. Skin:     General: Skin is warm and dry. Coloration: Skin is not pale. Findings: No rash. Neurological:      General: No focal deficit present. Mental Status: He is alert and oriented to person, place, and time. Mental status is at baseline. Psychiatric:         Attention and Perception: Attention normal.         Mood and Affect: Mood is anxious.          Speech: Speech normal.         Behavior: Behavior normal.         Vital Signs  ED Triage Vitals [09/02/23 1921]   Temperature Pulse Respirations Blood Pressure SpO2   98.2 °F (36.8 °C) 79 16 122/78 98 %      Temp Source Heart Rate Source Patient Position - Orthostatic VS BP Location FiO2 (%)   Temporal Monitor Lying Right arm --      Pain Score       9           Vitals:    09/02/23 1921 09/02/23 2030 09/02/23 2045 09/02/23 2100   BP: 122/78 112/63  99/52   Pulse: 79  75 65   Patient Position - Orthostatic VS: Lying            Visual Acuity      ED Medications  Medications   sodium chloride 0.9 % bolus 1,000 mL (1,000 mL Intravenous New Bag 9/2/23 2144)   sodium chloride 0.9 % infusion (has no administration in time range)   metoclopramide (REGLAN) injection 10 mg (10 mg Intravenous Given 9/2/23 1952)   pantoprazole (PROTONIX) injection 40 mg (40 mg Intravenous Given 9/2/23 1952)   ketorolac (TORADOL) injection 15 mg (15 mg Intravenous Given 9/2/23 1952)   droperidol (INAPSINE) injection 0.625 mg (0.625 mg Intravenous Given 9/2/23 2017)   iohexol (OMNIPAQUE) 350 MG/ML injection (SINGLE-DOSE) 99 mL (99 mL Intravenous Given 9/2/23 2040)   droperidol (INAPSINE) injection 1.25 mg (1.25 mg Intravenous Given 9/2/23 2056)   ondansetron (ZOFRAN) injection 4 mg (4 mg Intravenous Given 9/2/23 2143)       Diagnostic Studies  Results Reviewed     Procedure Component Value Units Date/Time    Rapid drug screen, urine [[id number]] Collected: 09/02/23 2143    Lab Status: No result Specimen: Urine, Clean Catch     UA (URINE) with reflex to Scope [[id number]] Collected: 09/02/23 2143    Lab Status: No result Specimen: Urine, Clean Catch     Lipase [[id number]]  (Abnormal) Collected: 09/02/23 1951    Lab Status: Final result Specimen: Blood from Arm, Right Updated: 09/02/23 2020     Lipase 7 u/L     Comprehensive metabolic panel [[id number]] Collected: 09/02/23 1951    Lab Status: Final result Specimen: Blood from Arm, Right Updated: 09/02/23 2020     Sodium 137 mmol/L      Potassium 3.6 mmol/L      Chloride 104 mmol/L      CO2 26 mmol/L      ANION GAP 7 mmol/L      BUN 15 mg/dL      Creatinine 0.95 mg/dL      Glucose 131 mg/dL      Calcium 9.6 mg/dL      AST 17 U/L      ALT 15 U/L      Alkaline Phosphatase 62 U/L      Total Protein 7.4 g/dL      Albumin 4.6 g/dL      Total Bilirubin 0.48 mg/dL      eGFR 109 ml/min/1.73sq m     Narrative:      [place] guidelines for Chronic Kidney Disease (CKD):   •  Stage 1 with normal or high GFR (GFR > 90 mL/min/1.73 square meters)  •  Stage 2 Mild CKD (GFR = 60-89 mL/min/1.73 square meters)  •  Stage 3A Moderate CKD (GFR = 45-59 mL/min/1.73 square meters)  •  Stage 3B Moderate CKD (GFR = 30-44 mL/min/1.73 square meters)  •  Stage 4 Severe CKD (GFR = 15-29 mL/min/1.73 square meters)  •  Stage 5 End Stage CKD (GFR <15 mL/min/1.73 square meters)  Note: GFR calculation is accurate only with a steady state creatinine    CBC and differential [[id number]]  (Abnormal) Collected: 09/02/23 1951    Lab Status: Final result Specimen: Blood from Arm, Right Updated: 09/02/23 2001     WBC 7.35 Thousand/uL      RBC 4.88 Million/uL      Hemoglobin 14.8 g/dL      Hematocrit 42.8 %      MCV 88 fL      MCH 30.3 pg      MCHC 34.6 g/dL      RDW 12.6 %      MPV 10.3 fL      Platelets 692 Thousands/uL      nRBC 0 /100 WBCs      Neutrophils Relative 82 %      Immat GRANS % 0 %      Lymphocytes Relative 12 %      Monocytes Relative 6 %      Eosinophils Relative 0 %      Basophils Relative 0 %      Neutrophils Absolute 5.98 Thousands/µL      Immature Grans Absolute 0.03 Thousand/uL      Lymphocytes Absolute 0.86 Thousands/µL      Monocytes Absolute 0.47 Thousand/µL      Eosinophils Absolute 0.00 Thousand/µL      Basophils Absolute 0.01 Thousands/µL                  CT abdomen pelvis with contrast    (Results Pending)              Procedures  Procedures         ED Course  ED Course as of 09/02/23 2145   Sat Sep 02, 2023   2140 Signed out to Dr. Wilfredo Romero 20yo+    Flowsheet Row Most Recent Value   Initial Alcohol Screen: US AUDIT-C     1. How often do you have a drink containing alcohol? 0 Filed at: 09/02/2023 1923   2. How many drinks containing alcohol do you have on a typical day you are drinking? 0 Filed at: 09/02/2023 1923   3a. Male UNDER 65: How often do you have five or more drinks on one occasion? 0 Filed at: 09/02/2023 1923   Audit-C Score 0 Filed at: 09/02/2023 6750   BATSHEVA: How many times in the past year have you. .. Used an illegal drug or used a prescription medication for non-medical reasons? Never Filed at: 09/02/2023 [address] Making  Patient presented to the emergency department and a MSE was performed. The patient was evaluated for complaint related to acute nausea and/or vomiting and/or diarrhea. Patient is potentially at risk for, but not limited to, viral infection, celiac disease, Crohn's disease, irritable bowel syndrome, colitis, malabsorption syndrome, or C. Difficile, or other disease process unrelated to the abdomen which may cause this symptomatology is also considered. Several of these diagnoses have been evaluated and ruled out by history and physical.  As needed, patient will be further evaluated with laboratory and imaging studies. Higher level diagnostics, such as CT imaging or ultrasound, may also be required. Please see work-up portion of the note for further evaluation of patient's risk. Socioeconomic factors were also considered as part of the decision-making process. Unless otherwise stated in the chart or patient is admitted as elsewhere documented, any previously prescribed medications will be maintained. Amount and/or Complexity of Data Reviewed  Labs: ordered. Radiology: ordered. Risk  Prescription drug management.           Disposition  Final diagnoses:   Nausea and vomiting - Intractable     Time reflects when diagnosis was documented in both MDM as applicable and the Disposition within this note     Time User Action Codes Description Comment    9/2/2023  9:45 PM Manuel Jimenez Add [R11.2] Nausea and vomiting     9/2/2023  9:45 PM Evelia Diaz [R11.2] Nausea and vomiting Intractable      ED Disposition     None      Follow-up Information    None         Patient's Medications   Discharge Prescriptions    No medications on file       No discharge procedures on file.     PDMP Review     None          ED Provider  Electronically Signed by           Janine Kemp DO  09/02/23 5807

## 2023-09-03 PROBLEM — Z87.898 HISTORY OF URINARY RETENTION: Status: ACTIVE | Noted: 2023-09-03

## 2023-09-03 PROBLEM — R11.10 INTRACTABLE VOMITING: Status: ACTIVE | Noted: 2023-09-03

## 2023-09-03 LAB
AMPHETAMINES SERPL QL SCN: NEGATIVE
ANION GAP SERPL CALCULATED.3IONS-SCNC: 9 MMOL/L
BARBITURATES UR QL: NEGATIVE
BASOPHILS # BLD AUTO: 0.01 THOUSANDS/ÂΜL (ref 0–0.1)
BASOPHILS NFR BLD AUTO: 0 % (ref 0–1)
BENZODIAZ UR QL: NEGATIVE
BUN SERPL-MCNC: 17 MG/DL (ref 5–25)
CALCIUM SERPL-MCNC: 8.7 MG/DL (ref 8.4–10.2)
CHLORIDE SERPL-SCNC: 106 MMOL/L (ref 96–108)
CO2 SERPL-SCNC: 24 MMOL/L (ref 21–32)
COCAINE UR QL: NEGATIVE
CREAT SERPL-MCNC: 0.79 MG/DL (ref 0.6–1.3)
EOSINOPHIL # BLD AUTO: 0.01 THOUSAND/ÂΜL (ref 0–0.61)
EOSINOPHIL NFR BLD AUTO: 0 % (ref 0–6)
ERYTHROCYTE [DISTWIDTH] IN BLOOD BY AUTOMATED COUNT: 12.7 % (ref 11.6–15.1)
GFR SERPL CREATININE-BSD FRML MDRD: 123 ML/MIN/1.73SQ M
GLUCOSE P FAST SERPL-MCNC: 110 MG/DL (ref 65–99)
GLUCOSE SERPL-MCNC: 110 MG/DL (ref 65–140)
HCT VFR BLD AUTO: 41.3 % (ref 36.5–49.3)
HGB BLD-MCNC: 14.3 G/DL (ref 12–17)
IMM GRANULOCYTES # BLD AUTO: 0.02 THOUSAND/UL (ref 0–0.2)
IMM GRANULOCYTES NFR BLD AUTO: 0 % (ref 0–2)
LYMPHOCYTES # BLD AUTO: 1.4 THOUSANDS/ÂΜL (ref 0.6–4.47)
LYMPHOCYTES NFR BLD AUTO: 23 % (ref 14–44)
MAGNESIUM SERPL-MCNC: 2.2 MG/DL (ref 1.9–2.7)
MCH RBC QN AUTO: 30.6 PG (ref 26.8–34.3)
MCHC RBC AUTO-ENTMCNC: 34.6 G/DL (ref 31.4–37.4)
MCV RBC AUTO: 88 FL (ref 82–98)
METHADONE UR QL: NEGATIVE
MONOCYTES # BLD AUTO: 0.61 THOUSAND/ÂΜL (ref 0.17–1.22)
MONOCYTES NFR BLD AUTO: 10 % (ref 4–12)
NEUTROPHILS # BLD AUTO: 3.97 THOUSANDS/ÂΜL (ref 1.85–7.62)
NEUTS SEG NFR BLD AUTO: 67 % (ref 43–75)
NRBC BLD AUTO-RTO: 0 /100 WBCS
OPIATES UR QL SCN: NEGATIVE
OXYCODONE+OXYMORPHONE UR QL SCN: NEGATIVE
PCP UR QL: NEGATIVE
PLATELET # BLD AUTO: 157 THOUSANDS/UL (ref 149–390)
PMV BLD AUTO: 10 FL (ref 8.9–12.7)
POTASSIUM SERPL-SCNC: 3.9 MMOL/L (ref 3.5–5.3)
RBC # BLD AUTO: 4.67 MILLION/UL (ref 3.88–5.62)
SODIUM SERPL-SCNC: 139 MMOL/L (ref 135–147)
THC UR QL: NEGATIVE
WBC # BLD AUTO: 6.02 THOUSAND/UL (ref 4.31–10.16)

## 2023-09-03 PROCEDURE — 85025 COMPLETE CBC W/AUTO DIFF WBC: CPT | Performed by: NURSE PRACTITIONER

## 2023-09-03 PROCEDURE — 80048 BASIC METABOLIC PNL TOTAL CA: CPT | Performed by: NURSE PRACTITIONER

## 2023-09-03 PROCEDURE — 99223 1ST HOSP IP/OBS HIGH 75: CPT | Performed by: FAMILY MEDICINE

## 2023-09-03 PROCEDURE — 83735 ASSAY OF MAGNESIUM: CPT | Performed by: NURSE PRACTITIONER

## 2023-09-03 RX ORDER — ACETAMINOPHEN 325 MG/1
650 TABLET ORAL EVERY 6 HOURS PRN
Status: DISCONTINUED | OUTPATIENT
Start: 2023-09-03 | End: 2023-09-04 | Stop reason: HOSPADM

## 2023-09-03 RX ORDER — PANTOPRAZOLE SODIUM 40 MG/1
40 TABLET, DELAYED RELEASE ORAL
Status: DISCONTINUED | OUTPATIENT
Start: 2023-09-03 | End: 2023-09-04 | Stop reason: HOSPADM

## 2023-09-03 RX ORDER — ESCITALOPRAM OXALATE 10 MG/1
10 TABLET ORAL EVERY MORNING
Status: DISCONTINUED | OUTPATIENT
Start: 2023-09-03 | End: 2023-09-04 | Stop reason: HOSPADM

## 2023-09-03 RX ORDER — TAMSULOSIN HYDROCHLORIDE 0.4 MG/1
0.4 CAPSULE ORAL
Status: DISCONTINUED | OUTPATIENT
Start: 2023-09-03 | End: 2023-09-04 | Stop reason: HOSPADM

## 2023-09-03 RX ORDER — ONDANSETRON 2 MG/ML
4 INJECTION INTRAMUSCULAR; INTRAVENOUS EVERY 6 HOURS PRN
Status: DISCONTINUED | OUTPATIENT
Start: 2023-09-03 | End: 2023-09-04 | Stop reason: HOSPADM

## 2023-09-03 RX ORDER — SODIUM CHLORIDE, SODIUM GLUCONATE, SODIUM ACETATE, POTASSIUM CHLORIDE, MAGNESIUM CHLORIDE, SODIUM PHOSPHATE, DIBASIC, AND POTASSIUM PHOSPHATE .53; .5; .37; .037; .03; .012; .00082 G/100ML; G/100ML; G/100ML; G/100ML; G/100ML; G/100ML; G/100ML
75 INJECTION, SOLUTION INTRAVENOUS CONTINUOUS
Status: DISCONTINUED | OUTPATIENT
Start: 2023-09-03 | End: 2023-09-04

## 2023-09-03 RX ADMIN — PANTOPRAZOLE SODIUM 40 MG: 40 TABLET, DELAYED RELEASE ORAL at 05:44

## 2023-09-03 RX ADMIN — SODIUM CHLORIDE, SODIUM GLUCONATE, SODIUM ACETATE, POTASSIUM CHLORIDE AND MAGNESIUM CHLORIDE 100 ML/HR: 526; 502; 368; 37; 30 INJECTION, SOLUTION INTRAVENOUS at 00:55

## 2023-09-03 RX ADMIN — ONDANSETRON 4 MG: 2 INJECTION INTRAMUSCULAR; INTRAVENOUS at 08:54

## 2023-09-03 RX ADMIN — ESCITALOPRAM OXALATE 10 MG: 10 TABLET ORAL at 08:22

## 2023-09-03 RX ADMIN — ACETAMINOPHEN 650 MG: 325 TABLET, FILM COATED ORAL at 19:26

## 2023-09-03 RX ADMIN — TAMSULOSIN HYDROCHLORIDE 0.4 MG: 0.4 CAPSULE ORAL at 16:19

## 2023-09-03 RX ADMIN — PANTOPRAZOLE SODIUM 40 MG: 40 TABLET, DELAYED RELEASE ORAL at 16:18

## 2023-09-03 RX ADMIN — SODIUM CHLORIDE, SODIUM GLUCONATE, SODIUM ACETATE, POTASSIUM CHLORIDE AND MAGNESIUM CHLORIDE 100 ML/HR: 526; 502; 368; 37; 30 INJECTION, SOLUTION INTRAVENOUS at 12:34

## 2023-09-03 NOTE — ASSESSMENT & PLAN NOTE
· Developed intractable nausea and vomiting earlier in the day  · Grandfather reported to ED physician occurs frequently secondary to stress  · CT abdomen pelvis no acute abnormality  · Received multiple doses of IV antiemetic medications in the ED without improvement  · We will observe with IV hydration  · Advance diet as tolerated  · Abdominal exam unremarkable  · History of chronic gastritis- continue Protonix

## 2023-09-03 NOTE — H&P
[address] [facility]  H&P  Name: Andrez Kelly 33 y.o. male I MRN: [id number]  Unit/Bed#: -01 I Date of Admission: 9/2/2023   Date of Service: 9/3/2023 I Hospital Day: 0      Assessment/Plan   * Intractable vomiting  Assessment & Plan  · Developed intractable nausea and vomiting earlier in the day  · Grandfather reported to ED physician occurs frequently secondary to stress  · CT abdomen pelvis no acute abnormality  · Received multiple doses of IV antiemetic medications in the ED without improvement  · We will observe with IV hydration  · Advance diet as tolerated  · Abdominal exam unremarkable  · History of chronic gastritis- continue Protonix    History of urinary retention  Assessment & Plan  · Has a history of urinary retention and follows with urology in the outpatient setting  · Continue Flomax    Autism  Assessment & Plan  · History of autism/Asperger's disease  · Highly functioning and employed   · Outpatient follow-up       VTE Pharmacologic Prophylaxis:   Low Risk (Score 0-2) - Encourage Ambulation. Code Status: Level 1 - Full Code     Anticipated Length of Stay: Patient will be admitted on an observation basis with an anticipated length of stay of less than 2 midnights secondary to Vomiting. Total Time Spent on Date of Encounter in care of patient: 65 minutes This time was spent on one or more of the following: performing physical exam; counseling and coordination of care; obtaining or reviewing history; documenting in the medical record; reviewing/ordering tests, medications or procedures; communicating with other healthcare professionals and discussing with patient's family/caregivers. Chief Complaint: Vomiting    History of Present Illness:  Andrez Kelly is a 33 y.o. male with a PMH of Asperger's, autism, BPH, chronic urinary retention, GERD who presents with intractable nausea and vomiting.   Patient presented with his grandfather to the emergency department and reported nausea and vomiting beginning after stress secondary to the hamburger festival occurring in [place] outside of his home. Every year this vessel is very large and loud and upsets the patient. In the emergency room and received multiple doses of IV antiemetics along with Haldol and Inapsine without improvement in nausea or vomiting. CT abdomen pelvis without acute abnormality and abdominal exam benign. Presented to the medical service for further evaluation and treatment of intractable nausea and vomiting. .    Review of Systems:  Review of Systems   Constitutional: Negative for chills and fever. HENT: Negative for ear pain and sore throat. Eyes: Negative for pain and visual disturbance. Respiratory: Negative for cough and shortness of breath. Cardiovascular: Negative for chest pain and palpitations. Gastrointestinal: Positive for abdominal pain, nausea and vomiting. Genitourinary: Negative for dysuria and hematuria. Musculoskeletal: Negative for arthralgias and back pain. Skin: Negative for color change and rash. Neurological: Negative for seizures and syncope. Psychiatric/Behavioral: The patient is nervous/anxious. All other systems reviewed and are negative. Past Medical and Surgical History:   Past Medical History:   Diagnosis Date   • Autistic disorder    • BPH (benign prostatic hypertrophy) 9/8/22   • Gastric paresis    • GERD (gastroesophageal reflux disease)    • Known health problems: none    • Urinary retention 9/8/22   • Urinary tract infection        Past Surgical History:   Procedure Laterality Date   • EYE SURGERY     • FOOT SURGERY         Meds/Allergies:  Prior to Admission medications    Medication Sig Start Date End Date Taking?  Authorizing Provider   escitalopram (LEXAPRO) 10 mg tablet Take 1 tablet by mouth every morning 8/4/22  Yes Historical Provider, MD   fluticasone (FLONASE) 50 mcg/act nasal spray INSTILL 2 SPRAYS TO EACH NOSTRIL DAILY 12/29/22  Yes Historical Provider, MD   loratadine (CLARITIN) 10 mg tablet Take by mouth   Yes Historical Provider, MD   ondansetron (ZOFRAN) 4 mg tablet Take 4 mg by mouth 1/5/23  Yes Historical Provider, MD   pantoprazole (PROTONIX) 20 mg tablet Take 2 tablets (40 mg total) by mouth 2 (two) times a day 9/6/22  Yes Malgorzata Yoder MD   tamsulosin (FLOMAX) 0.4 mg Take 1 capsule (0.4 mg total) by mouth daily with dinner 8/1/23  Yes Chadwick Nelson MD   metoclopramide (Reglan) 10 mg tablet Take 1 tablet (10 mg total) by mouth every 6 (six) hours as needed (Nausea) for up to 10 doses 11/28/22   Raven Santos MD   pantoprazole (PROTONIX) 40 mg tablet take 1 tablet by mouth twice a day for 15 MINUTES before meals  Patient not taking: Reported on 12/19/2022 11/5/22   Historical Provider, MD   triamcinolone (KENALOG) 0.1 % oral topical paste Apply 1 application to teeth 4/68/81 1/16/24  Historical Provider, MD     I have reviewed home medications with patient personally. Allergies:    Allergies   Allergen Reactions   • Pepto-Bismol [Bismuth Subsalicylate] GI Intolerance   • Amoxicillin Rash   • Latex Rash       Social History:  Marital Status: Single   Occupation: retail  Patient Pre-hospital Living Situation: Home  Patient Pre-hospital Level of Mobility: walks  Patient Pre-hospital Diet Restrictions: none  Substance Use History:   Social History     Substance and Sexual Activity   Alcohol Use Not Currently     Social History     Tobacco Use   Smoking Status Never   Smokeless Tobacco Never     Social History     Substance and Sexual Activity   Drug Use Never       Family History:  Family History   Problem Relation Age of Onset   • No Known Problems Mother    • No Known Problems Father    • Heart attack Paternal Grandfather    • Hypertension Paternal Grandfather        Physical Exam:     Vitals:   Blood Pressure: 121/71 (09/03/23 0008)  Pulse: 55 (09/03/23 0008)  Temperature: 98.2 °F (36.8 °C) (09/03/23 0008)  Temp Source: Temporal (09/02/23 1921)  Respirations: 16 (09/03/23 0008)  Height: 5' 8" (172.7 cm) (09/02/23 1921)  Weight - Scale: 90.5 kg (199 lb 8.3 oz) (09/02/23 1921)  SpO2: 92 % (09/03/23 0008)    Physical Exam  Vitals and nursing note reviewed. Constitutional:       General: He is not in acute distress. Appearance: Normal appearance. He is well-developed. HENT:      Head: Normocephalic and atraumatic. Mouth/Throat:      Mouth: Mucous membranes are dry. Eyes:      Conjunctiva/sclera: Conjunctivae normal.   Cardiovascular:      Rate and Rhythm: Normal rate and regular rhythm. Heart sounds: No murmur heard. Pulmonary:      Effort: Pulmonary effort is normal. No respiratory distress. Breath sounds: Normal breath sounds. Abdominal:      Palpations: Abdomen is soft. Tenderness: There is no abdominal tenderness. Musculoskeletal:         General: No swelling. Cervical back: Neck supple. Skin:     General: Skin is warm and dry. Capillary Refill: Capillary refill takes less than 2 seconds. Neurological:      General: No focal deficit present. Mental Status: He is alert and oriented to person, place, and time. Psychiatric:         Mood and Affect: Mood is anxious.          Behavior: Behavior normal.         Additional Data:     Lab Results:  Results from last 7 days   Lab Units 09/03/23  0548   WBC Thousand/uL 6.02   HEMOGLOBIN g/dL 14.3   HEMATOCRIT % 41.3   PLATELETS Thousands/uL 157   NEUTROS PCT % 67   LYMPHS PCT % 23   MONOS PCT % 10   EOS PCT % 0     Results from last 7 days   Lab Units 09/03/23  0548 09/02/23 1951   SODIUM mmol/L 139 137   POTASSIUM mmol/L 3.9 3.6   CHLORIDE mmol/L 106 104   CO2 mmol/L 24 26   BUN mg/dL 17 15   CREATININE mg/dL 0.79 0.95   ANION GAP mmol/L 9 7   CALCIUM mg/dL 8.7 9.6   ALBUMIN g/dL  --  4.6   TOTAL BILIRUBIN mg/dL  --  0.48   ALK PHOS U/L  --  62   ALT U/L  --  15   AST U/L  --  17   GLUCOSE RANDOM mg/dL 110 131 Lines/Drains:  Invasive Devices     Peripheral Intravenous Line  Duration           Peripheral IV 09/02/23 Right Antecubital <1 day                    Imaging: Reviewed radiology reports from this admission including: abdominal/pelvic CT  CT abdomen pelvis with contrast   Final Result by Luis Pitts MD (09/02 2207)      No acute pathology visualized on CT of the abdomen and pelvis with IV without oral contrast.            Workstation performed: [id number]             EKG and Other Studies Reviewed on Admission:    all    ** Please Note: This note has been constructed using a voice recognition system.  **

## 2023-09-03 NOTE — PLAN OF CARE
Problem: PAIN - ADULT  Goal: Verbalizes/displays adequate comfort level or baseline comfort level  Description: Interventions:  - Encourage patient to monitor pain and request assistance  - Assess pain using appropriate pain scale  - Administer analgesics based on type and severity of pain and evaluate response  - Implement non-pharmacological measures as appropriate and evaluate response  - Consider cultural and social influences on pain and pain management  - Notify physician/advanced practitioner if interventions unsuccessful or patient reports new pain  Outcome: Progressing     Problem: INFECTION - ADULT  Goal: Absence or prevention of progression during hospitalization  Description: INTERVENTIONS:  - Assess and monitor for signs and symptoms of infection  - Monitor lab/diagnostic results  - Monitor all insertion sites, i.e. indwelling lines, tubes, and drains  - Monitor endotracheal if appropriate and nasal secretions for changes in amount and color  - [place] appropriate cooling/warming therapies per order  - Administer medications as ordered  - Instruct and encourage patient and family to use good hand hygiene technique  - Identify and instruct in appropriate isolation precautions for identified infection/condition  Outcome: Progressing  Goal: Absence of fever/infection during neutropenic period  Description: INTERVENTIONS:  - Monitor WBC    Outcome: Progressing     Problem: SAFETY ADULT  Goal: Patient will remain free of falls  Description: INTERVENTIONS:  - Educate patient/family on patient safety including physical limitations  - Instruct patient to call for assistance with activity   - Consult OT/PT to assist with strengthening/mobility   - Keep Call bell within reach  - Keep bed low and locked with side rails adjusted as appropriate  - Keep care items and personal belongings within reach  - Initiate and maintain comfort rounds  - Make Fall Risk Sign visible to staff  - Offer Toileting every 2 Hours, in advance of need  - Initiate/Maintain bed alarm  - Obtain necessary fall risk management equipment  - Apply yellow socks and bracelet for high fall risk patients  - Consider moving patient to room near nurses station  Outcome: Progressing  Goal: Maintain or return to baseline ADL function  Description: INTERVENTIONS:  -  Assess patient's ability to carry out ADLs; assess patient's baseline for ADL function and identify physical deficits which impact ability to perform ADLs (bathing, care of mouth/teeth, toileting, grooming, dressing, etc.)  - Assess/evaluate cause of self-care deficits   - Assess range of motion  - Assess patient's mobility; develop plan if impaired  - Assess patient's need for assistive devices and provide as appropriate  - Encourage maximum independence but intervene and supervise when necessary  - Involve family in performance of ADLs  - Assess for home care needs following discharge   - Consider OT consult to assist with ADL evaluation and planning for discharge  - Provide patient education as appropriate  Outcome: Progressing  Goal: Maintains/Returns to pre admission functional level  Description: INTERVENTIONS:  - Perform BMAT or MOVE assessment daily.   - Set and communicate daily mobility goal to care team and patient/family/caregiver. - Collaborate with rehabilitation services on mobility goals if consulted  - Perform Range of Motion 3 times a day. - Reposition patient every 2 hours.   - Dangle patient 3 times a day  - Stand patient 3 times a day  - Ambulate patient 3 times a day  - Out of bed to chair 3 times a day   - Out of bed for meals 3 times a day  - Out of bed for toileting  - Record patient progress and toleration of activity level   Outcome: Progressing     Problem: DISCHARGE PLANNING  Goal: Discharge to home or other facility with appropriate resources  Description: INTERVENTIONS:  - Identify barriers to discharge w/patient and caregiver  - Arrange for needed discharge resources and transportation as appropriate  - Identify discharge learning needs (meds, wound care, etc.)  - Arrange for interpretive services to assist at discharge as needed  - Refer to Case Management Department for coordinating discharge planning if the patient needs post-hospital services based on physician/advanced practitioner order or complex needs related to functional status, cognitive ability, or social support system  Outcome: Progressing     Problem: Knowledge Deficit  Goal: Patient/family/caregiver demonstrates understanding of disease process, treatment plan, medications, and discharge instructions  Description: Complete learning assessment and assess knowledge base.   Interventions:  - Provide teaching at level of understanding  - Provide teaching via preferred learning methods  Outcome: Progressing

## 2023-09-03 NOTE — ED CARE HANDOFF
Emergency Department Sign Out Note        Sign out and transfer of care from [facility]. See Separate Emergency Department note. The patient, Getachew Murphy, was evaluated by the previous provider for abodminal pain, nausea and vomiting    Workup Completed:  Labs     ED Course / Workup Pending (followup):  Pending CT abd/pelvis and reassessment                                  ED Course as of 09/02/23 2340   Sat Sep 02, 2023   2321 Patient has no acute findings on the CT abdomen and pelvis. On my reassessment patient states that he still not feeling improved after getting multiple medications. Will discuss with the hospitalist   [id number] Case discussed with the hospitalist will place on observation. Procedures  MDM        Disposition  Final diagnoses:   Nausea and vomiting - Intractable     Time reflects when diagnosis was documented in both MDM as applicable and the Disposition within this note     Time User Action Codes Description Comment    9/2/2023  9:45 PM Tana Pena Add [R11.2] Nausea and vomiting     9/2/2023  9:45 PM Tana Pena Modify [R11.2] Nausea and vomiting Intractable      ED Disposition     ED Disposition   Admit    Condition   Stable    Date/Time   Sat Sep 2, 2023 11:39 PM    Comment              Follow-up Information    None       Patient's Medications   Discharge Prescriptions    No medications on file     No discharge procedures on file.        ED Provider  Electronically Signed by     Leonard Cobb DO  09/02/23 [id number] Abbe Rooney)

## 2023-09-03 NOTE — ASSESSMENT & PLAN NOTE
· Has a history of urinary retention and follows with urology in the outpatient setting  · Continue Flomax

## 2023-09-03 NOTE — PLAN OF CARE
Problem: PAIN - ADULT  Goal: Verbalizes/displays adequate comfort level or baseline comfort level  Description: Interventions:  - Encourage patient to monitor pain and request assistance  - Assess pain using appropriate pain scale  - Administer analgesics based on type and severity of pain and evaluate response  - Implement non-pharmacological measures as appropriate and evaluate response  - Consider cultural and social influences on pain and pain management  - Notify physician/advanced practitioner if interventions unsuccessful or patient reports new pain  Outcome: Progressing     Problem: INFECTION - ADULT  Goal: Absence or prevention of progression during hospitalization  Description: INTERVENTIONS:  - Assess and monitor for signs and symptoms of infection  - Monitor lab/diagnostic results  - Monitor all insertion sites, i.e. indwelling lines, tubes, and drains  - Monitor endotracheal if appropriate and nasal secretions for changes in amount and color  - [place] appropriate cooling/warming therapies per order  - Administer medications as ordered  - Instruct and encourage patient and family to use good hand hygiene technique  - Identify and instruct in appropriate isolation precautions for identified infection/condition  Outcome: Progressing  Goal: Absence of fever/infection during neutropenic period  Description: INTERVENTIONS:  - Monitor WBC    Outcome: Progressing     Problem: SAFETY ADULT  Goal: Patient will remain free of falls  Description: INTERVENTIONS:  - Educate patient/family on patient safety including physical limitations  - Instruct patient to call for assistance with activity   - Consult OT/PT to assist with strengthening/mobility   - Keep Call bell within reach  - Keep bed low and locked with side rails adjusted as appropriate  - Keep care items and personal belongings within reach  - Initiate and maintain comfort rounds  - Make Fall Risk Sign visible to staff  - Offer Toileting every 2 Hours, in advance of need  - Initiate/Maintain bed alarm  - Obtain necessary fall risk management equipment  - Apply yellow socks and bracelet for high fall risk patients  - Consider moving patient to room near nurses station  Outcome: Progressing  Goal: Maintain or return to baseline ADL function  Description: INTERVENTIONS:  -  Assess patient's ability to carry out ADLs; assess patient's baseline for ADL function and identify physical deficits which impact ability to perform ADLs (bathing, care of mouth/teeth, toileting, grooming, dressing, etc.)  - Assess/evaluate cause of self-care deficits   - Assess range of motion  - Assess patient's mobility; develop plan if impaired  - Assess patient's need for assistive devices and provide as appropriate  - Encourage maximum independence but intervene and supervise when necessary  - Involve family in performance of ADLs  - Assess for home care needs following discharge   - Consider OT consult to assist with ADL evaluation and planning for discharge  - Provide patient education as appropriate  Outcome: Progressing  Goal: Maintains/Returns to pre admission functional level  Description: INTERVENTIONS:  - Perform BMAT or MOVE assessment daily.   - Set and communicate daily mobility goal to care team and patient/family/caregiver. - Collaborate with rehabilitation services on mobility goals if consulted  - Perform Range of Motion 3 times a day. - Reposition patient every 2 hours.   - Dangle patient 3 times a day  - Stand patient 3 times a day  - Ambulate patient 3 times a day  - Out of bed to chair 3 times a day   - Out of bed for meals 3 times a day  - Out of bed for toileting  - Record patient progress and toleration of activity level   Outcome: Progressing     Problem: DISCHARGE PLANNING  Goal: Discharge to home or other facility with appropriate resources  Description: INTERVENTIONS:  - Identify barriers to discharge w/patient and caregiver  - Arrange for needed discharge resources and transportation as appropriate  - Identify discharge learning needs (meds, wound care, etc.)  - Arrange for interpretive services to assist at discharge as needed  - Refer to Case Management Department for coordinating discharge planning if the patient needs post-hospital services based on physician/advanced practitioner order or complex needs related to functional status, cognitive ability, or social support system  Outcome: Progressing     Problem: Knowledge Deficit  Goal: Patient/family/caregiver demonstrates understanding of disease process, treatment plan, medications, and discharge instructions  Description: Complete learning assessment and assess knowledge base. Interventions:  - Provide teaching at level of understanding  - Provide teaching via preferred learning methods  Outcome: Progressing     Problem: Nutrition/Hydration-ADULT  Goal: Nutrient/Hydration intake appropriate for improving, restoring or maintaining nutritional needs  Description: Monitor and assess patient's nutrition/hydration status for malnutrition. Collaborate with interdisciplinary team and initiate plan and interventions as ordered. Monitor patient's weight and dietary intake as ordered or per policy. Utilize nutrition screening tool and intervene as necessary. Determine patient's food preferences and provide high-protein, high-caloric foods as appropriate.      INTERVENTIONS:  - Monitor oral intake, urinary output, labs, and treatment plans  - Assess nutrition and hydration status and recommend course of action  - Evaluate amount of meals eaten  - Assist patient with eating if necessary   - Allow adequate time for meals  - Recommend/ encourage appropriate diets, oral nutritional supplements, and vitamin/mineral supplements  - Order, calculate, and assess calorie counts as needed  - Recommend, monitor, and adjust tube feedings and TPN/PPN based on assessed needs  - Assess need for intravenous fluids  - Provide specific nutrition/hydration education as appropriate  - Include patient/family/caregiver in decisions related to nutrition  Outcome: Progressing

## 2023-09-03 NOTE — ED NOTES
Pt reports feeling better after medication. NO nausea and vomiting. Greta Graves, MITUL  09/02/23 [id number]

## 2023-09-04 VITALS
TEMPERATURE: 98.4 F | SYSTOLIC BLOOD PRESSURE: 118 MMHG | HEIGHT: 68 IN | DIASTOLIC BLOOD PRESSURE: 73 MMHG | HEART RATE: 62 BPM | OXYGEN SATURATION: 96 % | WEIGHT: 199.52 LBS | RESPIRATION RATE: 16 BRPM | BODY MASS INDEX: 30.24 KG/M2

## 2023-09-04 PROCEDURE — 99239 HOSP IP/OBS DSCHRG MGMT >30: CPT | Performed by: FAMILY MEDICINE

## 2023-09-04 RX ADMIN — ONDANSETRON 4 MG: 2 INJECTION INTRAMUSCULAR; INTRAVENOUS at 10:26

## 2023-09-04 RX ADMIN — PANTOPRAZOLE SODIUM 40 MG: 40 TABLET, DELAYED RELEASE ORAL at 07:00

## 2023-09-04 RX ADMIN — SODIUM CHLORIDE, SODIUM GLUCONATE, SODIUM ACETATE, POTASSIUM CHLORIDE AND MAGNESIUM CHLORIDE 75 ML/HR: 526; 502; 368; 37; 30 INJECTION, SOLUTION INTRAVENOUS at 02:14

## 2023-09-04 RX ADMIN — ACETAMINOPHEN 650 MG: 325 TABLET, FILM COATED ORAL at 08:45

## 2023-09-04 RX ADMIN — ESCITALOPRAM OXALATE 10 MG: 10 TABLET ORAL at 08:44

## 2023-09-04 NOTE — PLAN OF CARE
Problem: PAIN - ADULT  Goal: Verbalizes/displays adequate comfort level or baseline comfort level  Description: Interventions:  - Encourage patient to monitor pain and request assistance  - Assess pain using appropriate pain scale  - Administer analgesics based on type and severity of pain and evaluate response  - Implement non-pharmacological measures as appropriate and evaluate response  - Consider cultural and social influences on pain and pain management  - Notify physician/advanced practitioner if interventions unsuccessful or patient reports new pain  Outcome: Progressing     Problem: INFECTION - ADULT  Goal: Absence or prevention of progression during hospitalization  Description: INTERVENTIONS:  - Assess and monitor for signs and symptoms of infection  - Monitor lab/diagnostic results  - Monitor all insertion sites, i.e. indwelling lines, tubes, and drains  - Monitor endotracheal if appropriate and nasal secretions for changes in amount and color  - [place] appropriate cooling/warming therapies per order  - Administer medications as ordered  - Instruct and encourage patient and family to use good hand hygiene technique  - Identify and instruct in appropriate isolation precautions for identified infection/condition  Outcome: Progressing  Goal: Absence of fever/infection during neutropenic period  Description: INTERVENTIONS:  - Monitor WBC    Outcome: Progressing     Problem: SAFETY ADULT  Goal: Patient will remain free of falls  Description: INTERVENTIONS:  - Educate patient/family on patient safety including physical limitations  - Instruct patient to call for assistance with activity   - Consult OT/PT to assist with strengthening/mobility   - Keep Call bell within reach  - Keep bed low and locked with side rails adjusted as appropriate  - Keep care items and personal belongings within reach  - Initiate and maintain comfort rounds  - Make Fall Risk Sign visible to staff  - Apply yellow socks and bracelet for high fall risk patients  - Consider moving patient to room near nurses station  Outcome: Progressing  Goal: Maintain or return to baseline ADL function  Description: INTERVENTIONS:  -  Assess patient's ability to carry out ADLs; assess patient's baseline for ADL function and identify physical deficits which impact ability to perform ADLs (bathing, care of mouth/teeth, toileting, grooming, dressing, etc.)  - Assess/evaluate cause of self-care deficits   - Assess range of motion  - Assess patient's mobility; develop plan if impaired  - Assess patient's need for assistive devices and provide as appropriate  - Encourage maximum independence but intervene and supervise when necessary  - Involve family in performance of ADLs  - Assess for home care needs following discharge   - Consider OT consult to assist with ADL evaluation and planning for discharge  - Provide patient education as appropriate  Outcome: Progressing  Goal: Maintains/Returns to pre admission functional level  Description: INTERVENTIONS:  - Perform BMAT or MOVE assessment daily.   - Set and communicate daily mobility goal to care team and patient/family/caregiver. - Collaborate with rehabilitation services on mobility goals if consulted  - Perform Range of Motion 3 times a day. - Reposition patient every 3 hours.   - Dangle patient 3 times a day  - Stand patient 3 times a day  - Ambulate patient 3 times a day  - Out of bed to chair 3 times a day   - Out of bed for meals 3 times a day  - Out of bed for toileting  - Record patient progress and toleration of activity level   Outcome: Progressing     Problem: DISCHARGE PLANNING  Goal: Discharge to home or other facility with appropriate resources  Description: INTERVENTIONS:  - Identify barriers to discharge w/patient and caregiver  - Arrange for needed discharge resources and transportation as appropriate  - Identify discharge learning needs (meds, wound care, etc.)  - Arrange for interpretive services to assist at discharge as needed  - Refer to Case Management Department for coordinating discharge planning if the patient needs post-hospital services based on physician/advanced practitioner order or complex needs related to functional status, cognitive ability, or social support system  Outcome: Progressing     Problem: Knowledge Deficit  Goal: Patient/family/caregiver demonstrates understanding of disease process, treatment plan, medications, and discharge instructions  Description: Complete learning assessment and assess knowledge base. Interventions:  - Provide teaching at level of understanding  - Provide teaching via preferred learning methods  Outcome: Progressing     Problem: Nutrition/Hydration-ADULT  Goal: Nutrient/Hydration intake appropriate for improving, restoring or maintaining nutritional needs  Description: Monitor and assess patient's nutrition/hydration status for malnutrition. Collaborate with interdisciplinary team and initiate plan and interventions as ordered. Monitor patient's weight and dietary intake as ordered or per policy. Utilize nutrition screening tool and intervene as necessary. Determine patient's food preferences and provide high-protein, high-caloric foods as appropriate.      INTERVENTIONS:  - Monitor oral intake, urinary output, labs, and treatment plans  - Assess nutrition and hydration status and recommend course of action  - Evaluate amount of meals eaten  - Assist patient with eating if necessary   - Allow adequate time for meals  - Recommend/ encourage appropriate diets, oral nutritional supplements, and vitamin/mineral supplements  - Order, calculate, and assess calorie counts as needed  - Recommend, monitor, and adjust tube feedings and TPN/PPN based on assessed needs  - Assess need for intravenous fluids  - Provide specific nutrition/hydration education as appropriate  - Include patient/family/caregiver in decisions related to nutrition  Outcome: Progressing

## 2023-09-04 NOTE — ASSESSMENT & PLAN NOTE
· Developed intractable nausea and vomiting earlier in the day  · Grandfather reported to ED physician occurs frequently secondary to stress  · CT abdomen pelvis no acute abnormality  · Received multiple doses of IV antiemetic medications in the ED without improvement  · Patient vomiting resolved, still having some nausea which is chronic in nature due to gastritis and history of gastroparesis. Advised the patient to take a small frequent meal.  We will add Reglan.

## 2023-09-04 NOTE — ASSESSMENT & PLAN NOTE
· Has a history of urinary retention and follows with urology in the outpatient setting  · Continue Flomax  · Advised the patient to follow-up with urologist outpatient basis

## 2023-09-04 NOTE — DISCHARGE SUMMARY
[facility]  Discharge- Gabriele Vikram 1995, 33 y.o. male MRN: [id number]  Unit/Bed#: -Jadiel Encounter: [id number]  Primary Care Provider: Safia Courtney MD   Date and time admitted to hospital: 9/2/2023  7:17 PM    * Intractable vomiting  Assessment & Plan  · Developed intractable nausea and vomiting earlier in the day  · Grandfather reported to ED physician occurs frequently secondary to stress  · CT abdomen pelvis no acute abnormality  · Received multiple doses of IV antiemetic medications in the ED without improvement  · Patient vomiting resolved, still having some nausea which is chronic in nature due to gastritis and history of gastroparesis. Advised the patient to take a small frequent meal.  We will add Reglan.     History of urinary retention  Assessment & Plan  · Has a history of urinary retention and follows with urology in the outpatient setting  · Continue Flomax  · Advised the patient to follow-up with urologist outpatient basis    Autism  Assessment & Plan  · History of autism/Asperger's disease  · Highly functioning and employed   · Outpatient follow-up      Medical Problems     Resolved Problems  Date Reviewed: 6/5/2023   None       Discharging Physician / Practitioner: Ezequiel Champion MD  PCP: Safia Courtney MD  Admission Date:   Admission Orders (From admission, onward)     Ordered        09/02/23 7505  Place in Observation  Once                      Discharge Date: 09/04/23    Consultations During Hospital Stay:  · None    Procedures Performed:   CT abdomen pelvis with contrast   Final Result by Mindy Cuevas MD (09/02 2207)      No acute pathology visualized on CT of the abdomen and pelvis with IV without oral contrast.            Workstation performed: [id number]         ·   ·     Significant Findings / Test Results:   Lab Results   Component Value Date    WBC 6.02 09/03/2023    HGB 14.3 09/03/2023    HCT 41.3 09/03/2023    MCV 88 09/03/2023     09/03/2023   ·   Lab Results   Component Value Date    SODIUM 139 09/03/2023    K 3.9 09/03/2023     09/03/2023    CO2 24 09/03/2023    AGAP 9 09/03/2023    BUN 17 09/03/2023    CREATININE 0.79 09/03/2023    GLUC 110 09/03/2023    GLUF 110 (H) 09/03/2023    CALCIUM 8.7 09/03/2023    AST 17 09/02/2023    ALT 15 09/02/2023    ALKPHOS 62 09/02/2023    TP 7.4 09/02/2023    TBILI 0.48 09/02/2023    EGFR 123 09/03/2023   ·   ·     Incidental Findings:   · As mentioned above  · I reviewed the above mentioned incidental findings with the patient and/or family and they expressed understanding. Test Results Pending at Discharge (will require follow up):   · none     Outpatient Tests Requested:  · none    Complications:  none    Reason for Admission: Intractable vomiting. Hospital Course:   Pablo Orozco is a 33 y.o. male patient who originally presented to the hospital on 9/2/2023 due to intractable vomiting with history of chronic urinary retention. Per patient, he does have a history of gastritis and gastroparesis. Since admission, patient vomiting resolved. He still having some nausea. But tolerating diet  Per record reviewed, stress aggravates his vomiting and urinary retention. Patient had bladder scan done 1 time found retaining 923. Does follow-up with outpatient urology continue Flomax. Patient does have history of chronic retention of urine. All lab results, imaging findings, treatment plan and option discussed in details with patient. Verbalized understanding agrees. Advised the patient to resume home medication. Please see above list of diagnoses and related plan for additional information. Condition at Discharge: stable    Discharge Day Visit / Exam:   Subjective: Seen and evaluated and examined. Resting comfortably. Still feels nauseated.   Vitals: Blood Pressure: 118/73 (09/04/23 0717)  Pulse: 62 (09/04/23 0717)  Temperature: 98.4 °F (36.9 °C) (09/04/23 9600)  Temp Source: Temporal (09/02/23 1921)  Respirations: 16 (09/04/23 0717)  Height: 5' 8" (172.7 cm) (09/02/23 1921)  Weight - Scale: 90.5 kg (199 lb 8.3 oz) (09/02/23 1921)  SpO2: 96 % (09/04/23 0717)  Exam:   Physical Exam  Vitals reviewed. Constitutional:       Appearance: Normal appearance. He is not ill-appearing or diaphoretic. HENT:      Mouth/Throat:      Mouth: Mucous membranes are moist.      Pharynx: Oropharynx is clear. No oropharyngeal exudate. Eyes:      General: No scleral icterus. Left eye: No discharge. Extraocular Movements: Extraocular movements intact. Conjunctiva/sclera: Conjunctivae normal.      Pupils: Pupils are equal, round, and reactive to light. Cardiovascular:      Rate and Rhythm: Normal rate. Heart sounds: Normal heart sounds. No murmur heard. No friction rub. No gallop. Pulmonary:      Effort: Pulmonary effort is normal. No respiratory distress. Breath sounds: No stridor. No wheezing or rhonchi. Abdominal:      General: Abdomen is flat. Bowel sounds are normal. There is no distension. Palpations: There is no mass. Tenderness: There is no abdominal tenderness. Hernia: No hernia is present. Musculoskeletal:         General: No swelling, tenderness, deformity or signs of injury. Normal range of motion. Skin:     General: Skin is warm. Coloration: Skin is not jaundiced or pale. Findings: No bruising or erythema. Neurological:      Mental Status: He is alert and oriented to person, place, and time. Cranial Nerves: No cranial nerve deficit. Sensory: No sensory deficit. Motor: No weakness. Coordination: Coordination normal.   Psychiatric:         Mood and Affect: Mood normal.         Discussion with Family: with patient. Discharge instructions/Information to patient and family:   See after visit summary for information provided to patient and family.       Provisions for Follow-Up Care:  See after visit summary for information related to follow-up care and any pertinent home health orders. Disposition:   Home    Planned Readmission: If condition get worse     Discharge Statement:  Greater than 50% of the total time was spent examining patient, answering all patient questions, arranging and discussing plan of care with patient as well as directly providing post-discharge instructions. Additional time then spent on discharge activities. Discharge Medications:  See after visit summary for reconciled discharge medications provided to patient and/or family.       **Please Note: This note may have been constructed using a voice recognition system**

## 2023-09-04 NOTE — QUICK NOTE
Patient rang bell and this PCA went in. Patient states he felt like he had to pee but notified me he only voided a very small amount and states he has not voided since this AM. Does note some abdominal discomfort. Bladder scan done and noted to be 923, RN notified.

## 2023-09-08 ENCOUNTER — HOSPITAL ENCOUNTER (EMERGENCY)
Facility: HOSPITAL | Age: 28
Discharge: HOME | DRG: 241 | End: 2023-09-08
Attending: EMERGENCY MEDICINE
Payer: COMMERCIAL

## 2023-09-08 VITALS
TEMPERATURE: 98.6 F | OXYGEN SATURATION: 98 % | HEIGHT: 68 IN | RESPIRATION RATE: 16 BRPM | DIASTOLIC BLOOD PRESSURE: 83 MMHG | SYSTOLIC BLOOD PRESSURE: 127 MMHG | BODY MASS INDEX: 30.16 KG/M2 | WEIGHT: 199 LBS | HEART RATE: 82 BPM

## 2023-09-08 DIAGNOSIS — R31.9 HEMATURIA: Primary | ICD-10-CM

## 2023-09-08 LAB
ALBUMIN SERPL BCP-MCNC: 4.5 G/DL (ref 3.5–5)
ALP SERPL-CCNC: 56 U/L (ref 34–104)
ALT SERPL W P-5'-P-CCNC: 10 U/L (ref 7–52)
ANION GAP SERPL CALCULATED.3IONS-SCNC: 10 MMOL/L
AST SERPL W P-5'-P-CCNC: 12 U/L (ref 13–39)
BACTERIA UR QL AUTO: ABNORMAL /HPF
BASOPHILS # BLD AUTO: 0.02 THOUSANDS/ÂΜL (ref 0–0.1)
BASOPHILS NFR BLD AUTO: 0 % (ref 0–1)
BILIRUB SERPL-MCNC: 0.7 MG/DL (ref 0.2–1)
BILIRUB UR QL STRIP: ABNORMAL
BUN SERPL-MCNC: 14 MG/DL (ref 5–25)
CALCIUM SERPL-MCNC: 9.3 MG/DL (ref 8.4–10.2)
CHLORIDE SERPL-SCNC: 103 MMOL/L (ref 96–108)
CLARITY UR: CLEAR
CO2 SERPL-SCNC: 25 MMOL/L (ref 21–32)
COLOR UR: YELLOW
CREAT SERPL-MCNC: 0.9 MG/DL (ref 0.6–1.3)
EOSINOPHIL # BLD AUTO: 0.02 THOUSAND/ÂΜL (ref 0–0.61)
EOSINOPHIL NFR BLD AUTO: 0 % (ref 0–6)
ERYTHROCYTE [DISTWIDTH] IN BLOOD BY AUTOMATED COUNT: 12.4 % (ref 11.6–15.1)
GFR SERPL CREATININE-BSD FRML MDRD: 116 ML/MIN/1.73SQ M
GLUCOSE SERPL-MCNC: 89 MG/DL (ref 65–140)
GLUCOSE UR STRIP-MCNC: NEGATIVE MG/DL
HCT VFR BLD AUTO: 44.1 % (ref 36.5–49.3)
HGB BLD-MCNC: 15.2 G/DL (ref 12–17)
HGB UR QL STRIP.AUTO: ABNORMAL
IMM GRANULOCYTES # BLD AUTO: 0.03 THOUSAND/UL (ref 0–0.2)
IMM GRANULOCYTES NFR BLD AUTO: 1 % (ref 0–2)
KETONES UR STRIP-MCNC: ABNORMAL MG/DL
LEUKOCYTE ESTERASE UR QL STRIP: NEGATIVE
LYMPHOCYTES # BLD AUTO: 1.26 THOUSANDS/ÂΜL (ref 0.6–4.47)
LYMPHOCYTES NFR BLD AUTO: 21 % (ref 14–44)
MCH RBC QN AUTO: 30.6 PG (ref 26.8–34.3)
MCHC RBC AUTO-ENTMCNC: 34.5 G/DL (ref 31.4–37.4)
MCV RBC AUTO: 89 FL (ref 82–98)
MONOCYTES # BLD AUTO: 0.52 THOUSAND/ÂΜL (ref 0.17–1.22)
MONOCYTES NFR BLD AUTO: 9 % (ref 4–12)
MUCOUS THREADS UR QL AUTO: ABNORMAL
NEUTROPHILS # BLD AUTO: 4.15 THOUSANDS/ÂΜL (ref 1.85–7.62)
NEUTS SEG NFR BLD AUTO: 69 % (ref 43–75)
NITRITE UR QL STRIP: NEGATIVE
NON-SQ EPI CELLS URNS QL MICRO: ABNORMAL /HPF
NRBC BLD AUTO-RTO: 0 /100 WBCS
PH UR STRIP.AUTO: 6 [PH]
PLATELET # BLD AUTO: 168 THOUSANDS/UL (ref 149–390)
PMV BLD AUTO: 9.9 FL (ref 8.9–12.7)
POTASSIUM SERPL-SCNC: 4 MMOL/L (ref 3.5–5.3)
PROT SERPL-MCNC: 7.4 G/DL (ref 6.4–8.4)
PROT UR STRIP-MCNC: ABNORMAL MG/DL
RBC # BLD AUTO: 4.96 MILLION/UL (ref 3.88–5.62)
RBC #/AREA URNS AUTO: ABNORMAL /HPF
SODIUM SERPL-SCNC: 138 MMOL/L (ref 135–147)
SP GR UR STRIP.AUTO: >=1.03 (ref 1–1.03)
UROBILINOGEN UR QL STRIP.AUTO: 1 E.U./DL
WBC # BLD AUTO: 6 THOUSAND/UL (ref 4.31–10.16)
WBC #/AREA URNS AUTO: ABNORMAL /HPF

## 2023-09-08 PROCEDURE — 85025 COMPLETE CBC W/AUTO DIFF WBC: CPT | Performed by: PHYSICIAN ASSISTANT

## 2023-09-08 PROCEDURE — 80053 COMPREHEN METABOLIC PANEL: CPT | Performed by: PHYSICIAN ASSISTANT

## 2023-09-08 PROCEDURE — 99284 EMERGENCY DEPT VISIT MOD MDM: CPT | Performed by: PHYSICIAN ASSISTANT

## 2023-09-08 PROCEDURE — 36415 COLL VENOUS BLD VENIPUNCTURE: CPT | Performed by: PHYSICIAN ASSISTANT

## 2023-09-08 PROCEDURE — 81001 URINALYSIS AUTO W/SCOPE: CPT | Performed by: PHYSICIAN ASSISTANT

## 2023-09-08 PROCEDURE — 99283 EMERGENCY DEPT VISIT LOW MDM: CPT

## 2023-09-08 NOTE — DISCHARGE INSTRUCTIONS
Please follow-up with Dr. Rachel Schultz on Monday. Stay well-hydrated. If you are unable to urinate within a 6-hour period or have any new or worsening symptoms please return to the emergency department.   Follow-up with your family doctor

## 2023-09-08 NOTE — ED PROVIDER NOTES
History  Chief Complaint   Patient presents with   • Possible UTI     Pt reports groin pain, back pain; pain w urination; was seen at pcp and urine - yesterday;      81-year-old male presents the emergency department for evaluation of decrease in urinary frequency and noted dysuria. Patient with PMH: Autism, BPH, urinary retention, UTI, gastroparesis. He was seen yesterday at PCP office and had UA done which he reports was negative for UTI. Was prescribed Flomax. States he took this yesterday but not today. States he has only urinate twice today. Does not feel he needs to urinate now. Denies any pain or pressure sensation. Reports it does burn when he pees. States he has not been hydrated. He denies any current back or flank pain. Denies any pain in his testicles. He denies fevers or chills. Prior to Admission Medications   Prescriptions Last Dose Informant Patient Reported?  Taking?   escitalopram (LEXAPRO) 10 mg tablet  Self Yes No   Sig: Take 1 tablet by mouth every morning   fluticasone (FLONASE) 50 mcg/act nasal spray  Self Yes No   Sig: INSTILL 2 SPRAYS TO EACH NOSTRIL DAILY   loratadine (CLARITIN) 10 mg tablet  Self Yes No   Sig: Take by mouth   metoclopramide (Reglan) 10 mg tablet  Self No No   Sig: Take 1 tablet (10 mg total) by mouth every 6 (six) hours as needed (Nausea) for up to 10 doses   ondansetron (ZOFRAN) 4 mg tablet  Self Yes No   Sig: Take 4 mg by mouth   pantoprazole (PROTONIX) 20 mg tablet  Self No No   Sig: Take 2 tablets (40 mg total) by mouth 2 (two) times a day   tamsulosin (FLOMAX) 0.4 mg   No No   Sig: Take 1 capsule (0.4 mg total) by mouth daily with dinner   triamcinolone (KENALOG) 0.1 % oral topical paste  Self Yes No   Sig: Apply 1 application to teeth      Facility-Administered Medications: None       Past Medical History:   Diagnosis Date   • Autistic disorder    • BPH (benign prostatic hypertrophy) 9/8/22   • Gastric paresis    • GERD (gastroesophageal reflux disease)    • Known health problems: none    • Urinary retention 9/8/22   • Urinary tract infection        Past Surgical History:   Procedure Laterality Date   • EYE SURGERY     • FOOT SURGERY         Family History   Problem Relation Age of Onset   • No Known Problems Mother    • No Known Problems Father    • Heart attack Paternal Grandfather    • Hypertension Paternal Grandfather      I have reviewed and agree with the history as documented. E-Cigarette/Vaping   • E-Cigarette Use Never User      E-Cigarette/Vaping Substances   • Nicotine No    • THC No    • CBD No    • Flavoring No    • Other No    • Unknown No      Social History     Tobacco Use   • Smoking status: Never   • Smokeless tobacco: Never   Vaping Use   • Vaping Use: Never used   Substance Use Topics   • Alcohol use: Not Currently   • Drug use: Never       Review of Systems   Constitutional: Negative. Respiratory: Negative. Cardiovascular: Negative. Gastrointestinal: Negative for abdominal pain, diarrhea, nausea and vomiting. Genitourinary: Positive for dysuria. Negative for flank pain, penile pain, penile swelling, scrotal swelling and testicular pain. Decreased urinary frequency    Musculoskeletal: Negative for back pain. Skin: Negative. Neurological: Negative. All other systems reviewed and are negative. Physical Exam  Physical Exam  Vitals and nursing note reviewed. Constitutional:       General: He is not in acute distress. Appearance: Normal appearance. He is not ill-appearing, toxic-appearing or diaphoretic. HENT:      Head: Normocephalic. Nose: Nose normal.   Eyes:      Conjunctiva/sclera: Conjunctivae normal.   Cardiovascular:      Rate and Rhythm: Normal rate and regular rhythm. Pulmonary:      Effort: Pulmonary effort is normal.      Breath sounds: Normal breath sounds. Abdominal:      General: Abdomen is flat. Bowel sounds are normal. There is no distension. Tenderness:  There is no abdominal tenderness. There is no right CVA tenderness, left CVA tenderness or guarding. Musculoskeletal:      Cervical back: Normal range of motion. Skin:     General: Skin is warm and dry. Findings: No rash. Neurological:      General: No focal deficit present. Mental Status: He is alert.          Vital Signs  ED Triage Vitals [09/08/23 1830]   Temperature Pulse Respirations Blood Pressure SpO2   98.6 °F (37 °C) 82 16 127/83 98 %      Temp Source Heart Rate Source Patient Position - Orthostatic VS BP Location FiO2 (%)   Tympanic Monitor Sitting Right arm --      Pain Score       7           Vitals:    09/08/23 1830   BP: 127/83   Pulse: 82   Patient Position - Orthostatic VS: Sitting         Visual Acuity      ED Medications  Medications - No data to display    Diagnostic Studies  Results Reviewed     Procedure Component Value Units Date/Time    Comprehensive metabolic panel [[id number]]  (Abnormal) Collected: 09/08/23 1921    Lab Status: Final result Specimen: Blood from Arm, Right Updated: 09/08/23 1949     Sodium 138 mmol/L      Potassium 4.0 mmol/L      Chloride 103 mmol/L      CO2 25 mmol/L      ANION GAP 10 mmol/L      BUN 14 mg/dL      Creatinine 0.90 mg/dL      Glucose 89 mg/dL      Calcium 9.3 mg/dL      AST 12 U/L      ALT 10 U/L      Alkaline Phosphatase 56 U/L      Total Protein 7.4 g/dL      Albumin 4.5 g/dL      Total Bilirubin 0.70 mg/dL      eGFR 116 ml/min/1.73sq m     Narrative:      [facility] guidelines for Chronic Kidney Disease (CKD):   •  Stage 1 with normal or high GFR (GFR > 90 mL/min/1.73 square meters)  •  Stage 2 Mild CKD (GFR = 60-89 mL/min/1.73 square meters)  •  Stage 3A Moderate CKD (GFR = 45-59 mL/min/1.73 square meters)  •  Stage 3B Moderate CKD (GFR = 30-44 mL/min/1.73 square meters)  •  Stage 4 Severe CKD (GFR = 15-29 mL/min/1.73 square meters)  •  Stage 5 End Stage CKD (GFR <15 mL/min/1.73 square meters)  Note: GFR calculation is accurate only with a steady state creatinine    CBC and differential [[id number]] Collected: 09/08/23 1921    Lab Status: Final result Specimen: Blood from Arm, Right Updated: 09/08/23 1926     WBC 6.00 Thousand/uL      RBC 4.96 Million/uL      Hemoglobin 15.2 g/dL      Hematocrit 44.1 %      MCV 89 fL      MCH 30.6 pg      MCHC 34.5 g/dL      RDW 12.4 %      MPV 9.9 fL      Platelets 949 Thousands/uL      nRBC 0 /100 WBCs      Neutrophils Relative 69 %      Immat GRANS % 1 %      Lymphocytes Relative 21 %      Monocytes Relative 9 %      Eosinophils Relative 0 %      Basophils Relative 0 %      Neutrophils Absolute 4.15 Thousands/µL      Immature Grans Absolute 0.03 Thousand/uL      Lymphocytes Absolute 1.26 Thousands/µL      Monocytes Absolute 0.52 Thousand/µL      Eosinophils Absolute 0.02 Thousand/µL      Basophils Absolute 0.02 Thousands/µL     Urine Microscopic [[id number]]  (Abnormal) Collected: 09/08/23 1854    Lab Status: Final result Specimen: Urine, Clean Catch Updated: 09/08/23 1907     RBC, UA 0-1 /hpf      WBC, UA 2-4 /hpf      Epithelial Cells Occasional /hpf      Bacteria, UA None Seen /hpf      MUCUS THREADS Occasional    UA w Reflex to Microscopic w Reflex to Culture [[id number]]  (Abnormal) Collected: 09/08/23 1854    Lab Status: Final result Specimen: Urine, Clean Catch Updated: 09/08/23 1859     Color, UA Yellow     Clarity, UA Clear     Specific Gravity, UA >=1.030     pH, UA 6.0     Leukocytes, UA Negative     Nitrite, UA Negative     Protein, UA Trace mg/dl      Glucose, UA Negative mg/dl      Ketones, UA 40 (2+) mg/dl      Urobilinogen, UA 1.0 E.U./dl      Bilirubin, UA Moderate     Occult Blood, UA Trace-Intact                 No orders to display              Procedures  Procedures         ED Course  ED Course as of 09/08/23 2007   Fri Sep 08, 2023   1942 WBC: 6.00   1942 Blood, UA(!): Trace-Intact  UA negative for UTI.  Noted for trace blood     1945 Patient states he was able to provide urine sample without difficulty. Per nursing he had 127 mL on bladder scan prior to urinating. 1951 CMP unremarkable   1951 I discussed results and findings with both patient and grandfather. We discussed benefits and risks of CT scan. At this time family would like to forego imaging. Is agreeable to follow-up with urology on Monday. We discussed return precautions and both verbalized understanding. Patient was clinically and hemodynamically stable for discharge                     SBIRT 20yo+    Flowsheet Row Most Recent Value   Initial Alcohol Screen: US AUDIT-C     1. How often do you have a drink containing alcohol? 0 Filed at: 09/08/2023 1829   2. How many drinks containing alcohol do you have on a typical day you are drinking? 0 Filed at: 09/08/2023 1829   3a. Male UNDER 65: How often do you have five or more drinks on one occasion? 0 Filed at: 09/08/2023 1829   3b. FEMALE Any Age, or MALE 65+: How often do you have 4 or more drinks on one occassion? 0 Filed at: 09/08/2023 1829   Audit-C Score 0 Filed at: 09/08/2023 1829   BATSHEVA: How many times in the past year have you. .. Used an illegal drug or used a prescription medication for non-medical reasons? Never Filed at: 09/08/2023 [address]  28-year-old male presented to the emergency department for evaluation of dysuria and decreased urinary frequency. Vitals and medical record reviewed. Patient at risk for urinary retention, urinary tract infection, pyonephritis. Only had 127mL in the bladder on arrival.  he was able to provide urine sample without difficulty. Abdomen was soft and nontender on exam.  No CVA tenderness. UA negative for UTI. Noted for trace blood. No leukocytosis. CMP unremarkable. I discussed all results and findings with the patient and grandfather at this time we discussed symptomatic treatment at home return precautions and follow-up and both verbalized understanding.   He was clinically and hemodynamically stable for discharge    Hematuria: acute illness or injury  Amount and/or Complexity of Data Reviewed  Labs: ordered. Decision-making details documented in ED Course. Disposition  Final diagnoses:   Hematuria     Time reflects when diagnosis was documented in both MDM as applicable and the Disposition within this note     Time User Action Codes Description Comment    9/8/2023  7:56 PM Vince Rucker Add [R31.9] Hematuria       ED Disposition     ED Disposition   Discharge    Condition   Stable    Date/Time   Fri Sep 8, 2023  7:56 PM    Comment   Doriselsie Beck discharge to home/self care.                Follow-up Information     Follow up With Specialties Details Why [address] MD [facility]   [address] [facility]  [address] [facility]      Elizabeth Ruiz MD Urology   [address] [phone]            Discharge Medication List as of 9/8/2023  7:57 PM      CONTINUE these medications which have NOT CHANGED    Details   escitalopram (LEXAPRO) 10 mg tablet Take 1 tablet by mouth every morning, Starting Thu 8/4/2022, Historical Med      fluticasone (FLONASE) 50 mcg/act nasal spray INSTILL 2 SPRAYS TO EACH NOSTRIL DAILY, Historical Med      loratadine (CLARITIN) 10 mg tablet Take by mouth, Historical Med      metoclopramide (Reglan) 10 mg tablet Take 1 tablet (10 mg total) by mouth every 6 (six) hours as needed (Nausea) for up to 10 doses, Starting Mon 11/28/2022, Normal      ondansetron (ZOFRAN) 4 mg tablet Take 4 mg by mouth, Starting Thu 1/5/2023, Historical Med      pantoprazole (PROTONIX) 20 mg tablet Take 2 tablets (40 mg total) by mouth 2 (two) times a day, Starting Tue 9/6/2022, Normal      tamsulosin (FLOMAX) 0.4 mg Take 1 capsule (0.4 mg total) by mouth daily with dinner, Starting Tue 8/1/2023, Normal      triamcinolone (KENALOG) 0.1 % oral topical paste Apply 1 application to teeth, Starting Mon 1/16/2023, Until Tue 1/16/2024 at 2359, Historical Med             No discharge procedures on file.     PDMP Review       Value Time User    PDMP Reviewed  Yes 9/4/2023 11:07 AM Marva Castleman, MD          ED Provider  Electronically Signed by           Rach Yin PA-C  09/08/23 2007

## 2023-09-09 ENCOUNTER — HOSPITAL ENCOUNTER (EMERGENCY)
Facility: HOSPITAL | Age: 28
Discharge: HOME | DRG: 241 | End: 2023-09-09
Attending: STUDENT IN AN ORGANIZED HEALTH CARE EDUCATION/TRAINING PROGRAM | Admitting: EMERGENCY MEDICINE
Payer: COMMERCIAL

## 2023-09-09 VITALS
DIASTOLIC BLOOD PRESSURE: 85 MMHG | HEART RATE: 69 BPM | TEMPERATURE: 97.5 F | OXYGEN SATURATION: 98 % | SYSTOLIC BLOOD PRESSURE: 115 MMHG | RESPIRATION RATE: 18 BRPM

## 2023-09-09 DIAGNOSIS — R11.0 NAUSEA: ICD-10-CM

## 2023-09-09 DIAGNOSIS — F41.9 ANXIETY: ICD-10-CM

## 2023-09-09 DIAGNOSIS — R33.9 URINARY RETENTION: Primary | ICD-10-CM

## 2023-09-09 LAB
ANION GAP SERPL CALCULATED.3IONS-SCNC: 14 MMOL/L
BACTERIA UR QL AUTO: NORMAL /HPF
BASOPHILS # BLD AUTO: 0.01 THOUSANDS/ÂΜL (ref 0–0.1)
BASOPHILS NFR BLD AUTO: 0 % (ref 0–1)
BILIRUB UR QL STRIP: ABNORMAL
BUN SERPL-MCNC: 16 MG/DL (ref 5–25)
CALCIUM SERPL-MCNC: 9.2 MG/DL (ref 8.4–10.2)
CHLORIDE SERPL-SCNC: 100 MMOL/L (ref 96–108)
CLARITY UR: CLEAR
CO2 SERPL-SCNC: 23 MMOL/L (ref 21–32)
COLOR UR: YELLOW
CREAT SERPL-MCNC: 0.86 MG/DL (ref 0.6–1.3)
EOSINOPHIL # BLD AUTO: 0.01 THOUSAND/ÂΜL (ref 0–0.61)
EOSINOPHIL NFR BLD AUTO: 0 % (ref 0–6)
ERYTHROCYTE [DISTWIDTH] IN BLOOD BY AUTOMATED COUNT: 12.3 % (ref 11.6–15.1)
GFR SERPL CREATININE-BSD FRML MDRD: 118 ML/MIN/1.73SQ M
GLUCOSE SERPL-MCNC: 81 MG/DL (ref 65–140)
GLUCOSE UR STRIP-MCNC: NEGATIVE MG/DL
HCT VFR BLD AUTO: 44.4 % (ref 36.5–49.3)
HGB BLD-MCNC: 15.5 G/DL (ref 12–17)
HGB UR QL STRIP.AUTO: ABNORMAL
IMM GRANULOCYTES # BLD AUTO: 0.02 THOUSAND/UL (ref 0–0.2)
IMM GRANULOCYTES NFR BLD AUTO: 0 % (ref 0–2)
KETONES UR STRIP-MCNC: ABNORMAL MG/DL
LEUKOCYTE ESTERASE UR QL STRIP: NEGATIVE
LYMPHOCYTES # BLD AUTO: 1.23 THOUSANDS/ÂΜL (ref 0.6–4.47)
LYMPHOCYTES NFR BLD AUTO: 18 % (ref 14–44)
MAGNESIUM SERPL-MCNC: 2.1 MG/DL (ref 1.9–2.7)
MCH RBC QN AUTO: 30.6 PG (ref 26.8–34.3)
MCHC RBC AUTO-ENTMCNC: 34.9 G/DL (ref 31.4–37.4)
MCV RBC AUTO: 88 FL (ref 82–98)
MONOCYTES # BLD AUTO: 0.49 THOUSAND/ÂΜL (ref 0.17–1.22)
MONOCYTES NFR BLD AUTO: 7 % (ref 4–12)
NEUTROPHILS # BLD AUTO: 5.22 THOUSANDS/ÂΜL (ref 1.85–7.62)
NEUTS SEG NFR BLD AUTO: 75 % (ref 43–75)
NITRITE UR QL STRIP: NEGATIVE
NON-SQ EPI CELLS URNS QL MICRO: NORMAL /HPF
NRBC BLD AUTO-RTO: 0 /100 WBCS
PH UR STRIP.AUTO: 5.5 [PH]
PLATELET # BLD AUTO: 173 THOUSANDS/UL (ref 149–390)
PMV BLD AUTO: 9.9 FL (ref 8.9–12.7)
POTASSIUM SERPL-SCNC: 3.8 MMOL/L (ref 3.5–5.3)
PROT UR STRIP-MCNC: NEGATIVE MG/DL
RBC # BLD AUTO: 5.07 MILLION/UL (ref 3.88–5.62)
RBC #/AREA URNS AUTO: NORMAL /HPF
SODIUM SERPL-SCNC: 137 MMOL/L (ref 135–147)
SP GR UR STRIP.AUTO: 1.02 (ref 1–1.03)
UROBILINOGEN UR QL STRIP.AUTO: 0.2 E.U./DL
WBC # BLD AUTO: 6.98 THOUSAND/UL (ref 4.31–10.16)
WBC #/AREA URNS AUTO: NORMAL /HPF

## 2023-09-09 PROCEDURE — 96374 THER/PROPH/DIAG INJ IV PUSH: CPT

## 2023-09-09 PROCEDURE — 99284 EMERGENCY DEPT VISIT MOD MDM: CPT | Performed by: STUDENT IN AN ORGANIZED HEALTH CARE EDUCATION/TRAINING PROGRAM

## 2023-09-09 PROCEDURE — 83735 ASSAY OF MAGNESIUM: CPT | Performed by: STUDENT IN AN ORGANIZED HEALTH CARE EDUCATION/TRAINING PROGRAM

## 2023-09-09 PROCEDURE — 80048 BASIC METABOLIC PNL TOTAL CA: CPT | Performed by: STUDENT IN AN ORGANIZED HEALTH CARE EDUCATION/TRAINING PROGRAM

## 2023-09-09 PROCEDURE — 0T9B70Z DRAINAGE OF BLADDER WITH DRAINAGE DEVICE, VIA NATURAL OR ARTIFICIAL OPENING: ICD-10-PCS | Performed by: EMERGENCY MEDICINE

## 2023-09-09 PROCEDURE — 81001 URINALYSIS AUTO W/SCOPE: CPT | Performed by: STUDENT IN AN ORGANIZED HEALTH CARE EDUCATION/TRAINING PROGRAM

## 2023-09-09 PROCEDURE — 96375 TX/PRO/DX INJ NEW DRUG ADDON: CPT

## 2023-09-09 PROCEDURE — C9113 INJ PANTOPRAZOLE SODIUM, VIA: HCPCS | Performed by: EMERGENCY MEDICINE

## 2023-09-09 PROCEDURE — 99283 EMERGENCY DEPT VISIT LOW MDM: CPT

## 2023-09-09 PROCEDURE — 85025 COMPLETE CBC W/AUTO DIFF WBC: CPT | Performed by: STUDENT IN AN ORGANIZED HEALTH CARE EDUCATION/TRAINING PROGRAM

## 2023-09-09 PROCEDURE — 36415 COLL VENOUS BLD VENIPUNCTURE: CPT | Performed by: STUDENT IN AN ORGANIZED HEALTH CARE EDUCATION/TRAINING PROGRAM

## 2023-09-09 RX ORDER — ONDANSETRON 4 MG/1
4 TABLET, FILM COATED ORAL EVERY 6 HOURS PRN
Qty: 12 TABLET | Refills: 0 | Status: SHIPPED | OUTPATIENT
Start: 2023-09-09 | End: 2023-09-19

## 2023-09-09 RX ORDER — METOCLOPRAMIDE HYDROCHLORIDE 5 MG/ML
10 INJECTION INTRAMUSCULAR; INTRAVENOUS ONCE
Status: COMPLETED | OUTPATIENT
Start: 2023-09-09 | End: 2023-09-09

## 2023-09-09 RX ORDER — PANTOPRAZOLE SODIUM 40 MG/10ML
40 INJECTION, POWDER, LYOPHILIZED, FOR SOLUTION INTRAVENOUS ONCE
Status: COMPLETED | OUTPATIENT
Start: 2023-09-09 | End: 2023-09-09

## 2023-09-09 RX ORDER — LORAZEPAM 2 MG/ML
0.25 INJECTION INTRAMUSCULAR ONCE
Status: COMPLETED | OUTPATIENT
Start: 2023-09-09 | End: 2023-09-09

## 2023-09-09 RX ORDER — ONDANSETRON 4 MG/1
4 TABLET, ORALLY DISINTEGRATING ORAL ONCE
Status: COMPLETED | OUTPATIENT
Start: 2023-09-09 | End: 2023-09-09

## 2023-09-09 RX ORDER — ONDANSETRON 2 MG/ML
4 INJECTION INTRAMUSCULAR; INTRAVENOUS ONCE
Status: COMPLETED | OUTPATIENT
Start: 2023-09-09 | End: 2023-09-09

## 2023-09-09 RX ADMIN — PANTOPRAZOLE SODIUM 40 MG: 40 INJECTION, POWDER, FOR SOLUTION INTRAVENOUS at 16:24

## 2023-09-09 RX ADMIN — ONDANSETRON 4 MG: 2 INJECTION INTRAMUSCULAR; INTRAVENOUS at 17:21

## 2023-09-09 RX ADMIN — LORAZEPAM 0.25 MG: 2 INJECTION INTRAMUSCULAR; INTRAVENOUS at 16:25

## 2023-09-09 RX ADMIN — ONDANSETRON 4 MG: 4 TABLET, ORALLY DISINTEGRATING ORAL at 14:22

## 2023-09-09 RX ADMIN — METOCLOPRAMIDE HYDROCHLORIDE 10 MG: 5 INJECTION INTRAMUSCULAR; INTRAVENOUS at 15:27

## 2023-09-09 NOTE — DISCHARGE INSTRUCTIONS
Continue all prescribed medications. Contact the urologist Monday to set up the appointment. Return to the emergency department for any concerning signs or symptoms.

## 2023-09-09 NOTE — ED PROVIDER NOTES
History  Chief Complaint   Patient presents with   • Possible UTI     Pt reports burning on urination. Seen at PCP and given antibiotics. Pt states he cannot tolerate them d/t n/v. History provided by:  Patient, caregiver and medical records     27-year-old male. History of BPH with associated urinary retention. Presents with inability to void. Was evaluated in the ED yesterday. Was able to spontaneously void without difficulty in the ED. Worsening symptoms since last night. Hasn't been able to void since 1030 PM last night. He reports that he has had intermittent chills over the last 48 hours. Hasn't taken two doses of Flomax due to feeling ill. The patinet also states that he was recently prescribed a course of antibiotics by his PCP for tx of a urinary tract infection. Does not know which antibiotic he was prescribed. Bladder scan 353 mL. Prior to Admission Medications   Prescriptions Last Dose Informant Patient Reported?  Taking?   escitalopram (LEXAPRO) 10 mg tablet  Self Yes No   Sig: Take 1 tablet by mouth every morning   fluticasone (FLONASE) 50 mcg/act nasal spray  Self Yes No   Sig: INSTILL 2 SPRAYS TO EACH NOSTRIL DAILY   loratadine (CLARITIN) 10 mg tablet  Self Yes No   Sig: Take by mouth   metoclopramide (Reglan) 10 mg tablet  Self No No   Sig: Take 1 tablet (10 mg total) by mouth every 6 (six) hours as needed (Nausea) for up to 10 doses   ondansetron (ZOFRAN) 4 mg tablet  Self Yes No   Sig: Take 4 mg by mouth   pantoprazole (PROTONIX) 20 mg tablet  Self No No   Sig: Take 2 tablets (40 mg total) by mouth 2 (two) times a day   tamsulosin (FLOMAX) 0.4 mg   No No   Sig: Take 1 capsule (0.4 mg total) by mouth daily with dinner   triamcinolone (KENALOG) 0.1 % oral topical paste  Self Yes No   Sig: Apply 1 application to teeth      Facility-Administered Medications: None       Past Medical History:   Diagnosis Date   • Autistic disorder    • BPH (benign prostatic hypertrophy) 9/8/22   • Gastric paresis    • GERD (gastroesophageal reflux disease)    • Known health problems: none    • Urinary retention 9/8/22   • Urinary tract infection        Past Surgical History:   Procedure Laterality Date   • EYE SURGERY     • FOOT SURGERY         Family History   Problem Relation Age of Onset   • No Known Problems Mother    • No Known Problems Father    • Heart attack Paternal Grandfather    • Hypertension Paternal Grandfather      I have reviewed and agree with the history as documented. E-Cigarette/Vaping   • E-Cigarette Use Never User      E-Cigarette/Vaping Substances   • Nicotine No    • THC No    • CBD No    • Flavoring No    • Other No    • Unknown No      Social History     Tobacco Use   • Smoking status: Never   • Smokeless tobacco: Never   Vaping Use   • Vaping Use: Never used   Substance Use Topics   • Alcohol use: Not Currently   • Drug use: Never       Review of Systems   Constitutional: Positive for activity change, appetite change, chills and fatigue. Negative for fever. Respiratory: Negative for cough, chest tightness, shortness of breath and wheezing. Cardiovascular: Negative for chest pain and palpitations. Gastrointestinal: Positive for abdominal pain. Negative for diarrhea, nausea and vomiting. Genitourinary: Positive for decreased urine volume and difficulty urinating. Negative for dysuria, flank pain, frequency and urgency. Musculoskeletal: Negative for back pain, myalgias, neck pain and neck stiffness. Skin: Negative for color change, pallor, rash and wound. Neurological: Negative for dizziness, syncope, weakness and headaches. All other systems reviewed and are negative. Physical Exam  Physical Exam  Vitals and nursing note reviewed. Constitutional:       General: He is not in acute distress. Appearance: He is not ill-appearing or toxic-appearing. HENT:      Head: Normocephalic and atraumatic.       Right Ear: External ear normal.      Left Ear: External ear normal.      Nose: No congestion or rhinorrhea. Eyes:      General:         Right eye: No discharge. Left eye: No discharge. Extraocular Movements: Extraocular movements intact. Conjunctiva/sclera: Conjunctivae normal.   Cardiovascular:      Rate and Rhythm: Normal rate and regular rhythm. Pulses: Normal pulses. Heart sounds: Normal heart sounds. No murmur heard. Pulmonary:      Effort: Pulmonary effort is normal. No respiratory distress. Breath sounds: Normal breath sounds. No stridor. No wheezing, rhonchi or rales. Chest:      Chest wall: No tenderness. Abdominal:      General: Abdomen is flat. Bowel sounds are normal. There is no distension. Palpations: Abdomen is soft. There is no mass. Tenderness: There is abdominal tenderness. There is no right CVA tenderness, left CVA tenderness, guarding or rebound. Hernia: No hernia is present. Comments: Tenderness to palpation along the lower abdomen. Musculoskeletal:         General: No swelling, tenderness, deformity or signs of injury. Cervical back: Neck supple. No tenderness. Right lower leg: No edema. Left lower leg: No edema. Skin:     General: Skin is warm and dry. Capillary Refill: Capillary refill takes less than 2 seconds. Coloration: Skin is not jaundiced or pale. Findings: No bruising, erythema or rash. Neurological:      General: No focal deficit present. Mental Status: He is alert and oriented to person, place, and time. Cranial Nerves: No cranial nerve deficit. Sensory: No sensory deficit. Motor: No weakness. Psychiatric:         Mood and Affect: Mood normal.         Behavior: Behavior normal.         Thought Content:  Thought content normal.         Judgment: Judgment normal.         Vital Signs  ED Triage Vitals [09/09/23 1324]   Temperature Pulse Respirations Blood Pressure SpO2   97.5 °F (36.4 °C) 69 18 115/85 98 %      Temp Source Heart Rate Source Patient Position - Orthostatic VS BP Location FiO2 (%)   Temporal Monitor Sitting Left arm --      Pain Score       --           Vitals:    09/09/23 1324   BP: 115/85   Pulse: 69   Patient Position - Orthostatic VS: Sitting         Visual Acuity      ED Medications  Medications - No data to display    Diagnostic Studies  Results Reviewed     Procedure Component Value Units Date/Time    CBC and differential [[id number]] Collected: 09/09/23 1515    Lab Status: Final result Specimen: Blood from Arm, Right Updated: 09/09/23 1519     WBC 6.98 Thousand/uL      RBC 5.07 Million/uL      Hemoglobin 15.5 g/dL      Hematocrit 44.4 %      MCV 88 fL      MCH 30.6 pg      MCHC 34.9 g/dL      RDW 12.3 %      MPV 9.9 fL      Platelets 077 Thousands/uL      nRBC 0 /100 WBCs      Neutrophils Relative 75 %      Immat GRANS % 0 %      Lymphocytes Relative 18 %      Monocytes Relative 7 %      Eosinophils Relative 0 %      Basophils Relative 0 %      Neutrophils Absolute 5.22 Thousands/µL      Immature Grans Absolute 0.02 Thousand/uL      Lymphocytes Absolute 1.23 Thousands/µL      Monocytes Absolute 0.49 Thousand/µL      Eosinophils Absolute 0.01 Thousand/µL      Basophils Absolute 0.01 Thousands/µL     Basic metabolic panel [[id number]] Collected: 09/09/23 1515    Lab Status: In process Specimen: Blood from Arm, Right Updated: 09/09/23 1517    Magnesium [[id number]] Collected: 09/09/23 1515    Lab Status:  In process Specimen: Blood from Arm, Right Updated: 09/09/23 1517    Urine Microscopic [[id number]]  (Normal) Collected: 09/09/23 1403    Lab Status: Final result Specimen: Urine, Straight Cath Updated: 09/09/23 1435     RBC, UA 0-1 /hpf      WBC, UA 1-2 /hpf      Epithelial Cells Occasional /hpf      Bacteria, UA Occasional /hpf     UA w Reflex to Microscopic w Reflex to Culture [[id number]]  (Abnormal) Collected: 09/09/23 1403    Lab Status: Final result Specimen: Urine, Straight Cath Updated: 09/09/23 1413     Color, UA Yellow     Clarity, UA Clear     Specific Gravity, UA 1.025     pH, UA 5.5     Leukocytes, UA Negative     Nitrite, UA Negative     Protein, UA Negative mg/dl      Glucose, UA Negative mg/dl      Ketones, UA >=80 (3+) mg/dl      Urobilinogen, UA 0.2 E.U./dl      Bilirubin, UA Small     Occult Blood, UA Trace-Intact             No orders to display          Procedures  Procedures     ED Course  ED Course as of 09/09/23 1523   Sat Sep 09, 2023   1335 Bladder scan 353 mL    1417 UA non infectious appearing   1506 Upon reevaluation, the patient continues to feel nauseous despite Zofran ODT. Will obtain labs and administer a dose of IV Reglan. SBIRT 22yo+    Flowsheet Row Most Recent Value   Initial Alcohol Screen: US AUDIT-C     1. How often do you have a drink containing alcohol? 0 Filed at: 09/09/2023 1326   2. How many drinks containing alcohol do you have on a typical day you are drinking? 0 Filed at: 09/09/2023 1326   3a. Male UNDER 65: How often do you have five or more drinks on one occasion? 0 Filed at: 09/09/2023 1326   Audit-C Score 0 Filed at: 09/09/2023 1326   BATSHEVA: How many times in the past year have you. .. Used an illegal drug or used a prescription medication for non-medical reasons? Never Filed at: 09/09/2023 1326        Medical Decision Making  The differential diagnoses include but are not limited to BPH, JHONNY, cystitis, pyelonephritis  Vital signs reviewed. Bladder scan 353 mL. Unable to void--castaneda catheter inserted with clear/yellow urine output. UA non infectious appearing. The patient continued to be nauseous despite PO Zofran. IV Reglan administered. Labs are pending. The patient was signed out to Dr. Pierre Lynch. Plan discussed. Amount and/or Complexity of Data Reviewed  Labs: ordered. Risk  Prescription drug management.           Disposition  Final diagnoses:   Urinary retention   Nausea     Time reflects when diagnosis was documented in both MDM as applicable and the Disposition within this note     Time User Action Codes Description Comment    9/9/2023  3:20 PM Tashi Joseph Add [R33.9] Urinary retention     9/9/2023  3:27 PM Danica Kay Add [R11.0] Nausea       ED Disposition     None      Follow-up Information    None         Patient's Medications   Discharge Prescriptions    No medications on file       No discharge procedures on file.     PDMP Review       Value Time User    PDMP Reviewed  Yes 9/4/2023 11:07 AM Jose Maria Kee MD          ED Provider  Electronically Signed by           Sherine Ayala DO  09/09/23 1529

## 2023-09-09 NOTE — ED NOTES
Educated patient on castaneda care and how to empty urine collection bag.       Lori North, RN  09/09/23 2106

## 2023-09-10 ENCOUNTER — APPOINTMENT (EMERGENCY)
Dept: CT IMAGING | Facility: HOSPITAL | Age: 28
DRG: 241 | End: 2023-09-10
Payer: COMMERCIAL

## 2023-09-10 ENCOUNTER — HOSPITAL ENCOUNTER (INPATIENT)
Facility: HOSPITAL | Age: 28
LOS: 9 days | Discharge: HOME | DRG: 241 | End: 2023-09-19
Attending: EMERGENCY MEDICINE | Admitting: FAMILY MEDICINE
Payer: COMMERCIAL

## 2023-09-10 DIAGNOSIS — N30.00 ACUTE CYSTITIS WITHOUT HEMATURIA: ICD-10-CM

## 2023-09-10 DIAGNOSIS — R33.9 URINARY RETENTION: ICD-10-CM

## 2023-09-10 DIAGNOSIS — N39.0 UTI (URINARY TRACT INFECTION): ICD-10-CM

## 2023-09-10 DIAGNOSIS — R33.8 ACUTE URINARY RETENTION: ICD-10-CM

## 2023-09-10 DIAGNOSIS — R11.2 NAUSEA AND VOMITING, UNSPECIFIED VOMITING TYPE: ICD-10-CM

## 2023-09-10 DIAGNOSIS — R11.2 INTRACTABLE NAUSEA AND VOMITING: Primary | ICD-10-CM

## 2023-09-10 DIAGNOSIS — E87.20 METABOLIC ACIDOSIS: ICD-10-CM

## 2023-09-10 LAB
ALBUMIN SERPL BCP-MCNC: 4.6 G/DL (ref 3.5–5)
ALP SERPL-CCNC: 69 U/L (ref 34–104)
ALT SERPL W P-5'-P-CCNC: 10 U/L (ref 7–52)
ANION GAP SERPL CALCULATED.3IONS-SCNC: 16 MMOL/L
APTT PPP: 31 SECONDS (ref 23–37)
AST SERPL W P-5'-P-CCNC: 11 U/L (ref 13–39)
BACTERIA UR QL AUTO: NORMAL /HPF
BASOPHILS # BLD AUTO: 0.02 THOUSANDS/ÂΜL (ref 0–0.1)
BASOPHILS NFR BLD AUTO: 0 % (ref 0–1)
BILIRUB SERPL-MCNC: 0.8 MG/DL (ref 0.2–1)
BILIRUB UR QL STRIP: ABNORMAL
BUN SERPL-MCNC: 15 MG/DL (ref 5–25)
CALCIUM SERPL-MCNC: 9.3 MG/DL (ref 8.4–10.2)
CHLORIDE SERPL-SCNC: 100 MMOL/L (ref 96–108)
CLARITY UR: CLEAR
CO2 SERPL-SCNC: 18 MMOL/L (ref 21–32)
COLOR UR: ABNORMAL
CREAT SERPL-MCNC: 0.95 MG/DL (ref 0.6–1.3)
EOSINOPHIL # BLD AUTO: 0.01 THOUSAND/ÂΜL (ref 0–0.61)
EOSINOPHIL NFR BLD AUTO: 0 % (ref 0–6)
ERYTHROCYTE [DISTWIDTH] IN BLOOD BY AUTOMATED COUNT: 12.3 % (ref 11.6–15.1)
GFR SERPL CREATININE-BSD FRML MDRD: 109 ML/MIN/1.73SQ M
GLUCOSE SERPL-MCNC: 72 MG/DL (ref 65–140)
GLUCOSE UR STRIP-MCNC: NEGATIVE MG/DL
HCT VFR BLD AUTO: 46.8 % (ref 36.5–49.3)
HGB BLD-MCNC: 15.6 G/DL (ref 12–17)
HGB UR QL STRIP.AUTO: ABNORMAL
IMM GRANULOCYTES # BLD AUTO: 0.03 THOUSAND/UL (ref 0–0.2)
IMM GRANULOCYTES NFR BLD AUTO: 0 % (ref 0–2)
INR PPP: 1.01 (ref 0.84–1.19)
KETONES UR STRIP-MCNC: ABNORMAL MG/DL
LACTATE SERPL-SCNC: 0.8 MMOL/L (ref 0.5–2)
LEUKOCYTE ESTERASE UR QL STRIP: ABNORMAL
LIPASE SERPL-CCNC: 13 U/L (ref 11–82)
LYMPHOCYTES # BLD AUTO: 1.67 THOUSANDS/ÂΜL (ref 0.6–4.47)
LYMPHOCYTES NFR BLD AUTO: 24 % (ref 14–44)
MAGNESIUM SERPL-MCNC: 2 MG/DL (ref 1.9–2.7)
MCH RBC QN AUTO: 29.9 PG (ref 26.8–34.3)
MCHC RBC AUTO-ENTMCNC: 33.3 G/DL (ref 31.4–37.4)
MCV RBC AUTO: 90 FL (ref 82–98)
MONOCYTES # BLD AUTO: 0.58 THOUSAND/ÂΜL (ref 0.17–1.22)
MONOCYTES NFR BLD AUTO: 8 % (ref 4–12)
NEUTROPHILS # BLD AUTO: 4.8 THOUSANDS/ÂΜL (ref 1.85–7.62)
NEUTS SEG NFR BLD AUTO: 68 % (ref 43–75)
NITRITE UR QL STRIP: POSITIVE
NON-SQ EPI CELLS URNS QL MICRO: NORMAL /HPF
NRBC BLD AUTO-RTO: 0 /100 WBCS
PH UR STRIP.AUTO: 5 [PH]
PLATELET # BLD AUTO: 184 THOUSANDS/UL (ref 149–390)
PMV BLD AUTO: 10 FL (ref 8.9–12.7)
POTASSIUM SERPL-SCNC: 4.1 MMOL/L (ref 3.5–5.3)
PROT SERPL-MCNC: 7.8 G/DL (ref 6.4–8.4)
PROT UR STRIP-MCNC: ABNORMAL MG/DL
PROTHROMBIN TIME: 13.6 SECONDS (ref 11.6–14.5)
RBC # BLD AUTO: 5.21 MILLION/UL (ref 3.88–5.62)
RBC #/AREA URNS AUTO: NORMAL /HPF
SODIUM SERPL-SCNC: 134 MMOL/L (ref 135–147)
SP GR UR STRIP.AUTO: >=1.03 (ref 1–1.03)
UROBILINOGEN UR QL STRIP.AUTO: 1 E.U./DL
WBC # BLD AUTO: 7.11 THOUSAND/UL (ref 4.31–10.16)
WBC #/AREA URNS AUTO: NORMAL /HPF

## 2023-09-10 PROCEDURE — 87077 CULTURE AEROBIC IDENTIFY: CPT | Performed by: FAMILY MEDICINE

## 2023-09-10 PROCEDURE — 96368 THER/DIAG CONCURRENT INF: CPT

## 2023-09-10 PROCEDURE — 36415 COLL VENOUS BLD VENIPUNCTURE: CPT | Performed by: EMERGENCY MEDICINE

## 2023-09-10 PROCEDURE — 83605 ASSAY OF LACTIC ACID: CPT | Performed by: EMERGENCY MEDICINE

## 2023-09-10 PROCEDURE — 99223 1ST HOSP IP/OBS HIGH 75: CPT | Performed by: FAMILY MEDICINE

## 2023-09-10 PROCEDURE — 87086 URINE CULTURE/COLONY COUNT: CPT | Performed by: FAMILY MEDICINE

## 2023-09-10 PROCEDURE — 96361 HYDRATE IV INFUSION ADD-ON: CPT

## 2023-09-10 PROCEDURE — 85025 COMPLETE CBC W/AUTO DIFF WBC: CPT | Performed by: EMERGENCY MEDICINE

## 2023-09-10 PROCEDURE — 93005 ELECTROCARDIOGRAM TRACING: CPT

## 2023-09-10 PROCEDURE — 99285 EMERGENCY DEPT VISIT HI MDM: CPT | Performed by: EMERGENCY MEDICINE

## 2023-09-10 PROCEDURE — 83735 ASSAY OF MAGNESIUM: CPT | Performed by: EMERGENCY MEDICINE

## 2023-09-10 PROCEDURE — 81001 URINALYSIS AUTO W/SCOPE: CPT | Performed by: EMERGENCY MEDICINE

## 2023-09-10 PROCEDURE — 85610 PROTHROMBIN TIME: CPT | Performed by: EMERGENCY MEDICINE

## 2023-09-10 PROCEDURE — C9113 INJ PANTOPRAZOLE SODIUM, VIA: HCPCS | Performed by: FAMILY MEDICINE

## 2023-09-10 PROCEDURE — 80053 COMPREHEN METABOLIC PANEL: CPT | Performed by: EMERGENCY MEDICINE

## 2023-09-10 PROCEDURE — G1004 CDSM NDSC: HCPCS

## 2023-09-10 PROCEDURE — 87186 SC STD MICRODIL/AGAR DIL: CPT | Performed by: FAMILY MEDICINE

## 2023-09-10 PROCEDURE — 83690 ASSAY OF LIPASE: CPT | Performed by: EMERGENCY MEDICINE

## 2023-09-10 PROCEDURE — 96365 THER/PROPH/DIAG IV INF INIT: CPT

## 2023-09-10 PROCEDURE — 96375 TX/PRO/DX INJ NEW DRUG ADDON: CPT

## 2023-09-10 PROCEDURE — 85730 THROMBOPLASTIN TIME PARTIAL: CPT | Performed by: EMERGENCY MEDICINE

## 2023-09-10 PROCEDURE — 74177 CT ABD & PELVIS W/CONTRAST: CPT

## 2023-09-10 PROCEDURE — 99284 EMERGENCY DEPT VISIT MOD MDM: CPT

## 2023-09-10 RX ORDER — SODIUM CHLORIDE, SODIUM GLUCONATE, SODIUM ACETATE, POTASSIUM CHLORIDE, MAGNESIUM CHLORIDE, SODIUM PHOSPHATE, DIBASIC, AND POTASSIUM PHOSPHATE .53; .5; .37; .037; .03; .012; .00082 G/100ML; G/100ML; G/100ML; G/100ML; G/100ML; G/100ML; G/100ML
150 INJECTION, SOLUTION INTRAVENOUS CONTINUOUS
Status: DISCONTINUED | OUTPATIENT
Start: 2023-09-10 | End: 2023-09-12

## 2023-09-10 RX ORDER — LORATADINE 10 MG/1
10 TABLET ORAL DAILY
Status: DISCONTINUED | OUTPATIENT
Start: 2023-09-11 | End: 2023-09-19 | Stop reason: HOSPADM

## 2023-09-10 RX ORDER — FLUTICASONE PROPIONATE 50 MCG
1 SPRAY, SUSPENSION (ML) NASAL DAILY
Status: DISCONTINUED | OUTPATIENT
Start: 2023-09-11 | End: 2023-09-19 | Stop reason: HOSPADM

## 2023-09-10 RX ORDER — ESCITALOPRAM OXALATE 10 MG/1
10 TABLET ORAL DAILY
Status: DISCONTINUED | OUTPATIENT
Start: 2023-09-11 | End: 2023-09-19 | Stop reason: HOSPADM

## 2023-09-10 RX ORDER — FAMOTIDINE 10 MG/ML
20 INJECTION, SOLUTION INTRAVENOUS ONCE
Status: COMPLETED | OUTPATIENT
Start: 2023-09-10 | End: 2023-09-10

## 2023-09-10 RX ORDER — PANTOPRAZOLE SODIUM 40 MG/10ML
40 INJECTION, POWDER, LYOPHILIZED, FOR SOLUTION INTRAVENOUS EVERY 12 HOURS SCHEDULED
Status: DISCONTINUED | OUTPATIENT
Start: 2023-09-10 | End: 2023-09-19

## 2023-09-10 RX ORDER — ACETAMINOPHEN 325 MG/1
650 TABLET ORAL EVERY 6 HOURS PRN
Status: DISCONTINUED | OUTPATIENT
Start: 2023-09-10 | End: 2023-09-19 | Stop reason: HOSPADM

## 2023-09-10 RX ORDER — TAMSULOSIN HYDROCHLORIDE 0.4 MG/1
0.4 CAPSULE ORAL
Status: DISCONTINUED | OUTPATIENT
Start: 2023-09-10 | End: 2023-09-14

## 2023-09-10 RX ORDER — METOCLOPRAMIDE HYDROCHLORIDE 5 MG/ML
5 INJECTION INTRAMUSCULAR; INTRAVENOUS
Status: DISCONTINUED | OUTPATIENT
Start: 2023-09-10 | End: 2023-09-12

## 2023-09-10 RX ORDER — CEFTRIAXONE 1 G/50ML
1000 INJECTION, SOLUTION INTRAVENOUS EVERY 24 HOURS
Status: DISCONTINUED | OUTPATIENT
Start: 2023-09-11 | End: 2023-09-12

## 2023-09-10 RX ORDER — METOCLOPRAMIDE HYDROCHLORIDE 5 MG/ML
10 INJECTION INTRAMUSCULAR; INTRAVENOUS ONCE
Status: COMPLETED | OUTPATIENT
Start: 2023-09-10 | End: 2023-09-10

## 2023-09-10 RX ORDER — CEFAZOLIN SODIUM 1 G/50ML
1000 SOLUTION INTRAVENOUS ONCE
Status: COMPLETED | OUTPATIENT
Start: 2023-09-10 | End: 2023-09-10

## 2023-09-10 RX ORDER — DIPHENHYDRAMINE HYDROCHLORIDE 50 MG/ML
25 INJECTION INTRAMUSCULAR; INTRAVENOUS ONCE
Status: COMPLETED | OUTPATIENT
Start: 2023-09-10 | End: 2023-09-10

## 2023-09-10 RX ADMIN — METOCLOPRAMIDE HYDROCHLORIDE 5 MG: 5 INJECTION INTRAMUSCULAR; INTRAVENOUS at 17:12

## 2023-09-10 RX ADMIN — SODIUM CHLORIDE, SODIUM GLUCONATE, SODIUM ACETATE, POTASSIUM CHLORIDE, MAGNESIUM CHLORIDE, SODIUM PHOSPHATE, DIBASIC, AND POTASSIUM PHOSPHATE 150 ML/HR: .53; .5; .37; .037; .03; .012; .00082 INJECTION, SOLUTION INTRAVENOUS at 15:08

## 2023-09-10 RX ADMIN — CEFAZOLIN SODIUM 1000 MG: 1 SOLUTION INTRAVENOUS at 15:06

## 2023-09-10 RX ADMIN — SODIUM CHLORIDE 1000 ML: 0.9 INJECTION, SOLUTION INTRAVENOUS at 14:31

## 2023-09-10 RX ADMIN — TAMSULOSIN HYDROCHLORIDE 0.4 MG: 0.4 CAPSULE ORAL at 17:12

## 2023-09-10 RX ADMIN — SODIUM CHLORIDE, SODIUM GLUCONATE, SODIUM ACETATE, POTASSIUM CHLORIDE, MAGNESIUM CHLORIDE, SODIUM PHOSPHATE, DIBASIC, AND POTASSIUM PHOSPHATE 150 ML/HR: .53; .5; .37; .037; .03; .012; .00082 INJECTION, SOLUTION INTRAVENOUS at 19:39

## 2023-09-10 RX ADMIN — DIPHENHYDRAMINE HYDROCHLORIDE 25 MG: 50 INJECTION, SOLUTION INTRAMUSCULAR; INTRAVENOUS at 14:31

## 2023-09-10 RX ADMIN — IOHEXOL 100 ML: 350 INJECTION, SOLUTION INTRAVENOUS at 14:36

## 2023-09-10 RX ADMIN — PANTOPRAZOLE SODIUM 40 MG: 40 INJECTION, POWDER, FOR SOLUTION INTRAVENOUS at 21:55

## 2023-09-10 RX ADMIN — FAMOTIDINE 20 MG: 10 INJECTION, SOLUTION INTRAVENOUS at 14:31

## 2023-09-10 RX ADMIN — METOCLOPRAMIDE HYDROCHLORIDE 10 MG: 5 INJECTION INTRAMUSCULAR; INTRAVENOUS at 14:31

## 2023-09-10 NOTE — PLAN OF CARE
Problem: Potential for Falls  Goal: Patient will remain free of falls  Description: INTERVENTIONS:  - Educate patient/family on patient safety including physical limitations  - Instruct patient to call for assistance with activity   - Consult OT/PT to assist with strengthening/mobility   - Keep Call bell within reach  - Keep bed low and locked with side rails adjusted as appropriate  - Keep care items and personal belongings within reach  - Initiate and maintain comfort rounds  - Make Fall Risk Sign visible to staff  - Offer Toileting every 2 Hours, in advance of need  - Apply yellow socks and bracelet for high fall risk patients  - Consider moving patient to room near nurses station  Outcome: Progressing     Problem: PAIN - ADULT  Goal: Verbalizes/displays adequate comfort level or baseline comfort level  Description: Interventions:  - Encourage patient to monitor pain and request assistance  - Assess pain using appropriate pain scale  - Administer analgesics based on type and severity of pain and evaluate response  - Implement non-pharmacological measures as appropriate and evaluate response  - Consider cultural and social influences on pain and pain management  - Notify physician/advanced practitioner if interventions unsuccessful or patient reports new pain  Outcome: Progressing     Problem: INFECTION - ADULT  Goal: Absence or prevention of progression during hospitalization  Description: INTERVENTIONS:  - Assess and monitor for signs and symptoms of infection  - Monitor lab/diagnostic results  - Monitor all insertion sites, i.e. indwelling lines, tubes, and drains  - Monitor endotracheal if appropriate and nasal secretions for changes in amount and color  - [place] appropriate cooling/warming therapies per order  - Administer medications as ordered  - Instruct and encourage patient and family to use good hand hygiene technique  - Identify and instruct in appropriate isolation precautions for identified infection/condition  Outcome: Progressing  Goal: Absence of fever/infection during neutropenic period  Description: INTERVENTIONS:  - Monitor WBC    Outcome: Progressing     Problem: DISCHARGE PLANNING  Goal: Discharge to home or other facility with appropriate resources  Description: INTERVENTIONS:  - Identify barriers to discharge w/patient and caregiver  - Arrange for needed discharge resources and transportation as appropriate  - Identify discharge learning needs (meds, wound care, etc.)  - Arrange for interpretive services to assist at discharge as needed  - Refer to Case Management Department for coordinating discharge planning if the patient needs post-hospital services based on physician/advanced practitioner order or complex needs related to functional status, cognitive ability, or social support system  Outcome: Progressing

## 2023-09-10 NOTE — PLAN OF CARE
Problem: Potential for Falls  Goal: Patient will remain free of falls  Description: INTERVENTIONS:  - Educate patient/family on patient safety including physical limitations  - Instruct patient to call for assistance with activity   - Consult OT/PT to assist with strengthening/mobility   - Keep Call bell within reach  - Keep bed low and locked with side rails adjusted as appropriate  - Keep care items and personal belongings within reach  - Initiate and maintain comfort rounds  - Make Fall Risk Sign visible to staff  - Offer Toileting every 2 Hours, in advance of need  - Initiate/Maintain bed/chair alarm  - Obtain necessary fall risk management equipment:   - Apply yellow socks and bracelet for high fall risk patients  - Consider moving patient to room near nurses station  Outcome: Progressing     Problem: PAIN - ADULT  Goal: Verbalizes/displays adequate comfort level or baseline comfort level  Description: Interventions:  - Encourage patient to monitor pain and request assistance  - Assess pain using appropriate pain scale  - Administer analgesics based on type and severity of pain and evaluate response  - Implement non-pharmacological measures as appropriate and evaluate response  - Consider cultural and social influences on pain and pain management  - Notify physician/advanced practitioner if interventions unsuccessful or patient reports new pain  Outcome: Progressing     Problem: INFECTION - ADULT  Goal: Absence or prevention of progression during hospitalization  Description: INTERVENTIONS:  - Assess and monitor for signs and symptoms of infection  - Monitor lab/diagnostic results  - Monitor all insertion sites, i.e. indwelling lines, tubes, and drains  - Monitor endotracheal if appropriate and nasal secretions for changes in amount and color  - [place] appropriate cooling/warming therapies per order  - Administer medications as ordered  - Instruct and encourage patient and family to use good hand hygiene technique  - Identify and instruct in appropriate isolation precautions for identified infection/condition  Outcome: Progressing  Goal: Absence of fever/infection during neutropenic period  Description: INTERVENTIONS:  - Monitor WBC    Outcome: Progressing     Problem: DISCHARGE PLANNING  Goal: Discharge to home or other facility with appropriate resources  Description: INTERVENTIONS:  - Identify barriers to discharge w/patient and caregiver  - Arrange for needed discharge resources and transportation as appropriate  - Identify discharge learning needs (meds, wound care, etc.)  - Arrange for interpretive services to assist at discharge as needed  - Refer to Case Management Department for coordinating discharge planning if the patient needs post-hospital services based on physician/advanced practitioner order or complex needs related to functional status, cognitive ability, or social support system  Outcome: Progressing

## 2023-09-10 NOTE — ED PROVIDER NOTES
History  Chief Complaint   Patient presents with   • Vomiting     Pt endorses vomiting blood, coffee ground per patient; unable to tolerate PO intake     Patient is a 71-year-old male history of BPH autism GERD gastroparesis no prior abdominal surgery presents the emergency department complaining of epigastric abdominal pain nausea and vomiting and felt like there was blood in vomit and dark vomit. Patient reports unable to tolerate any oral food or fluids over the past 2 days was seen in the emergency department 3 times now in the past 2 days reports that every time he goes back home he feels worse yesterday also could not empty bladder and required Joseph catheter which was placed in the ED patient has had Joseph catheter in the past follows with Dr. Tres Vigil for urology. History provided by:  Patient and parent  Abdominal Pain  Pain location:  Epigastric  Pain quality: aching and cramping    Pain severity:  Moderate  Onset quality:  Gradual  Duration:  3 days  Timing:  Constant  Progression:  Worsening  Chronicity:  Recurrent  Associated symptoms: nausea and vomiting    Associated symptoms: no chest pain, no chills, no constipation, no cough, no diarrhea, no dysuria, no fatigue, no fever, no hematuria, no shortness of breath and no sore throat        Prior to Admission Medications   Prescriptions Last Dose Informant Patient Reported?  Taking?   escitalopram (LEXAPRO) 10 mg tablet  Self Yes No   Sig: Take 1 tablet by mouth every morning   fluticasone (FLONASE) 50 mcg/act nasal spray  Self Yes No   Sig: INSTILL 2 SPRAYS TO EACH NOSTRIL DAILY   loratadine (CLARITIN) 10 mg tablet  Self Yes No   Sig: Take by mouth   metoclopramide (Reglan) 10 mg tablet  Self No No   Sig: Take 1 tablet (10 mg total) by mouth every 6 (six) hours as needed (Nausea) for up to 10 doses   ondansetron (ZOFRAN) 4 mg tablet  Self Yes No   Sig: Take 4 mg by mouth   ondansetron (ZOFRAN) 4 mg tablet   No No   Sig: Take 1 tablet (4 mg total) by mouth every 6 (six) hours as needed for nausea or vomiting   pantoprazole (PROTONIX) 20 mg tablet  Self No No   Sig: Take 2 tablets (40 mg total) by mouth 2 (two) times a day   tamsulosin (FLOMAX) 0.4 mg   No No   Sig: Take 1 capsule (0.4 mg total) by mouth daily with dinner   triamcinolone (KENALOG) 0.1 % oral topical paste  Self Yes No   Sig: Apply 1 application to teeth      Facility-Administered Medications: None       Past Medical History:   Diagnosis Date   • Autistic disorder    • BPH (benign prostatic hypertrophy) 9/8/22   • Gastric paresis    • GERD (gastroesophageal reflux disease)    • Known health problems: none    • Urinary retention 9/8/22   • Urinary tract infection        Past Surgical History:   Procedure Laterality Date   • EYE SURGERY     • FOOT SURGERY         Family History   Problem Relation Age of Onset   • No Known Problems Mother    • No Known Problems Father    • Heart attack Paternal Grandfather    • Hypertension Paternal Grandfather      I have reviewed and agree with the history as documented. E-Cigarette/Vaping   • E-Cigarette Use Never User      E-Cigarette/Vaping Substances   • Nicotine No    • THC No    • CBD No    • Flavoring No    • Other No    • Unknown No      Social History     Tobacco Use   • Smoking status: Never   • Smokeless tobacco: Never   Vaping Use   • Vaping Use: Never used   Substance Use Topics   • Alcohol use: Not Currently   • Drug use: Never       Review of Systems   Constitutional: Positive for appetite change. Negative for activity change, chills, fatigue and fever. HENT: Negative for congestion, ear pain, rhinorrhea and sore throat. Eyes: Negative for discharge, redness and visual disturbance. Respiratory: Negative for cough, chest tightness, shortness of breath and wheezing. Cardiovascular: Negative for chest pain and palpitations. Gastrointestinal: Positive for abdominal pain, nausea and vomiting. Negative for constipation and diarrhea. Endocrine: Negative for polydipsia and polyuria. Genitourinary: Negative for difficulty urinating, dysuria, frequency, hematuria and urgency. Musculoskeletal: Negative for arthralgias and myalgias. Skin: Negative for color change, pallor and rash. Neurological: Negative for dizziness, weakness, light-headedness, numbness and headaches. Hematological: Negative for adenopathy. Does not bruise/bleed easily. All other systems reviewed and are negative. Physical Exam  Physical Exam  Vitals and nursing note reviewed. Constitutional:       Appearance: He is well-developed. HENT:      Head: Normocephalic and atraumatic. Right Ear: External ear normal.      Left Ear: External ear normal.      Nose: Nose normal.   Eyes:      Conjunctiva/sclera: Conjunctivae normal.      Pupils: Pupils are equal, round, and reactive to light. Cardiovascular:      Rate and Rhythm: Normal rate and regular rhythm. Heart sounds: Normal heart sounds. Pulmonary:      Effort: Pulmonary effort is normal. No respiratory distress. Breath sounds: Normal breath sounds. No wheezing or rales. Chest:      Chest wall: No tenderness. Abdominal:      General: Bowel sounds are normal. There is no distension. Palpations: Abdomen is soft. Tenderness: There is abdominal tenderness in the epigastric area. There is no guarding. Musculoskeletal:         General: Normal range of motion. Cervical back: Normal range of motion and neck supple. Skin:     General: Skin is warm and dry. Neurological:      Mental Status: He is alert and oriented to person, place, and time. Cranial Nerves: No cranial nerve deficit. Sensory: No sensory deficit.          Vital Signs  ED Triage Vitals [09/10/23 1413]   Temperature Pulse Respirations Blood Pressure SpO2   98 °F (36.7 °C) 83 16 132/58 98 %      Temp Source Heart Rate Source Patient Position - Orthostatic VS BP Location FiO2 (%)   Tympanic Monitor Sitting Right arm --      Pain Score       6           Vitals:    09/10/23 1413 09/10/23 1445 09/10/23 1515   BP: 132/58 105/53 102/56   Pulse: 83 92 75   Patient Position - Orthostatic VS: Sitting           Visual Acuity      ED Medications  Medications   multi-electrolyte (PLASMALYTE-A/ISOLYTE-S PH 7.4) IV solution (150 mL/hr Intravenous New Bag 9/10/23 1508)   sodium chloride 0.9 % bolus 1,000 mL (1,000 mL Intravenous New Bag 9/10/23 1431)   metoclopramide (REGLAN) injection 10 mg (10 mg Intravenous Given 9/10/23 1431)   diphenhydrAMINE (BENADRYL) injection 25 mg (25 mg Intravenous Given 9/10/23 1431)   Famotidine (PF) (PEPCID) injection 20 mg (20 mg Intravenous Given 9/10/23 1431)   iohexol (OMNIPAQUE) 350 MG/ML injection (SINGLE-DOSE) 100 mL (100 mL Intravenous Given 9/10/23 1436)   ceFAZolin (ANCEF) IVPB (premix in dextrose) 1,000 mg 50 mL (1,000 mg Intravenous New Bag 9/10/23 1506)       Diagnostic Studies  Results Reviewed     Procedure Component Value Units Date/Time    Urine culture [[id number]] Collected: 09/09/23 1403    Lab Status:  In process Specimen: Urine, Straight Cath Updated: 09/10/23 1537    Rapid drug screen, urine [[id number]]     Lab Status: No result Specimen: Urine     Comprehensive metabolic panel [[id number]]  (Abnormal) Collected: 09/10/23 1423    Lab Status: Final result Specimen: Blood from Arm, Left Updated: 09/10/23 1451     Sodium 134 mmol/L      Potassium 4.1 mmol/L      Chloride 100 mmol/L      CO2 18 mmol/L      ANION GAP 16 mmol/L      BUN 15 mg/dL      Creatinine 0.95 mg/dL      Glucose 72 mg/dL      Calcium 9.3 mg/dL      AST 11 U/L      ALT 10 U/L      Alkaline Phosphatase 69 U/L      Total Protein 7.8 g/dL      Albumin 4.6 g/dL      Total Bilirubin 0.80 mg/dL      eGFR 109 ml/min/1.73sq m     Narrative:      [place] guidelines for Chronic Kidney Disease (CKD):   •  Stage 1 with normal or high GFR (GFR > 90 mL/min/1.73 square meters)  •  Stage 2 Mild CKD (GFR = 60-89 mL/min/1.73 square meters)  •  Stage 3A Moderate CKD (GFR = 45-59 mL/min/1.73 square meters)  •  Stage 3B Moderate CKD (GFR = 30-44 mL/min/1.73 square meters)  •  Stage 4 Severe CKD (GFR = 15-29 mL/min/1.73 square meters)  •  Stage 5 End Stage CKD (GFR <15 mL/min/1.73 square meters)  Note: GFR calculation is accurate only with a steady state creatinine    Lipase [[id number]]  (Normal) Collected: 09/10/23 1423    Lab Status: Final result Specimen: Blood from Arm, Left Updated: 09/10/23 1451     Lipase 13 u/L     Magnesium [[id number]]  (Normal) Collected: 09/10/23 1423    Lab Status: Final result Specimen: Blood from Arm, Left Updated: 09/10/23 1451     Magnesium 2.0 mg/dL     Protime-INR [[id number]]  (Normal) Collected: 09/10/23 1423    Lab Status: Final result Specimen: Blood from Arm, Left Updated: 09/10/23 1449     Protime 13.6 seconds      INR 1.01    APTT [[id number]]  (Normal) Collected: 09/10/23 1423    Lab Status: Final result Specimen: Blood from Arm, Left Updated: 09/10/23 1449     PTT 31 seconds     Lactic acid, plasma (w/reflex if result > 2.0) [[id number]]  (Normal) Collected: 09/10/23 1423    Lab Status: Final result Specimen: Blood from Arm, Left Updated: 09/10/23 1444     LACTIC ACID 0.8 mmol/L     Narrative:      Result may be elevated if tourniquet was used during collection.     Urine Microscopic [[id number]]  (Normal) Collected: 09/10/23 1423    Lab Status: Final result Specimen: Urine, Indwelling Joseph Catheter Updated: 09/10/23 1443     RBC, UA 2-4 /hpf      WBC, UA 0-1 /hpf      Epithelial Cells Occasional /hpf      Bacteria, UA None Seen /hpf     UA w Reflex to Microscopic w Reflex to Culture [[id number]]  (Abnormal) Collected: 09/10/23 1423    Lab Status: Final result Specimen: Urine, Indwelling Joseph Catheter Updated: 09/10/23 1431     Color, UA [place]     Clarity, UA Clear     Specific Gravity, UA >=1.030     pH, UA 5.0     Leukocytes, UA Trace     Nitrite, UA Positive     Protein, UA 30 (1+) mg/dl      Glucose, UA Negative mg/dl      Ketones, UA >=80 (3+) mg/dl      Urobilinogen, UA 1.0 E.U./dl      Bilirubin, UA Small     Occult Blood, UA Moderate    CBC and differential [[id number]] Collected: 09/10/23 1423    Lab Status: Final result Specimen: Blood from Arm, Left Updated: 09/10/23 1430     WBC 7.11 Thousand/uL      RBC 5.21 Million/uL      Hemoglobin 15.6 g/dL      Hematocrit 46.8 %      MCV 90 fL      MCH 29.9 pg      MCHC 33.3 g/dL      RDW 12.3 %      MPV 10.0 fL      Platelets 611 Thousands/uL      nRBC 0 /100 WBCs      Neutrophils Relative 68 %      Immat GRANS % 0 %      Lymphocytes Relative 24 %      Monocytes Relative 8 %      Eosinophils Relative 0 %      Basophils Relative 0 %      Neutrophils Absolute 4.80 Thousands/µL      Immature Grans Absolute 0.03 Thousand/uL      Lymphocytes Absolute 1.67 Thousands/µL      Monocytes Absolute 0.58 Thousand/µL      Eosinophils Absolute 0.01 Thousand/µL      Basophils Absolute 0.02 Thousands/µL                  CT abdomen pelvis w contrast   Final Result by Sushma Baum MD (09/10 1514)   1. Several small appendicoliths are again visualized at the appendiceal tip, without findings to suggest acute appendicitis. 2. Otherwise no acute intra-abdominal pathology. Workstation performed: [id number]                    Procedures  ECG 12 Lead Documentation Only    Date/Time: 9/10/2023 2:20 PM    Performed by: Roslyn Dalal DO  Authorized by:  Roslyn Dalal DO    ECG reviewed by me, the ED Provider: yes    Patient location:  ED  Previous ECG:     Comparison to cardiac monitor: Yes    Quality:     Tracing quality:  Limited by artifact  Rate:     ECG rate:  82    ECG rate assessment: normal    Rhythm:     Rhythm: sinus rhythm    QRS:     QRS axis:  Normal    QRS intervals:  Normal  Conduction:     Conduction: normal    ST segments:     ST segments:  Normal  T waves:     T waves: normal               ED Course  ED Course as of 09/10/23 7311 Sun Sep 10, 2023   1538 Spoke with Dr. Souleymane Conner hospitalist on-call reviewed case and findings in the emergency department and management thus far accepts for admission. SBIRT 22yo+    Flowsheet Row Most Recent Value   Initial Alcohol Screen: US AUDIT-C     1. How often do you have a drink containing alcohol? 0 Filed at: 09/10/2023 1413   2. How many drinks containing alcohol do you have on a typical day you are drinking? 0 Filed at: 09/10/2023 1413   3a. Male UNDER 65: How often do you have five or more drinks on one occasion? 0 Filed at: 09/10/2023 1413   3b. FEMALE Any Age, or MALE 65+: How often do you have 4 or more drinks on one occassion? 0 Filed at: 09/10/2023 1413   Audit-C Score 0 Filed at: 09/10/2023 1413   BATSHEVA: How many times in the past year have you. .. Used an illegal drug or used a prescription medication for non-medical reasons? Never Filed at: 09/10/2023 1413                    Medical Decision Making  Intractable nausea and vomiting: acute illness or injury  Metabolic acidosis: acute illness or injury  Urinary retention: acute illness or injury  UTI (urinary tract infection): acute illness or injury  Amount and/or Complexity of Data Reviewed  Labs: ordered. Decision-making details documented in ED Course. Radiology: ordered and independent interpretation performed. Decision-making details documented in ED Course. ECG/medicine tests: ordered and independent interpretation performed. Decision-making details documented in ED Course. Risk  Prescription drug management. Decision regarding hospitalization.           Disposition  Final diagnoses:   Intractable nausea and vomiting   UTI (urinary tract infection)   Metabolic acidosis   Urinary retention     Time reflects when diagnosis was documented in both MDM as applicable and the Disposition within this note     Time User Action Codes Description Comment    9/10/2023  2:57 PM Errol Lr Add [R11.2] Intractable nausea and vomiting     9/10/2023  2:57 PM Manuel Bell Add [N39.0] UTI (urinary tract infection)     9/10/2023  2:59 PM Phi Reyna Add [F96.46] Metabolic acidosis     9/64/3131  3:08 PM Phi Axel Add [R33.9] Urinary retention       ED Disposition     ED Disposition   Admit    Condition   Stable    Date/Time   Sun Sep 10, 2023  3:39 PM    Comment   Case was discussed with Dr. Ruby Ley and the patient's admission status was agreed to be Admission Status: inpatient status to the service of Dr. Ruby Ley. Follow-up Information    None         Patient's Medications   Discharge Prescriptions    No medications on file       No discharge procedures on file.     PDMP Review       Value Time User    PDMP Reviewed  Yes 9/4/2023 11:07 AM Good Vuong MD          ED Provider  Electronically Signed by           Adonis Cordoba DO  09/10/23 1976

## 2023-09-10 NOTE — H&P
[facility]  H&P  Name: Milana Bond 33 y.o. male I MRN: [id number]  Unit/Bed#: -[facility] I Date of Admission: 9/10/2023   Date of Service: 9/10/2023 I Hospital Day: 0      Assessment/Plan   * Intractable nausea and vomiting  Assessment & Plan  · Recurrent with epigastric pain. Hx of gastritis /coffe ground emesis before. Hx of suspected gastroperesis. Recent uds negative  · Ct abd and pelvis- no pathology to explain  · Hb normal   · Start iv fluids  · protonix 40 mg iv bid  · egd April 2022 when had same done in [facility] nml  · Tylenol prn pain  · reglan iv 5 mg with meals as will start clears and see how he does  · Will treat uti as well  · qtc nml  · Will place tigan prn as well    Acute cystitis without hematuria  Assessment & Plan  · ua positive for nitrate and leuks - start rocephin urine cx did not reflect on this sample unable to add      Autism  Assessment & Plan  · Pleasant     Acute urinary retention  Assessment & Plan  · Just had castaneda placed in er 9/9 hx of same  · Keep castaneda ua rechecked today positive for nitrates and some leuks did not reflect to cultute  · Will start iv rocephin 1g iv daily    · Will give flomax hope able to tolerate  · Outpatient follow up with urology       VTE Pharmacologic Prophylaxis:   Low Risk (Score 0-2) - Encourage Ambulation. Code Status: full code  Discussion with family: pt    Anticipated Length of Stay: Patient will be admitted on an inpatient basis with an anticipated length of stay of greater than 2 midnights secondary to intractable n/v.     Total Time Spent on Date of Encounter in care of patient: 65 minutes This time was spent on one or more of the following: performing physical exam; counseling and coordination of care; obtaining or reviewing history; documenting in the medical record; reviewing/ordering tests, medications or procedures; communicating with other healthcare professionals and discussing with patient's family/caregivers. Chief Complaint: Intractable nausea vomiting and epigastric pain    History of Present Illness:  Precious Bennett is a 33 y.o. male with a PMH of recurrent nausea vomiting epigastric pain autism urinary retention who presents with recurrent nausea and vomiting on and off for the past week he was just here for same and discharged in September 4 he is unable to keep anything down even his medications last time he actually ate was Friday today he was concerned. that he had coffe ground emesis. Since er no vomiting just dry heaves. No bm for 3 days. Review of Systems:  Review of Systems   Constitutional: Negative for chills and fever. HENT: Negative for ear pain and sore throat. Eyes: Negative for pain and visual disturbance. Respiratory: Negative for cough and shortness of breath. Cardiovascular: Negative for chest pain and palpitations. Gastrointestinal: Positive for abdominal pain, nausea and vomiting. Genitourinary: Negative for dysuria and hematuria. Musculoskeletal: Negative for arthralgias and back pain. Skin: Negative for color change and rash. Neurological: Negative for seizures and syncope. All other systems reviewed and are negative. Past Medical and Surgical History:   Past Medical History:   Diagnosis Date   • Autistic disorder    • BPH (benign prostatic hypertrophy) 9/8/22   • Gastric paresis    • GERD (gastroesophageal reflux disease)    • Known health problems: none    • Urinary retention 9/8/22   • Urinary tract infection        Past Surgical History:   Procedure Laterality Date   • EYE SURGERY     • FOOT SURGERY         Meds/Allergies:  Prior to Admission medications    Medication Sig Start Date End Date Taking?  Authorizing Provider   escitalopram (LEXAPRO) 10 mg tablet Take 1 tablet by mouth every morning 8/4/22   Historical Provider, MD   fluticasone (FLONASE) 50 mcg/act nasal spray INSTILL 2 SPRAYS TO EACH NOSTRIL DAILY 12/29/22   Historical Provider, MD   loratadine (CLARITIN) 10 mg tablet Take by mouth    Historical Provider, MD   metoclopramide (Reglan) 10 mg tablet Take 1 tablet (10 mg total) by mouth every 6 (six) hours as needed (Nausea) for up to 10 doses 11/28/22   Randi Hagan MD   ondansetron (ZOFRAN) 4 mg tablet Take 4 mg by mouth 1/5/23   Historical Provider, MD   ondansetron (ZOFRAN) 4 mg tablet Take 1 tablet (4 mg total) by mouth every 6 (six) hours as needed for nausea or vomiting 9/9/23   Angie Kay DO   pantoprazole (PROTONIX) 20 mg tablet Take 2 tablets (40 mg total) by mouth 2 (two) times a day 9/6/22   Hue Ambroes MD   tamsulosin (FLOMAX) 0.4 mg Take 1 capsule (0.4 mg total) by mouth daily with dinner 8/1/23   Poonam Olmos MD   triamcinolone (KENALOG) 0.1 % oral topical paste Apply 1 application to teeth 6/89/61 1/16/24  Historical Provider, MD GONZALEZ have reviewed home medications using recent Epic encounter. Allergies:    Allergies   Allergen Reactions   • Pepto-Bismol [Bismuth Subsalicylate] GI Intolerance   • Amoxicillin Rash   • Latex Rash       Social History:  Marital Status: Single   Substance Use History:   Social History     Substance and Sexual Activity   Alcohol Use Not Currently     Social History     Tobacco Use   Smoking Status Never   Smokeless Tobacco Never     Social History     Substance and Sexual Activity   Drug Use Never       Family History:  Family History   Problem Relation Age of Onset   • No Known Problems Mother    • No Known Problems Father    • Heart attack Paternal Grandfather    • Hypertension Paternal Grandfather        Physical Exam:     Vitals:   Blood Pressure: 96/53 (09/10/23 1530)  Pulse: 74 (09/10/23 1530)  Temperature: 98 °F (36.7 °C) (09/10/23 1413)  Temp Source: Tympanic (09/10/23 1413)  Respirations: 16 (09/10/23 1413)  Height: 5' 8" (172.7 cm) (09/10/23 1413)  Weight - Scale: 87.4 kg (192 lb 10.9 oz) (09/10/23 1631)  SpO2: 100 % (09/10/23 1530)    Physical Exam  Vitals and nursing note reviewed. Constitutional:       General: He is not in acute distress. Appearance: He is well-developed. HENT:      Head: Normocephalic and atraumatic. Eyes:      Conjunctiva/sclera: Conjunctivae normal.   Cardiovascular:      Rate and Rhythm: Normal rate and regular rhythm. Heart sounds: No murmur heard. Pulmonary:      Effort: Pulmonary effort is normal. No respiratory distress. Breath sounds: Normal breath sounds. Abdominal:      General: Bowel sounds are normal. There is no distension. Palpations: Abdomen is soft. Tenderness: There is abdominal tenderness (epigastric). Musculoskeletal:         General: No swelling. Cervical back: Neck supple. Skin:     General: Skin is warm and dry. Capillary Refill: Capillary refill takes less than 2 seconds. Neurological:      General: No focal deficit present. Mental Status: He is alert. Mental status is at baseline.    Psychiatric:         Mood and Affect: Mood normal.          Additional Data:     Lab Results:  Results from last 7 days   Lab Units 09/10/23  1423   WBC Thousand/uL 7.11   HEMOGLOBIN g/dL 15.6   HEMATOCRIT % 46.8   PLATELETS Thousands/uL 184   NEUTROS PCT % 68   LYMPHS PCT % 24   MONOS PCT % 8   EOS PCT % 0     Results from last 7 days   Lab Units 09/10/23  1423   SODIUM mmol/L 134*   POTASSIUM mmol/L 4.1   CHLORIDE mmol/L 100   CO2 mmol/L 18*   BUN mg/dL 15   CREATININE mg/dL 0.95   ANION GAP mmol/L 16   CALCIUM mg/dL 9.3   ALBUMIN g/dL 4.6   TOTAL BILIRUBIN mg/dL 0.80   ALK PHOS U/L 69   ALT U/L 10   AST U/L 11*   GLUCOSE RANDOM mg/dL 72     Results from last 7 days   Lab Units 09/10/23  1423   INR  1.01             Results from last 7 days   Lab Units 09/10/23  1423   LACTIC ACID mmol/L 0.8       Lines/Drains:  Invasive Devices     Peripheral Intravenous Line  Duration           Peripheral IV 09/10/23 Left Antecubital <1 day          Drain  Duration           Urethral Catheter Non-latex 16 Fr. 1 day Urinary Catheter:  Goal for removal: N/A- Discharging with Joseph             Imaging: Reviewed radiology reports from this admission including: abdominal/pelvic CT  CT abdomen pelvis w contrast   Final Result by Asim Vuong MD (09/10 1514)   1. Several small appendicoliths are again visualized at the appendiceal tip, without findings to suggest acute appendicitis. 2. Otherwise no acute intra-abdominal pathology. Workstation performed: [id number]             EKG and Other Studies Reviewed on Admission:   · EKG: NSR. HR 82.    ** Please Note: This note has been constructed using a voice recognition system.  **

## 2023-09-10 NOTE — ASSESSMENT & PLAN NOTE
· Recurrent with epigastric pain. Hx of gastritis /coffe ground emesis before. Hx of suspected gastroperesis.  Recent uds negative  · Ct abd and pelvis- no pathology to explain  · Hb normal   · Start iv fluids  · protonix 40 mg iv bid  · egd April 2022 when had same done [facility] n[facility]  · Tylenol prn pain  · reglan iv 5 mg with meals as will start clears and see how he does  · Will treat uti as well  · qtc nml  · Will place tigan prn as well

## 2023-09-10 NOTE — LETTER
[facility] MED SURG [facility]  [address]  [address] [facility] [place] [address]  No information on file. September 19, 2023     Patient: Neeru Duarte   YOB: 1995   Date of Visit: 9/10/2023       To Whom it May Concern:    Neeru Duarte is under my professional care. He was seen in the hospital from 9/10/2023 to 09/19/23. He may return to work on 9/20/2023 without limitations. If you have any questions or concerns, please don't hesitate to call.          Sincerely,          Chino Connolly PA-C

## 2023-09-10 NOTE — ASSESSMENT & PLAN NOTE
· ua positive for nitrate and leuks - start rocephin urine cx did not reflect on this sample unable to add

## 2023-09-11 LAB
ANION GAP SERPL CALCULATED.3IONS-SCNC: 12 MMOL/L
ATRIAL RATE: 82 BPM
BUN SERPL-MCNC: 11 MG/DL (ref 5–25)
CALCIUM SERPL-MCNC: 8.3 MG/DL (ref 8.4–10.2)
CHLORIDE SERPL-SCNC: 105 MMOL/L (ref 96–108)
CO2 SERPL-SCNC: 20 MMOL/L (ref 21–32)
CREAT SERPL-MCNC: 0.79 MG/DL (ref 0.6–1.3)
GFR SERPL CREATININE-BSD FRML MDRD: 123 ML/MIN/1.73SQ M
GLUCOSE SERPL-MCNC: 75 MG/DL (ref 65–140)
MAGNESIUM SERPL-MCNC: 2.1 MG/DL (ref 1.9–2.7)
P AXIS: -3 DEGREES
POTASSIUM SERPL-SCNC: 3.8 MMOL/L (ref 3.5–5.3)
PR INTERVAL: 180 MS
QRS AXIS: 48 DEGREES
QRSD INTERVAL: 96 MS
QT INTERVAL: 376 MS
QTC INTERVAL: 439 MS
SODIUM SERPL-SCNC: 137 MMOL/L (ref 135–147)
T WAVE AXIS: 16 DEGREES
VENTRICULAR RATE: 82 BPM

## 2023-09-11 PROCEDURE — C9113 INJ PANTOPRAZOLE SODIUM, VIA: HCPCS | Performed by: FAMILY MEDICINE

## 2023-09-11 PROCEDURE — 99254 IP/OBS CNSLTJ NEW/EST MOD 60: CPT | Performed by: UROLOGY

## 2023-09-11 PROCEDURE — 83735 ASSAY OF MAGNESIUM: CPT | Performed by: FAMILY MEDICINE

## 2023-09-11 PROCEDURE — 99232 SBSQ HOSP IP/OBS MODERATE 35: CPT

## 2023-09-11 PROCEDURE — 80048 BASIC METABOLIC PNL TOTAL CA: CPT | Performed by: FAMILY MEDICINE

## 2023-09-11 PROCEDURE — 93010 ELECTROCARDIOGRAM REPORT: CPT | Performed by: INTERNAL MEDICINE

## 2023-09-11 RX ADMIN — SODIUM CHLORIDE, SODIUM GLUCONATE, SODIUM ACETATE, POTASSIUM CHLORIDE, MAGNESIUM CHLORIDE, SODIUM PHOSPHATE, DIBASIC, AND POTASSIUM PHOSPHATE 150 ML/HR: .53; .5; .37; .037; .03; .012; .00082 INJECTION, SOLUTION INTRAVENOUS at 09:52

## 2023-09-11 RX ADMIN — METOCLOPRAMIDE HYDROCHLORIDE 5 MG: 5 INJECTION INTRAMUSCULAR; INTRAVENOUS at 12:06

## 2023-09-11 RX ADMIN — TAMSULOSIN HYDROCHLORIDE 0.4 MG: 0.4 CAPSULE ORAL at 17:34

## 2023-09-11 RX ADMIN — SODIUM CHLORIDE, SODIUM GLUCONATE, SODIUM ACETATE, POTASSIUM CHLORIDE, MAGNESIUM CHLORIDE, SODIUM PHOSPHATE, DIBASIC, AND POTASSIUM PHOSPHATE 150 ML/HR: .53; .5; .37; .037; .03; .012; .00082 INJECTION, SOLUTION INTRAVENOUS at 23:25

## 2023-09-11 RX ADMIN — PANTOPRAZOLE SODIUM 40 MG: 40 INJECTION, POWDER, FOR SOLUTION INTRAVENOUS at 07:53

## 2023-09-11 RX ADMIN — CEFTRIAXONE 1000 MG: 1 INJECTION, SOLUTION INTRAVENOUS at 23:22

## 2023-09-11 RX ADMIN — FLUTICASONE PROPIONATE 1 SPRAY: 50 SPRAY, METERED NASAL at 09:50

## 2023-09-11 RX ADMIN — ESCITALOPRAM OXALATE 10 MG: 10 TABLET ORAL at 09:52

## 2023-09-11 RX ADMIN — PANTOPRAZOLE SODIUM 40 MG: 40 INJECTION, POWDER, FOR SOLUTION INTRAVENOUS at 20:12

## 2023-09-11 RX ADMIN — METOCLOPRAMIDE HYDROCHLORIDE 5 MG: 5 INJECTION INTRAMUSCULAR; INTRAVENOUS at 17:34

## 2023-09-11 RX ADMIN — CEFTRIAXONE 1000 MG: 1 INJECTION, SOLUTION INTRAVENOUS at 00:05

## 2023-09-11 RX ADMIN — LORATADINE 10 MG: 10 TABLET ORAL at 09:52

## 2023-09-11 RX ADMIN — METOCLOPRAMIDE HYDROCHLORIDE 5 MG: 5 INJECTION INTRAMUSCULAR; INTRAVENOUS at 07:52

## 2023-09-11 RX ADMIN — SODIUM CHLORIDE, SODIUM GLUCONATE, SODIUM ACETATE, POTASSIUM CHLORIDE, MAGNESIUM CHLORIDE, SODIUM PHOSPHATE, DIBASIC, AND POTASSIUM PHOSPHATE 150 ML/HR: .53; .5; .37; .037; .03; .012; .00082 INJECTION, SOLUTION INTRAVENOUS at 03:16

## 2023-09-11 RX ADMIN — SODIUM CHLORIDE, SODIUM GLUCONATE, SODIUM ACETATE, POTASSIUM CHLORIDE, MAGNESIUM CHLORIDE, SODIUM PHOSPHATE, DIBASIC, AND POTASSIUM PHOSPHATE 150 ML/HR: .53; .5; .37; .037; .03; .012; .00082 INJECTION, SOLUTION INTRAVENOUS at 16:32

## 2023-09-11 RX ADMIN — TRIMETHOBENZAMIDE HYDROCHLORIDE 200 MG: 100 INJECTION INTRAMUSCULAR at 13:44

## 2023-09-11 NOTE — CONSULTS
Consultation - Urology   Precious Bennett 33 y.o. male MRN: [id number]  Unit/Bed#: -01 Encounter: [id number]      Assessment/Plan      Assessment:  Recurrent urinary retention with UTI. Currently on antibiotics and Flomax. Patient comfortable. Plan:  Follow-up later in the week or early next week for voiding trial.  Can go home and antibiotics when clinically stable with regard to his other medical issues. Office is aware to get him in. History of Present Illness   Attending: Laura Luu MD  HPI: Precious Bennett is a 33y.o. year old male who presents with history of UTI and recurrent urinary retention. Catheterized through the emergency room earlier this week patient returned with abdominal pain and nausea. Currently being worked up for that. Currently on antibiotics and Flomax. That should be continued. Currently comfortable no fever or chills. Benign exam.      REVIEW OF SYSTEMS  Const: Denies chills, fever and weight loss. CV: Denies chest pain. Resp: Denies SOB. GI: Denies abdominal pain, nausea and vomiting. : Denies symptoms other than stated above. Musculo: Denies back pain.     Historical Information   Past Medical History:   Diagnosis Date   • Autistic disorder    • BPH (benign prostatic hypertrophy) 9/8/22   • Gastric paresis    • GERD (gastroesophageal reflux disease)    • Known health problems: none    • Urinary retention 9/8/22   • Urinary tract infection      Past Surgical History:   Procedure Laterality Date   • EYE SURGERY     • FOOT SURGERY       Social History   Social History     Substance and Sexual Activity   Alcohol Use Not Currently     E-Cigarette/Vaping   • E-Cigarette Use Never User      E-Cigarette/Vaping Substances   • Nicotine No    • THC No    • CBD No    • Flavoring No    • Other No    • Unknown No      Social History     Tobacco Use   Smoking Status Never   Smokeless Tobacco Never         Meds/Allergies   all current active meds have been reviewed, current meds:   Current Facility-Administered Medications   Medication Dose Route Frequency   • acetaminophen (TYLENOL) tablet 650 mg  650 mg Oral Q6H PRN   • cefTRIAXone (ROCEPHIN) IVPB (premix in dextrose) 1,000 mg 50 mL  1,000 mg Intravenous Q24H   • escitalopram (LEXAPRO) tablet 10 mg  10 mg Oral Daily   • fluticasone (FLONASE) 50 mcg/act nasal spray 1 spray  1 spray Each Nare Daily   • loratadine (CLARITIN) tablet 10 mg  10 mg Oral Daily   • metoclopramide (REGLAN) injection 5 mg  5 mg Intravenous TID AC   • multi-electrolyte (PLASMALYTE-A/ISOLYTE-S PH 7.4) IV solution  150 mL/hr Intravenous Continuous   • pantoprazole (PROTONIX) injection 40 mg  40 mg Intravenous Q12H TK   • tamsulosin (FLOMAX) capsule 0.4 mg  0.4 mg Oral Daily With Dinner   • trimethobenzamide (TIGAN) IM injection 200 mg  200 mg Intramuscular Q6H PRN   , and PTA meds:   Prior to Admission Medications   Prescriptions Last Dose Informant Patient Reported?  Taking?   escitalopram (LEXAPRO) 10 mg tablet Past Week Self Yes Yes   Sig: Take 1 tablet by mouth every morning   fluticasone (FLONASE) 50 mcg/act nasal spray Past Week Self Yes Yes   Sig: INSTILL 2 SPRAYS TO EACH NOSTRIL DAILY   loratadine (CLARITIN) 10 mg tablet Past Week Self Yes Yes   Sig: Take by mouth   metoclopramide (Reglan) 10 mg tablet Past Week Self No Yes   Sig: Take 1 tablet (10 mg total) by mouth every 6 (six) hours as needed (Nausea) for up to 10 doses   ondansetron (ZOFRAN) 4 mg tablet Past Week  No Yes   Sig: Take 1 tablet (4 mg total) by mouth every 6 (six) hours as needed for nausea or vomiting   pantoprazole (PROTONIX) 20 mg tablet Past Week Self No Yes   Sig: Take 2 tablets (40 mg total) by mouth 2 (two) times a day   tamsulosin (FLOMAX) 0.4 mg Past Week  No Yes   Sig: Take 1 capsule (0.4 mg total) by mouth daily with dinner   triamcinolone (KENALOG) 0.1 % oral topical paste Past Week Self Yes Yes   Sig: Apply 1 application to teeth      Facility-Administered Medications: None     Allergies   Allergen Reactions   • Pepto-Bismol [Bismuth Subsalicylate] GI Intolerance   • Amoxicillin Rash   • Latex Rash       Objective   Vitals: Blood pressure 102/56, pulse 58, temperature 97.9 °F (36.6 °C), resp. rate 18, height 5' 8" (1.727 m), weight 87.4 kg (192 lb 10.9 oz), SpO2 95 %. I/O last 24 hours: In: 3117 [P.O.:480; I.V.:1587; IV Piggyback:1050]  Out: 2750 [Urine:2750]    Invasive Devices     Peripheral Intravenous Line  Duration           Peripheral IV 09/10/23 Left Antecubital 1 day          Drain  Duration           Urethral Catheter Non-latex 16 Fr. 2 days                PHYSICAL EXAM  Const: Appears healthy and well developed. No signs of acute distress present. Resp: Respirations are regular and unlabored. CV: Rate is regular. Rhythm is regular. Abdomen: Abdomen is soft, nontender, and nondistended. Kidneys are not palpable. : Penis testicles epididymis scrotum normal no SP tenderness no CVA tenderness. Catheter in good position. Draining clear urine. Psych: Patient's attitude is cooperative. Mood is normal. Affect is normal.    Lab Results: I have personally reviewed pertinent reports. CBC: No results found for: "WBC", "HGB", "HCT", "MCV", "PLT", "ADJUSTEDWBC", "RBC", "MCH", "MCHC", "RDW", "MPV", "NRBC"  CMP:   Lab Results   Component Value Date    SODIUM 137 09/11/2023     09/11/2023    CO2 20 (L) 09/11/2023    BUN 11 09/11/2023    CREATININE 0.79 09/11/2023    CALCIUM 8.3 (L) 09/11/2023    EGFR 123 09/11/2023     Urinalysis: No results found for: "COLORU", "CLARITYU", "Starla Edwards", "PHUR", "LEUKOCYTESUR", "NITRITE", "PROTEINUA", "GLUCOSEU", "Arabella Bob", "Mortimer Shown", "BLOODU"  Urine Culture: No results found for: "Ashanti Carmona"  PSA: No results found for: "PSA"  Imaging Studies: I have personally reviewed pertinent reports.    and I have personally reviewed pertinent films in PACS  EKG, Pathology, and Other Studies: I have personally reviewed pertinent reports. and I have personally reviewed pertinent films in PACS    Code Status: Level 1 - Full Code  Advance Directive and Living Will:      Power of :    POLST:      Counseling / Coordination of Care  Total floor / unit time spent today 20 minutes. Greater than 50% of total time was spent with the patient and / or family counseling and / or coordination of care.  A description of the counseling / coordination of care:

## 2023-09-11 NOTE — ASSESSMENT & PLAN NOTE
· Recurrent with epigastric pain. Hx of gastritis /coffe ground emesis before. Hx of suspected gastroperesis.  Recent uds negative  · Ct abd and pelvis- no pathology to explain  · Hb normal   · Start iv fluids  · protonix 40 mg iv bid  · egd April 2022 when had same done in [facility]  · Tylenol prn pain  · reglan iv 5 mg with meals as will start clears and see how he does  · Will treat uti as well  · qtc nml  · Will place tigan prn as well  · Patient having worsening of abdominal pain with clears and still with nausea, will continue clears and attempt to advance diet to full liquids toast and crackers later during the day if tolerated  · If patient still having significant symptoms, make NPO and consult GI for potential scope on Wednesday

## 2023-09-11 NOTE — PROGRESS NOTES
[facility]  Progress Note  Name: Shahid Pena  MRN: [id number]  Unit/Bed#: -01 I Date of Admission: 9/10/2023   Date of Service: 9/11/2023 I Hospital Day: 1    Assessment/Plan   * Intractable nausea and vomiting  Assessment & Plan  · Recurrent with epigastric pain. Hx of gastritis /coffe ground emesis before. Hx of suspected gastroperesis. Recent uds negative  · Ct abd and pelvis- no pathology to explain  · Hb normal   · Start iv fluids  · protonix 40 mg iv bid  · egd April 2022 when had same done in [facility]  · Tylenol prn pain  · reglan iv 5 mg with meals as will start clears and see how he does  · Will treat uti as well  · qtc nml  · Will place tigan prn as well  · Patient having worsening of abdominal pain with clears and still with nausea, will continue clears and attempt to advance diet to full liquids toast and crackers later during the day if tolerated  · If patient still having significant symptoms, make NPO and consult GI for potential scope on Wednesday    Acute cystitis without hematuria  Assessment & Plan  · ua positive for nitrate and leuks - start rocephin urine cx did not reflect on this sample unable to add    Acute urinary retention  Assessment & Plan  · Just had castaneda placed in er 9/9 hx of same  · Keep castaneda ua rechecked today positive for nitrates and some leuks did not reflect to culture  · Will start iv rocephin 1g iv daily    · Will give flomax hope able to tolerate  · Will consult urology to discuss when patient should have castaneda removed and he should continue outpatient follow up    Autism  Assessment & Plan  · Pleasant           VTE Pharmacologic Prophylaxis: VTE Score: 0 Low Risk (Score 0-2) - Encourage Ambulation. Patient Centered Rounds: I performed bedside rounds with nursing staff today.   Discussions with Specialists or Other Care Team Provider: nursing, case management    Education and Discussions with Family / Patient: Updated contact person (grandfather) via phone. Total Time Spent on Date of Encounter in care of patient: 35 minutes This time was spent on one or more of the following: performing physical exam; counseling and coordination of care; obtaining or reviewing history; documenting in the medical record; reviewing/ordering tests, medications or procedures; communicating with other healthcare professionals and discussing with patient's family/caregivers. Current Length of Stay: 1 day(s)  Current Patient Status: Inpatient   Certification Statement: The patient will continue to require additional inpatient hospital stay due to nausea, vomiting, UTI  Discharge Plan: Anticipate discharge tomorrow to home. Code Status: Level 1 - Full Code    Subjective:   Patient seen and examined at bedside this morning. He states that he slept well last night. Still having nausea with eating and worsening epigastric pain. States he is not ready to try and advancement of diet because of the nausea and pain he had this morning. Denies fever, chills, CP, SOB. Said prior to castaneda placement patient had burning with urination, this has since improved with castaneda placement. Objective:     Vitals:   Temp (24hrs), Av.1 °F (36.7 °C), Min:97.9 °F (36.6 °C), Max:98.6 °F (37 °C)    Temp:  [97.9 °F (36.6 °C)-98.6 °F (37 °C)] 97.9 °F (36.6 °C)  HR:  [58-92] 58  Resp:  [16-19] 18  BP: ()/(53-64) 102/56  SpO2:  [95 %-100 %] 95 %  Body mass index is 29.3 kg/m². Input and Output Summary (last 24 hours): Intake/Output Summary (Last 24 hours) at 2023 1023  Last data filed at 2023 0738  Gross per 24 hour   Intake 2877 ml   Output 1950 ml   Net 927 ml       Physical Exam:   Physical Exam  Vitals reviewed. Constitutional:       General: He is not in acute distress. Appearance: He is not ill-appearing or toxic-appearing. HENT:      Head: Normocephalic and atraumatic.       Nose: Nose normal.      Mouth/Throat:      Mouth: Mucous membranes are moist.   Eyes:      Pupils: Pupils are equal, round, and reactive to light. Cardiovascular:      Rate and Rhythm: Normal rate and regular rhythm. Heart sounds: No murmur heard. Pulmonary:      Effort: No respiratory distress. Breath sounds: No stridor. No wheezing. Abdominal:      General: Bowel sounds are normal. There is no distension. Palpations: Abdomen is soft. There is no mass. Tenderness: There is abdominal tenderness (epigastric). There is no guarding. Hernia: No hernia is present. Genitourinary:     Comments: Joseph with clear yellow urine  Musculoskeletal:         General: Normal range of motion. Cervical back: Normal range of motion. Right lower leg: No edema. Left lower leg: No edema. Skin:     General: Skin is warm and dry. Neurological:      General: No focal deficit present. Mental Status: He is alert and oriented to person, place, and time.    Psychiatric:         Mood and Affect: Mood normal.         Behavior: Behavior normal.          Additional Data:     Labs:  Results from last 7 days   Lab Units 09/10/23  1423   WBC Thousand/uL 7.11   HEMOGLOBIN g/dL 15.6   HEMATOCRIT % 46.8   PLATELETS Thousands/uL 184   NEUTROS PCT % 68   LYMPHS PCT % 24   MONOS PCT % 8   EOS PCT % 0     Results from last 7 days   Lab Units 09/11/23  0557 09/10/23  1423   SODIUM mmol/L 137 134*   POTASSIUM mmol/L 3.8 4.1   CHLORIDE mmol/L 105 100   CO2 mmol/L 20* 18*   BUN mg/dL 11 15   CREATININE mg/dL 0.79 0.95   ANION GAP mmol/L 12 16   CALCIUM mg/dL 8.3* 9.3   ALBUMIN g/dL  --  4.6   TOTAL BILIRUBIN mg/dL  --  0.80   ALK PHOS U/L  --  69   ALT U/L  --  10   AST U/L  --  11*   GLUCOSE RANDOM mg/dL 75 72     Results from last 7 days   Lab Units 09/10/23  1423   INR  1.01             Results from last 7 days   Lab Units 09/10/23  1423   LACTIC ACID mmol/L 0.8       Lines/Drains:  Invasive Devices     Peripheral Intravenous Line  Duration           Peripheral IV 09/10/23 Left Antecubital <1 day          Drain  Duration           Urethral Catheter Non-latex 16 Fr. 1 day              Urinary Catheter:  Goal for removal: N/A- Discharging with Joseph               Imaging: Reviewed radiology reports from this admission including: abdominal/pelvic CT    Recent Cultures (last 7 days):         Last 24 Hours Medication List:   Current Facility-Administered Medications   Medication Dose Route Frequency Provider Last Rate   • acetaminophen  650 mg Oral Q6H PRN Sebastian Hanley MD     • cefTRIAXone  1,000 mg Intravenous Q24H Sebastian Hanley MD 1,000 mg (09/11/23 0005)   • escitalopram  10 mg Oral Daily Sebastian Hanley MD     • fluticasone  1 spray Each Nare Daily Sebastian Hanley MD     • loratadine  10 mg Oral Daily Sebastian Hanley MD     • metoclopramide  5 mg Intravenous TID AC Sebastian Hanley MD     • multi-electrolyte  150 mL/hr Intravenous Continuous Amador Shea Bell  mL/hr (09/11/23 2062)   • pantoprazole  40 mg Intravenous Q12H [address] Yoon Sawyer MD     • tamsulosin  0.4 mg Oral Daily With Sony Torres MD     • trimethobenzamide  200 mg Intramuscular Q6H PRN Sebastian Hanley MD          Today, Patient Was Seen By: Omar Donnelly PA-C    **Please Note: This note may have been constructed using a voice recognition system. **

## 2023-09-11 NOTE — ASSESSMENT & PLAN NOTE
Patients mother is calling to make an appointment for her son due to \"stomach issues\" and not been able to eat for two months.  Patient is having shaking and sweating and anxious.  Patients mother was offered an appointment for 5-14 and but asking if someone can look at DR. Poon schedule and get him in next week?   · Pleasant

## 2023-09-11 NOTE — ASSESSMENT & PLAN NOTE
· Just had castaneda placed in er 9/9 hx of same  · Keep castaneda ua rechecked today positive for nitrates and some leuks did not reflect to culture  · Will start iv rocephin 1g iv daily    · Will give flomax hope able to tolerate  · Will consult urology to discuss when patient should have castaneda removed and he should continue outpatient follow up

## 2023-09-11 NOTE — UTILIZATION REVIEW
NOTIFICATION OF INPATIENT ADMISSION   AUTHORIZATION REQUEST   SERVICING FACILITY:   [address]  Tax ID: [id number]  NPI: [id number] ATTENDING PROVIDER:  Attending Name and NPI#: Ame Rodríguez Md [[id number]]  Address: [address] [place] [address]  Phone: [phone]   ADMISSION INFORMATION:  [facility]   Place of Service Code: 21  Inpatient Admission Date/Time: 9/10/23  3:40 PM  Discharge Date/Time: No discharge date for patient encounter. Admitting Diagnosis Code/Description:  Urinary retention [R33.9]  Vomiting [R11.10]  UTI (urinary tract infection) [K30.9]  Metabolic acidosis [G69.54]  Intractable nausea and vomiting [R11.2]     UTILIZATION REVIEW CONTACT:  Cindy Rodríguez Utilization   Network Utilization Review Department  Phone: [phone]  Fax [phone]  Email: [email or web address]  Contact for approvals/pending authorizations, clinical reviews, and discharge. PHYSICIAN ADVISORY SERVICES:  Medical Necessity Denial & [id number] Review  Phone: [phone]  Fax: [phone]  Email: [email or web address]

## 2023-09-11 NOTE — PLAN OF CARE
Problem: Potential for Falls  Goal: Patient will remain free of falls  Description: INTERVENTIONS:  - Educate patient/family on patient safety including physical limitations  - Instruct patient to call for assistance with activity   - Consult OT/PT to assist with strengthening/mobility   - Keep Call bell within reach  - Keep bed low and locked with side rails adjusted as appropriate  - Keep care items and personal belongings within reach  - Initiate and maintain comfort rounds  - Make Fall Risk Sign visible to staff  - Offer Toileting every  2 Hours, in advance of need  - Apply yellow socks and bracelet for high fall risk patients  - Consider moving patient to room near nurses station  Outcome: Progressing     Problem: PAIN - ADULT  Goal: Verbalizes/displays adequate comfort level or baseline comfort level  Description: Interventions:  - Encourage patient to monitor pain and request assistance  - Assess pain using appropriate pain scale  - Administer analgesics based on type and severity of pain and evaluate response  - Implement non-pharmacological measures as appropriate and evaluate response  - Consider cultural and social influences on pain and pain management  - Notify physician/advanced practitioner if interventions unsuccessful or patient reports new pain  Outcome: Progressing     Problem: INFECTION - ADULT  Goal: Absence or prevention of progression during hospitalization  Description: INTERVENTIONS:  - Assess and monitor for signs and symptoms of infection  - Monitor lab/diagnostic results  - Monitor all insertion sites, i.e. indwelling lines, tubes, and drains  - Monitor endotracheal if appropriate and nasal secretions for changes in amount and color  - [place] appropriate cooling/warming therapies per order  - Administer medications as ordered  - Instruct and encourage patient and family to use good hand hygiene technique  - Identify and instruct in appropriate isolation precautions for identified infection/condition  Outcome: Progressing  Goal: Absence of fever/infection during neutropenic period  Description: INTERVENTIONS:  - Monitor WBC    Outcome: Progressing     Problem: DISCHARGE PLANNING  Goal: Discharge to home or other facility with appropriate resources  Description: INTERVENTIONS:  - Identify barriers to discharge w/patient and caregiver  - Arrange for needed discharge resources and transportation as appropriate  - Identify discharge learning needs (meds, wound care, etc.)  - Arrange for interpretive services to assist at discharge as needed  - Refer to Case Management Department for coordinating discharge planning if the patient needs post-hospital services based on physician/advanced practitioner order or complex needs related to functional status, cognitive ability, or social support system  Outcome: Progressing     Problem: Nutrition/Hydration-ADULT  Goal: Nutrient/Hydration intake appropriate for improving, restoring or maintaining nutritional needs  Description: Monitor and assess patient's nutrition/hydration status for malnutrition. Collaborate with interdisciplinary team and initiate plan and interventions as ordered. Monitor patient's weight and dietary intake as ordered or per policy. Utilize nutrition screening tool and intervene as necessary. Determine patient's food preferences and provide high-protein, high-caloric foods as appropriate.      INTERVENTIONS:  - Monitor oral intake, urinary output, labs, and treatment plans  - Assess nutrition and hydration status and recommend course of action  - Evaluate amount of meals eaten  - Assist patient with eating if necessary   - Allow adequate time for meals  - Recommend/ encourage appropriate diets, oral nutritional supplements, and vitamin/mineral supplements  - Order, calculate, and assess calorie counts as needed  - Recommend, monitor, and adjust tube feedings and TPN/PPN based on assessed needs  - Assess need for intravenous fluids  - Provide specific nutrition/hydration education as appropriate  - Include patient/family/caregiver in decisions related to nutrition  Outcome: Progressing

## 2023-09-12 ENCOUNTER — TELEPHONE (OUTPATIENT)
Dept: UROLOGY | Facility: CLINIC | Age: 28
End: 2023-09-12

## 2023-09-12 LAB
ANION GAP SERPL CALCULATED.3IONS-SCNC: 10 MMOL/L
ATRIAL RATE: 49 BPM
BACTERIA UR CULT: ABNORMAL
BUN SERPL-MCNC: 4 MG/DL (ref 5–25)
CALCIUM SERPL-MCNC: 8.1 MG/DL (ref 8.4–10.2)
CHLORIDE SERPL-SCNC: 105 MMOL/L (ref 96–108)
CO2 SERPL-SCNC: 23 MMOL/L (ref 21–32)
CREAT SERPL-MCNC: 0.68 MG/DL (ref 0.6–1.3)
ERYTHROCYTE [DISTWIDTH] IN BLOOD BY AUTOMATED COUNT: 12.6 % (ref 11.6–15.1)
GFR SERPL CREATININE-BSD FRML MDRD: 130 ML/MIN/1.73SQ M
GLUCOSE SERPL-MCNC: 90 MG/DL (ref 65–140)
HCT VFR BLD AUTO: 39.2 % (ref 36.5–49.3)
HGB BLD-MCNC: 13.5 G/DL (ref 12–17)
MAGNESIUM SERPL-MCNC: 2.1 MG/DL (ref 1.9–2.7)
MCH RBC QN AUTO: 30.2 PG (ref 26.8–34.3)
MCHC RBC AUTO-ENTMCNC: 34.4 G/DL (ref 31.4–37.4)
MCV RBC AUTO: 88 FL (ref 82–98)
P AXIS: 46 DEGREES
PLATELET # BLD AUTO: 149 THOUSANDS/UL (ref 149–390)
PMV BLD AUTO: 9.9 FL (ref 8.9–12.7)
POTASSIUM SERPL-SCNC: 3.6 MMOL/L (ref 3.5–5.3)
PR INTERVAL: 182 MS
QRS AXIS: 14 DEGREES
QRSD INTERVAL: 94 MS
QT INTERVAL: 444 MS
QTC INTERVAL: 401 MS
RBC # BLD AUTO: 4.47 MILLION/UL (ref 3.88–5.62)
SODIUM SERPL-SCNC: 138 MMOL/L (ref 135–147)
T WAVE AXIS: 27 DEGREES
VENTRICULAR RATE: 49 BPM
WBC # BLD AUTO: 6.5 THOUSAND/UL (ref 4.31–10.16)

## 2023-09-12 PROCEDURE — 93010 ELECTROCARDIOGRAM REPORT: CPT | Performed by: STUDENT IN AN ORGANIZED HEALTH CARE EDUCATION/TRAINING PROGRAM

## 2023-09-12 PROCEDURE — 99232 SBSQ HOSP IP/OBS MODERATE 35: CPT

## 2023-09-12 PROCEDURE — C9113 INJ PANTOPRAZOLE SODIUM, VIA: HCPCS | Performed by: FAMILY MEDICINE

## 2023-09-12 PROCEDURE — 93005 ELECTROCARDIOGRAM TRACING: CPT

## 2023-09-12 PROCEDURE — 85027 COMPLETE CBC AUTOMATED: CPT

## 2023-09-12 PROCEDURE — 83735 ASSAY OF MAGNESIUM: CPT

## 2023-09-12 PROCEDURE — 80048 BASIC METABOLIC PNL TOTAL CA: CPT

## 2023-09-12 RX ORDER — SULFAMETHOXAZOLE AND TRIMETHOPRIM 800; 160 MG/1; MG/1
1 TABLET ORAL EVERY 12 HOURS SCHEDULED
Status: COMPLETED | OUTPATIENT
Start: 2023-09-12 | End: 2023-09-16

## 2023-09-12 RX ORDER — SUCRALFATE 1 G/1
1 TABLET ORAL
Status: DISCONTINUED | OUTPATIENT
Start: 2023-09-12 | End: 2023-09-12

## 2023-09-12 RX ORDER — SULFAMETHOXAZOLE AND TRIMETHOPRIM 800; 160 MG/1; MG/1
1 TABLET ORAL EVERY 12 HOURS SCHEDULED
Status: DISCONTINUED | OUTPATIENT
Start: 2023-09-12 | End: 2023-09-12

## 2023-09-12 RX ORDER — ONDANSETRON 2 MG/ML
4 INJECTION INTRAMUSCULAR; INTRAVENOUS ONCE
Status: COMPLETED | OUTPATIENT
Start: 2023-09-12 | End: 2023-09-12

## 2023-09-12 RX ADMIN — SUCRALFATE 1 G: 1 TABLET ORAL at 11:45

## 2023-09-12 RX ADMIN — LORATADINE 10 MG: 10 TABLET ORAL at 07:49

## 2023-09-12 RX ADMIN — PANTOPRAZOLE SODIUM 40 MG: 40 INJECTION, POWDER, FOR SOLUTION INTRAVENOUS at 20:30

## 2023-09-12 RX ADMIN — ESCITALOPRAM OXALATE 10 MG: 10 TABLET ORAL at 07:50

## 2023-09-12 RX ADMIN — PANTOPRAZOLE SODIUM 40 MG: 40 INJECTION, POWDER, FOR SOLUTION INTRAVENOUS at 07:49

## 2023-09-12 RX ADMIN — TRIMETHOBENZAMIDE HYDROCHLORIDE 200 MG: 100 INJECTION INTRAMUSCULAR at 11:48

## 2023-09-12 RX ADMIN — METOCLOPRAMIDE HYDROCHLORIDE 5 MG: 5 INJECTION INTRAMUSCULAR; INTRAVENOUS at 07:47

## 2023-09-12 RX ADMIN — ONDANSETRON 4 MG: 2 INJECTION INTRAMUSCULAR; INTRAVENOUS at 16:44

## 2023-09-12 RX ADMIN — SULFAMETHOXAZOLE AND TRIMETHOPRIM 1 TABLET: 800; 160 TABLET ORAL at 09:24

## 2023-09-12 RX ADMIN — FLUTICASONE PROPIONATE 1 SPRAY: 50 SPRAY, METERED NASAL at 07:50

## 2023-09-12 RX ADMIN — ACETAMINOPHEN 650 MG: 325 TABLET, FILM COATED ORAL at 20:30

## 2023-09-12 RX ADMIN — SULFAMETHOXAZOLE AND TRIMETHOPRIM 1 TABLET: 800; 160 TABLET ORAL at 20:30

## 2023-09-12 RX ADMIN — SODIUM CHLORIDE, SODIUM GLUCONATE, SODIUM ACETATE, POTASSIUM CHLORIDE, MAGNESIUM CHLORIDE, SODIUM PHOSPHATE, DIBASIC, AND POTASSIUM PHOSPHATE 150 ML/HR: .53; .5; .37; .037; .03; .012; .00082 INJECTION, SOLUTION INTRAVENOUS at 06:33

## 2023-09-12 RX ADMIN — TAMSULOSIN HYDROCHLORIDE 0.4 MG: 0.4 CAPSULE ORAL at 16:44

## 2023-09-12 NOTE — ASSESSMENT & PLAN NOTE
· Recurrent with epigastric pain. Hx of gastritis /coffe ground emesis before. Hx of suspected gastroperesis. Recent uds negative  · Ct abd and pelvis- no pathology to explain  · Hb normal   · Start iv fluids  · protonix 40 mg iv bid  · egd April 2022 when had same done in [facility]  · Tylenol prn pain  · reglan iv 5 mg with meals as will start clears and see how he does  · Patient states he felt more nauseous with reglan, d/c and tried carafate, had nausea and vomiting with this and was d/c.   · Will treat uti as well  · qtc nml  · Will place tigan prn as well  · Patient having worsening of abdominal pain with clears and still with nausea, will continue clears and attempt to advance diet to full liquids toast and crackers later during the day if tolerated  · Patient's diet was increased, patient had more nausea and vomiting. Will decrease back to clear liquids and ask GI to evaluate patient tomorrow. NPO after midnight in case of scope.

## 2023-09-12 NOTE — ASSESSMENT & PLAN NOTE
· Just had castaneda placed in er 9/9 hx of same  · Keep castaneda ua rechecked today positive for nitrates and some leuks did not reflect to culture  · Will start iv rocephin 1g iv daily    · Patient states he gets more nauseous from rocephin, has tolerated bactrim, d/c and start bactrim. · Will give flomax hope able to tolerate  · Urology consult: follow up later in the week or early next week for voiding trial. Can go home on abx when clinically stable.

## 2023-09-12 NOTE — PLAN OF CARE
Problem: Potential for Falls  Goal: Patient will remain free of falls  Description: INTERVENTIONS:  - Educate patient/family on patient safety including physical limitations  - Instruct patient to call for assistance with activity   - Consult OT/PT to assist with strengthening/mobility   - Keep Call bell within reach  - Keep bed low and locked with side rails adjusted as appropriate  - Keep care items and personal belongings within reach  - Initiate and maintain comfort rounds  - Make Fall Risk Sign visible to staff  - Apply yellow socks and bracelet for high fall risk patients  - Consider moving patient to room near nurses station  Outcome: Progressing     Problem: PAIN - ADULT  Goal: Verbalizes/displays adequate comfort level or baseline comfort level  Description: Interventions:  - Encourage patient to monitor pain and request assistance  - Assess pain using appropriate pain scale  - Administer analgesics based on type and severity of pain and evaluate response  - Implement non-pharmacological measures as appropriate and evaluate response  - Consider cultural and social influences on pain and pain management  - Notify physician/advanced practitioner if interventions unsuccessful or patient reports new pain  Outcome: Progressing     Problem: INFECTION - ADULT  Goal: Absence or prevention of progression during hospitalization  Description: INTERVENTIONS:  - Assess and monitor for signs and symptoms of infection  - Monitor lab/diagnostic results  - Monitor all insertion sites, i.e. indwelling lines, tubes, and drains  - Monitor endotracheal if appropriate and nasal secretions for changes in amount and color  - [place] appropriate cooling/warming therapies per order  - Administer medications as ordered  - Instruct and encourage patient and family to use good hand hygiene technique  - Identify and instruct in appropriate isolation precautions for identified infection/condition  Outcome: Progressing  Goal: Absence of fever/infection during neutropenic period  Description: INTERVENTIONS:  - Monitor WBC    Outcome: Progressing     Problem: DISCHARGE PLANNING  Goal: Discharge to home or other facility with appropriate resources  Description: INTERVENTIONS:  - Identify barriers to discharge w/patient and caregiver  - Arrange for needed discharge resources and transportation as appropriate  - Identify discharge learning needs (meds, wound care, etc.)  - Arrange for interpretive services to assist at discharge as needed  - Refer to Case Management Department for coordinating discharge planning if the patient needs post-hospital services based on physician/advanced practitioner order or complex needs related to functional status, cognitive ability, or social support system  Outcome: Progressing     Problem: Nutrition/Hydration-ADULT  Goal: Nutrient/Hydration intake appropriate for improving, restoring or maintaining nutritional needs  Description: Monitor and assess patient's nutrition/hydration status for malnutrition. Collaborate with interdisciplinary team and initiate plan and interventions as ordered. Monitor patient's weight and dietary intake as ordered or per policy. Utilize nutrition screening tool and intervene as necessary. Determine patient's food preferences and provide high-protein, high-caloric foods as appropriate.      INTERVENTIONS:  - Monitor oral intake, urinary output, labs, and treatment plans  - Assess nutrition and hydration status and recommend course of action  - Evaluate amount of meals eaten  - Assist patient with eating if necessary   - Allow adequate time for meals  - Recommend/ encourage appropriate diets, oral nutritional supplements, and vitamin/mineral supplements  - Order, calculate, and assess calorie counts as needed  - Recommend, monitor, and adjust tube feedings and TPN/PPN based on assessed needs  - Assess need for intravenous fluids  - Provide specific nutrition/hydration education as appropriate  - Include patient/family/caregiver in decisions related to nutrition  Outcome: Progressing

## 2023-09-12 NOTE — PLAN OF CARE
Problem: Potential for Falls  Goal: Patient will remain free of falls  Description: INTERVENTIONS:  - Educate patient/family on patient safety including physical limitations  - Instruct patient to call for assistance with activity   - Consult OT/PT to assist with strengthening/mobility   - Keep Call bell within reach  - Keep bed low and locked with side rails adjusted as appropriate  - Keep care items and personal belongings within reach  - Initiate and maintain comfort rounds  - Make Fall Risk Sign visible to staff  - Offer Toileting every 2 Hours, in advance of need  - Initiate/Maintain bed alarm  - Obtain necessary fall risk management equipment  - Apply yellow socks and bracelet for high fall risk patients  - Consider moving patient to room near nurses station  Outcome: Progressing     Problem: PAIN - ADULT  Goal: Verbalizes/displays adequate comfort level or baseline comfort level  Description: Interventions:  - Encourage patient to monitor pain and request assistance  - Assess pain using appropriate pain scale  - Administer analgesics based on type and severity of pain and evaluate response  - Implement non-pharmacological measures as appropriate and evaluate response  - Consider cultural and social influences on pain and pain management  - Notify physician/advanced practitioner if interventions unsuccessful or patient reports new pain  Outcome: Progressing     Problem: INFECTION - ADULT  Goal: Absence or prevention of progression during hospitalization  Description: INTERVENTIONS:  - Assess and monitor for signs and symptoms of infection  - Monitor lab/diagnostic results  - Monitor all insertion sites, i.e. indwelling lines, tubes, and drains  - Monitor endotracheal if appropriate and nasal secretions for changes in amount and color  - [place] appropriate cooling/warming therapies per order  - Administer medications as ordered  - Instruct and encourage patient and family to use good hand hygiene technique  - Identify and instruct in appropriate isolation precautions for identified infection/condition  Outcome: Progressing  Goal: Absence of fever/infection during neutropenic period  Description: INTERVENTIONS:  - Monitor WBC    Outcome: Progressing     Problem: DISCHARGE PLANNING  Goal: Discharge to home or other facility with appropriate resources  Description: INTERVENTIONS:  - Identify barriers to discharge w/patient and caregiver  - Arrange for needed discharge resources and transportation as appropriate  - Identify discharge learning needs (meds, wound care, etc.)  - Arrange for interpretive services to assist at discharge as needed  - Refer to Case Management Department for coordinating discharge planning if the patient needs post-hospital services based on physician/advanced practitioner order or complex needs related to functional status, cognitive ability, or social support system  Outcome: Progressing     Problem: Nutrition/Hydration-ADULT  Goal: Nutrient/Hydration intake appropriate for improving, restoring or maintaining nutritional needs  Description: Monitor and assess patient's nutrition/hydration status for malnutrition. Collaborate with interdisciplinary team and initiate plan and interventions as ordered. Monitor patient's weight and dietary intake as ordered or per policy. Utilize nutrition screening tool and intervene as necessary. Determine patient's food preferences and provide high-protein, high-caloric foods as appropriate.      INTERVENTIONS:  - Monitor oral intake, urinary output, labs, and treatment plans  - Assess nutrition and hydration status and recommend course of action  - Evaluate amount of meals eaten  - Assist patient with eating if necessary   - Allow adequate time for meals  - Recommend/ encourage appropriate diets, oral nutritional supplements, and vitamin/mineral supplements  - Order, calculate, and assess calorie counts as needed  - Recommend, monitor, and adjust tube feedings and TPN/PPN based on assessed needs  - Assess need for intravenous fluids  - Provide specific nutrition/hydration education as appropriate  - Include patient/family/caregiver in decisions related to nutrition  Outcome: Progressing

## 2023-09-12 NOTE — PROGRESS NOTES
[address] [facility]  Progress Note  Name: Felicia Angela  MRN: [id number]  Unit/Bed#: -01 I Date of Admission: 9/10/2023   Date of Service: 9/12/2023 I Hospital Day: 2    Assessment/Plan   * Intractable nausea and vomiting  Assessment & Plan  · Recurrent with epigastric pain. Hx of gastritis /coffe ground emesis before. Hx of suspected gastroperesis. Recent uds negative  · Ct abd and pelvis- no pathology to explain  · Hb normal   · Start iv fluids  · protonix 40 mg iv bid  · egd April 2022 when had same done in [facility]  · Tylenol prn pain  · reglan iv 5 mg with meals as will start clears and see how he does  · Patient states he felt more nauseous with reglan, d/c and tried carafate, had nausea and vomiting with this and was d/c.   · Will treat uti as well  · qtc nml  · Will place tigan prn as well  · Patient having worsening of abdominal pain with clears and still with nausea, will continue clears and attempt to advance diet to full liquids toast and crackers later during the day if tolerated  · Patient's diet was increased, patient had more nausea and vomiting. Will decrease back to clear liquids and ask GI to evaluate patient tomorrow. NPO after midnight in case of scope. Acute cystitis without hematuria  Assessment & Plan  · ua positive for nitrate and leuks - start rocephin urine cx did not reflect on this sample unable to add  · Patient with increasing nausea after rocephin, d/c and start on bactrim as patient states he tolerated this in the past.     Acute urinary retention  Assessment & Plan  · Just had castaneda placed in er 9/9 hx of same  · Keep castaneda ua rechecked today positive for nitrates and some leuks did not reflect to culture  · Will start iv rocephin 1g iv daily    · Patient states he gets more nauseous from rocephin, has tolerated bactrim, d/c and start bactrim.    · Will give flomax hope able to tolerate  · Urology consult: follow up later in the week or early next week for voiding trial. Can go home on abx when clinically stable. Autism  Assessment & Plan  · Pleasant           VTE Pharmacologic Prophylaxis: VTE Score: 0 Low Risk (Score 0-2) - Encourage Ambulation. Patient Centered Rounds: I performed bedside rounds with nursing staff today. Discussions with Specialists or Other Care Team Provider: nursing, case management    Education and Discussions with Family / Patient: Updated  (grandfather) via phone. Total Time Spent on Date of Encounter in care of patient: 30 mins. This time was spent on one or more of the following: performing physical exam; counseling and coordination of care; obtaining or reviewing history; documenting in the medical record; reviewing/ordering tests, medications or procedures; communicating with other healthcare professionals and discussing with patient's family/caregivers. Current Length of Stay: 2 day(s)  Current Patient Status: Inpatient   Certification Statement: The patient will continue to require additional inpatient hospital stay due to nausea and vomiting  Discharge Plan: Anticipate discharge in 24-48 hrs to home. Code Status: Level 1 - Full Code    Subjective:   Patient seen and examined at bedside this morning. He states that he was feeling better this morning, was still having difficulty tolerating his diet, but did not have any nausea or vomiting. RN messaged shortly later stating that patient had multiple episodes of vomiting, unable to tolerate his food and his medication. Will decrease diet and make NPO after midnight. Denies fever, chills, CP, SOB, diarrhea, constipation. Still with abdominal pain. Objective:     Vitals:   Temp (24hrs), Av.3 °F (36.8 °C), Min:98.1 °F (36.7 °C), Max:98.8 °F (37.1 °C)    Temp:  [98.1 °F (36.7 °C)-98.8 °F (37.1 °C)] 98.8 °F (37.1 °C)  HR:  [64-82] 82  Resp:  [19] 19  BP: (103-110)/(60-67) 106/65  SpO2:  [95 %-98 %] 97 %  Body mass index is 29.3 kg/m².      Input and Output Summary (last 24 hours): Intake/Output Summary (Last 24 hours) at 9/12/2023 1252  Last data filed at 9/12/2023 0900  Gross per 24 hour   Intake 4752.5 ml   Output 1350 ml   Net 3402.5 ml       Physical Exam:   Physical Exam  Vitals reviewed. Constitutional:       General: He is not in acute distress. Appearance: He is not ill-appearing or toxic-appearing. HENT:      Head: Normocephalic and atraumatic. Nose: Nose normal.      Mouth/Throat:      Mouth: Mucous membranes are moist.   Eyes:      Pupils: Pupils are equal, round, and reactive to light. Cardiovascular:      Rate and Rhythm: Normal rate and regular rhythm. Heart sounds: No murmur heard. Pulmonary:      Effort: No respiratory distress. Breath sounds: No stridor. No wheezing. Abdominal:      General: Bowel sounds are normal. There is no distension. Palpations: Abdomen is soft. There is no mass. Tenderness: There is abdominal tenderness (epigastric). Genitourinary:     Comments: Joseph in place draining clear, yellow urine  Musculoskeletal:      Right lower leg: No edema. Left lower leg: No edema. Skin:     General: Skin is warm and dry. Neurological:      General: No focal deficit present. Mental Status: He is alert and oriented to person, place, and time.    Psychiatric:         Mood and Affect: Mood normal.         Behavior: Behavior normal.          Additional Data:     Labs:  Results from last 7 days   Lab Units 09/12/23  0534 09/10/23  1423   WBC Thousand/uL 6.50 7.11   HEMOGLOBIN g/dL 13.5 15.6   HEMATOCRIT % 39.2 46.8   PLATELETS Thousands/uL 149 184   NEUTROS PCT %  --  68   LYMPHS PCT %  --  24   MONOS PCT %  --  8   EOS PCT %  --  0     Results from last 7 days   Lab Units 09/12/23  0534 09/11/23  0557 09/10/23  1423   SODIUM mmol/L 138   < > 134*   POTASSIUM mmol/L 3.6   < > 4.1   CHLORIDE mmol/L 105   < > 100   CO2 mmol/L 23   < > 18*   BUN mg/dL 4*   < > 15   CREATININE mg/dL 0.68 < > 0.95   ANION GAP mmol/L 10   < > 16   CALCIUM mg/dL 8.1*   < > 9.3   ALBUMIN g/dL  --   --  4.6   TOTAL BILIRUBIN mg/dL  --   --  0.80   ALK PHOS U/L  --   --  69   ALT U/L  --   --  10   AST U/L  --   --  11*   GLUCOSE RANDOM mg/dL 90   < > 72    < > = values in this interval not displayed. Results from last 7 days   Lab Units 09/10/23  1423   INR  1.01             Results from last 7 days   Lab Units 09/10/23  1423   LACTIC ACID mmol/L 0.8       Lines/Drains:  Invasive Devices     Peripheral Intravenous Line  Duration           Peripheral IV 09/10/23 Left Antecubital 1 day          Drain  Duration           Urethral Catheter Non-latex 16 Fr. 2 days              Urinary Catheter:  Goal for removal: N/A- Discharging with Joseph               Imaging: Reviewed radiology reports from this admission including: abdominal/pelvic CT    Recent Cultures (last 7 days):         Last 24 Hours Medication List:   Current Facility-Administered Medications   Medication Dose Route Frequency Provider Last Rate   • acetaminophen  650 mg Oral Q6H PRN Gerardo Villegas MD     • escitalopram  10 mg Oral Daily Gerardo Villegas MD     • fluticasone  1 spray Each Nare Daily Gerardo Villegas MD     • loratadine  10 mg Oral Daily Gerardo Villegas MD     • pantoprazole  40 mg Intravenous Q12H [facility] Gerardo Villegas MD     • sucralfate  1 g Oral 4x Daily (AC & HS) Mahi Edwards PA-C     • sulfamethoxazole-trimethoprim  1 tablet Oral Q12H [facility] Mahi Edwards PA-C     • tamsulosin  0.4 mg Oral Daily With Je Felipe MD     • trimethobenzamide  200 mg Intramuscular Q6H PRN Gerardo Villegas MD          Today, Patient Was Seen By: Mahi Edwards PA-C    **Please Note: This note may have been constructed using a voice recognition system. **

## 2023-09-12 NOTE — ASSESSMENT & PLAN NOTE
· ua positive for nitrate and leuks - start rocephin urine cx did not reflect on this sample unable to add  · Patient with increasing nausea after rocephin, d/c and start on bactrim as patient states he tolerated this in the past.

## 2023-09-12 NOTE — TELEPHONE ENCOUNTER
----- Message from Leny Patiño MD sent at 9/11/2023  3:51 PM EDT -----  Please get him in for a voiding trial later this week or early next week.   He is currently in the hospital

## 2023-09-13 LAB
ANION GAP SERPL CALCULATED.3IONS-SCNC: 11 MMOL/L
BUN SERPL-MCNC: 4 MG/DL (ref 5–25)
CALCIUM SERPL-MCNC: 8.7 MG/DL (ref 8.4–10.2)
CHLORIDE SERPL-SCNC: 104 MMOL/L (ref 96–108)
CO2 SERPL-SCNC: 22 MMOL/L (ref 21–32)
CREAT SERPL-MCNC: 0.72 MG/DL (ref 0.6–1.3)
ERYTHROCYTE [DISTWIDTH] IN BLOOD BY AUTOMATED COUNT: 12.6 % (ref 11.6–15.1)
GFR SERPL CREATININE-BSD FRML MDRD: 127 ML/MIN/1.73SQ M
GLUCOSE SERPL-MCNC: 99 MG/DL (ref 65–140)
HCT VFR BLD AUTO: 41.3 % (ref 36.5–49.3)
HGB BLD-MCNC: 14.4 G/DL (ref 12–17)
MAGNESIUM SERPL-MCNC: 2 MG/DL (ref 1.9–2.7)
MCH RBC QN AUTO: 30.3 PG (ref 26.8–34.3)
MCHC RBC AUTO-ENTMCNC: 34.9 G/DL (ref 31.4–37.4)
MCV RBC AUTO: 87 FL (ref 82–98)
PLATELET # BLD AUTO: 165 THOUSANDS/UL (ref 149–390)
PMV BLD AUTO: 9.9 FL (ref 8.9–12.7)
POTASSIUM SERPL-SCNC: 3.3 MMOL/L (ref 3.5–5.3)
RBC # BLD AUTO: 4.76 MILLION/UL (ref 3.88–5.62)
SODIUM SERPL-SCNC: 137 MMOL/L (ref 135–147)
WBC # BLD AUTO: 8.44 THOUSAND/UL (ref 4.31–10.16)

## 2023-09-13 PROCEDURE — 83735 ASSAY OF MAGNESIUM: CPT

## 2023-09-13 PROCEDURE — 80048 BASIC METABOLIC PNL TOTAL CA: CPT

## 2023-09-13 PROCEDURE — 99232 SBSQ HOSP IP/OBS MODERATE 35: CPT

## 2023-09-13 PROCEDURE — C9113 INJ PANTOPRAZOLE SODIUM, VIA: HCPCS | Performed by: FAMILY MEDICINE

## 2023-09-13 PROCEDURE — 85027 COMPLETE CBC AUTOMATED: CPT

## 2023-09-13 RX ORDER — BISACODYL 10 MG
10 SUPPOSITORY, RECTAL RECTAL DAILY
Status: DISCONTINUED | OUTPATIENT
Start: 2023-09-13 | End: 2023-09-19 | Stop reason: HOSPADM

## 2023-09-13 RX ORDER — POTASSIUM CHLORIDE 20 MEQ/1
20 TABLET, EXTENDED RELEASE ORAL ONCE
Status: COMPLETED | OUTPATIENT
Start: 2023-09-13 | End: 2023-09-13

## 2023-09-13 RX ADMIN — POTASSIUM CHLORIDE 20 MEQ: 1500 TABLET, EXTENDED RELEASE ORAL at 14:03

## 2023-09-13 RX ADMIN — LORATADINE 10 MG: 10 TABLET ORAL at 11:33

## 2023-09-13 RX ADMIN — BISACODYL 10 MG: 10 SUPPOSITORY RECTAL at 14:04

## 2023-09-13 RX ADMIN — PANTOPRAZOLE SODIUM 40 MG: 40 INJECTION, POWDER, FOR SOLUTION INTRAVENOUS at 09:15

## 2023-09-13 RX ADMIN — TRIMETHOBENZAMIDE HYDROCHLORIDE 200 MG: 100 INJECTION INTRAMUSCULAR at 16:31

## 2023-09-13 RX ADMIN — ACETAMINOPHEN 650 MG: 325 TABLET, FILM COATED ORAL at 15:32

## 2023-09-13 RX ADMIN — PANTOPRAZOLE SODIUM 40 MG: 40 INJECTION, POWDER, FOR SOLUTION INTRAVENOUS at 20:20

## 2023-09-13 RX ADMIN — SULFAMETHOXAZOLE AND TRIMETHOPRIM 1 TABLET: 800; 160 TABLET ORAL at 09:15

## 2023-09-13 RX ADMIN — TRIMETHOBENZAMIDE HYDROCHLORIDE 200 MG: 100 INJECTION INTRAMUSCULAR at 10:29

## 2023-09-13 RX ADMIN — SULFAMETHOXAZOLE AND TRIMETHOPRIM 1 TABLET: 800; 160 TABLET ORAL at 20:20

## 2023-09-13 RX ADMIN — ESCITALOPRAM OXALATE 10 MG: 10 TABLET ORAL at 14:04

## 2023-09-13 RX ADMIN — TAMSULOSIN HYDROCHLORIDE 0.4 MG: 0.4 CAPSULE ORAL at 15:33

## 2023-09-13 RX ADMIN — TRIMETHOBENZAMIDE HYDROCHLORIDE 200 MG: 100 INJECTION INTRAMUSCULAR at 03:17

## 2023-09-13 NOTE — PLAN OF CARE
Problem: Potential for Falls  Goal: Patient will remain free of falls  Description: INTERVENTIONS:  - Educate patient/family on patient safety including physical limitations  - Instruct patient to call for assistance with activity   - Consult OT/PT to assist with strengthening/mobility   - Keep Call bell within reach  - Keep bed low and locked with side rails adjusted as appropriate  - Keep care items and personal belongings within reach  - Initiate and maintain comfort rounds  - Make Fall Risk Sign visible to staff  - Offer Toileting every 2 Hours, in advance of nee  - Consider moving patient to room near nurses station  9/12/2023 2157 by Fransisco Vaughan RN  Outcome: Progressing  9/12/2023 1858 by Fransisco Vaughan RN  Outcome: Progressing     Problem: PAIN - ADULT  Goal: Verbalizes/displays adequate comfort level or baseline comfort level  Description: Interventions:  - Encourage patient to monitor pain and request assistance  - Assess pain using appropriate pain scale  - Administer analgesics based on type and severity of pain and evaluate response  - Implement non-pharmacological measures as appropriate and evaluate response  - Consider cultural and social influences on pain and pain management  - Notify physician/advanced practitioner if interventions unsuccessful or patient reports new pain  9/12/2023 2157 by Fransisco Vaughan RN  Outcome: Progressing  9/12/2023 1858 by Fransisco Vaughan RN  Outcome: Progressing     Problem: INFECTION - ADULT  Goal: Absence or prevention of progression during hospitalization  Description: INTERVENTIONS:  - Assess and monitor for signs and symptoms of infection  - Monitor lab/diagnostic results  - Monitor all insertion sites, i.e. indwelling lines, tubes, and drains  - Monitor endotracheal if appropriate and nasal secretions for changes in amount and color  - [place] appropriate cooling/warming therapies per order  - Administer medications as ordered  - Instruct and encourage patient and family to use good hand hygiene technique  - Identify and instruct in appropriate isolation precautions for identified infection/condition  9/12/2023 2157 by Mati Fair RN  Outcome: Progressing  9/12/2023 1858 by Mati Fair RN  Outcome: Progressing  Goal: Absence of fever/infection during neutropenic period  Description: INTERVENTIONS:  - Monitor WBC    9/12/2023 2157 by Mati Fair RN  Outcome: Progressing  9/12/2023 1858 by Mati Fair RN  Outcome: Progressing     Problem: DISCHARGE PLANNING  Goal: Discharge to home or other facility with appropriate resources  Description: INTERVENTIONS:  - Identify barriers to discharge w/patient and caregiver  - Arrange for needed discharge resources and transportation as appropriate  - Identify discharge learning needs (meds, wound care, etc.)  - Arrange for interpretive services to assist at discharge as needed  - Refer to Case Management Department for coordinating discharge planning if the patient needs post-hospital services based on physician/advanced practitioner order or complex needs related to functional status, cognitive ability, or social support system  9/12/2023 2157 by Mati Fair RN  Outcome: Progressing  9/12/2023 1858 by Mati Fair RN  Outcome: Progressing     Problem: Nutrition/Hydration-ADULT  Goal: Nutrient/Hydration intake appropriate for improving, restoring or maintaining nutritional needs  Description: Monitor and assess patient's nutrition/hydration status for malnutrition. Collaborate with interdisciplinary team and initiate plan and interventions as ordered. Monitor patient's weight and dietary intake as ordered or per policy. Utilize nutrition screening tool and intervene as necessary. Determine patient's food preferences and provide high-protein, high-caloric foods as appropriate.      INTERVENTIONS:  - Monitor oral intake, urinary output, labs, and treatment plans  - Assess nutrition and hydration status and recommend course of action  - Evaluate amount of meals eaten  - Assist patient with eating if necessary   - Allow adequate time for meals  - Recommend/ encourage appropriate diets, oral nutritional supplements, and vitamin/mineral supplements  - Order, calculate, and assess calorie counts as needed  - Recommend, monitor, and adjust tube feedings and TPN/PPN based on assessed needs  - Assess need for intravenous fluids  - Provide specific nutrition/hydration education as appropriate  - Include patient/family/caregiver in decisions related to nutrition  9/12/2023 2157 by Luigi More RN  Outcome: Progressing  9/12/2023 1858 by Luigi More RN  Outcome: Progressing

## 2023-09-13 NOTE — PLAN OF CARE
Problem: Potential for Falls  Goal: Patient will remain free of falls  Description: INTERVENTIONS:  - Educate patient/family on patient safety including physical limitations  - Instruct patient to call for assistance with activity   - Consult OT/PT to assist with strengthening/mobility   - Keep Call bell within reach  - Keep bed low and locked with side rails adjusted as appropriate  - Keep care items and personal belongings within reach  - Initiate and maintain comfort rounds  - Make Fall Risk Sign visible to staff  - Offer Toileting every 2 Hours, in advance of need  - Apply yellow socks and bracelet for high fall risk patients  - Consider moving patient to room near nurses station  Outcome: Progressing     Problem: PAIN - ADULT  Goal: Verbalizes/displays adequate comfort level or baseline comfort level  Description: Interventions:  - Encourage patient to monitor pain and request assistance  - Assess pain using appropriate pain scale  - Administer analgesics based on type and severity of pain and evaluate response  - Implement non-pharmacological measures as appropriate and evaluate response  - Consider cultural and social influences on pain and pain management  - Notify physician/advanced practitioner if interventions unsuccessful or patient reports new pain  Outcome: Progressing     Problem: INFECTION - ADULT  Goal: Absence or prevention of progression during hospitalization  Description: INTERVENTIONS:  - Assess and monitor for signs and symptoms of infection  - Monitor lab/diagnostic results  - Monitor all insertion sites, i.e. indwelling lines, tubes, and drains  - Monitor endotracheal if appropriate and nasal secretions for changes in amount and color  - [place] appropriate cooling/warming therapies per order  - Administer medications as ordered  - Instruct and encourage patient and family to use good hand hygiene technique  - Identify and instruct in appropriate isolation precautions for identified infection/condition  Outcome: Progressing  Goal: Absence of fever/infection during neutropenic period  Description: INTERVENTIONS:  - Monitor WBC    Outcome: Progressing     Problem: DISCHARGE PLANNING  Goal: Discharge to home or other facility with appropriate resources  Description: INTERVENTIONS:  - Identify barriers to discharge w/patient and caregiver  - Arrange for needed discharge resources and transportation as appropriate  - Identify discharge learning needs (meds, wound care, etc.)  - Arrange for interpretive services to assist at discharge as needed  - Refer to Case Management Department for coordinating discharge planning if the patient needs post-hospital services based on physician/advanced practitioner order or complex needs related to functional status, cognitive ability, or social support system  Outcome: Progressing     Problem: Nutrition/Hydration-ADULT  Goal: Nutrient/Hydration intake appropriate for improving, restoring or maintaining nutritional needs  Description: Monitor and assess patient's nutrition/hydration status for malnutrition. Collaborate with interdisciplinary team and initiate plan and interventions as ordered. Monitor patient's weight and dietary intake as ordered or per policy. Utilize nutrition screening tool and intervene as necessary. Determine patient's food preferences and provide high-protein, high-caloric foods as appropriate.      INTERVENTIONS:  - Monitor oral intake, urinary output, labs, and treatment plans  - Assess nutrition and hydration status and recommend course of action  - Evaluate amount of meals eaten  - Assist patient with eating if necessary   - Allow adequate time for meals  - Recommend/ encourage appropriate diets, oral nutritional supplements, and vitamin/mineral supplements  - Order, calculate, and assess calorie counts as needed  - Recommend, monitor, and adjust tube feedings and TPN/PPN based on assessed needs  - Assess need for intravenous fluids  - Provide specific nutrition/hydration education as appropriate  - Include patient/family/caregiver in decisions related to nutrition  Outcome: Progressing

## 2023-09-13 NOTE — ASSESSMENT & PLAN NOTE
· Just had castaneda placed in er 9/9 hx of same  · Keep castaneda ua rechecked today positive for nitrates and some leuks did not reflect to culture  · Patient states he gets more nauseous from rocephin, has tolerated bactrim, d/c and start bactrim. · Will give flomax hope able to tolerate  · Urology consult: follow up later in the week or early next week for voiding trial. Can go home on abx when clinically stable.

## 2023-09-13 NOTE — ASSESSMENT & PLAN NOTE
· Recurrent with epigastric pain. Hx of gastritis /coffe ground emesis before. Hx of suspected gastroperesis. Recent uds negative  · Ct abd and pelvis- no pathology to explain  · protonix 40 mg iv bid  · egd April 2022 when had same done in [place] nml  · reglan iv 5 mg with meals as will start clears and see how he does  · Patient states he felt more nauseous with reglan, d/c and tried carafate, had nausea and vomiting with this and was d/c.   · Will treat uti as well  · qtc nml  · Will place tigan prn as well  · GI consulted and recommending protonix, outpatient follow up and daily dulcolax suppositories. Can then advance to miralax BID after having adequate PO intake.  Avoid zofran   · Started on clear liquid diet

## 2023-09-13 NOTE — CONSULTS
Consultation - GI   Gabriele Sue 33 y.o. male MRN: [id number]  Unit/Bed#: -01 Encounter: [id number]      Assessment/Plan     Assessment:  28M with autism spectrum disorder, longstanding history of nausea and vomiting since childhood admitted with acute on chronic nausea and vomiting as well as recurrent urinary retention and UTI. Patient's nausea and vomiting is longstanding and he has undergone an extensive GI evaluation which has been unremarkable. Suspect it to be multifactorial and possibly related to constipation +/- UTI/urinary retention as well as underlying anxiety disorder vs cyclic vomiting syndrome. Plan:  - Do not recommend further GI testing at this time, therefore okay to advance diet as tolerated. - Recommend starting dulcolax suppositories daily for constipation (Miralax PO may make him more nauseated). Once having more regular BMs and able to take PO would recommend increasing Miralax dose to 17 g BID.  - Continue Protonix  - Try to limit Zofran which may make him more constipated  - Follow up with GI as outpatient      History of Present Illness   Physician Requesting Consult: Ezequiel Champion MD  Reason for Consult / Principal Problem: Nausea/vomiting     HPI: 28M with autism spectrum disorder, longstanding history of nausea and vomiting since childhood admitted with acute on chronic nausea and vomiting as well as urinary retention. Patient has had multiple ED visits and hospitalizations for similar symptoms, most recently being admitted on 9/4. Patient presented to the ED again on 9/10 with complaints of epigastric abdominal pain, nausea and vomiting and an inability to tolerate solid food or liquids over the past few days. Labs on admission showed a normal CBC, mild hyponatremia to 134 and normal lactate. CT abd/pel on 9/10 was unremarkable. He was started on Bactrim for UTI as well as Zofran and Tigan PRN.  Patient reports that he did not vomit last night and feels better this morning. He reports that at home he takes Zofran PRN for nausea which he needs about once a month. He follows with GI in [place] and had undergone an EGD in April 2022 which was endoscopically unremarkable. He believes that his last visit with them was in May and has a follow up scheduled in November. He has had multiple exams for workup of nausea and vomiting including multiple EGDs and colonoscopies, CT enterography in 2018, small bowel follow-through all of which have been unremarkable. He says that he attempted a gastric emptying study in 2014 but he couldn't tolerate the eggs. He reports having a BM only 3 times per week. He has been using Miralax 17g daily as prescribed by his PCP. He reports that nausea is not necessarily associated with eating. He believes that he has lost weight over this most recent illness but does not know how much. He does not smoke marijuana.        Review of Systems   Review of Systems - History obtained from the patient  General ROS: negative  ENT ROS: negative  Hematological and Lymphatic ROS: negative  Endocrine ROS: negative  Respiratory ROS: no cough, shortness of breath, or wheezing  Cardiovascular ROS: no chest pain or dyspnea on exertion  Gastrointestinal ROS: as per HPI  Musculoskeletal ROS: negative      Historical Information   Past Medical History:   Diagnosis Date   • Autistic disorder    • BPH (benign prostatic hypertrophy) 9/8/22   • Gastric paresis    • GERD (gastroesophageal reflux disease)    • Known health problems: none    • Urinary retention 9/8/22   • Urinary tract infection      Past Surgical History:   Procedure Laterality Date   • EYE SURGERY     • FOOT SURGERY       Social History   Social History     Substance and Sexual Activity   Alcohol Use Not Currently     Social History     Substance and Sexual Activity   Drug Use Never     E-Cigarette/Vaping   • E-Cigarette Use Never User      E-Cigarette/Vaping Substances   • Nicotine No    • THC No    • CBD No • Flavoring No    • Other No    • Unknown No      Social History     Tobacco Use   Smoking Status Never   Smokeless Tobacco Never     Family History: non-contributory    Meds/Allergies   all current active meds have been reviewed    Allergies   Allergen Reactions   • Pepto-Bismol [Bismuth Subsalicylate] GI Intolerance   • Amoxicillin Rash   • Latex Rash       Objective       Intake/Output Summary (Last 24 hours) at 9/13/2023 1152  Last data filed at 9/13/2023 0718  Gross per 24 hour   Intake 240 ml   Output 2100 ml   Net -1860 ml       Invasive Devices:   Peripheral IV 09/10/23 Left Antecubital (Active)   Site Assessment WDL; Clean;Dry; Intact 09/13/23 1100   Dressing Type Transparent 09/13/23 1100   Line Status Flushed;Saline locked 09/13/23 1100   Dressing Status Clean;Dry; Intact 09/13/23 1100   Dressing Change Due 09/14/23 09/13/23 1100   Reason Not Rotated Not due 09/13/23 1100       Urethral Catheter Non-latex 16 Fr. (Active)   Reasons to continue Urinary Catheter  Acute urinary retention/obstruction failing urinary retention protocol 09/11/23 2015   Goal for Removal Will consult urology 09/11/23 2015   Site Assessment Clean;Skin intact 09/11/23 2015   Joseph Care Done 09/12/23 2043   Collection Container Standard drainage bag 09/11/23 2015   Securement Method Other (Comment) 09/11/23 2015   Output (mL) 650 mL 09/13/23 0718       Physical Exam   Vitals:    09/13/23 0717   BP: 110/68   Pulse: 86   Resp:    Temp:    SpO2: 95%     Gen: NAD  HEENT: Sclerae anicteric, MMM  CV: RRR  Pulm: CTA  Abd: Soft, NT, ND, normal BS  Ext: WWP  Neuro: A&Ox3    Lab Results: I have personally reviewed pertinent reports. Imaging Studies: I have personally reviewed pertinent reports. EKG, Pathology, and Other Studies: I have personally reviewed pertinent reports. Counseling / Coordination of Care  Total floor / unit time spent today 55 minutes.  Greater than 50% of total time was spent with the patient and / or family counseling and / or coordination of care.

## 2023-09-13 NOTE — PROGRESS NOTES
[address] [facility]  Progress Note  Name: Rhoda Brown  MRN: [id number]  Unit/Bed#: -01 I Date of Admission: 9/10/2023   Date of Service: 9/13/2023 I Hospital Day: 3    Assessment/Plan   * Intractable nausea and vomiting  Assessment & Plan  · Recurrent with epigastric pain. Hx of gastritis /coffe ground emesis before. Hx of suspected gastroperesis. Recent uds negative  · Ct abd and pelvis- no pathology to explain  · protonix 40 mg iv bid  · egd April 2022 when had same done in [place] nml  · reglan iv 5 mg with meals as will start clears and see how he does  · Patient states he felt more nauseous with reglan, d/c and tried carafate, had nausea and vomiting with this and was d/c.   · Will treat uti as well  · qtc nml  · Will place tigan prn as well  · GI consulted and recommending protonix, outpatient follow up and daily dulcolax suppositories. Can then advance to miralax BID after having adequate PO intake. Avoid zofran   · Started on clear liquid diet    Acute cystitis without hematuria  Assessment & Plan  · ua positive for nitrate and leuks - start rocephin urine cx did not reflect on this sample unable to add  · Patient with increasing nausea after rocephin, d/c and start on bactrim as patient states he tolerated this in the past.     Autism  Assessment & Plan  · Pleasant     Acute urinary retention  Assessment & Plan  · Just had castaneda placed in er 9/9 hx of same  · Keep castaneda ua rechecked today positive for nitrates and some leuks did not reflect to culture  · Patient states he gets more nauseous from rocephin, has tolerated bactrim, d/c and start bactrim. · Will give flomax hope able to tolerate  · Urology consult: follow up later in the week or early next week for voiding trial. Can go home on abx when clinically stable. VTE Pharmacologic Prophylaxis: VTE Score: 0 Low Risk (Score 0-2) - Encourage Ambulation.     Patient Centered Rounds: I performed bedside rounds with nursing staff today. Discussions with Specialists or Other Care Team Provider: Nursing, GI    Education and Discussions with Family / Patient: Updated  (grandfather) via phone. Total Time Spent on Date of Encounter in care of patient: This time was spent on one or more of the following: performing physical exam; counseling and coordination of care; obtaining or reviewing history; documenting in the medical record; reviewing/ordering tests, medications or procedures; communicating with other healthcare professionals and discussing with patient's family/caregivers. Current Length of Stay: 3 day(s)  Current Patient Status: Inpatient   Certification Statement: The patient will continue to require additional inpatient hospital stay due to intactable nausea and vomiting  Discharge Plan: Anticipate discharge in 24-48 hrs to home. Code Status: Level 1 - Full Code    Subjective:   Patient states that he is still feeling nauseous but no vomiting overnight. States that he has not had a bowel movement in a few days and the last time he did, it was watery. Denies chest pain or shortness of breath. Objective:     Vitals:   Temp (24hrs), Av.1 °F (36.7 °C), Min:98.1 °F (36.7 °C), Max:98.1 °F (36.7 °C)    Temp:  [98.1 °F (36.7 °C)] 98.1 °F (36.7 °C)  HR:  [58-90] 86  Resp:  [19] 19  BP: (103-110)/(55-68) 110/68  SpO2:  [95 %-98 %] 95 %  Body mass index is 29.3 kg/m². Input and Output Summary (last 24 hours): Intake/Output Summary (Last 24 hours) at 2023 1357  Last data filed at 2023 0718  Gross per 24 hour   Intake 240 ml   Output 2100 ml   Net -1860 ml       Physical Exam:   Physical Exam  Vitals reviewed. Constitutional:       General: He is not in acute distress. Appearance: Normal appearance. He is not ill-appearing. HENT:      Head: Normocephalic and atraumatic.       Nose: Nose normal.      Mouth/Throat:      Mouth: Mucous membranes are moist.      Pharynx: Oropharynx is clear.   Eyes:      Extraocular Movements: Extraocular movements intact. Conjunctiva/sclera: Conjunctivae normal.   Cardiovascular:      Rate and Rhythm: Normal rate and regular rhythm. Pulses: Normal pulses. Heart sounds: Normal heart sounds. No murmur heard. Pulmonary:      Effort: Pulmonary effort is normal. No respiratory distress. Breath sounds: Normal breath sounds. No wheezing. Abdominal:      General: Abdomen is flat. Bowel sounds are normal. There is no distension. Palpations: Abdomen is soft. Tenderness: There is no abdominal tenderness. There is no guarding. Musculoskeletal:         General: Normal range of motion. Cervical back: Normal range of motion. Right lower leg: No edema. Left lower leg: No edema. Skin:     General: Skin is warm. Neurological:      General: No focal deficit present. Mental Status: He is alert and oriented to person, place, and time. Mental status is at baseline. Motor: No weakness. Psychiatric:         Mood and Affect: Mood normal.         Behavior: Behavior normal.         Thought Content: Thought content normal.         Judgment: Judgment normal.          Additional Data:     Labs:  Results from last 7 days   Lab Units 09/13/23  0516 09/12/23  0534 09/10/23  1423   WBC Thousand/uL 8.44   < > 7.11   HEMOGLOBIN g/dL 14.4   < > 15.6   HEMATOCRIT % 41.3   < > 46.8   PLATELETS Thousands/uL 165   < > 184   NEUTROS PCT %  --   --  68   LYMPHS PCT %  --   --  24   MONOS PCT %  --   --  8   EOS PCT %  --   --  0    < > = values in this interval not displayed.      Results from last 7 days   Lab Units 09/13/23  0516 09/11/23  0557 09/10/23  1423   SODIUM mmol/L 137   < > 134*   POTASSIUM mmol/L 3.3*   < > 4.1   CHLORIDE mmol/L 104   < > 100   CO2 mmol/L 22   < > 18*   BUN mg/dL 4*   < > 15   CREATININE mg/dL 0.72   < > 0.95   ANION GAP mmol/L 11   < > 16   CALCIUM mg/dL 8.7   < > 9.3   ALBUMIN g/dL  --   --  4.6   TOTAL BILIRUBIN mg/dL  --   --  0.80   ALK PHOS U/L  --   --  69   ALT U/L  --   --  10   AST U/L  --   --  11*   GLUCOSE RANDOM mg/dL 99   < > 72    < > = values in this interval not displayed. Results from last 7 days   Lab Units 09/10/23  1423   INR  1.01             Results from last 7 days   Lab Units 09/10/23  1423   LACTIC ACID mmol/L 0.8       Lines/Drains:  Invasive Devices     Peripheral Intravenous Line  Duration           Peripheral IV 09/10/23 Left Antecubital 2 days          Drain  Duration           Urethral Catheter Non-latex 16 Fr. 3 days              Urinary Catheter:  Goal for removal: N/A- Discharging with Joseph               Imaging: Reviewed radiology reports from this admission including: abdominal/pelvic CT    Recent Cultures (last 7 days):   Results from last 7 days   Lab Units 09/09/23  1403   URINE CULTURE  <10,000 cfu/ml Enterococcus faecalis*       Last 24 Hours Medication List:   Current Facility-Administered Medications   Medication Dose Route Frequency Provider Last Rate   • acetaminophen  650 mg Oral Q6H PRN Royal Madisyn MD     • bisacodyl  10 mg Rectal Daily Chantelle Khan PA-C     • escitalopram  10 mg Oral Daily Royal Madisyn MD     • fluticasone  1 spray Each Nare Daily Royal Madisyn MD     • loratadine  10 mg Oral Daily Royal Madisyn MD     • pantoprazole  40 mg Intravenous Q12H [address] Royal Madisyn MD     • potassium chloride  20 mEq Oral Once Chantelle Khan PA-C     • sulfamethoxazole-trimethoprim  1 tablet Oral Q12H [address] [facility] SRIRAM Miller     • tamsulosin  0.4 mg Oral Daily With Manual Frederickson, MD     • trimethobenzamide  200 mg Intramuscular Q6H PRN Royal Madisyn MD          Today, Patient Was Seen By: Venkat Pizano PA-C    **Please Note: This note may have been constructed using a voice recognition system. **

## 2023-09-14 LAB
ANION GAP SERPL CALCULATED.3IONS-SCNC: 10 MMOL/L
BUN SERPL-MCNC: 6 MG/DL (ref 5–25)
CALCIUM SERPL-MCNC: 9.1 MG/DL (ref 8.4–10.2)
CHLORIDE SERPL-SCNC: 104 MMOL/L (ref 96–108)
CO2 SERPL-SCNC: 24 MMOL/L (ref 21–32)
CREAT SERPL-MCNC: 0.78 MG/DL (ref 0.6–1.3)
ERYTHROCYTE [DISTWIDTH] IN BLOOD BY AUTOMATED COUNT: 12.8 % (ref 11.6–15.1)
GFR SERPL CREATININE-BSD FRML MDRD: 123 ML/MIN/1.73SQ M
GLUCOSE SERPL-MCNC: 99 MG/DL (ref 65–140)
HCT VFR BLD AUTO: 42.4 % (ref 36.5–49.3)
HGB BLD-MCNC: 14.8 G/DL (ref 12–17)
MAGNESIUM SERPL-MCNC: 2 MG/DL (ref 1.9–2.7)
MCH RBC QN AUTO: 30.3 PG (ref 26.8–34.3)
MCHC RBC AUTO-ENTMCNC: 34.9 G/DL (ref 31.4–37.4)
MCV RBC AUTO: 87 FL (ref 82–98)
PLATELET # BLD AUTO: 179 THOUSANDS/UL (ref 149–390)
PMV BLD AUTO: 10 FL (ref 8.9–12.7)
POTASSIUM SERPL-SCNC: 3.3 MMOL/L (ref 3.5–5.3)
RBC # BLD AUTO: 4.88 MILLION/UL (ref 3.88–5.62)
SODIUM SERPL-SCNC: 138 MMOL/L (ref 135–147)
WBC # BLD AUTO: 7.01 THOUSAND/UL (ref 4.31–10.16)

## 2023-09-14 PROCEDURE — C9113 INJ PANTOPRAZOLE SODIUM, VIA: HCPCS | Performed by: FAMILY MEDICINE

## 2023-09-14 PROCEDURE — 99232 SBSQ HOSP IP/OBS MODERATE 35: CPT | Performed by: UROLOGY

## 2023-09-14 PROCEDURE — 80048 BASIC METABOLIC PNL TOTAL CA: CPT

## 2023-09-14 PROCEDURE — 85027 COMPLETE CBC AUTOMATED: CPT

## 2023-09-14 PROCEDURE — 83735 ASSAY OF MAGNESIUM: CPT

## 2023-09-14 PROCEDURE — 99232 SBSQ HOSP IP/OBS MODERATE 35: CPT

## 2023-09-14 RX ORDER — TAMSULOSIN HYDROCHLORIDE 0.4 MG/1
0.8 CAPSULE ORAL
Status: DISCONTINUED | OUTPATIENT
Start: 2023-09-14 | End: 2023-09-19 | Stop reason: HOSPADM

## 2023-09-14 RX ORDER — HYDROXYZINE HYDROCHLORIDE 25 MG/1
25 TABLET, FILM COATED ORAL EVERY 6 HOURS PRN
Status: DISCONTINUED | OUTPATIENT
Start: 2023-09-14 | End: 2023-09-19 | Stop reason: HOSPADM

## 2023-09-14 RX ORDER — POTASSIUM CHLORIDE 20 MEQ/1
40 TABLET, EXTENDED RELEASE ORAL ONCE
Status: COMPLETED | OUTPATIENT
Start: 2023-09-14 | End: 2023-09-14

## 2023-09-14 RX ADMIN — BISACODYL 10 MG: 10 SUPPOSITORY RECTAL at 09:40

## 2023-09-14 RX ADMIN — TAMSULOSIN HYDROCHLORIDE 0.8 MG: 0.4 CAPSULE ORAL at 15:49

## 2023-09-14 RX ADMIN — ESCITALOPRAM OXALATE 10 MG: 10 TABLET ORAL at 09:40

## 2023-09-14 RX ADMIN — ACETAMINOPHEN 650 MG: 325 TABLET, FILM COATED ORAL at 00:42

## 2023-09-14 RX ADMIN — LORATADINE 10 MG: 10 TABLET ORAL at 09:41

## 2023-09-14 RX ADMIN — POTASSIUM CHLORIDE 40 MEQ: 1500 TABLET, EXTENDED RELEASE ORAL at 09:40

## 2023-09-14 RX ADMIN — ACETAMINOPHEN 650 MG: 325 TABLET, FILM COATED ORAL at 20:20

## 2023-09-14 RX ADMIN — PANTOPRAZOLE SODIUM 40 MG: 40 INJECTION, POWDER, FOR SOLUTION INTRAVENOUS at 20:20

## 2023-09-14 RX ADMIN — SULFAMETHOXAZOLE AND TRIMETHOPRIM 1 TABLET: 800; 160 TABLET ORAL at 09:40

## 2023-09-14 RX ADMIN — PANTOPRAZOLE SODIUM 40 MG: 40 INJECTION, POWDER, FOR SOLUTION INTRAVENOUS at 09:40

## 2023-09-14 RX ADMIN — SULFAMETHOXAZOLE AND TRIMETHOPRIM 1 TABLET: 800; 160 TABLET ORAL at 20:20

## 2023-09-14 RX ADMIN — TRIMETHOBENZAMIDE HYDROCHLORIDE 200 MG: 100 INJECTION INTRAMUSCULAR at 13:50

## 2023-09-14 NOTE — CONSULTS
Progress Note - Mini Huerta 33 y.o. male MRN: [id number]    Unit/Bed#: -Jadiel Encounter: [id number]      Assessment:  Urinary retention, possible UTI. History of autism. Recent constipation. Plan:  Treat constipation. Patient has a history of difficulty with incomplete bladder emptying possibly related to some of his medications. He sees Dr. Letty Nevarez.  Joseph catheter is in place and draining well. Agree with antibiotics and he should be on tamsulosin 0.8 mg after supper daily. Constipation has been an issue and I agree with a regimen to address this. He will need follow-up next week for catheter removal and voiding trial with Dr. Letty Nevarez.  CT scan reviewed. Unremarkable    Subjective:   Patient with history of incomplete bladder emptying and intermittent problems with urinary retention. He sees Dr. Letty Nevarez.  Those notes were reviewed. Patient had problems with retention possibly related to recent episode of constipation. Also has evidence of possible UTI via notes from the emergency room. Objective:     Vitals: Blood pressure 114/76, pulse 78, temperature 97.9 °F (36.6 °C), resp. rate 16, height 5' 8" (1.727 m), weight 87.4 kg (192 lb 10.9 oz), SpO2 96 %. ,Body mass index is 29.3 kg/m². Intake/Output Summary (Last 24 hours) at 9/14/2023 1318  Last data filed at 9/14/2023 1315  Gross per 24 hour   Intake 1080 ml   Output 2200 ml   Net -1120 ml       Physical Exam:  Const: Appears healthy and well developed. No signs of acute distress present. Resp: Respirations are regular and unlabored. CV: Rate is regular. Rhythm is regular. Abdomen: Abdomen is soft, nontender, and nondistended. Kidneys are not palpable. : External genitalia reveal a Joseph catheter which is draining clear urine. Patient comfortable. Testes normal.  No hernia. Abdomen normal.  Psych: Patient's attitude is cooperative.  Mood is normal. Affect is normal.    Invasive Devices     Peripheral Intravenous Line  Duration Peripheral IV 09/14/23 Dorsal (posterior); Right Forearm <1 day          Drain  Duration           Urethral Catheter Non-latex 16 Fr. 4 days                Lab, Imaging and other studies: I have personally reviewed pertinent reports.

## 2023-09-14 NOTE — PLAN OF CARE
Problem: PAIN - ADULT  Goal: Verbalizes/displays adequate comfort level or baseline comfort level  Description: Interventions:  - Encourage patient to monitor pain and request assistance  - Assess pain using appropriate pain scale  - Administer analgesics based on type and severity of pain and evaluate response  - Implement non-pharmacological measures as appropriate and evaluate response  - Consider cultural and social influences on pain and pain management  - Notify physician/advanced practitioner if interventions unsuccessful or patient reports new pain  Outcome: Progressing     Problem: INFECTION - ADULT  Goal: Absence or prevention of progression during hospitalization  Description: INTERVENTIONS:  - Assess and monitor for signs and symptoms of infection  - Monitor lab/diagnostic results  - Monitor all insertion sites, i.e. indwelling lines, tubes, and drains  - Monitor endotracheal if appropriate and nasal secretions for changes in amount and color  - [place] appropriate cooling/warming therapies per order  - Administer medications as ordered  - Instruct and encourage patient and family to use good hand hygiene technique  - Identify and instruct in appropriate isolation precautions for identified infection/condition  Outcome: Progressing     Problem: Nutrition/Hydration-ADULT  Goal: Nutrient/Hydration intake appropriate for improving, restoring or maintaining nutritional needs  Description: Monitor and assess patient's nutrition/hydration status for malnutrition. Collaborate with interdisciplinary team and initiate plan and interventions as ordered. Monitor patient's weight and dietary intake as ordered or per policy. Utilize nutrition screening tool and intervene as necessary. Determine patient's food preferences and provide high-protein, high-caloric foods as appropriate.      INTERVENTIONS:  - Monitor oral intake, urinary output, labs, and treatment plans  - Assess nutrition and hydration status and recommend course of action  - Evaluate amount of meals eaten  - Assist patient with eating if necessary   - Allow adequate time for meals  - Recommend/ encourage appropriate diets, oral nutritional supplements, and vitamin/mineral supplements  - Order, calculate, and assess calorie counts as needed  - Recommend, monitor, and adjust tube feedings and TPN/PPN based on assessed needs  - Assess need for intravenous fluids  - Provide specific nutrition/hydration education as appropriate  - Include patient/family/caregiver in decisions related to nutrition  Outcome: Progressing Yes

## 2023-09-14 NOTE — PROGRESS NOTES
[address] [facility]  Progress Note  Name: Oral Garcia  MRN: [id number]  Unit/Bed#: -01 I Date of Admission: 9/10/2023   Date of Service: 9/14/2023 I Hospital Day: 4    Assessment/Plan   * Intractable nausea and vomiting  Assessment & Plan  · Recurrent with epigastric pain. Hx of gastritis /coffe ground emesis before. Hx of suspected gastroperesis. Recent uds negative  · Ct abd and pelvis- no pathology to explain  · protonix 40 mg iv bid  · egd April 2022 when had same done in [place] nml  · reglan iv 5 mg with meals as will start clears and see how he does  · Patient states he felt more nauseous with reglan, d/c and tried carafate, had nausea and vomiting with this and was d/c.   · Will treat uti as well  · qtc nml  · Question possible psychogenic - will do prn atarax   · Will place tigan prn as well  · GI consulted and recommending protonix, outpatient follow up and daily dulcolax suppositories. Can then advance to miralax BID after having adequate PO intake. Avoid zofran   · continue gastroparesis diet of low fiber, small portions and low fat    Acute cystitis without hematuria  Assessment & Plan  · ua positive for nitrate and leuks - start rocephin urine cx did not reflect on this sample unable to add  · Patient with increasing nausea after rocephin, d/c and start on bactrim as patient states he tolerated this in the past.     Autism  Assessment & Plan  · Pleasant     Acute urinary retention  Assessment & Plan  · Just had castaneda placed in er 9/9 hx of same  · Keep castaneda ua rechecked today positive for nitrates and some leuks did not reflect to culture  · Patient states he gets more nauseous from rocephin, has tolerated bactrim, d/c and start bactrim. · Continue flomax 0.8mg nightly  · Urology consult: follow up later in the week or early next week for voiding trial. Can go home on abx when clinically stable.  Has appointment on 9/18         VTE Pharmacologic Prophylaxis: VTE Score: 0 Low Risk (Score 0-2) - Encourage Ambulation. Patient Centered Rounds: I performed bedside rounds with nursing staff today. Discussions with Specialists or Other Care Team Provider: Nursing and CM    Education and Discussions with Family / Patient: Updated  (grandfather) via phone. Total Time Spent on Date of Encounter in care of patient: This time was spent on one or more of the following: performing physical exam; counseling and coordination of care; obtaining or reviewing history; documenting in the medical record; reviewing/ordering tests, medications or procedures; communicating with other healthcare professionals and discussing with patient's family/caregivers. Current Length of Stay: 4 day(s)  Current Patient Status: Inpatient   Certification Statement: The patient will continue to require additional inpatient hospital stay due to intractable nausea and vomiting  Discharge Plan: Anticipate discharge in 24-48 hrs to home. Code Status: Level 1 - Full Code    Subjective:   Patient states that he is feeling better today and is without nausea all night. Wanting to increase his lunch to surgical soft. Patient then had vomiting after lunch. Will deescalate dinner. Denies chest pain or shortness of breath. Objective:     Vitals:   Temp (24hrs), Av.5 °F (36.9 °C), Min:97.9 °F (36.6 °C), Max:98.8 °F (37.1 °C)    Temp:  [97.9 °F (36.6 °C)-98.8 °F (37.1 °C)] 97.9 °F (36.6 °C)  HR:  [78-91] 78  Resp:  [16] 16  BP: (112-119)/(70-76) 114/76  SpO2:  [95 %-96 %] 96 %  Body mass index is 29.3 kg/m². Input and Output Summary (last 24 hours): Intake/Output Summary (Last 24 hours) at 2023 1417  Last data filed at 2023 1315  Gross per 24 hour   Intake 1080 ml   Output 2200 ml   Net -1120 ml       Physical Exam:   Physical Exam  Vitals reviewed. Constitutional:       General: He is not in acute distress. Appearance: Normal appearance. He is not ill-appearing.    HENT: Head: Normocephalic and atraumatic. Nose: Nose normal.      Mouth/Throat:      Mouth: Mucous membranes are moist.      Pharynx: Oropharynx is clear. Eyes:      Extraocular Movements: Extraocular movements intact. Conjunctiva/sclera: Conjunctivae normal.   Cardiovascular:      Rate and Rhythm: Normal rate and regular rhythm. Pulses: Normal pulses. Heart sounds: Normal heart sounds. No murmur heard. Pulmonary:      Effort: Pulmonary effort is normal. No respiratory distress. Breath sounds: Normal breath sounds. No wheezing. Abdominal:      General: Abdomen is flat. Bowel sounds are normal. There is no distension. Palpations: Abdomen is soft. Tenderness: There is no abdominal tenderness. There is no guarding. Musculoskeletal:         General: Normal range of motion. Cervical back: Normal range of motion. Right lower leg: No edema. Left lower leg: No edema. Skin:     General: Skin is warm. Neurological:      General: No focal deficit present. Mental Status: He is alert and oriented to person, place, and time. Mental status is at baseline. Motor: No weakness. Psychiatric:         Mood and Affect: Mood normal.         Behavior: Behavior normal.         Thought Content: Thought content normal.         Judgment: Judgment normal.          Additional Data:     Labs:  Results from last 7 days   Lab Units 09/14/23  0500 09/12/23  0534 09/10/23  1423   WBC Thousand/uL 7.01   < > 7.11   HEMOGLOBIN g/dL 14.8   < > 15.6   HEMATOCRIT % 42.4   < > 46.8   PLATELETS Thousands/uL 179   < > 184   NEUTROS PCT %  --   --  68   LYMPHS PCT %  --   --  24   MONOS PCT %  --   --  8   EOS PCT %  --   --  0    < > = values in this interval not displayed.      Results from last 7 days   Lab Units 09/14/23  0500 09/11/23  0557 09/10/23  1423   SODIUM mmol/L 138   < > 134*   POTASSIUM mmol/L 3.3*   < > 4.1   CHLORIDE mmol/L 104   < > 100   CO2 mmol/L 24   < > 18*   BUN mg/dL 6 < > 15   CREATININE mg/dL 0.78   < > 0.95   ANION GAP mmol/L 10   < > 16   CALCIUM mg/dL 9.1   < > 9.3   ALBUMIN g/dL  --   --  4.6   TOTAL BILIRUBIN mg/dL  --   --  0.80   ALK PHOS U/L  --   --  69   ALT U/L  --   --  10   AST U/L  --   --  11*   GLUCOSE RANDOM mg/dL 99   < > 72    < > = values in this interval not displayed. Results from last 7 days   Lab Units 09/10/23  1423   INR  1.01             Results from last 7 days   Lab Units 09/10/23  1423   LACTIC ACID mmol/L 0.8       Lines/Drains:  Invasive Devices     Peripheral Intravenous Line  Duration           Peripheral IV 09/14/23 Dorsal (posterior); Right Forearm <1 day          Drain  Duration           Urethral Catheter Non-latex 16 Fr. 5 days              Urinary Catheter:  Goal for removal: N/A- Discharging with Joseph               Imaging: Reviewed radiology reports from this admission including: abdominal/pelvic CT    Recent Cultures (last 7 days):   Results from last 7 days   Lab Units 09/09/23  1403   URINE CULTURE  <10,000 cfu/ml Enterococcus faecalis*       Last 24 Hours Medication List:   Current Facility-Administered Medications   Medication Dose Route Frequency Provider Last Rate   • acetaminophen  650 mg Oral Q6H PRN Pollo Ruelas MD     • bisacodyl  10 mg Rectal Daily Chantelle Khan PA-C     • escitalopram  10 mg Oral Daily Pollo Ruelas MD     • fluticasone  1 spray Each Nare Daily Pollo Ruelas MD     • hydrOXYzine HCL  25 mg Oral Q6H PRN Chantelle Khan PA-C     • loratadine  10 mg Oral Daily Pollo Ruelas MD     • pantoprazole  40 mg Intravenous Q12H [address] Pollo Ruelas MD     • sulfamethoxazole-trimethoprim  1 tablet Oral Q12H [address]  Berna Khan PA-C     • tamsulosin  0.8 mg Oral Daily With [employer]     • trimethobenzamide  200 mg Intramuscular Q6H PRN Pollo Ruelas MD          Today, Patient Was Seen By: Bernice Norman PA-C    **Please Note: This note may have been constructed using a voice recognition system. **

## 2023-09-14 NOTE — PLAN OF CARE
Problem: Potential for Falls  Goal: Patient will remain free of falls  Description: INTERVENTIONS:  - Educate patient/family on patient safety including physical limitations  - Instruct patient to call for assistance with activity   - Consult OT/PT to assist with strengthening/mobility   - Keep Call bell within reach  - Keep bed low and locked with side rails adjusted as appropriate  - Keep care items and personal belongings within reach  - Initiate and maintain comfort rounds  - Make Fall Risk Sign visible to staff  - Offer Toileting every 2 Hours, in advance of need  - Initiate/Maintain alarm  - Obtain necessary fall risk management equipment:   - Apply yellow socks and bracelet for high fall risk patients  - Consider moving patient to room near nurses station  Outcome: Progressing     Problem: PAIN - ADULT  Goal: Verbalizes/displays adequate comfort level or baseline comfort level  Description: Interventions:  - Encourage patient to monitor pain and request assistance  - Assess pain using appropriate pain scale  - Administer analgesics based on type and severity of pain and evaluate response  - Implement non-pharmacological measures as appropriate and evaluate response  - Consider cultural and social influences on pain and pain management  - Notify physician/advanced practitioner if interventions unsuccessful or patient reports new pain  Outcome: Progressing     Problem: DISCHARGE PLANNING  Goal: Discharge to home or other facility with appropriate resources  Description: INTERVENTIONS:  - Identify barriers to discharge w/patient and caregiver  - Arrange for needed discharge resources and transportation as appropriate  - Identify discharge learning needs (meds, wound care, etc.)  - Arrange for interpretive services to assist at discharge as needed  - Refer to Case Management Department for coordinating discharge planning if the patient needs post-hospital services based on physician/advanced practitioner order or complex needs related to functional status, cognitive ability, or social support system  Outcome: Progressing     Problem: Nutrition/Hydration-ADULT  Goal: Nutrient/Hydration intake appropriate for improving, restoring or maintaining nutritional needs  Description: Monitor and assess patient's nutrition/hydration status for malnutrition. Collaborate with interdisciplinary team and initiate plan and interventions as ordered. Monitor patient's weight and dietary intake as ordered or per policy. Utilize nutrition screening tool and intervene as necessary. Determine patient's food preferences and provide high-protein, high-caloric foods as appropriate.      INTERVENTIONS:  - Monitor oral intake, urinary output, labs, and treatment plans  - Assess nutrition and hydration status and recommend course of action  - Evaluate amount of meals eaten  - Assist patient with eating if necessary   - Allow adequate time for meals  - Recommend/ encourage appropriate diets, oral nutritional supplements, and vitamin/mineral supplements  - Order, calculate, and assess calorie counts as needed  - Recommend, monitor, and adjust tube feedings and TPN/PPN based on assessed needs  - Assess need for intravenous fluids  - Provide specific nutrition/hydration education as appropriate  - Include patient/family/caregiver in decisions related to nutrition  Outcome: Progressing

## 2023-09-14 NOTE — ASSESSMENT & PLAN NOTE
· Just had castaneda placed in er 9/9 hx of same  · Keep castaneda ua rechecked today positive for nitrates and some leuks did not reflect to culture  · Patient states he gets more nauseous from rocephin, has tolerated bactrim, d/c and start bactrim. · Continue flomax 0.8mg nightly  · Urology consult: follow up later in the week or early next week for voiding trial. Can go home on abx when clinically stable.  Has appointment on 9/18

## 2023-09-14 NOTE — ASSESSMENT & PLAN NOTE
· Recurrent with epigastric pain. Hx of gastritis /coffe ground emesis before. Hx of suspected gastroperesis. Recent uds negative  · Ct abd and pelvis- no pathology to explain  · protonix 40 mg iv bid  · egd April 2022 when had same done in [place] nml  · reglan iv 5 mg with meals as will start clears and see how he does  · Patient states he felt more nauseous with reglan, d/c and tried carafate, had nausea and vomiting with this and was d/c.   · Will treat uti as well  · qtc nml  · Question possible psychogenic - will do prn atarax   · Will place tigan prn as well  · GI consulted and recommending protonix, outpatient follow up and daily dulcolax suppositories. Can then advance to miralax BID after having adequate PO intake.  Avoid zofran   · continue gastroparesis diet of low fiber, small portions and low fat

## 2023-09-15 ENCOUNTER — APPOINTMENT (INPATIENT)
Dept: RADIOLOGY | Facility: HOSPITAL | Age: 28
DRG: 241 | End: 2023-09-15
Payer: COMMERCIAL

## 2023-09-15 LAB
ANION GAP SERPL CALCULATED.3IONS-SCNC: 10 MMOL/L
BUN SERPL-MCNC: 8 MG/DL (ref 5–25)
CALCIUM SERPL-MCNC: 9.1 MG/DL (ref 8.4–10.2)
CHLORIDE SERPL-SCNC: 103 MMOL/L (ref 96–108)
CO2 SERPL-SCNC: 24 MMOL/L (ref 21–32)
CREAT SERPL-MCNC: 0.82 MG/DL (ref 0.6–1.3)
GFR SERPL CREATININE-BSD FRML MDRD: 121 ML/MIN/1.73SQ M
GLUCOSE SERPL-MCNC: 102 MG/DL (ref 65–140)
POTASSIUM SERPL-SCNC: 3.5 MMOL/L (ref 3.5–5.3)
SODIUM SERPL-SCNC: 137 MMOL/L (ref 135–147)

## 2023-09-15 PROCEDURE — 99232 SBSQ HOSP IP/OBS MODERATE 35: CPT

## 2023-09-15 PROCEDURE — 74018 RADEX ABDOMEN 1 VIEW: CPT

## 2023-09-15 PROCEDURE — 80048 BASIC METABOLIC PNL TOTAL CA: CPT

## 2023-09-15 PROCEDURE — C9113 INJ PANTOPRAZOLE SODIUM, VIA: HCPCS | Performed by: FAMILY MEDICINE

## 2023-09-15 RX ORDER — DOCUSATE SODIUM 100 MG/1
100 CAPSULE, LIQUID FILLED ORAL 2 TIMES DAILY
Status: DISCONTINUED | OUTPATIENT
Start: 2023-09-15 | End: 2023-09-19 | Stop reason: HOSPADM

## 2023-09-15 RX ADMIN — BISACODYL 10 MG: 10 SUPPOSITORY RECTAL at 13:01

## 2023-09-15 RX ADMIN — TRIMETHOBENZAMIDE HYDROCHLORIDE 200 MG: 100 INJECTION INTRAMUSCULAR at 10:31

## 2023-09-15 RX ADMIN — DOCUSATE SODIUM 100 MG: 100 CAPSULE, LIQUID FILLED ORAL at 15:35

## 2023-09-15 RX ADMIN — PANTOPRAZOLE SODIUM 40 MG: 40 INJECTION, POWDER, FOR SOLUTION INTRAVENOUS at 21:08

## 2023-09-15 RX ADMIN — MAGNESIUM HYDROXIDE 30 ML: 400 SUSPENSION ORAL at 13:02

## 2023-09-15 RX ADMIN — ESCITALOPRAM OXALATE 10 MG: 10 TABLET ORAL at 08:21

## 2023-09-15 RX ADMIN — SULFAMETHOXAZOLE AND TRIMETHOPRIM 1 TABLET: 800; 160 TABLET ORAL at 08:21

## 2023-09-15 RX ADMIN — SULFAMETHOXAZOLE AND TRIMETHOPRIM 1 TABLET: 800; 160 TABLET ORAL at 21:09

## 2023-09-15 RX ADMIN — TAMSULOSIN HYDROCHLORIDE 0.8 MG: 0.4 CAPSULE ORAL at 15:35

## 2023-09-15 RX ADMIN — PANTOPRAZOLE SODIUM 40 MG: 40 INJECTION, POWDER, FOR SOLUTION INTRAVENOUS at 08:21

## 2023-09-15 RX ADMIN — LORATADINE 10 MG: 10 TABLET ORAL at 08:21

## 2023-09-15 RX ADMIN — HYDROXYZINE HYDROCHLORIDE 25 MG: 25 TABLET ORAL at 08:21

## 2023-09-15 NOTE — PLAN OF CARE
Problem: PAIN - ADULT  Goal: Verbalizes/displays adequate comfort level or baseline comfort level  Description: Interventions:  - Encourage patient to monitor pain and request assistance  - Assess pain using appropriate pain scale  - Administer analgesics based on type and severity of pain and evaluate response  - Implement non-pharmacological measures as appropriate and evaluate response  - Consider cultural and social influences on pain and pain management  - Notify physician/advanced practitioner if interventions unsuccessful or patient reports new pain  Outcome: Progressing     Problem: INFECTION - ADULT  Goal: Absence or prevention of progression during hospitalization  Description: INTERVENTIONS:  - Assess and monitor for signs and symptoms of infection  - Monitor lab/diagnostic results  - Monitor all insertion sites, i.e. indwelling lines, tubes, and drains  - Monitor endotracheal if appropriate and nasal secretions for changes in amount and color  - [place] appropriate cooling/warming therapies per order  - Administer medications as ordered  - Instruct and encourage patient and family to use good hand hygiene technique  - Identify and instruct in appropriate isolation precautions for identified infection/condition  Outcome: Progressing     Problem: Nutrition/Hydration-ADULT  Goal: Nutrient/Hydration intake appropriate for improving, restoring or maintaining nutritional needs  Description: Monitor and assess patient's nutrition/hydration status for malnutrition. Collaborate with interdisciplinary team and initiate plan and interventions as ordered. Monitor patient's weight and dietary intake as ordered or per policy. Utilize nutrition screening tool and intervene as necessary. Determine patient's food preferences and provide high-protein, high-caloric foods as appropriate.      INTERVENTIONS:  - Monitor oral intake, urinary output, labs, and treatment plans  - Assess nutrition and hydration status and recommend course of action  - Evaluate amount of meals eaten  - Assist patient with eating if necessary   - Allow adequate time for meals  - Recommend/ encourage appropriate diets, oral nutritional supplements, and vitamin/mineral supplements  - Order, calculate, and assess calorie counts as needed  - Recommend, monitor, and adjust tube feedings and TPN/PPN based on assessed needs  - Assess need for intravenous fluids  - Provide specific nutrition/hydration education as appropriate  - Include patient/family/caregiver in decisions related to nutrition  Outcome: Progressing

## 2023-09-15 NOTE — PROGRESS NOTES
[address] [facility]  Progress Note  Name: Symone Chavez  MRN: [id number]  Unit/Bed#: -01 I Date of Admission: 9/10/2023   Date of Service: 9/15/2023 I Hospital Day: 5    Assessment/Plan   * Intractable nausea and vomiting  Assessment & Plan  · Recurrent with epigastric pain. Hx of gastritis /coffe ground emesis before. Hx of suspected gastroperesis. Recent uds negative  · Ct abd and pelvis- no pathology to explain  · protonix 40 mg iv bid  · egd April 2022 when had same done in [place] nml  · reglan iv 5 mg with meals as will start clears and see how he does  · Patient states he felt more nauseous with reglan, d/c and tried carafate, had nausea and vomiting with this and was d/c.   · Will treat uti as well  · qtc nml  · Question possible psychogenic - will do prn atarax   · Will place tigan prn as well  · GI consulted and recommending protonix, outpatient follow up and daily dulcolax suppositories. Can then advance to miralax BID after having adequate PO intake. Avoid zofran   · continue gastroparesis diet of low fiber, small portions and low fat  · KUB normal. Showing constipation  · Curbsided GI and recommending to continue with bowel regimen as patient is still constipated. Started on milk of mag prn and dulcolax suppository daily    Acute cystitis without hematuria  Assessment & Plan  · ua positive for nitrate and leuks - start rocephin urine cx did not reflect on this sample unable to add  · Patient with increasing nausea after rocephin, d/c and start on bactrim as patient states he tolerated this in the past.     Autism  Assessment & Plan  · Pleasant     Acute urinary retention  Assessment & Plan  · Just had castaneda placed in er 9/9 hx of same  · Keep castaneda ua rechecked today positive for nitrates and some leuks did not reflect to culture  · Patient states he gets more nauseous from rocephin, has tolerated bactrim, d/c and start bactrim.    · Continue flomax 0.8mg nightly  · Urology consult: follow up later in the week or early next week for voiding trial. Can go home on abx when clinically stable. Has appointment on            VTE Pharmacologic Prophylaxis: VTE Score: 0 Low Risk (Score 0-2) - Encourage Ambulation. Patient Centered Rounds: I performed bedside rounds with nursing staff today. Discussions with Specialists or Other Care Team Provider: Nursing and GI    Education and Discussions with Family / Patient: Patient declined call to . Total Time Spent on Date of Encounter in care of patient: This time was spent on one or more of the following: performing physical exam; counseling and coordination of care; obtaining or reviewing history; documenting in the medical record; reviewing/ordering tests, medications or procedures; communicating with other healthcare professionals and discussing with patient's family/caregivers. Current Length of Stay: 5 day(s)  Current Patient Status: Inpatient   Certification Statement: The patient will continue to require additional inpatient hospital stay due to nausea and vomiting. Continue to monitor electrolytes  Discharge Plan: Anticipate discharge in 24-48 hrs to home. Code Status: Level 1 - Full Code    Subjective:   Patient states that he is feeling nauseous today. Denies chest pain or shortness of breath. Objective:     Vitals:   Temp (24hrs), Av.6 °F (37 °C), Min:98.6 °F (37 °C), Max:98.6 °F (37 °C)    Temp:  [98.6 °F (37 °C)] 98.6 °F (37 °C)  HR:  [77-89] 77  Resp:  [14-18] 14  BP: (101-121)/(74-76) 101/74  SpO2:  [95 %-97 %] 96 %  Body mass index is 28.09 kg/m². Input and Output Summary (last 24 hours): Intake/Output Summary (Last 24 hours) at 9/15/2023 1552  Last data filed at 9/15/2023 1001  Gross per 24 hour   Intake 570 ml   Output 426 ml   Net 144 ml       Physical Exam:   Physical Exam  Vitals reviewed. Constitutional:       General: He is not in acute distress.      Appearance: Normal appearance. He is not ill-appearing. HENT:      Head: Normocephalic and atraumatic. Nose: Nose normal.      Mouth/Throat:      Mouth: Mucous membranes are moist.      Pharynx: Oropharynx is clear. Eyes:      Extraocular Movements: Extraocular movements intact. Conjunctiva/sclera: Conjunctivae normal.   Cardiovascular:      Rate and Rhythm: Normal rate and regular rhythm. Pulses: Normal pulses. Heart sounds: Normal heart sounds. No murmur heard. Pulmonary:      Effort: Pulmonary effort is normal. No respiratory distress. Breath sounds: Normal breath sounds. No wheezing. Abdominal:      General: Abdomen is flat. Bowel sounds are normal. There is no distension. Palpations: Abdomen is soft. Tenderness: There is no abdominal tenderness. There is no guarding. Musculoskeletal:         General: Normal range of motion. Cervical back: Normal range of motion. Right lower leg: No edema. Left lower leg: No edema. Skin:     General: Skin is warm. Neurological:      General: No focal deficit present. Mental Status: He is alert and oriented to person, place, and time. Mental status is at baseline. Motor: No weakness. Psychiatric:         Mood and Affect: Mood normal.         Behavior: Behavior normal.         Thought Content: Thought content normal.         Judgment: Judgment normal.         Additional Data:     Labs:  Results from last 7 days   Lab Units 09/14/23  0500 09/12/23  0534 09/10/23  1423   WBC Thousand/uL 7.01   < > 7.11   HEMOGLOBIN g/dL 14.8   < > 15.6   HEMATOCRIT % 42.4   < > 46.8   PLATELETS Thousands/uL 179   < > 184   NEUTROS PCT %  --   --  68   LYMPHS PCT %  --   --  24   MONOS PCT %  --   --  8   EOS PCT %  --   --  0    < > = values in this interval not displayed.      Results from last 7 days   Lab Units 09/15/23  0553 09/11/23  0557 09/10/23  1423   SODIUM mmol/L 137   < > 134*   POTASSIUM mmol/L 3.5   < > 4.1   CHLORIDE mmol/L 103 < > 100   CO2 mmol/L 24   < > 18*   BUN mg/dL 8   < > 15   CREATININE mg/dL 0.82   < > 0.95   ANION GAP mmol/L 10   < > 16   CALCIUM mg/dL 9.1   < > 9.3   ALBUMIN g/dL  --   --  4.6   TOTAL BILIRUBIN mg/dL  --   --  0.80   ALK PHOS U/L  --   --  69   ALT U/L  --   --  10   AST U/L  --   --  11*   GLUCOSE RANDOM mg/dL 102   < > 72    < > = values in this interval not displayed. Results from last 7 days   Lab Units 09/10/23  1423   INR  1.01             Results from last 7 days   Lab Units 09/10/23  1423   LACTIC ACID mmol/L 0.8       Lines/Drains:  Invasive Devices     Peripheral Intravenous Line  Duration           Peripheral IV 09/14/23 Dorsal (posterior); Right Forearm 1 day          Drain  Duration           Urethral Catheter Non-latex 16 Fr. 6 days              Urinary Catheter:  Goal for removal: N/A- Discharging with Joseph               Imaging: Reviewed radiology reports from this admission including: xray(s)    Recent Cultures (last 7 days):   Results from last 7 days   Lab Units 09/09/23  1403   URINE CULTURE  <10,000 cfu/ml Enterococcus faecalis*       Last 24 Hours Medication List:   Current Facility-Administered Medications   Medication Dose Route Frequency Provider Last Rate   • acetaminophen  650 mg Oral Q6H PRN Aaron Moreno MD     • bisacodyl  10 mg Rectal Daily Chantelle Khan PA-C     • docusate sodium  100 mg Oral BID Chantelle Khan PA-C     • escitalopram  10 mg Oral Daily Aaron Moreno MD     • fluticasone  1 spray Each Nare Daily Aaron Moreno MD     • hydrOXYzine HCL  25 mg Oral Q6H PRN Chantelle Khan PA-C     • loratadine  10 mg Oral Daily Aaron Moreno MD     • magnesium hydroxide  30 mL Oral Daily PRN Chantelle Khan PA-C     • pantoprazole  40 mg Intravenous Q12H [address] Aaron Moreno MD     • sulfamethoxazole-trimethoprim  1 tablet Oral Q12H [address] Blaine Goodwin PA-C     • tamsulosin  0.8 mg Oral Daily With [employer]     • trimethobenzamide  200 mg Intramuscular Q6H KARLO Gonzalez MD          Today, Patient Was Seen By: Kaycee , SRIRAM    **Please Note: This note may have been constructed using a voice recognition system. **

## 2023-09-15 NOTE — MALNUTRITION/BMI
This medical record reflects one or more clinical indicators suggestive of malnutrition. Malnutrition Findings:   Adult Malnutrition type: Acute illness  Adult Degree of Malnutrition: Other severe protein calorie malnutrition  Malnutrition Characteristics: Inadequate energy, Weight loss                  360 Statement: Acute severe malnutrition related to N/V, inability to keep down po intake as evidenced by < 50% estimated energy intake x 5 days, 4.2% weight loss x 5 days (9/10 192lb standing scale, 9/15 184lb standing scale). Treated with: Continue diet as ordered, advance to soft foods diet as medically appropriate. Will trial ensure compact BID though pt unable to keep most all foods down at this time. If unable to tolerate po within 24-48 h, may need to consider TPN. At that time, would recommend standard TPN x 24-48 h. Provide IV thiamine prior to starting TPN. Check baseline TRIG and monitor -lytes daily. Will provide goal TPN recs as indicated. Recommend daily weights (standing scale as able) for nutrition monitoring. BMI Findings: Body mass index is 28.09 kg/m². See Nutrition note dated 9/15/23 for additional details. Completed nutrition assessment is viewable in the nutrition documentation.

## 2023-09-15 NOTE — PLAN OF CARE
Problem: Potential for Falls  Goal: Patient will remain free of falls  Description: INTERVENTIONS:  - Educate patient/family on patient safety including physical limitations  - Instruct patient to call for assistance with activity   - Consult OT/PT to assist with strengthening/mobility   - Keep Call bell within reach  - Keep bed low and locked with side rails adjusted as appropriate  - Keep care items and personal belongings within reach  - Initiate and maintain comfort rounds  - Make Fall Risk Sign visible to staff  - Outcome: Progressing     Problem: PAIN - ADULT  Goal: Verbalizes/displays adequate comfort level or baseline comfort level  Description: Interventions:  - Encourage patient to monitor pain and request assistance  - Assess pain using appropriate pain scale  - Administer analgesics based on type and severity of pain and evaluate response  - Implement non-pharmacological measures as appropriate and evaluate response  - Consider cultural and social influences on pain and pain management  - Notify physician/advanced practitioner if interventions unsuccessful or patient reports new pain  Outcome: Progressing     Problem: INFECTION - ADULT  Goal: Absence or prevention of progression during hospitalization  Description: INTERVENTIONS:  - Assess and monitor for signs and symptoms of infection  - Monitor lab/diagnostic results  - Monitor all insertion sites, i.e. indwelling lines, tubes, and drains  - Monitor endotracheal if appropriate and nasal secretions for changes in amount and color  - [place] appropriate cooling/warming therapies per order  - Administer medications as ordered  - Instruct and encourage patient and family to use good hand hygiene technique  - Identify and instruct in appropriate isolation precautions for identified infection/condition  Outcome: Progressing  Goal: Absence of fever/infection during neutropenic period  Description: INTERVENTIONS:  - Monitor WBC    Outcome: Progressing Problem: DISCHARGE PLANNING  Goal: Discharge to home or other facility with appropriate resources  Description: INTERVENTIONS:  - Identify barriers to discharge w/patient and caregiver  - Arrange for needed discharge resources and transportation as appropriate  - Identify discharge learning needs (meds, wound care, etc.)  - Arrange for interpretive services to assist at discharge as needed  - Refer to Case Management Department for coordinating discharge planning if the patient needs post-hospital services based on physician/advanced practitioner order or complex needs related to functional status, cognitive ability, or social support system  Outcome: Progressing     Problem: Nutrition/Hydration-ADULT  Goal: Nutrient/Hydration intake appropriate for improving, restoring or maintaining nutritional needs  Description: Monitor and assess patient's nutrition/hydration status for malnutrition. Collaborate with interdisciplinary team and initiate plan and interventions as ordered. Monitor patient's weight and dietary intake as ordered or per policy. Utilize nutrition screening tool and intervene as necessary. Determine patient's food preferences and provide high-protein, high-caloric foods as appropriate.      INTERVENTIONS:  - Monitor oral intake, urinary output, labs, and treatment plans  - Assess nutrition and hydration status and recommend course of action  - Evaluate amount of meals eaten  - Assist patient with eating if necessary   - Allow adequate time for meals  - Recommend/ encourage appropriate diets, oral nutritional supplements, and vitamin/mineral supplements  - Order, calculate, and assess calorie counts as needed  - Recommend, monitor, and adjust tube feedings and TPN/PPN based on assessed needs  - Assess need for intravenous fluids  - Provide specific nutrition/hydration education as appropriate  - Include patient/family/caregiver in decisions related to nutrition  Outcome: Progressing

## 2023-09-15 NOTE — PLAN OF CARE
Problem: Potential for Falls  Goal: Patient will remain free of falls  Description: INTERVENTIONS:  - Educate patient/family on patient safety including physical limitations  - Instruct patient to call for assistance with activity   - Consult OT/PT to assist with strengthening/mobility   - Keep Call bell within reach  - Keep bed low and locked with side rails adjusted as appropriate  - Keep care items and personal belongings within reach  - Initiate and maintain comfort rounds  - Make Fall Risk Sign visible to staff  - Offer Toileting every 2 Hours, in advance of need  - Initiate/Maintain bed alarm  - Obtain necessary fall risk management equipment  - Apply yellow socks and bracelet for high fall risk patients  - Consider moving patient to room near nurses station  Outcome: Progressing     Problem: PAIN - ADULT  Goal: Verbalizes/displays adequate comfort level or baseline comfort level  Description: Interventions:  - Encourage patient to monitor pain and request assistance  - Assess pain using appropriate pain scale  - Administer analgesics based on type and severity of pain and evaluate response  - Implement non-pharmacological measures as appropriate and evaluate response  - Consider cultural and social influences on pain and pain management  - Notify physician/advanced practitioner if interventions unsuccessful or patient reports new pain  Outcome: Progressing     Problem: INFECTION - ADULT  Goal: Absence or prevention of progression during hospitalization  Description: INTERVENTIONS:  - Assess and monitor for signs and symptoms of infection  - Monitor lab/diagnostic results  - Monitor all insertion sites, i.e. indwelling lines, tubes, and drains  - Monitor endotracheal if appropriate and nasal secretions for changes in amount and color  - [place] appropriate cooling/warming therapies per order  - Administer medications as ordered  - Instruct and encourage patient and family to use good hand hygiene technique  - Identify and instruct in appropriate isolation precautions for identified infection/condition  Outcome: Progressing  Goal: Absence of fever/infection during neutropenic period  Description: INTERVENTIONS:  - Monitor WBC    Outcome: Progressing     Problem: DISCHARGE PLANNING  Goal: Discharge to home or other facility with appropriate resources  Description: INTERVENTIONS:  - Identify barriers to discharge w/patient and caregiver  - Arrange for needed discharge resources and transportation as appropriate  - Identify discharge learning needs (meds, wound care, etc.)  - Arrange for interpretive services to assist at discharge as needed  - Refer to Case Management Department for coordinating discharge planning if the patient needs post-hospital services based on physician/advanced practitioner order or complex needs related to functional status, cognitive ability, or social support system  Outcome: Progressing     Problem: Nutrition/Hydration-ADULT  Goal: Nutrient/Hydration intake appropriate for improving, restoring or maintaining nutritional needs  Description: Monitor and assess patient's nutrition/hydration status for malnutrition. Collaborate with interdisciplinary team and initiate plan and interventions as ordered. Monitor patient's weight and dietary intake as ordered or per policy. Utilize nutrition screening tool and intervene as necessary. Determine patient's food preferences and provide high-protein, high-caloric foods as appropriate.      INTERVENTIONS:  - Monitor oral intake, urinary output, labs, and treatment plans  - Assess nutrition and hydration status and recommend course of action  - Evaluate amount of meals eaten  - Assist patient with eating if necessary   - Allow adequate time for meals  - Recommend/ encourage appropriate diets, oral nutritional supplements, and vitamin/mineral supplements  - Order, calculate, and assess calorie counts as needed  - Recommend, monitor, and adjust tube feedings and TPN/PPN based on assessed needs  - Assess need for intravenous fluids  - Provide specific nutrition/hydration education as appropriate  - Include patient/family/caregiver in decisions related to nutrition  Outcome: Progressing

## 2023-09-15 NOTE — ASSESSMENT & PLAN NOTE
· Recurrent with epigastric pain. Hx of gastritis /coffe ground emesis before. Hx of suspected gastroperesis. Recent uds negative  · Ct abd and pelvis- no pathology to explain  · protonix 40 mg iv bid  · egd April 2022 [facility] [place] [facility] [place] [employer] [facility]  · reglan iv 5 mg with meals as will start clears and see how he does  · Patient states he felt more nauseous with reglan, d/c and tried carafate, had nausea and vomiting with this and was d/c.   · Will treat uti as well  · qtc nml  · Question possible psychogenic - will do prn atarax   · Will place tigan prn as well  · GI consulted and recommending protonix, outpatient follow up and daily dulcolax suppositories. Can then advance to miralax BID after having adequate PO intake. Avoid zofran   · continue gastroparesis diet of low fiber, small portions and low fat  · KUB normal. Showing constipation  · Curbsided GI and recommending to continue with bowel regimen as patient is still constipated.  Started on milk of mag prn and dulcolax suppository daily

## 2023-09-16 PROBLEM — E43 SEVERE PROTEIN-CALORIE MALNUTRITION (HCC): Status: ACTIVE | Noted: 2023-09-16

## 2023-09-16 LAB
ANION GAP SERPL CALCULATED.3IONS-SCNC: 10 MMOL/L
BUN SERPL-MCNC: 7 MG/DL (ref 5–25)
CALCIUM SERPL-MCNC: 9.2 MG/DL (ref 8.4–10.2)
CHLORIDE SERPL-SCNC: 101 MMOL/L (ref 96–108)
CO2 SERPL-SCNC: 26 MMOL/L (ref 21–32)
CREAT SERPL-MCNC: 0.83 MG/DL (ref 0.6–1.3)
GFR SERPL CREATININE-BSD FRML MDRD: 120 ML/MIN/1.73SQ M
GLUCOSE SERPL-MCNC: 101 MG/DL (ref 65–140)
MAGNESIUM SERPL-MCNC: 2.1 MG/DL (ref 1.9–2.7)
POTASSIUM SERPL-SCNC: 3.2 MMOL/L (ref 3.5–5.3)
SODIUM SERPL-SCNC: 137 MMOL/L (ref 135–147)

## 2023-09-16 PROCEDURE — 83735 ASSAY OF MAGNESIUM: CPT

## 2023-09-16 PROCEDURE — 99232 SBSQ HOSP IP/OBS MODERATE 35: CPT

## 2023-09-16 PROCEDURE — C9113 INJ PANTOPRAZOLE SODIUM, VIA: HCPCS | Performed by: FAMILY MEDICINE

## 2023-09-16 PROCEDURE — 80048 BASIC METABOLIC PNL TOTAL CA: CPT

## 2023-09-16 RX ORDER — POTASSIUM CHLORIDE 20 MEQ/1
20 TABLET, EXTENDED RELEASE ORAL ONCE
Status: COMPLETED | OUTPATIENT
Start: 2023-09-16 | End: 2023-09-16

## 2023-09-16 RX ORDER — POTASSIUM CHLORIDE 20 MEQ/1
40 TABLET, EXTENDED RELEASE ORAL ONCE
Status: COMPLETED | OUTPATIENT
Start: 2023-09-16 | End: 2023-09-16

## 2023-09-16 RX ORDER — METOCLOPRAMIDE HYDROCHLORIDE 5 MG/ML
10 INJECTION INTRAMUSCULAR; INTRAVENOUS ONCE
Status: COMPLETED | OUTPATIENT
Start: 2023-09-16 | End: 2023-09-16

## 2023-09-16 RX ADMIN — BISACODYL 10 MG: 10 SUPPOSITORY RECTAL at 08:31

## 2023-09-16 RX ADMIN — ESCITALOPRAM OXALATE 10 MG: 10 TABLET ORAL at 08:31

## 2023-09-16 RX ADMIN — DOCUSATE SODIUM 100 MG: 100 CAPSULE, LIQUID FILLED ORAL at 08:31

## 2023-09-16 RX ADMIN — FLUTICASONE PROPIONATE 1 SPRAY: 50 SPRAY, METERED NASAL at 08:31

## 2023-09-16 RX ADMIN — POTASSIUM CHLORIDE 40 MEQ: 1500 TABLET, EXTENDED RELEASE ORAL at 13:32

## 2023-09-16 RX ADMIN — TRIMETHOBENZAMIDE HYDROCHLORIDE 200 MG: 100 INJECTION INTRAMUSCULAR at 16:30

## 2023-09-16 RX ADMIN — LORATADINE 10 MG: 10 TABLET ORAL at 08:31

## 2023-09-16 RX ADMIN — PANTOPRAZOLE SODIUM 40 MG: 40 INJECTION, POWDER, FOR SOLUTION INTRAVENOUS at 08:31

## 2023-09-16 RX ADMIN — TRIMETHOBENZAMIDE HYDROCHLORIDE 200 MG: 100 INJECTION INTRAMUSCULAR at 09:39

## 2023-09-16 RX ADMIN — TAMSULOSIN HYDROCHLORIDE 0.8 MG: 0.4 CAPSULE ORAL at 16:30

## 2023-09-16 RX ADMIN — SULFAMETHOXAZOLE AND TRIMETHOPRIM 1 TABLET: 800; 160 TABLET ORAL at 08:31

## 2023-09-16 RX ADMIN — METOCLOPRAMIDE HYDROCHLORIDE 10 MG: 5 INJECTION INTRAMUSCULAR; INTRAVENOUS at 13:32

## 2023-09-16 RX ADMIN — POTASSIUM CHLORIDE 20 MEQ: 1500 TABLET, EXTENDED RELEASE ORAL at 08:33

## 2023-09-16 RX ADMIN — SULFAMETHOXAZOLE AND TRIMETHOPRIM 1 TABLET: 800; 160 TABLET ORAL at 20:54

## 2023-09-16 RX ADMIN — ACETAMINOPHEN 650 MG: 325 TABLET, FILM COATED ORAL at 22:13

## 2023-09-16 RX ADMIN — DOCUSATE SODIUM 100 MG: 100 CAPSULE, LIQUID FILLED ORAL at 16:31

## 2023-09-16 RX ADMIN — PANTOPRAZOLE SODIUM 40 MG: 40 INJECTION, POWDER, FOR SOLUTION INTRAVENOUS at 20:54

## 2023-09-16 NOTE — ASSESSMENT & PLAN NOTE
Malnutrition Findings:   Adult Malnutrition type: Acute illness  Adult Degree of Malnutrition: Other severe protein calorie malnutrition  Malnutrition Characteristics: Inadequate energy, Weight loss                  360 Statement: Acute severe malnutrition related to N/V, inability to keep down po intake as evidenced by < 50% estimated energy intake x 5 days, 4.2% weight loss x 5 days (9/10 192lb standing scale, 9/15 184lb standing scale). Treated with: Continue diet as ordered, advance to soft foods diet as medically appropriate. Will trial ensure compact BID though pt unable to keep most all foods down at this time. If unable to tolerate po within 24-48 h, may need to consider TPN. At that time, would recommend standard TPN x 24-48 h. Provide IV thiamine prior to starting TPN. Check baseline TRIG and monitor -lytes daily. Will provide goal TPN recs as indicated. Recommend daily weights (standing scale as able) for nutrition monitoring. BMI Findings: Body mass index is 28.09 kg/m². · Patient tolerating full liquid toast and crackers and advanced to surgical softs.  Patient with further episodes of emesis per RN

## 2023-09-16 NOTE — PROGRESS NOTES
[address] [facility]  Progress Note  Name: Julianne Rivero  MRN: [id number]  Unit/Bed#: -01 I Date of Admission: 9/10/2023   Date of Service: 9/16/2023 I Hospital Day: 6    Assessment/Plan   * Intractable nausea and vomiting  Assessment & Plan  · Recurrent with epigastric pain. Hx of gastritis /coffe ground emesis before. Hx of suspected gastroperesis. Recent uds negative  · Ct abd and pelvis- no pathology to explain  · protonix 40 mg iv bid  · egd April 2022 when had same done in [place] nml  · reglan iv 5 mg with meals as will start clears and see how he does  · Patient states he felt more nauseous with reglan, d/c and tried carafate, had nausea and vomiting with this and was d/c.   · Will treat uti as well  · qtc nml  · Question possible psychogenic - will do prn atarax   · Will place tigan prn as well  · GI consulted and recommending protonix, outpatient follow up and daily dulcolax suppositories. Can then advance to miralax BID after having adequate PO intake. Avoid zofran   · continue gastroparesis diet of low fiber, small portions and low fat  · KUB normal. Showing constipation  · Curbsided GI and recommending to continue with bowel regimen as patient is still constipated. Started on milk of mag prn and dulcolax suppository daily  · Had multiple BM on 9/15, increased diet to surgical softs, low fiber, small portions, low fat.   · Patient had another episode of emesis, continue to monitor    Acute cystitis without hematuria  Assessment & Plan  · ua positive for nitrate and leuks - start rocephin urine cx did not reflect on this sample unable to add  · Patient with increasing nausea after rocephin, d/c and start on bactrim as patient states he tolerated this in the past.     Acute urinary retention  Assessment & Plan  · Just had castaneda placed in er 9/9 hx of same  · Keep castaneda ua rechecked today positive for nitrates and some leuks did not reflect to culture  · Patient states he gets more nauseous from rocephin, has tolerated bactrim, d/c and start bactrim. · Continue flomax 0.8mg nightly  · Urology consult: follow up later in the week or early next week for voiding trial. Can go home on abx when clinically stable. Has appointment on 9/18    Severe protein-calorie malnutrition ([address])  Assessment & Plan  Malnutrition Findings:   Adult Malnutrition type: Acute illness  Adult Degree of Malnutrition: Other severe protein calorie malnutrition  Malnutrition Characteristics: Inadequate energy, Weight loss                  360 Statement: Acute severe malnutrition related to N/V, inability to keep down po intake as evidenced by < 50% estimated energy intake x 5 days, 4.2% weight loss x 5 days (9/10 192lb standing scale, 9/15 184lb standing scale). Treated with: Continue diet as ordered, advance to soft foods diet as medically appropriate. Will trial ensure compact BID though pt unable to keep most all foods down at this time. If unable to tolerate po within 24-48 h, may need to consider TPN. At that time, would recommend standard TPN x 24-48 h. Provide IV thiamine prior to starting TPN. Check baseline TRIG and monitor -lytes daily. Will provide goal TPN recs as indicated. Recommend daily weights (standing scale as able) for nutrition monitoring. BMI Findings: Body mass index is 28.09 kg/m². · Patient tolerating full liquid toast and crackers and advanced to surgical softs. Patient with further episodes of emesis per RN      Autism  Assessment & Plan  · Pleasant           VTE Pharmacologic Prophylaxis: VTE Score: 0 Low Risk (Score 0-2) - Encourage Ambulation. Patient Centered Rounds: I performed bedside rounds with nursing staff today. Discussions with Specialists or Other Care Team Provider: nursing, case management    Education and Discussions with Family / Patient: Patient declined call to . Total Time Spent on Date of Encounter in care of patient: 35 mins.  This time was spent on one or more of the following: performing physical exam; counseling and coordination of care; obtaining or reviewing history; documenting in the medical record; reviewing/ordering tests, medications or procedures; communicating with other healthcare professionals and discussing with patient's family/caregivers. Current Length of Stay: 6 day(s)  Current Patient Status: Inpatient   Certification Statement: The patient will continue to require additional inpatient hospital stay due to nausea and vomiting, advancement of diet  Discharge Plan: Anticipate discharge in 24-48 hrs to home. Code Status: Level 1 - Full Code    Subjective:   Patient seen and examined at bedside this morning. He was doing well today. He said that his last episode of emesis was yesterday around lunchtime. He said that he is feeling better than yesterday and had multiple bowel movements. Discussed possible discharge with patient and he looking forward to this. RN then informed that patient is now vomiting again, requiring IM and IV medications. Not discharging at this time. Objective:     Vitals:   Temp (24hrs), Av.1 °F (36.7 °C), Min:98.1 °F (36.7 °C), Max:98.1 °F (36.7 °C)    Temp:  [98.1 °F (36.7 °C)] 98.1 °F (36.7 °C)  HR:  [75-81] 81  Resp:  [14-18] 18  BP: ()/(61-76) 119/76  SpO2:  [96 %-97 %] 97 %  Body mass index is 28.09 kg/m². Input and Output Summary (last 24 hours): Intake/Output Summary (Last 24 hours) at 2023 1319  Last data filed at 2023 1300  Gross per 24 hour   Intake --   Output 1550 ml   Net -1550 ml       Physical Exam:   Physical Exam  Vitals reviewed. Constitutional:       General: He is not in acute distress. Appearance: He is not ill-appearing or toxic-appearing. HENT:      Head: Normocephalic and atraumatic. Nose: Nose normal.      Mouth/Throat:      Mouth: Mucous membranes are moist.   Eyes:      Pupils: Pupils are equal, round, and reactive to light. Cardiovascular:      Rate and Rhythm: Normal rate and regular rhythm. Heart sounds: No murmur heard. Pulmonary:      Effort: No respiratory distress. Breath sounds: No stridor. No wheezing. Abdominal:      General: Bowel sounds are normal. There is no distension. Palpations: Abdomen is soft. There is no mass. Tenderness: There is no abdominal tenderness. Musculoskeletal:      Right lower leg: No edema. Left lower leg: No edema. Skin:     General: Skin is warm and dry. Neurological:      General: No focal deficit present. Mental Status: He is alert and oriented to person, place, and time. Psychiatric:         Mood and Affect: Mood normal.         Behavior: Behavior normal.          Additional Data:     Labs:  Results from last 7 days   Lab Units 09/14/23  0500 09/12/23  0534 09/10/23  1423   WBC Thousand/uL 7.01   < > 7.11   HEMOGLOBIN g/dL 14.8   < > 15.6   HEMATOCRIT % 42.4   < > 46.8   PLATELETS Thousands/uL 179   < > 184   NEUTROS PCT %  --   --  68   LYMPHS PCT %  --   --  24   MONOS PCT %  --   --  8   EOS PCT %  --   --  0    < > = values in this interval not displayed. Results from last 7 days   Lab Units 09/16/23  0528 09/11/23  0557 09/10/23  1423   SODIUM mmol/L 137   < > 134*   POTASSIUM mmol/L 3.2*   < > 4.1   CHLORIDE mmol/L 101   < > 100   CO2 mmol/L 26   < > 18*   BUN mg/dL 7   < > 15   CREATININE mg/dL 0.83   < > 0.95   ANION GAP mmol/L 10   < > 16   CALCIUM mg/dL 9.2   < > 9.3   ALBUMIN g/dL  --   --  4.6   TOTAL BILIRUBIN mg/dL  --   --  0.80   ALK PHOS U/L  --   --  69   ALT U/L  --   --  10   AST U/L  --   --  11*   GLUCOSE RANDOM mg/dL 101   < > 72    < > = values in this interval not displayed.      Results from last 7 days   Lab Units 09/10/23  1423   INR  1.01             Results from last 7 days   Lab Units 09/10/23  1423   LACTIC ACID mmol/L 0.8       Lines/Drains:  Invasive Devices     Peripheral Intravenous Line  Duration           Peripheral IV 09/14/23 Dorsal (posterior); Right Forearm 2 days          Drain  Duration           Urethral Catheter Non-latex 16 Fr. 6 days              Urinary Catheter:  Goal for removal: Voiding trial with urology for appointment on 9/18               Imaging: Reviewed radiology reports from this admission including: xray(s)    Recent Cultures (last 7 days):   Results from last 7 days   Lab Units 09/09/23  1403   URINE CULTURE  <10,000 cfu/ml Enterococcus faecalis*       Last 24 Hours Medication List:   Current Facility-Administered Medications   Medication Dose Route Frequency Provider Last Rate   • acetaminophen  650 mg Oral Q6H PRN Jonathan Valdez MD     • bisacodyl  10 mg Rectal Daily Chantelle Khan PA-C     • docusate sodium  100 mg Oral BID Chantelle Khan PA-C     • escitalopram  10 mg Oral Daily Jonathan Valdez MD     • fluticasone  1 spray Each Nare Daily Jonathan Valdez MD     • hydrOXYzine HCL  25 mg Oral Q6H PRN Chantelle Khan PA-C     • loratadine  10 mg Oral Daily Jonathan Valdez MD     • magnesium hydroxide  30 mL Oral Daily PRN Chantelle Khan PA-C     • metoclopramide  10 mg Intravenous Once Ary Cruz PA-C     • pantoprazole  40 mg Intravenous Q12H [facility] Jonathan Valdez MD     • potassium chloride  40 mEq Oral Once Ary Cruz PA-C     • sulfamethoxazole-trimethoprim  1 tablet Oral Q12H [facility] Ary Cruz PA-C     • tamsulosin  0.8 mg Oral Daily With Dinner Chantelle Khan PA-C     • trimethobenzamide  200 mg Intramuscular Q6H PRN Jonathan Valdez MD          Today, Patient Was Seen By: Ary Cruz PA-C    **Please Note: This note may have been constructed using a voice recognition system. **

## 2023-09-16 NOTE — ASSESSMENT & PLAN NOTE
· Recurrent with epigastric pain. Hx of gastritis /coffe ground emesis before. Hx of suspected gastroperesis. Recent uds negative  · Ct abd and pelvis- no pathology to explain  · protonix 40 mg iv bid  · egd April 2022 when had same done in [place]  · reglan iv 5 mg with meals as will start clears and see how he does  · Patient states he felt more nauseous with reglan, d/c and tried carafate, had nausea and vomiting with this and was d/c.   · Will treat uti as well  · qtc nml  · Question possible psychogenic - will do prn atarax   · Will place tigan prn as well  · GI consulted and recommending protonix, outpatient follow up and daily dulcolax suppositories. Can then advance to miralax BID after having adequate PO intake. Avoid zofran   · continue gastroparesis diet of low fiber, small portions and low fat  · KUB normal. Showing constipation  · Curbsided GI and recommending to continue with bowel regimen as patient is still constipated. Started on milk of mag prn and dulcolax suppository daily  · Had multiple BM on 9/15, increased diet to surgical softs, low fiber, small portions, low fat.   · Patient had another episode of emesis, continue to monitor

## 2023-09-17 ENCOUNTER — APPOINTMENT (INPATIENT)
Dept: RADIOLOGY | Facility: HOSPITAL | Age: 28
DRG: 241 | End: 2023-09-17
Payer: COMMERCIAL

## 2023-09-17 LAB
ANION GAP SERPL CALCULATED.3IONS-SCNC: 11 MMOL/L
BUN SERPL-MCNC: 11 MG/DL (ref 5–25)
CALCIUM SERPL-MCNC: 9.4 MG/DL (ref 8.4–10.2)
CHLORIDE SERPL-SCNC: 101 MMOL/L (ref 96–108)
CO2 SERPL-SCNC: 25 MMOL/L (ref 21–32)
CREAT SERPL-MCNC: 1 MG/DL (ref 0.6–1.3)
GFR SERPL CREATININE-BSD FRML MDRD: 102 ML/MIN/1.73SQ M
GLUCOSE SERPL-MCNC: 98 MG/DL (ref 65–140)
MAGNESIUM SERPL-MCNC: 2.2 MG/DL (ref 1.9–2.7)
POTASSIUM SERPL-SCNC: 3.9 MMOL/L (ref 3.5–5.3)
SODIUM SERPL-SCNC: 137 MMOL/L (ref 135–147)

## 2023-09-17 PROCEDURE — 74018 RADEX ABDOMEN 1 VIEW: CPT

## 2023-09-17 PROCEDURE — 83735 ASSAY OF MAGNESIUM: CPT

## 2023-09-17 PROCEDURE — 80048 BASIC METABOLIC PNL TOTAL CA: CPT

## 2023-09-17 PROCEDURE — C9113 INJ PANTOPRAZOLE SODIUM, VIA: HCPCS | Performed by: FAMILY MEDICINE

## 2023-09-17 PROCEDURE — 99232 SBSQ HOSP IP/OBS MODERATE 35: CPT

## 2023-09-17 RX ORDER — ERYTHROMYCIN 250 MG/1
250 TABLET, COATED ORAL
Status: DISCONTINUED | OUTPATIENT
Start: 2023-09-17 | End: 2023-09-19 | Stop reason: HOSPADM

## 2023-09-17 RX ADMIN — DOCUSATE SODIUM 100 MG: 100 CAPSULE, LIQUID FILLED ORAL at 08:15

## 2023-09-17 RX ADMIN — DOCUSATE SODIUM 100 MG: 100 CAPSULE, LIQUID FILLED ORAL at 17:11

## 2023-09-17 RX ADMIN — BISACODYL 10 MG: 10 SUPPOSITORY RECTAL at 08:15

## 2023-09-17 RX ADMIN — TRIMETHOBENZAMIDE HYDROCHLORIDE 200 MG: 100 INJECTION INTRAMUSCULAR at 08:27

## 2023-09-17 RX ADMIN — ESCITALOPRAM OXALATE 10 MG: 10 TABLET ORAL at 08:15

## 2023-09-17 RX ADMIN — PANTOPRAZOLE SODIUM 40 MG: 40 INJECTION, POWDER, FOR SOLUTION INTRAVENOUS at 08:15

## 2023-09-17 RX ADMIN — MAGNESIUM HYDROXIDE 30 ML: 400 SUSPENSION ORAL at 10:20

## 2023-09-17 RX ADMIN — TAMSULOSIN HYDROCHLORIDE 0.8 MG: 0.4 CAPSULE ORAL at 17:11

## 2023-09-17 RX ADMIN — ACETAMINOPHEN 650 MG: 325 TABLET, FILM COATED ORAL at 17:11

## 2023-09-17 RX ADMIN — ERYTHROMYCIN 250 MG: 250 TABLET, FILM COATED ORAL at 17:12

## 2023-09-17 RX ADMIN — PANTOPRAZOLE SODIUM 40 MG: 40 INJECTION, POWDER, FOR SOLUTION INTRAVENOUS at 21:09

## 2023-09-17 RX ADMIN — ERYTHROMYCIN 250 MG: 250 TABLET, FILM COATED ORAL at 13:40

## 2023-09-17 RX ADMIN — LORATADINE 10 MG: 10 TABLET ORAL at 08:15

## 2023-09-17 NOTE — ASSESSMENT & PLAN NOTE
· Recurrent with epigastric pain. Hx of gastritis /coffe ground emesis before. Hx of suspected gastroperesis. Recent uds negative  · Ct abd and pelvis- no pathology to explain  · protonix 40 mg iv bid  · egd April 2022 when [place]  · reglan iv 5 mg with meals as will start clears and see how he does  · Patient states he felt more nauseous with reglan, d/c and tried carafate, had nausea and vomiting with this and was d/c.   · Will treat uti as well  · qtc nml  · Question possible psychogenic - will do prn atarax   · Will place tigan prn as well  · GI consulted and recommending protonix, outpatient follow up and daily dulcolax suppositories. Can then advance to miralax BID after having adequate PO intake. Avoid zofran   · continue gastroparesis diet of low fiber, small portions and low fat  · KUB normal. Showing constipation  · Curbsided GI and recommending to continue with bowel regimen as patient is still constipated. Started on milk of mag prn and dulcolax suppository daily  · Had multiple BM on 9/15, increased diet to surgical softs, low fiber, small portions, low fat. · Patient had another episode of emesis, continue to monitor  · Still with emesis intermittently, will trial erythromycin 250 mg q8h and repeat KUB. Still with multiple bowel movements.

## 2023-09-17 NOTE — ASSESSMENT & PLAN NOTE
Malnutrition Findings:   Adult Malnutrition type: Acute illness  Adult Degree of Malnutrition: Other severe protein calorie malnutrition  Malnutrition Characteristics: Inadequate energy, Weight loss                  360 Statement: Acute severe malnutrition related to N/V, inability to keep down po intake as evidenced by < 50% estimated energy intake x 5 days, 4.2% weight loss x 5 days (9/10 192lb standing scale, 9/15 184lb standing scale). Treated with: Continue diet as ordered, advance to soft foods diet as medically appropriate. Will trial ensure compact BID though pt unable to keep most all foods down at this time. If unable to tolerate po within 24-48 h, may need to consider TPN. At that time, would recommend standard TPN x 24-48 h. Provide IV thiamine prior to starting TPN. Check baseline TRIG and monitor -lytes daily. Will provide goal TPN recs as indicated. Recommend daily weights (standing scale as able) for nutrition monitoring. BMI Findings: Body mass index is 28.09 kg/m². · Patient tolerating full liquid toast and crackers and advanced to surgical softs.  Patient with further episodes of emesis per RN    · Will trial erythromycin for gastric motility

## 2023-09-17 NOTE — ASSESSMENT & PLAN NOTE
· Just had castaneda placed in er 9/9 hx of same  · Keep castaneda ua rechecked today positive for nitrates and some leuks did not reflect to culture  · Patient states he gets more nauseous from rocephin, has tolerated bactrim, d/c and start bactrim. · Continue flomax 0.8mg nightly  · Urology consult: follow up later in the week or early next week for voiding trial. Can go home on abx when clinically stable. Has appointment on 9/18 - will discuss with urology to do voiding trial inpatient.

## 2023-09-17 NOTE — PROGRESS NOTES
[facility]  Progress Note  Name: Ghazal Luong  MRN: [id number]  Unit/Bed#: -01 I Date of Admission: 9/10/2023   Date of Service: 9/17/2023 I Hospital Day: 7    Assessment/Plan   * Intractable nausea and vomiting  Assessment & Plan  · Recurrent with epigastric pain. Hx of gastritis /coffe ground emesis before. Hx of suspected gastroperesis. Recent uds negative  · Ct abd and pelvis- no pathology to explain  · protonix 40 mg iv bid  · egd April 2022 when had same done in [place] nml  · reglan iv 5 mg with meals as will start clears and see how he does  · Patient states he felt more nauseous with reglan, d/c and tried carafate, had nausea and vomiting with this and was d/c.   · Will treat uti as well  · qtc nml  · Question possible psychogenic - will do prn atarax   · Will place tigan prn as well  · GI consulted and recommending protonix, outpatient follow up and daily dulcolax suppositories. Can then advance to miralax BID after having adequate PO intake. Avoid zofran   · continue gastroparesis diet of low fiber, small portions and low fat  · KUB normal. Showing constipation  · Curbsided GI and recommending to continue with bowel regimen as patient is still constipated. Started on milk of mag prn and dulcolax suppository daily  · Had multiple BM on 9/15, increased diet to surgical softs, low fiber, small portions, low fat. · Patient had another episode of emesis, continue to monitor  · Still with emesis intermittently, will trial erythromycin 250 mg q8h and repeat KUB. Still with multiple bowel movements. Acute cystitis without hematuria  Assessment & Plan  · ua positive for nitrate and leuks - start rocephin urine cx did not reflect on this sample unable to add  · Patient with increasing nausea after rocephin, d/c and start on bactrim as patient states he tolerated this in the past.   · Completed abx.      Acute urinary retention  Assessment & Plan  · Just had castaneda placed in er 9/9 hx of same  · Keep castaneda ua rechecked today positive for nitrates and some leuks did not reflect to culture  · Patient states he gets more nauseous from rocephin, has tolerated bactrim, d/c and start bactrim. · Continue flomax 0.8mg nightly  · Urology consult: follow up later in the week or early next week for voiding trial. Can go home on abx when clinically stable. Has appointment on 9/18 - will discuss with urology to do voiding trial inpatient. Severe protein-calorie malnutrition ([address])  Assessment & Plan  Malnutrition Findings:   Adult Malnutrition type: Acute illness  Adult Degree of Malnutrition: Other severe protein calorie malnutrition  Malnutrition Characteristics: Inadequate energy, Weight loss                  360 Statement: Acute severe malnutrition related to N/V, inability to keep down po intake as evidenced by < 50% estimated energy intake x 5 days, 4.2% weight loss x 5 days (9/10 192lb standing scale, 9/15 184lb standing scale). Treated with: Continue diet as ordered, advance to soft foods diet as medically appropriate. Will trial ensure compact BID though pt unable to keep most all foods down at this time. If unable to tolerate po within 24-48 h, may need to consider TPN. At that time, would recommend standard TPN x 24-48 h. Provide IV thiamine prior to starting TPN. Check baseline TRIG and monitor -lytes daily. Will provide goal TPN recs as indicated. Recommend daily weights (standing scale as able) for nutrition monitoring. BMI Findings: Body mass index is 28.09 kg/m². · Patient tolerating full liquid toast and crackers and advanced to surgical softs. Patient with further episodes of emesis per RN    · Will trial erythromycin for gastric motility    Autism  Assessment & Plan  · Pleasant             VTE Pharmacologic Prophylaxis: VTE Score: 0 Low Risk (Score 0-2) - Encourage Ambulation.     Patient Centered Rounds: I performed bedside rounds with nursing staff today.  Discussions with Specialists or Other Care Team Provider: nursing, case management    Education and Discussions with Family / Patient: Patient declined call to . He is calling and updating his grandfather    Total Time Spent on Date of Encounter in care of patient: 35 mins. This time was spent on one or more of the following: performing physical exam; counseling and coordination of care; obtaining or reviewing history; documenting in the medical record; reviewing/ordering tests, medications or procedures; communicating with other healthcare professionals and discussing with patient's family/caregivers. Current Length of Stay: 7 day(s)  Current Patient Status: Inpatient   Certification Statement: The patient will continue to require additional inpatient hospital stay due to recurrent nausea and vomiting. Discharge Plan: Anticipate discharge in 24-48 hrs to home. Code Status: Level 1 - Full Code    Subjective:   Patient seen and examined at bedside this morning. He is currently throwing up into his basin. He said his belly hurts more today. He is still having frequent bowel movements. Denies any chest pain or shortness of breath. He is hoping that he feels better so he can go home to see his puppy. Objective:     Vitals:   Temp (24hrs), Av °F (36.7 °C), Min:97.9 °F (36.6 °C), Max:98.1 °F (36.7 °C)    Temp:  [97.9 °F (36.6 °C)-98.1 °F (36.7 °C)] 97.9 °F (36.6 °C)  HR:  [69-72] 69  Resp:  [14-18] 16  BP: (102-117)/(60-73) 102/60  SpO2:  [96 %-97 %] 97 %  Body mass index is 28.09 kg/m². Input and Output Summary (last 24 hours): Intake/Output Summary (Last 24 hours) at 2023 1218  Last data filed at 2023 0536  Gross per 24 hour   Intake --   Output 550 ml   Net -550 ml       Physical Exam:   Physical Exam  Vitals reviewed. Constitutional:       General: He is not in acute distress. Appearance: He is not ill-appearing or toxic-appearing.       Comments: Currently vomiting in basin   HENT:      Head: Normocephalic and atraumatic. Nose: Nose normal.      Mouth/Throat:      Mouth: Mucous membranes are moist.   Eyes:      Pupils: Pupils are equal, round, and reactive to light. Cardiovascular:      Rate and Rhythm: Normal rate and regular rhythm. Heart sounds: No murmur heard. Pulmonary:      Effort: No respiratory distress. Breath sounds: No stridor. No wheezing. Abdominal:      General: Bowel sounds are normal. There is no distension. Palpations: Abdomen is soft. There is no mass. Tenderness: There is abdominal tenderness (epigastric with palpation). Musculoskeletal:      Right lower leg: No edema. Left lower leg: No edema. Skin:     General: Skin is warm and dry. Neurological:      General: No focal deficit present. Mental Status: He is alert and oriented to person, place, and time. Psychiatric:         Mood and Affect: Mood normal.         Behavior: Behavior normal.          Additional Data:     Labs:  Results from last 7 days   Lab Units 09/14/23  0500 09/12/23  0534 09/10/23  1423   WBC Thousand/uL 7.01   < > 7.11   HEMOGLOBIN g/dL 14.8   < > 15.6   HEMATOCRIT % 42.4   < > 46.8   PLATELETS Thousands/uL 179   < > 184   NEUTROS PCT %  --   --  68   LYMPHS PCT %  --   --  24   MONOS PCT %  --   --  8   EOS PCT %  --   --  0    < > = values in this interval not displayed. Results from last 7 days   Lab Units 09/17/23  0536 09/11/23  0557 09/10/23  1423   SODIUM mmol/L 137   < > 134*   POTASSIUM mmol/L 3.9   < > 4.1   CHLORIDE mmol/L 101   < > 100   CO2 mmol/L 25   < > 18*   BUN mg/dL 11   < > 15   CREATININE mg/dL 1.00   < > 0.95   ANION GAP mmol/L 11   < > 16   CALCIUM mg/dL 9.4   < > 9.3   ALBUMIN g/dL  --   --  4.6   TOTAL BILIRUBIN mg/dL  --   --  0.80   ALK PHOS U/L  --   --  69   ALT U/L  --   --  10   AST U/L  --   --  11*   GLUCOSE RANDOM mg/dL 98   < > 72    < > = values in this interval not displayed.      Results from last 7 days   Lab Units 09/10/23  1423   INR  1.01             Results from last 7 days   Lab Units 09/10/23  1423   LACTIC ACID mmol/L 0.8       Lines/Drains:  Invasive Devices     Peripheral Intravenous Line  Duration           Peripheral IV 09/14/23 Dorsal (posterior); Right Forearm 3 days          Drain  Duration           Urethral Catheter Non-latex 16 Fr. 7 days              Urinary Catheter:  Goal for removal: Voiding trial tomorrow               Imaging: Reviewed radiology reports from this admission including: KUB    Recent Cultures (last 7 days):         Last 24 Hours Medication List:   Current Facility-Administered Medications   Medication Dose Route Frequency Provider Last Rate   • acetaminophen  650 mg Oral Q6H PRN Pa Mccord MD     • bisacodyl  10 mg Rectal Daily Chantelle Khan PA-C     • docusate sodium  100 mg Oral BID Chantelle Khan PA-C     • erythromycin base  250 mg Oral TID AC Anuj Sousa PA-C     • escitalopram  10 mg Oral Daily Pa Mccord MD     • fluticasone  1 spray Each Nare Daily Pa Mccord MD     • hydrOXYzine HCL  25 mg Oral Q6H PRN Chantelle Khan PA-C     • loratadine  10 mg Oral Daily Pa Mccord MD     • magnesium hydroxide  30 mL Oral Daily PRN Chantelle Khan PA-C     • pantoprazole  40 mg Intravenous Q12H [address] Yoon Sawyer MD     • tamsulosin  0.8 mg Oral Daily With Dinner Chantelle Khan PA-C     • trimethobenzamide  200 mg Intramuscular Q6H PRN Pa Mccord MD          Today, Patient Was Seen By: Anuj Sousa PA-C    **Please Note: This note may have been constructed using a voice recognition system. **

## 2023-09-17 NOTE — ASSESSMENT & PLAN NOTE
· ua positive for nitrate and leuks - start rocephin urine cx did not reflect on this sample unable to add  · Patient with increasing nausea after rocephin, d/c and start on bactrim as patient states he tolerated this in the past.   · Completed abx.

## 2023-09-17 NOTE — PLAN OF CARE
Problem: Potential for Falls  Goal: Patient will remain free of falls  Description: INTERVENTIONS:  - Educate patient/family on patient safety including physical limitations  - Instruct patient to call for assistance with activity   - Consult OT/PT to assist with strengthening/mobility   - Keep Call bell within reach  - Keep bed low and locked with side rails adjusted as appropriate  - Keep care items and personal belongings within reach  - Initiate and maintain comfort rounds  - Make Fall Risk Sign visible to staff  - Offer Toileting every 2 Hours, in advance of need  - Initiate/Maintain bed alarm  - Obtain necessary fall risk management equipment  - Apply yellow socks and bracelet for high fall risk patients  - Consider moving patient to room near nurses station  Outcome: Progressing     Problem: PAIN - ADULT  Goal: Verbalizes/displays adequate comfort level or baseline comfort level  Description: Interventions:  - Encourage patient to monitor pain and request assistance  - Assess pain using appropriate pain scale  - Administer analgesics based on type and severity of pain and evaluate response  - Implement non-pharmacological measures as appropriate and evaluate response  - Consider cultural and social influences on pain and pain management  - Notify physician/advanced practitioner if interventions unsuccessful or patient reports new pain  Outcome: Progressing     Problem: INFECTION - ADULT  Goal: Absence or prevention of progression during hospitalization  Description: INTERVENTIONS:  - Assess and monitor for signs and symptoms of infection  - Monitor lab/diagnostic results  - Monitor all insertion sites, i.e. indwelling lines, tubes, and drains  - Monitor endotracheal if appropriate and nasal secretions for changes in amount and color  - [place] appropriate cooling/warming therapies per order  - Administer medications as ordered  - Instruct and encourage patient and family to use good hand hygiene technique  - Identify and instruct in appropriate isolation precautions for identified infection/condition  Outcome: Progressing  Goal: Absence of fever/infection during neutropenic period  Description: INTERVENTIONS:  - Monitor WBC    Outcome: Progressing     Problem: DISCHARGE PLANNING  Goal: Discharge to home or other facility with appropriate resources  Description: INTERVENTIONS:  - Identify barriers to discharge w/patient and caregiver  - Arrange for needed discharge resources and transportation as appropriate  - Identify discharge learning needs (meds, wound care, etc.)  - Arrange for interpretive services to assist at discharge as needed  - Refer to Case Management Department for coordinating discharge planning if the patient needs post-hospital services based on physician/advanced practitioner order or complex needs related to functional status, cognitive ability, or social support system  Outcome: Progressing     Problem: Nutrition/Hydration-ADULT  Goal: Nutrient/Hydration intake appropriate for improving, restoring or maintaining nutritional needs  Description: Monitor and assess patient's nutrition/hydration status for malnutrition. Collaborate with interdisciplinary team and initiate plan and interventions as ordered. Monitor patient's weight and dietary intake as ordered or per policy. Utilize nutrition screening tool and intervene as necessary. Determine patient's food preferences and provide high-protein, high-caloric foods as appropriate.      INTERVENTIONS:  - Monitor oral intake, urinary output, labs, and treatment plans  - Assess nutrition and hydration status and recommend course of action  - Evaluate amount of meals eaten  - Assist patient with eating if necessary   - Allow adequate time for meals  - Recommend/ encourage appropriate diets, oral nutritional supplements, and vitamin/mineral supplements  - Order, calculate, and assess calorie counts as needed  - Recommend, monitor, and adjust tube feedings and TPN/PPN based on assessed needs  - Assess need for intravenous fluids  - Provide specific nutrition/hydration education as appropriate  - Include patient/family/caregiver in decisions related to nutrition  Outcome: Progressing

## 2023-09-17 NOTE — PLAN OF CARE
Problem: Potential for Falls  Goal: Patient will remain free of falls  Description: INTERVENTIONS:  - Educate patient/family on patient safety including physical limitations  - Instruct patient to call for assistance with activity   - Consult OT/PT to assist with strengthening/mobility   - Keep Call bell within reach  - Keep bed low and locked with side rails adjusted as appropriate  - Keep care items and personal belongings within reach  - Initiate and maintain comfort rounds  - Make Fall Risk Sign visible to staff  - Offer Toileting every 2 Hours, in advance of need  -Outcome: Progressing     Problem: PAIN - ADULT  Goal: Verbalizes/displays adequate comfort level or baseline comfort level  Description: Interventions:  - Encourage patient to monitor pain and request assistance  - Assess pain using appropriate pain scale  - Administer analgesics based on type and severity of pain and evaluate response  - Implement non-pharmacological measures as appropriate and evaluate response  - Consider cultural and social influences on pain and pain management  - Notify physician/advanced practitioner if interventions unsuccessful or patient reports new pain  Outcome: Progressing     Problem: INFECTION - ADULT  Goal: Absence or prevention of progression during hospitalization  Description: INTERVENTIONS:  - Assess and monitor for signs and symptoms of infection  - Monitor lab/diagnostic results  - Monitor all insertion sites, i.e. indwelling lines, tubes, and drains  - Monitor endotracheal if appropriate and nasal secretions for changes in amount and color  - [place] appropriate cooling/warming therapies per order  - Administer medications as ordered  - Instruct and encourage patient and family to use good hand hygiene technique  - Identify and instruct in appropriate isolation precautions for identified infection/condition  Outcome: Progressing  Goal: Absence of fever/infection during neutropenic period  Description: INTERVENTIONS:  - Monitor WBC    Outcome: Progressing     Problem: DISCHARGE PLANNING  Goal: Discharge to home or other facility with appropriate resources  Description: INTERVENTIONS:  - Identify barriers to discharge w/patient and caregiver  - Arrange for needed discharge resources and transportation as appropriate  - Identify discharge learning needs (meds, wound care, etc.)  - Arrange for interpretive services to assist at discharge as needed  - Refer to Case Management Department for coordinating discharge planning if the patient needs post-hospital services based on physician/advanced practitioner order or complex needs related to functional status, cognitive ability, or social support system  Outcome: Progressing     Problem: Nutrition/Hydration-ADULT  Goal: Nutrient/Hydration intake appropriate for improving, restoring or maintaining nutritional needs  Description: Monitor and assess patient's nutrition/hydration status for malnutrition. Collaborate with interdisciplinary team and initiate plan and interventions as ordered. Monitor patient's weight and dietary intake as ordered or per policy. Utilize nutrition screening tool and intervene as necessary. Determine patient's food preferences and provide high-protein, high-caloric foods as appropriate.      INTERVENTIONS:  - Monitor oral intake, urinary output, labs, and treatment plans  - Assess nutrition and hydration status and recommend course of action  - Evaluate amount of meals eaten  - Assist patient with eating if necessary   - Allow adequate time for meals  - Recommend/ encourage appropriate diets, oral nutritional supplements, and vitamin/mineral supplements  - Order, calculate, and assess calorie counts as needed  - Recommend, monitor, and adjust tube feedings and TPN/PPN based on assessed needs  - Assess need for intravenous fluids  - Provide specific nutrition/hydration education as appropriate  - Include patient/family/caregiver in decisions related to nutrition  Outcome: Progressing

## 2023-09-18 ENCOUNTER — ANESTHESIA (INPATIENT)
Dept: GASTROENTEROLOGY | Facility: HOSPITAL | Age: 28
DRG: 241 | End: 2023-09-18
Payer: COMMERCIAL

## 2023-09-18 ENCOUNTER — TELEPHONE (OUTPATIENT)
Dept: UROLOGY | Facility: CLINIC | Age: 28
End: 2023-09-18

## 2023-09-18 ENCOUNTER — ANESTHESIA EVENT (INPATIENT)
Dept: GASTROENTEROLOGY | Facility: HOSPITAL | Age: 28
DRG: 241 | End: 2023-09-18
Payer: COMMERCIAL

## 2023-09-18 ENCOUNTER — ANESTHESIA EVENT (OUTPATIENT)
Dept: ANESTHESIOLOGY | Facility: HOSPITAL | Age: 28
End: 2023-09-18

## 2023-09-18 ENCOUNTER — ANESTHESIA (OUTPATIENT)
Dept: ANESTHESIOLOGY | Facility: HOSPITAL | Age: 28
End: 2023-09-18

## 2023-09-18 ENCOUNTER — APPOINTMENT (INPATIENT)
Dept: GASTROENTEROLOGY | Facility: HOSPITAL | Age: 28
DRG: 241 | End: 2023-09-18
Payer: COMMERCIAL

## 2023-09-18 PROBLEM — N30.00 ACUTE CYSTITIS WITHOUT HEMATURIA: Status: RESOLVED | Noted: 2023-09-10 | Resolved: 2023-09-18

## 2023-09-18 LAB
ANION GAP SERPL CALCULATED.3IONS-SCNC: 8 MMOL/L
BUN SERPL-MCNC: 12 MG/DL (ref 5–25)
CALCIUM SERPL-MCNC: 9.1 MG/DL (ref 8.4–10.2)
CHLORIDE SERPL-SCNC: 100 MMOL/L (ref 96–108)
CO2 SERPL-SCNC: 27 MMOL/L (ref 21–32)
CREAT SERPL-MCNC: 0.85 MG/DL (ref 0.6–1.3)
ERYTHROCYTE [DISTWIDTH] IN BLOOD BY AUTOMATED COUNT: 12.5 % (ref 11.6–15.1)
GFR SERPL CREATININE-BSD FRML MDRD: 119 ML/MIN/1.73SQ M
GLUCOSE SERPL-MCNC: 97 MG/DL (ref 65–140)
HCT VFR BLD AUTO: 43.9 % (ref 36.5–49.3)
HGB BLD-MCNC: 15.4 G/DL (ref 12–17)
MAGNESIUM SERPL-MCNC: 2.3 MG/DL (ref 1.9–2.7)
MCH RBC QN AUTO: 30.3 PG (ref 26.8–34.3)
MCHC RBC AUTO-ENTMCNC: 35.1 G/DL (ref 31.4–37.4)
MCV RBC AUTO: 86 FL (ref 82–98)
PLATELET # BLD AUTO: 205 THOUSANDS/UL (ref 149–390)
PMV BLD AUTO: 9.7 FL (ref 8.9–12.7)
POTASSIUM SERPL-SCNC: 3.6 MMOL/L (ref 3.5–5.3)
RBC # BLD AUTO: 5.08 MILLION/UL (ref 3.88–5.62)
SODIUM SERPL-SCNC: 135 MMOL/L (ref 135–147)
WBC # BLD AUTO: 5.31 THOUSAND/UL (ref 4.31–10.16)

## 2023-09-18 PROCEDURE — C9113 INJ PANTOPRAZOLE SODIUM, VIA: HCPCS | Performed by: FAMILY MEDICINE

## 2023-09-18 PROCEDURE — 0DB68ZX EXCISION OF STOMACH, VIA NATURAL OR ARTIFICIAL OPENING ENDOSCOPIC, DIAGNOSTIC: ICD-10-PCS | Performed by: FAMILY MEDICINE

## 2023-09-18 PROCEDURE — 80048 BASIC METABOLIC PNL TOTAL CA: CPT

## 2023-09-18 PROCEDURE — 99232 SBSQ HOSP IP/OBS MODERATE 35: CPT

## 2023-09-18 PROCEDURE — 88305 TISSUE EXAM BY PATHOLOGIST: CPT | Performed by: PATHOLOGY

## 2023-09-18 PROCEDURE — 83735 ASSAY OF MAGNESIUM: CPT

## 2023-09-18 PROCEDURE — 85027 COMPLETE CBC AUTOMATED: CPT

## 2023-09-18 RX ORDER — LIDOCAINE HYDROCHLORIDE 10 MG/ML
INJECTION, SOLUTION EPIDURAL; INFILTRATION; INTRACAUDAL; PERINEURAL AS NEEDED
Status: DISCONTINUED | OUTPATIENT
Start: 2023-09-18 | End: 2023-09-18

## 2023-09-18 RX ORDER — SODIUM CHLORIDE, SODIUM LACTATE, POTASSIUM CHLORIDE, CALCIUM CHLORIDE 600; 310; 30; 20 MG/100ML; MG/100ML; MG/100ML; MG/100ML
INJECTION, SOLUTION INTRAVENOUS CONTINUOUS PRN
Status: DISCONTINUED | OUTPATIENT
Start: 2023-09-18 | End: 2023-09-18

## 2023-09-18 RX ORDER — SODIUM CHLORIDE, SODIUM GLUCONATE, SODIUM ACETATE, POTASSIUM CHLORIDE, MAGNESIUM CHLORIDE, SODIUM PHOSPHATE, DIBASIC, AND POTASSIUM PHOSPHATE .53; .5; .37; .037; .03; .012; .00082 G/100ML; G/100ML; G/100ML; G/100ML; G/100ML; G/100ML; G/100ML
75 INJECTION, SOLUTION INTRAVENOUS CONTINUOUS
Status: DISCONTINUED | OUTPATIENT
Start: 2023-09-18 | End: 2023-09-19

## 2023-09-18 RX ORDER — PROPOFOL 10 MG/ML
INJECTION, EMULSION INTRAVENOUS AS NEEDED
Status: DISCONTINUED | OUTPATIENT
Start: 2023-09-18 | End: 2023-09-18

## 2023-09-18 RX ADMIN — ESCITALOPRAM OXALATE 10 MG: 10 TABLET ORAL at 08:28

## 2023-09-18 RX ADMIN — DOCUSATE SODIUM 100 MG: 100 CAPSULE, LIQUID FILLED ORAL at 17:28

## 2023-09-18 RX ADMIN — PROPOFOL 60 MG: 10 INJECTION, EMULSION INTRAVENOUS at 13:09

## 2023-09-18 RX ADMIN — ERYTHROMYCIN 250 MG: 250 TABLET, FILM COATED ORAL at 06:43

## 2023-09-18 RX ADMIN — PROPOFOL 120 MG: 10 INJECTION, EMULSION INTRAVENOUS at 13:04

## 2023-09-18 RX ADMIN — LIDOCAINE HYDROCHLORIDE 50 MG: 10 INJECTION, SOLUTION EPIDURAL; INFILTRATION; INTRACAUDAL at 13:04

## 2023-09-18 RX ADMIN — ERYTHROMYCIN 250 MG: 250 TABLET, FILM COATED ORAL at 17:28

## 2023-09-18 RX ADMIN — ERYTHROMYCIN 250 MG: 250 TABLET, FILM COATED ORAL at 14:01

## 2023-09-18 RX ADMIN — BISACODYL 10 MG: 10 SUPPOSITORY RECTAL at 08:32

## 2023-09-18 RX ADMIN — DOCUSATE SODIUM 100 MG: 100 CAPSULE, LIQUID FILLED ORAL at 08:28

## 2023-09-18 RX ADMIN — LORATADINE 10 MG: 10 TABLET ORAL at 08:28

## 2023-09-18 RX ADMIN — SODIUM CHLORIDE, SODIUM GLUCONATE, SODIUM ACETATE, POTASSIUM CHLORIDE, MAGNESIUM CHLORIDE, SODIUM PHOSPHATE, DIBASIC, AND POTASSIUM PHOSPHATE 75 ML/HR: .53; .5; .37; .037; .03; .012; .00082 INJECTION, SOLUTION INTRAVENOUS at 09:38

## 2023-09-18 RX ADMIN — TAMSULOSIN HYDROCHLORIDE 0.8 MG: 0.4 CAPSULE ORAL at 17:28

## 2023-09-18 RX ADMIN — SODIUM CHLORIDE, SODIUM GLUCONATE, SODIUM ACETATE, POTASSIUM CHLORIDE, MAGNESIUM CHLORIDE, SODIUM PHOSPHATE, DIBASIC, AND POTASSIUM PHOSPHATE 75 ML/HR: .53; .5; .37; .037; .03; .012; .00082 INJECTION, SOLUTION INTRAVENOUS at 13:54

## 2023-09-18 RX ADMIN — PANTOPRAZOLE SODIUM 40 MG: 40 INJECTION, POWDER, FOR SOLUTION INTRAVENOUS at 08:29

## 2023-09-18 RX ADMIN — SODIUM CHLORIDE, SODIUM LACTATE, POTASSIUM CHLORIDE, AND CALCIUM CHLORIDE: .6; .31; .03; .02 INJECTION, SOLUTION INTRAVENOUS at 12:44

## 2023-09-18 RX ADMIN — PROPOFOL 40 MG: 10 INJECTION, EMULSION INTRAVENOUS at 13:05

## 2023-09-18 RX ADMIN — PANTOPRAZOLE SODIUM 40 MG: 40 INJECTION, POWDER, FOR SOLUTION INTRAVENOUS at 21:29

## 2023-09-18 RX ADMIN — PROPOFOL 40 MG: 10 INJECTION, EMULSION INTRAVENOUS at 13:07

## 2023-09-18 NOTE — ASSESSMENT & PLAN NOTE
· Recurrent with epigastric pain. Hx of gastritis /coffe ground emesis before. Hx of suspected gastroperesis. Recent uds negative  · Ct abd and pelvis- no pathology to explain  · protonix 40 mg iv bid  · egd April 2022 when had same done in [place] nml  · reglan iv 5 mg with meals as will start clears and see how he does  · Patient states he felt more nauseous with reglan, d/c and tried carafate, had nausea and vomiting with this and was d/c.   · Will treat uti as well  · qtc nml  · Question possible psychogenic - will do prn atarax   · Will place tigan prn as well  · GI consulted and recommending protonix, outpatient follow up and daily dulcolax suppositories. Can then advance to miralax BID after having adequate PO intake. Avoid zofran   · continue gastroparesis diet of low fiber, small portions and low fat  · KUB normal. Showing constipation  · Curbsided GI and recommending to continue with bowel regimen as patient is still constipated. Started on milk of mag prn and dulcolax suppository daily  · Had multiple BM on 9/15, increased diet to surgical softs, low fiber, small portions, low fat. · Patient had another episode of emesis, continue to monitor  · Still with emesis intermittently, will trial erythromycin 250 mg q8h. Still with multiple bowel movements. · KUB pending  · Reconsult GI, patient made NPO yesterday, going to do EGD today. Appreciate further recommendations from GI.

## 2023-09-18 NOTE — PLAN OF CARE
Problem: Potential for Falls  Goal: Patient will remain free of falls  Description: INTERVENTIONS:  - Educate patient/family on patient safety including physical limitations  - Instruct patient to call for assistance with activity   - Consult OT/PT to assist with strengthening/mobility   - Keep Call bell within reach  - Keep bed low and locked with side rails adjusted as appropriate  - Keep care items and personal belongings within reach  - Initiate and maintain comfort rounds  - Make Fall Risk Sign visible to staff  - Offer Toileting every 2 Hours, in advance of need  - Initiate/Maintain bed alarm  - Obtain necessary fall risk management equipment  - Apply yellow socks and bracelet for high fall risk patients  - Consider moving patient to room near nurses station  Outcome: Progressing     Problem: PAIN - ADULT  Goal: Verbalizes/displays adequate comfort level or baseline comfort level  Description: Interventions:  - Encourage patient to monitor pain and request assistance  - Assess pain using appropriate pain scale  - Administer analgesics based on type and severity of pain and evaluate response  - Implement non-pharmacological measures as appropriate and evaluate response  - Consider cultural and social influences on pain and pain management  - Notify physician/advanced practitioner if interventions unsuccessful or patient reports new pain  Outcome: Progressing     Problem: INFECTION - ADULT  Goal: Absence or prevention of progression during hospitalization  Description: INTERVENTIONS:  - Assess and monitor for signs and symptoms of infection  - Monitor lab/diagnostic results  - Monitor all insertion sites, i.e. indwelling lines, tubes, and drains  - Monitor endotracheal if appropriate and nasal secretions for changes in amount and color  - [place] appropriate cooling/warming therapies per order  - Administer medications as ordered  - Instruct and encourage patient and family to use good hand hygiene technique  - Identify and instruct in appropriate isolation precautions for identified infection/condition  Outcome: Progressing  Goal: Absence of fever/infection during neutropenic period  Description: INTERVENTIONS:  - Monitor WBC    Outcome: Progressing     Problem: DISCHARGE PLANNING  Goal: Discharge to home or other facility with appropriate resources  Description: INTERVENTIONS:  - Identify barriers to discharge w/patient and caregiver  - Arrange for needed discharge resources and transportation as appropriate  - Identify discharge learning needs (meds, wound care, etc.)  - Arrange for interpretive services to assist at discharge as needed  - Refer to Case Management Department for coordinating discharge planning if the patient needs post-hospital services based on physician/advanced practitioner order or complex needs related to functional status, cognitive ability, or social support system  Outcome: Progressing     Problem: Nutrition/Hydration-ADULT  Goal: Nutrient/Hydration intake appropriate for improving, restoring or maintaining nutritional needs  Description: Monitor and assess patient's nutrition/hydration status for malnutrition. Collaborate with interdisciplinary team and initiate plan and interventions as ordered. Monitor patient's weight and dietary intake as ordered or per policy. Utilize nutrition screening tool and intervene as necessary. Determine patient's food preferences and provide high-protein, high-caloric foods as appropriate.      INTERVENTIONS:  - Monitor oral intake, urinary output, labs, and treatment plans  - Assess nutrition and hydration status and recommend course of action  - Evaluate amount of meals eaten  - Assist patient with eating if necessary   - Allow adequate time for meals  - Recommend/ encourage appropriate diets, oral nutritional supplements, and vitamin/mineral supplements  - Order, calculate, and assess calorie counts as needed  - Recommend, monitor, and adjust tube feedings and TPN/PPN based on assessed needs  - Assess need for intravenous fluids  - Provide specific nutrition/hydration education as appropriate  - Include patient/family/caregiver in decisions related to nutrition  Outcome: Progressing

## 2023-09-18 NOTE — ANESTHESIA PREPROCEDURE EVALUATION
Procedure:  PRE-OP ONLY    Relevant Problems   /RENAL   (+) JHONNY (acute kidney injury) ([address])   (+) Acute prostatitis      Digestive   (+) Acute gastritis   (+) Intractable nausea and vomiting      Other   (+) Autism   (+) Severe protein-calorie malnutrition (HCC)     Lab Results   Component Value Date    WBC 5.31 09/18/2023    HGB 15.4 09/18/2023    HCT 43.9 09/18/2023    MCV 86 09/18/2023     09/18/2023     Lab Results   Component Value Date    SODIUM 135 09/18/2023    K 3.6 09/18/2023     09/18/2023    CO2 27 09/18/2023    BUN 12 09/18/2023    CREATININE 0.85 09/18/2023    GLUC 97 09/18/2023    CALCIUM 9.1 09/18/2023     Lab Results   Component Value Date    INR 1.01 09/10/2023    INR 1.27 (H) 09/07/2022    INR 0.98 09/03/2022    PROTIME 13.6 09/10/2023    PROTIME 16.0 (H) 09/07/2022    PROTIME 13.1 09/03/2022     No results found for: "HGBA1C"

## 2023-09-18 NOTE — ASSESSMENT & PLAN NOTE
Malnutrition Findings:   Adult Malnutrition type: Acute illness  Adult Degree of Malnutrition: Other severe protein calorie malnutrition  Malnutrition Characteristics: Inadequate energy, Weight loss                  360 Statement: Acute severe malnutrition related to N/V, inability to keep down po intake as evidenced by < 50% estimated energy intake x 5 days, 4.2% weight loss x 5 days (9/10 192lb standing scale, 9/15 184lb standing scale). Treated with: Continue diet as ordered, advance to soft foods diet as medically appropriate. Will trial ensure compact BID though pt unable to keep most all foods down at this time. If unable to tolerate po within 24-48 h, may need to consider TPN. At that time, would recommend standard TPN x 24-48 h. Provide IV thiamine prior to starting TPN. Check baseline TRIG and monitor -lytes daily. Will provide goal TPN recs as indicated. Recommend daily weights (standing scale as able) for nutrition monitoring. BMI Findings: Body mass index is 28.09 kg/m². · Patient tolerating full liquid toast and crackers and advanced to surgical softs. Patient with further episodes of emesis per RN    · Will trial erythromycin for gastric motility - patient with improvement of nausea and no further vomiting since 9/17 in the morning.

## 2023-09-18 NOTE — PROGRESS NOTES
[facility] [address]  Progress Note  Name: Swetha Chris  MRN: [id number]  Unit/Bed#: -01 I Date of Admission: 9/10/2023   Date of Service: 9/18/2023 I Hospital Day: 8    Assessment/Plan   * Intractable nausea and vomiting  Assessment & Plan  · Recurrent with epigastric pain. Hx of gastritis /coffe ground emesis before. Hx of suspected gastroperesis. Recent uds negative  · Ct abd and pelvis- no pathology to explain  · protonix 40 mg iv bid  · egd April 2022 when had same done in [place] nml  · reglan iv 5 mg with meals as will start clears and see how he does  · Patient states he felt more nauseous with reglan, d/c and tried carafate, had nausea and vomiting with this and was d/c.   · Will treat uti as well  · qtc nml  · Question possible psychogenic - will do prn atarax   · Will place tigan prn as well  · GI consulted and recommending protonix, outpatient follow up and daily dulcolax suppositories. Can then advance to miralax BID after having adequate PO intake. Avoid zofran   · continue gastroparesis diet of low fiber, small portions and low fat  · KUB normal. Showing constipation  · Curbsided GI and recommending to continue with bowel regimen as patient is still constipated. Started on milk of mag prn and dulcolax suppository daily  · Had multiple BM on 9/15, increased diet to surgical softs, low fiber, small portions, low fat. · Patient had another episode of emesis, continue to monitor  · Still with emesis intermittently, will trial erythromycin 250 mg q8h. Still with multiple bowel movements. · KUB pending  · Reconsult GI, patient made NPO yesterday, going to do EGD today. Appreciate further recommendations from GI.      Acute urinary retention  Assessment & Plan  · Just had castaneda placed in er 9/9 hx of same  · Keep castaneda ua rechecked today positive for nitrates and some leuks did not reflect to culture  · Patient states he gets more nauseous from rocephin, has tolerated bactrim, d/c and start bactrim. · Continue flomax 0.8mg nightly  · Urology consult: follow up later in the week or early next week for voiding trial. Can go home on abx when clinically stable. Has appointment on 9/18 - will discuss with urology to do voiding trial inpatient. · Discussed with urology, will do voiding trial today. Severe protein-calorie malnutrition ([address])  Assessment & Plan  Malnutrition Findings:   Adult Malnutrition type: Acute illness  Adult Degree of Malnutrition: Other severe protein calorie malnutrition  Malnutrition Characteristics: Inadequate energy, Weight loss                  360 Statement: Acute severe malnutrition related to N/V, inability to keep down po intake as evidenced by < 50% estimated energy intake x 5 days, 4.2% weight loss x 5 days (9/10 192lb standing scale, 9/15 184lb standing scale). Treated with: Continue diet as ordered, advance to soft foods diet as medically appropriate. Will trial ensure compact BID though pt unable to keep most all foods down at this time. If unable to tolerate po within 24-48 h, may need to consider TPN. At that time, would recommend standard TPN x 24-48 h. Provide IV thiamine prior to starting TPN. Check baseline TRIG and monitor -lytes daily. Will provide goal TPN recs as indicated. Recommend daily weights (standing scale as able) for nutrition monitoring. BMI Findings: Body mass index is 28.09 kg/m². · Patient tolerating full liquid toast and crackers and advanced to surgical softs. Patient with further episodes of emesis per RN    · Will trial erythromycin for gastric motility - patient with improvement of nausea and no further vomiting since 9/17 in the morning.      Autism  Assessment & Plan  · Pleasant     Acute cystitis without hematuria-resolved as of 9/18/2023  Assessment & Plan  · ua positive for nitrate and leuks - start rocephin urine cx did not reflect on this sample unable to add  · Patient with increasing nausea after rocephin, d/c and start on bactrim as patient states he tolerated this in the past.   · Completed abx. VTE Pharmacologic Prophylaxis: VTE Score: 0 Low Risk (Score 0-2) - Encourage Ambulation. Patient Centered Rounds: I performed bedside rounds with nursing staff today. Discussions with Specialists or Other Care Team Provider: nursing, case management, GI    Education and Discussions with Family / Patient: Patient declined call to . Total Time Spent on Date of Encounter in care of patient: 30 mins. This time was spent on one or more of the following: performing physical exam; counseling and coordination of care; obtaining or reviewing history; documenting in the medical record; reviewing/ordering tests, medications or procedures; communicating with other healthcare professionals and discussing with patient's family/caregivers. Current Length of Stay: 8 day(s)  Current Patient Status: Inpatient   Certification Statement: The patient will continue to require additional inpatient hospital stay due to intractable nausea and vomiting, GI consult, EGD  Discharge Plan: Anticipate discharge in 24-48 hrs to home. Code Status: Level 1 - Full Code    Subjective:   Patient seen and examined this morning. He states that he has not had any further episodes of vomiting since yesterday around 10 AM.  Said that his belly feels a little bit better. Still having multiple bowel movements. Currently denying any belly pain. Discussed that we will reconsult GI as patient was still having multiple episodes of emesis even after slow advancement of diet, patient agreeable to doing endoscopy. He is hoping that he will get some answers with the endoscopy and he will be able to go home soon to see his puppy.     Objective:     Vitals:   Temp (24hrs), Av °F (36.7 °C), Min:97.9 °F (36.6 °C), Max:98.1 °F (36.7 °C)    Temp:  [97.9 °F (36.6 °C)-98.1 °F (36.7 °C)] 97.9 °F (36.6 °C)  HR:  [72-80] 72  Resp: [16-18] 16  BP: (104-118)/(61-77) 104/61  SpO2:  [95 %-96 %] 96 %  Body mass index is 28.09 kg/m². Input and Output Summary (last 24 hours): Intake/Output Summary (Last 24 hours) at 9/18/2023 0946  Last data filed at 9/18/2023 0646  Gross per 24 hour   Intake 480 ml   Output 200 ml   Net 280 ml       Physical Exam:   Physical Exam  Vitals reviewed. Constitutional:       General: He is not in acute distress. Appearance: He is not ill-appearing or toxic-appearing. HENT:      Head: Normocephalic and atraumatic. Nose: Nose normal.      Mouth/Throat:      Mouth: Mucous membranes are moist.   Eyes:      Pupils: Pupils are equal, round, and reactive to light. Cardiovascular:      Rate and Rhythm: Normal rate and regular rhythm. Heart sounds: No murmur heard. Pulmonary:      Effort: No respiratory distress. Breath sounds: No stridor. No wheezing. Abdominal:      General: Bowel sounds are normal. There is no distension. Palpations: Abdomen is soft. There is no mass. Tenderness: There is no abdominal tenderness. Musculoskeletal:         General: Normal range of motion. Cervical back: Normal range of motion. Right lower leg: No edema. Left lower leg: No edema. Skin:     General: Skin is warm and dry. Neurological:      General: No focal deficit present. Mental Status: He is alert and oriented to person, place, and time.    Psychiatric:         Mood and Affect: Mood normal.         Behavior: Behavior normal.          Additional Data:     Labs:  Results from last 7 days   Lab Units 09/18/23  0456   WBC Thousand/uL 5.31   HEMOGLOBIN g/dL 15.4   HEMATOCRIT % 43.9   PLATELETS Thousands/uL 205     Results from last 7 days   Lab Units 09/18/23  0456   SODIUM mmol/L 135   POTASSIUM mmol/L 3.6   CHLORIDE mmol/L 100   CO2 mmol/L 27   BUN mg/dL 12   CREATININE mg/dL 0.85   ANION GAP mmol/L 8   CALCIUM mg/dL 9.1   GLUCOSE RANDOM mg/dL 97 Lines/Drains:  Invasive Devices     Peripheral Intravenous Line  Duration           Peripheral IV 09/18/23 Right;Ventral (anterior) Forearm <1 day          Drain  Duration           Urethral Catheter Non-latex 16 Fr. 8 days              Urinary Catheter:  Goal for removal: Patient having voiding trial               Imaging: Reviewed radiology reports from this admission including: xray(s)    Recent Cultures (last 7 days):         Last 24 Hours Medication List:   Current Facility-Administered Medications   Medication Dose Route Frequency Provider Last Rate   • acetaminophen  650 mg Oral Q6H PRN Saray Peraza MD     • bisacodyl  10 mg Rectal Daily Chantelle Khan PA-C     • docusate sodium  100 mg Oral BID Chantelle Khan PA-C     • erythromycin base  250 mg Oral TID AC Ermias Barragan PA-C     • escitalopram  10 mg Oral Daily Saray Peraza MD     • fluticasone  1 spray Each Nare Daily Saray Peraza MD     • hydrOXYzine HCL  25 mg Oral Q6H PRN Chantelle Khan PA-C     • loratadine  10 mg Oral Daily Saray Peraza MD     • magnesium hydroxide  30 mL Oral Daily PRN Chantelle Khan PA-C     • multi-electrolyte  75 mL/hr Intravenous Continuous Ermias Barragan PA-C 75 mL/hr (09/18/23 6324)   • pantoprazole  40 mg Intravenous Q12H [address] Saray Peraza MD     • tamsulosin  0.8 mg Oral Daily With Dinner Chantelle Khan PA-C     • trimethobenzamide  200 mg Intramuscular Q6H PRN Saray Peraza MD          Today, Patient Was Seen By: Ermias Barragan PA-C    **Please Note: This note may have been constructed using a voice recognition system. **

## 2023-09-18 NOTE — ANESTHESIA POSTPROCEDURE EVALUATION
Post-Op Assessment Note    CV Status:  Stable  Pain Score: 0    Pain management: adequate     Mental Status:  Awake and sleepy   Hydration Status:  Euvolemic   PONV Controlled:  Controlled   Airway Patency:  Patent      Post Op Vitals Reviewed: Yes      Staff: Anesthesiologist, CRNA         No notable events documented.     /64 (09/18/23 1315)    Temp 98.4 °F (36.9 °C) (09/18/23 1315)    Pulse 85 (09/18/23 1315)   Resp 14 (09/18/23 1315)    SpO2 95 % (09/18/23 1315)

## 2023-09-18 NOTE — TELEPHONE ENCOUNTER
Called pt and left message that physicians will be covering him while he in the hospital. Advised to call back if needing a void trial after his discharge and to follow his instructions.

## 2023-09-18 NOTE — NUTRITION
please consider short term nutrition support/recommendations/RD      09/15/23 1524   Recommendations/Interventions   Recommendations to Provider If unable to tolerate po within 24-48 h, may need to consider TPN. At that time, would recommend standard TPN x 24-48 h. Provide IV thiamine prior to starting TPN. Check baseline TRIG and monitor -lytes daily. Will provide goal TPN recs as indicated. Recommend daily weights (standing scale as able) for nutrition monitoring.

## 2023-09-18 NOTE — ANESTHESIA PREPROCEDURE EVALUATION
Procedure:  EGD    Relevant Problems   ANESTHESIA (within normal limits)      CARDIO (within normal limits)      ENDO (within normal limits)      GI/HEPATIC (within normal limits)      /RENAL   (+) JHONNY (acute kidney injury) ([address])   (+) Acute prostatitis      HEMATOLOGY (within normal limits)      NEURO/PSYCH (within normal limits)      PULMONARY (within normal limits)      Digestive   (+) Acute gastritis   (+) Intractable nausea and vomiting      Genitourinary   (+) Acute urinary retention      Other   (+) Autism   (+) Severe protein-calorie malnutrition ([address])     EKG 9/12/2023:  Sinus bradycardia with sinus arrhythmia  and blocked PACs  When compared with ECG of 10-SEP-2023 14:16,  Vent. rate has decreased BY  33 BPM    CTAP 9/10/2023:  1. Several small appendicoliths are again visualized at the appendiceal tip, without findings to suggest acute appendicitis. 2. Otherwise no acute intra-abdominal pathology. Lab Results   Component Value Date    WBC 5.31 09/18/2023    HGB 15.4 09/18/2023    HCT 43.9 09/18/2023    MCV 86 09/18/2023     09/18/2023     Lab Results   Component Value Date    SODIUM 135 09/18/2023    K 3.6 09/18/2023     09/18/2023    CO2 27 09/18/2023    BUN 12 09/18/2023    CREATININE 0.85 09/18/2023    GLUC 97 09/18/2023    CALCIUM 9.1 09/18/2023     Lab Results   Component Value Date    INR 1.01 09/10/2023    INR 1.27 (H) 09/07/2022    INR 0.98 09/03/2022    PROTIME 13.6 09/10/2023    PROTIME 16.0 (H) 09/07/2022    PROTIME 13.1 09/03/2022     No results found for: "HGBA1C"          Physical Exam    Airway    Mallampati score: I  TM Distance: >3 FB  Neck ROM: full     Dental   No notable dental hx     Cardiovascular  Cardiovascular exam normal    Pulmonary  Pulmonary exam normal     Other Findings        Anesthesia Plan  ASA Score- 2     Anesthesia Type- IV sedation with anesthesia with ASA Monitors.          Additional Monitors:   Airway Plan:           Plan Factors-Exercise tolerance (METS): >4 METS. Chart reviewed. EKG reviewed. Existing labs reviewed. Patient summary reviewed. Induction- intravenous. Postoperative Plan-     Informed Consent- Anesthetic plan and risks discussed with patient. I personally reviewed this patient with the CRNA. Discussed and agreed on the Anesthesia Plan with the CRNA. Rory Spine

## 2023-09-18 NOTE — CASE MANAGEMENT
Case Management Assessment & Discharge Planning Note    Patient name Rachael Beck  Location [address] [facility] [place] 220/-01 MRN [id number]  : 1995 Date 2023       Current Admission Date: 9/10/2023  Current Admission Diagnosis:Intractable nausea and vomiting   Patient Active Problem List    Diagnosis Date Noted   • Severe protein-calorie malnutrition ([address]) 2023   • Acute cystitis without hematuria 09/10/2023   • Intractable nausea and vomiting 2023   • History of urinary retention 2023   • Microscopic hematuria 2023   • Hypokalemia 2022   • Autism 2022   • JHONNY (acute kidney injury) ([address]) 2022   • Acute prostatitis 2022   • Acute urinary retention 2022   • Acute gastritis 2022   • Acute bronchitis 2019      LOS (days): 8  Geometric Mean LOS (GMLOS) (days): 2.80  Days to GMLOS:-4.9     OBJECTIVE:    Risk of Unplanned Readmission Score: 13.16         Current admission status: Inpatient  Referral Reason:  (dc planning)    Preferred Pharmacy:   [address] [place], [address] [facility] [facility] [address]  [address] [facility] [address]  Phone: [phone] Fax: [address] [facility], [address] [place] [address]  Phone: [phone] Fax: [phone]    Primary Care Provider: Janeth Patel MD    Primary Insurance: Sari Light  Secondary Insurance:      CM met with patient at the bedside,baseline information  was obtained. CM discussed the role of CM in helping the patient develop a discharge plan and assist the patient in carry out their plan. ASSESSMENT:  Active Health Care Proxies    There are no active Health Care Proxies on file.        Advance Directives  Does patient have a [address]?: No  Was patient offered paperwork?: Yes (declined)  Does patient currently have a Health Care decision maker?: Yes, please see Health Care Proxy section  Does patient have Advance Directives?: No  Was patient offered paperwork?: Yes (declined)  Primary Contact: Leonidas Luna (Grandparent)   [phone]         Readmission Root Cause  30 Day Readmission: No    Patient Information  Admitted from[de-identified] Home  Mental Status: Alert  During Assessment patient was accompanied by: Not accompanied during assessment  Assessment information provided by[de-identified] Patient  Primary Caregiver: Self  Support Systems: Other (Comment), Family members (grandfather)  [place] of Residence: [address] [place] [address] Street do you live in?: [address] [place] entry access options.  Select all that apply.: Stairs  Number of steps to enter home.:  (12)  Type of Current Residence: Apartment  Floor Level: 2  Upon entering residence, is there a bedroom on the main floor (no further steps)?: Yes  Upon entering residence, is there a bathroom on the main floor (no further steps)?: Yes  In the last 12 months, was there a time when you were not able to pay the mortgage or rent on time?: No  In the last 12 months, how many places have you lived?: 1  In the last 12 months, was there a time when you did not have a steady place to sleep or slept in a shelter (including now)?: No  Homeless/housing insecurity resource given?: No  Living Arrangements: Other (Comment) (Lives with grandfather)  Is patient a ?: No    Activities of Daily Living Prior to Admission  Completes ADLs independently?: Yes  Ambulates independently?: Yes  Does patient use assisted devices?: No  Does patient currently own DME?: No  Does patient have a history of Outpatient Therapy (PT/OT)?: No  Does the patient have a history of Short-Term Rehab?: No  Does patient have a history of HHC?: No  Does patient currently have [facility]?: No         Patient Information Continued  Income Source: Employed (part time at [employer])  Does patient have prescription coverage?: Yes  Within the past 12 months, you worried that your food would run out before you got the money to buy more.: Never true  Within the past 12 months, the food you bought just didn't last and you didn't have money to get more.: Never true  Food insecurity resource given?: No  Does patient receive dialysis treatments?: No  Does patient have a history of Mental Health Diagnosis?: Yes  Is patient receiving treatment for mental health?: Yes (medicatons for anxiety)  Has patient received inpatient treatment related to mental health in the last 2 years?: No         Means of Transportation  Means of Transport to Appts[de-identified] Family transport  In the past 12 months, has lack of transportation kept you from medical appointments or from getting medications?: No  In the past 12 months, has lack of transportation kept you from meetings, work, or from getting things needed for daily living?: No  Was application for public transport provided?: No        DISCHARGE DETAILS:    Discharge planning discussed with[de-identified] patient  Freedom of Choice: Yes     CM contacted family/caregiver?: No- see comments (declined)                       DME Referral Provided  Referral made for DME?: No              Treatment Team Recommendation: Home  Discharge Destination Plan[de-identified] Home  Transport at Discharge : Family              CM will continue to follow for dc needs.

## 2023-09-18 NOTE — ASSESSMENT & PLAN NOTE
· Just had castaneda placed in er 9/9 hx of same  · Keep castaneda ua rechecked today positive for nitrates and some leuks did not reflect to culture  · Patient states he gets more nauseous from rocephin, has tolerated bactrim, d/c and start bactrim. · Continue flomax 0.8mg nightly  · Urology consult: follow up later in the week or early next week for voiding trial. Can go home on abx when clinically stable. Has appointment on 9/18 - will discuss with urology to do voiding trial inpatient. · Discussed with urology, will do voiding trial today.

## 2023-09-19 VITALS
BODY MASS INDEX: 27.89 KG/M2 | HEIGHT: 68 IN | WEIGHT: 184 LBS | HEART RATE: 83 BPM | OXYGEN SATURATION: 96 % | SYSTOLIC BLOOD PRESSURE: 117 MMHG | DIASTOLIC BLOOD PRESSURE: 76 MMHG | TEMPERATURE: 97.9 F | RESPIRATION RATE: 18 BRPM

## 2023-09-19 PROCEDURE — 99239 HOSP IP/OBS DSCHRG MGMT >30: CPT

## 2023-09-19 RX ORDER — DOCUSATE SODIUM 100 MG/1
100 CAPSULE, LIQUID FILLED ORAL 2 TIMES DAILY
Qty: 60 CAPSULE | Refills: 0 | Status: SHIPPED | OUTPATIENT
Start: 2023-09-19

## 2023-09-19 RX ORDER — PANTOPRAZOLE SODIUM 40 MG/1
40 TABLET, DELAYED RELEASE ORAL
Qty: 60 TABLET | Refills: 0 | Status: SHIPPED | OUTPATIENT
Start: 2023-09-19

## 2023-09-19 RX ORDER — PANTOPRAZOLE SODIUM 40 MG/1
40 TABLET, DELAYED RELEASE ORAL
Status: DISCONTINUED | OUTPATIENT
Start: 2023-09-19 | End: 2023-09-19 | Stop reason: HOSPADM

## 2023-09-19 RX ORDER — TAMSULOSIN HYDROCHLORIDE 0.4 MG/1
0.8 CAPSULE ORAL
Qty: 60 CAPSULE | Refills: 0 | Status: SHIPPED | OUTPATIENT
Start: 2023-09-19

## 2023-09-19 RX ORDER — METOCLOPRAMIDE 10 MG/1
10 TABLET ORAL EVERY 6 HOURS PRN
Qty: 10 TABLET | Refills: 0 | Status: SHIPPED | OUTPATIENT
Start: 2023-09-19

## 2023-09-19 RX ORDER — BISACODYL 10 MG
10 SUPPOSITORY, RECTAL RECTAL DAILY PRN
Qty: 12 SUPPOSITORY | Refills: 0 | Status: SHIPPED | OUTPATIENT
Start: 2023-09-19

## 2023-09-19 RX ORDER — ERYTHROMYCIN 250 MG/1
250 TABLET, COATED ORAL
Qty: 90 TABLET | Refills: 0 | Status: SHIPPED | OUTPATIENT
Start: 2023-09-19 | End: 2023-10-19

## 2023-09-19 RX ADMIN — LORATADINE 10 MG: 10 TABLET ORAL at 08:50

## 2023-09-19 RX ADMIN — ERYTHROMYCIN 250 MG: 250 TABLET, FILM COATED ORAL at 11:23

## 2023-09-19 RX ADMIN — DOCUSATE SODIUM 100 MG: 100 CAPSULE, LIQUID FILLED ORAL at 08:50

## 2023-09-19 RX ADMIN — ESCITALOPRAM OXALATE 10 MG: 10 TABLET ORAL at 08:50

## 2023-09-19 RX ADMIN — ERYTHROMYCIN 250 MG: 250 TABLET, FILM COATED ORAL at 06:15

## 2023-09-19 RX ADMIN — PANTOPRAZOLE SODIUM 40 MG: 40 TABLET, DELAYED RELEASE ORAL at 06:15

## 2023-09-19 NOTE — ASSESSMENT & PLAN NOTE
· Recurrent with epigastric pain. Hx of gastritis /coffe ground emesis before. Hx of suspected gastroperesis. Recent uds negative  · Ct abd and pelvis- no pathology to explain  · protonix 40 mg iv bid  · egd April 2022 when had same done in [place] nml  · reglan iv 5 mg with meals as will start clears and see how he does  · Patient states he felt more nauseous with reglan, d/c and tried carafate, had nausea and vomiting with this and was d/c.   · Will treat uti as well  · qtc nml  · Question possible psychogenic - will do prn atarax   · Will place tigan prn as well  · GI consulted and recommending protonix, outpatient follow up and daily dulcolax suppositories. Can then advance to miralax BID after having adequate PO intake. Avoid zofran   · continue gastroparesis diet of low fiber, small portions and low fat  · KUB normal. Showing constipation  · Curbsided GI and recommending to continue with bowel regimen as patient is still constipated. Started on milk of mag prn and dulcolax suppository daily  · Had multiple BM on 9/15, increased diet to surgical softs, low fiber, small portions, low fat. · Patient had another episode of emesis, continue to monitor  · Still with emesis intermittently, will trial erythromycin 250 mg q8h. Still with multiple bowel movements. · KUB: Nonobstructive bowel gas pattern  · Reconsult GI, patient made NPO yesterday, going to do EGD today. Appreciate further recommendations from GI.   · EGD: The esophagus, duodenal bulb, 1st part of the duodenum and 2nd part of the duodenum appeared normal. Mild erythematous mucosa in the fundus of the stomach and antrum; performed cold forceps biopsy  · GI recs: follow up with PCP, await pathology results, continue PPI 40 mg qd, continue antiemetics, avoid NSAIDs. · Patient with significant improvement with addition of erythromycin 250 mg tid. Should continue with this on discharge. Should follow up with GI outpatient. · Continue with protonix. Continue with reglan prn. Start taking miralax bid. Can use dulcolax suppositories prn. Start taking colace 100 mg twice daily.

## 2023-09-19 NOTE — ASSESSMENT & PLAN NOTE
Malnutrition Findings:   Adult Malnutrition type: Acute illness  Adult Degree of Malnutrition: Other severe protein calorie malnutrition  Malnutrition Characteristics: Inadequate energy, Weight loss                  360 Statement: Acute severe malnutrition related to N/V, inability to keep down po intake as evidenced by < 50% estimated energy intake x 5 days, 4.2% weight loss x 5 days (9/10 192lb standing scale, 9/15 184lb standing scale). Treated with: Continue diet as ordered, advance to soft foods diet as medically appropriate. Will trial ensure compact BID though pt unable to keep most all foods down at this time. If unable to tolerate po within 24-48 h, may need to consider TPN. At that time, would recommend standard TPN x 24-48 h. Provide IV thiamine prior to starting TPN. Check baseline TRIG and monitor -lytes daily. Will provide goal TPN recs as indicated. Recommend daily weights (standing scale as able) for nutrition monitoring. BMI Findings: Body mass index is 27.98 kg/m². · Patient tolerating full liquid toast and crackers and advanced to surgical softs. Patient with further episodes of emesis per RN    · Will trial erythromycin for gastric motility - patient with improvement of nausea and no further vomiting since 9/17 in the morning. · Will need outpatient GI follow up. Patient was tolerating diet better and no further nausea or vomiting since Saturday 9/17.

## 2023-09-19 NOTE — DISCHARGE SUMMARY
[address] [facility]  Discharge- Brady Anguiano 1995, 33 y.o. male MRN: [id number]  Unit/Bed#: -Jadiel Encounter: [id number]  Primary Care Provider: Hetal Galeano MD   Date and time admitted to hospital: 9/10/2023  2:09 PM    * Intractable nausea and vomiting  Assessment & Plan  · Recurrent with epigastric pain. Hx of gastritis /coffe ground emesis before. Hx of suspected gastroperesis. Recent uds negative  · Ct abd and pelvis- no pathology to explain  · protonix 40 mg iv bid  · egd April 2022 when had same done in [place] nml  · reglan iv 5 mg with meals as will start clears and see how he does  · Patient states he felt more nauseous with reglan, d/c and tried carafate, had nausea and vomiting with this and was d/c.   · Will treat uti as well  · qtc nml  · Question possible psychogenic - will do prn atarax   · Will place tigan prn as well  · GI consulted and recommending protonix, outpatient follow up and daily dulcolax suppositories. Can then advance to miralax BID after having adequate PO intake. Avoid zofran   · continue gastroparesis diet of low fiber, small portions and low fat  · KUB normal. Showing constipation  · Curbsided GI and recommending to continue with bowel regimen as patient is still constipated. Started on milk of mag prn and dulcolax suppository daily  · Had multiple BM on 9/15, increased diet to surgical softs, low fiber, small portions, low fat. · Patient had another episode of emesis, continue to monitor  · Still with emesis intermittently, will trial erythromycin 250 mg q8h. Still with multiple bowel movements. · KUB: Nonobstructive bowel gas pattern  · Reconsult GI, patient made NPO yesterday, going to do EGD today.  Appreciate further recommendations from GI.   · EGD: The esophagus, duodenal bulb, 1st part of the duodenum and 2nd part of the duodenum appeared normal. Mild erythematous mucosa in the fundus of the stomach and antrum; performed cold forceps biopsy  · GI recs: follow up with PCP, await pathology results, continue PPI 40 mg qd, continue antiemetics, avoid NSAIDs. · Patient with significant improvement with addition of erythromycin 250 mg tid. Should continue with this on discharge. Should follow up with GI outpatient. · Continue with protonix. Continue with reglan prn. Start taking miralax bid. Can use dulcolax suppositories prn. Start taking colace 100 mg twice daily. Acute urinary retention  Assessment & Plan  · Just had castaneda placed in er 9/9 hx of same  · Keep castaneda ua rechecked today positive for nitrates and some leuks did not reflect to culture  · Patient states he gets more nauseous from rocephin, has tolerated bactrim, d/c and start bactrim. · Continue flomax 0.8mg nightly  · Urology consult: follow up later in the week or early next week for voiding trial. Can go home on abx when clinically stable. Has appointment on 9/18 - will discuss with urology to do voiding trial inpatient. · Discussed with urology, will do voiding trial today. · Patient passed voiding trial, continue with increased dose of flomax on discharge. Severe protein-calorie malnutrition ([address])  Assessment & Plan  Malnutrition Findings:   Adult Malnutrition type: Acute illness  Adult Degree of Malnutrition: Other severe protein calorie malnutrition  Malnutrition Characteristics: Inadequate energy, Weight loss                  360 Statement: Acute severe malnutrition related to N/V, inability to keep down po intake as evidenced by < 50% estimated energy intake x 5 days, 4.2% weight loss x 5 days (9/10 192lb standing scale, 9/15 184lb standing scale). Treated with: Continue diet as ordered, advance to soft foods diet as medically appropriate. Will trial ensure compact BID though pt unable to keep most all foods down at this time. If unable to tolerate po within 24-48 h, may need to consider TPN. At that time, would recommend standard TPN x 24-48 h.  Provide IV thiamine prior to starting TPN. Check baseline TRIG and monitor -lytes daily. Will provide goal TPN recs as indicated. Recommend daily weights (standing scale as able) for nutrition monitoring. BMI Findings: Body mass index is 27.98 kg/m². · Patient tolerating full liquid toast and crackers and advanced to surgical softs. Patient with further episodes of emesis per RN    · Will trial erythromycin for gastric motility - patient with improvement of nausea and no further vomiting since 9/17 in the morning. · Will need outpatient GI follow up. Patient was tolerating diet better and no further nausea or vomiting since Saturday 9/17. Autism  Assessment & Plan  · Pleasant       Medical Problems     Resolved Problems  Date Reviewed: 9/19/2023          Resolved    Acute cystitis without hematuria 9/18/2023     Resolved by  Anushka Faith PA-C        Discharging Physician / Practitioner: Anushka Faith PA-C  PCP: Martha Sumner MD  Admission Date:   Admission Orders (From admission, onward)     Ordered        09/10/23 [address] [place] [address]  Once                      Discharge Date: 09/19/23    Consultations During Hospital Stay:  · GI, urology    Procedures Performed:   · EGD, voiding trial    Significant Findings / Test Results:   · XR abdomen: Nonobstructive bowel gas pattern  · XR abdomen: unremarkable abdomen  · CT abdomen pelvis: 1. Several small appendicoliths are again visualized at the appendiceal tip, without findings to suggest acute appendicitis. 2. Otherwise no acute intra-abdominal pathology. · EGD: The esophagus, duodenal bulb, 1st part of the duodenum and 2nd part of the duodenum appeared normal. Regular Z-line.  Mild, patchy erythematous mucosa in the fundus of the stomach and antrum; performed cold forceps biopsy to rule out H. Pylori  · UA: trace leukocytes, nitrite positive, protein 30, ketones 80, small bilirubin, occult blood moderate; microscopic normal    Incidental Findings:   · none     Test Results Pending at Discharge (will require follow up): · Tissue exam from EGD     Outpatient Tests Requested:  · Follow up with PCP in 1 week  · Follow up with urology   · Follow up with GI    Complications:  none    Reason for Admission: intractable nausea and vomiting    Hospital Course:   Pablo Orozco is a 33 y.o. male patient who originally presented to the hospital on 9/10/2023 due to recurrent nausea, vomiting, epigastric pain. Patient was admitted due to this and started on IVF rehydration. CT with results above. Started on reglan for possible gastroparesis. Also attempted clears on patient, but had increased vomiting. Made NPO and GI consulted. GI saw patient and did not recommend further testing at that time. Possibly thought to be multifactorial and related to constipation, UTI, underlying anxiety or cyclic vomiting syndrome. Started on dulcolax suppository qd for constipation and once having regular bowel movements able to take oral then increase miralax to bid. Continue protonix and limit zofran. Patient's diet attempted to be advanced again but still was having more vomiting. Felt he was vomiting secondary to IV rocephin and felt more nauseous with reglan. D/c reglan and switched rocephin to bactrim. Nutrition saw patient due to poor oral intake and recommended to continue with daily weights and consider TPN if needed. KUB repeated and did not show anything acute. Curbsided GI again and recommended to continue bowel regimen and start on milk of mag prn. Diet was increased as patient had multiple BM, but decreased due to further emesis. GI consulted again and patient made NPO for EGD. EGD done on 9/18 showing patient did have some gastritis. Was started on erythromycin on 9/17 and patient did not have further emesis episodes since then. Diet was advanced to surgical softs and patient tolerated well without nausea or vomiting. Felt much better.  Should follow up with GI outpatient and continue bowel regimen. Also noted to have urinary retention and UTI. Joseph in place. Urology consulted. Recommended increased dose of flomax 0.8 mg qd and follow up in office for voiding trial. Patient was in hospital for when appointment was and voiding trial done inpatient which patient passed. Should continue flomax on discharge and follow up with urology. Follow up with PCP in 1 week. Please see above list of diagnoses and related plan for additional information. Condition at Discharge: fair    Discharge Day Visit / Exam:   Subjective:  Patient seen and examined at bedside this morning. He states he feels 100 times better. He does not have any further nausea, vomiting, abdominal pain. Was able to tolerate surgical softs. He is still having multiple bowel movements. Being discharged home and he is very happy to go home and see his puppy. Vitals: Blood Pressure: 117/76 (09/19/23 0738)  Pulse: 83 (09/19/23 0738)  Temperature: 97.9 °F (36.6 °C) (09/19/23 0738)  Temp Source: Oral (09/18/23 1149)  Respirations: 18 (09/19/23 0738)  Height: 5' 8" (172.7 cm) (09/18/23 1149)  Weight - Scale: 83.5 kg (184 lb) (09/18/23 1149)  SpO2: 96 % (09/19/23 0815)  Exam:   Physical Exam  Vitals reviewed. Constitutional:       General: He is not in acute distress. Appearance: He is well-developed. He is not ill-appearing or toxic-appearing. HENT:      Head: Normocephalic and atraumatic. Nose: Nose normal.      Mouth/Throat:      Mouth: Mucous membranes are moist.   Eyes:      Conjunctiva/sclera: Conjunctivae normal.   Cardiovascular:      Rate and Rhythm: Normal rate and regular rhythm. Heart sounds: No murmur heard. Pulmonary:      Effort: Pulmonary effort is normal. No respiratory distress. Breath sounds: Normal breath sounds. No stridor. No wheezing. Abdominal:      General: Bowel sounds are normal. There is no distension. Palpations: Abdomen is soft. There is no mass. Tenderness: There is no abdominal tenderness. Musculoskeletal:         General: Normal range of motion. Cervical back: Neck supple. Right lower leg: No edema. Left lower leg: No edema. Skin:     General: Skin is warm and dry. Neurological:      General: No focal deficit present. Mental Status: He is alert and oriented to person, place, and time. Psychiatric:         Mood and Affect: Mood normal.         Behavior: Behavior normal.        Discussion with Family: Patient declined call to . Discharge instructions/Information to patient and family:   See after visit summary for information provided to patient and family. Provisions for Follow-Up Care:  See after visit summary for information related to follow-up care and any pertinent home health orders. Disposition:   Home    Planned Readmission: no     Discharge Statement:  I spent 35 minutes discharging the patient. This time was spent on the day of discharge. I had direct contact with the patient on the day of discharge. Greater than 50% of the total time was spent examining patient, answering all patient questions, arranging and discussing plan of care with patient as well as directly providing post-discharge instructions. Additional time then spent on discharge activities. Discharge Medications:  See after visit summary for reconciled discharge medications provided to patient and/or family.       **Please Note: This note may have been constructed using a voice recognition system**

## 2023-09-19 NOTE — DISCHARGE INSTR - AVS FIRST PAGE
Dear Ian Mooney,     It was our pleasure to care for you here at [facility]. For follow up as well as any medication refills, we recommend that you follow up with your primary care physician. Here are the most important instructions/ recommendations at discharge:     Notable Medication Adjustments -   Start taking protonix 40 mg twice daily  Start using dulcolax rectal suppository 10 mg daily as needed  Start taking colace 100 mg twice daily  Start taking erythromycin 250 mg three times daily before meals  Start taking flomax 0.8 mg daily with dinner  Start taking reglan as needed for nausea  Start taking miralax twice daily  Important follow up information -   Follow up with PCP in 1 week  Follow up with urology  Follow up with GI  Other Instructions -   Please continue to take these medications as prescribed. Continue with a soft diet on discharge. Continue with low fiber/low residue, low fat  Please review this entire after visit summary as additional general instructions including medication list, appointments, activity, diet, any pertinent wound care, and other additional recommendations from your care team that may be provided for you.       Sincerely,     Blaine Goodwin PA-C

## 2023-09-19 NOTE — PLAN OF CARE
Problem: PAIN - ADULT  Goal: Verbalizes/displays adequate comfort level or baseline comfort level  Description: Interventions:  - Encourage patient to monitor pain and request assistance  - Assess pain using appropriate pain scale  - Administer analgesics based on type and severity of pain and evaluate response  - Implement non-pharmacological measures as appropriate and evaluate response  - Consider cultural and social influences on pain and pain management  - Notify physician/advanced practitioner if interventions unsuccessful or patient reports new pain  Outcome: Progressing     Problem: INFECTION - ADULT  Goal: Absence or prevention of progression during hospitalization  Description: INTERVENTIONS:  - Assess and monitor for signs and symptoms of infection  - Monitor lab/diagnostic results  - Monitor all insertion sites, i.e. indwelling lines, tubes, and drains  - Monitor endotracheal if appropriate and nasal secretions for changes in amount and color  - [place] appropriate cooling/warming therapies per order  - Administer medications as ordered  - Instruct and encourage patient and family to use good hand hygiene technique  - Identify and instruct in appropriate isolation precautions for identified infection/condition  Outcome: Progressing     Problem: Nutrition/Hydration-ADULT  Goal: Nutrient/Hydration intake appropriate for improving, restoring or maintaining nutritional needs  Description: Monitor and assess patient's nutrition/hydration status for malnutrition. Collaborate with interdisciplinary team and initiate plan and interventions as ordered. Monitor patient's weight and dietary intake as ordered or per policy. Utilize nutrition screening tool and intervene as necessary. Determine patient's food preferences and provide high-protein, high-caloric foods as appropriate.      INTERVENTIONS:  - Monitor oral intake, urinary output, labs, and treatment plans  - Assess nutrition and hydration status and recommend course of action  - Evaluate amount of meals eaten  - Assist patient with eating if necessary   - Allow adequate time for meals  - Recommend/ encourage appropriate diets, oral nutritional supplements, and vitamin/mineral supplements  - Order, calculate, and assess calorie counts as needed  - Recommend, monitor, and adjust tube feedings and TPN/PPN based on assessed needs  - Assess need for intravenous fluids  - Provide specific nutrition/hydration education as appropriate  - Include patient/family/caregiver in decisions related to nutrition  Outcome: Progressing

## 2023-09-19 NOTE — NURSING NOTE
Lines removed, AVS reviewed. Pt has wallet, clothes, and cell phone with him. DC to car with grandparent.  Pt left blue bag with coloring books here- will leave at [address] [facility] desk for pt to

## 2023-09-19 NOTE — ASSESSMENT & PLAN NOTE
· Just had castaneda placed in er 9/9 hx of same  · Keep castaneda ua rechecked today positive for nitrates and some leuks did not reflect to culture  · Patient states he gets more nauseous from rocephin, has tolerated bactrim, d/c and start bactrim. · Continue flomax 0.8mg nightly  · Urology consult: follow up later in the week or early next week for voiding trial. Can go home on abx when clinically stable. Has appointment on 9/18 - will discuss with urology to do voiding trial inpatient. · Discussed with urology, will do voiding trial today. · Patient passed voiding trial, continue with increased dose of flomax on discharge.

## 2023-09-19 NOTE — PROGRESS NOTES
Pt has only been on surgical soft solid foods x 2 days during admission, otherwise NPO or on liquids. +acute severe malnutrition. Pt on full liquid/toast/crax diet this AM. Pt stated he had tolerated 100% of toast, jello, juice this AM. Diet advanced to soft foods today. Pt does not like ensure compact, d/c supplement and adjusted to magic cup BID vanilla flavor. If pt does not tolerate solid foods, recommend initiating TPN. Would recommend standard TPN x 24-48 h, obtain baseline TRIG, provide IV thiamine prior to starting TPN, monitor -lytes daily and replete as indicated. Will provide goal TPN recs as appropriate. Nutrition will continue to follow closely. Recommend daily weights for nutrition monitoring.

## 2023-09-19 NOTE — PLAN OF CARE
Problem: Potential for Falls  Goal: Patient will remain free of falls  Description: INTERVENTIONS:  - Educate patient/family on patient safety including physical limitations  - Instruct patient to call for assistance with activity   - Consult OT/PT to assist with strengthening/mobility   - Keep Call bell within reach  - Keep bed low and locked with side rails adjusted as appropriate  - Keep care items and personal belongings within reach  - Initiate and maintain comfort rounds  - Make Fall Risk Sign visible to staff  - Offer Toileting every 2 Hours, in advance of need  - Initiate/Maintain alarm  - Obtain necessary fall risk management equipment: 2  - Apply yellow socks and bracelet for high fall risk patients  - Consider moving patient to room near nurses station  Outcome: Progressing     Problem: PAIN - ADULT  Goal: Verbalizes/displays adequate comfort level or baseline comfort level  Description: Interventions:  - Encourage patient to monitor pain and request assistance  - Assess pain using appropriate pain scale  - Administer analgesics based on type and severity of pain and evaluate response  - Implement non-pharmacological measures as appropriate and evaluate response  - Consider cultural and social influences on pain and pain management  - Notify physician/advanced practitioner if interventions unsuccessful or patient reports new pain  Outcome: Progressing

## 2023-09-20 PROCEDURE — 88305 TISSUE EXAM BY PATHOLOGIST: CPT | Performed by: PATHOLOGY

## 2023-09-20 NOTE — UTILIZATION REVIEW
NOTIFICATION OF ADMISSION DISCHARGE   This is a Notification of Discharge from [facility]. Please be advised that this patient has been discharge from our facility. Below you will find the admission and discharge date and time including the patient’s disposition. UTILIZATION REVIEW CONTACT:  Naomi Álvarez  Utilization   Network Utilization Review Department  Phone: [phone] x carefully listen to the prompts. All voicemails are confidential.  Email: [email or web address]     ADMISSION INFORMATION  PRESENTATION DATE: 9/10/2023  2:09 PM  OBERVATION ADMISSION DATE:  INPATIENT ADMISSION DATE: 9/10/23  3:40 PM   DISCHARGE DATE: 9/19/2023  2:01 PM   DISPOSITION:Home/Self Care    IMPORTANT INFORMATION:  Send all requests for admission clinical reviews, approved or denied determinations and any other requests to dedicated fax number below belonging to the campus where the patient is receiving treatment.  List of dedicated fax numbers:  [place] DENIALS (Administrative/Medical Necessity) [phone] [address] [facility] (Maternity/NICU/Pediatrics) [phone]   [facility] [phone]   [facility] [phone] [address] [facility] [phone]   [address] [facility] [phone]   [facility] [phone]   [address] [place] [address] [address] [facility] [phone] [address] [facility] [phone] [address] [facility] [phone] [address] [facility] [address] [facility] [phone]

## 2023-10-17 ENCOUNTER — HOSPITAL ENCOUNTER (INPATIENT)
Facility: HOSPITAL | Age: 28
LOS: 1 days | Discharge: HOME | DRG: 254 | End: 2023-10-20
Attending: EMERGENCY MEDICINE | Admitting: FAMILY MEDICINE
Payer: COMMERCIAL

## 2023-10-17 DIAGNOSIS — R11.2 NAUSEA AND VOMITING: Primary | ICD-10-CM

## 2023-10-17 DIAGNOSIS — K59.09 CHRONIC CONSTIPATION: ICD-10-CM

## 2023-10-17 DIAGNOSIS — R11.2 INTRACTABLE NAUSEA AND VOMITING: ICD-10-CM

## 2023-10-17 PROBLEM — R11.15 CYCLICAL VOMITING: Status: RESOLVED | Noted: 2023-09-10 | Resolved: 2023-09-18

## 2023-10-17 PROBLEM — K31.84 GASTROPARESIS: Status: ACTIVE | Noted: 2022-09-08

## 2023-10-17 PROBLEM — F41.9 ANXIETY: Status: ACTIVE | Noted: 2022-09-07

## 2023-10-17 LAB
ALBUMIN SERPL BCP-MCNC: 4.3 G/DL (ref 3.5–5)
ALP SERPL-CCNC: 60 U/L (ref 34–104)
ALT SERPL W P-5'-P-CCNC: 17 U/L (ref 7–52)
ANION GAP SERPL CALCULATED.3IONS-SCNC: 11 MMOL/L
AST SERPL W P-5'-P-CCNC: 13 U/L (ref 13–39)
BASOPHILS # BLD AUTO: 0.01 THOUSANDS/ÂΜL (ref 0–0.1)
BASOPHILS NFR BLD AUTO: 0 % (ref 0–1)
BILIRUB SERPL-MCNC: 0.61 MG/DL (ref 0.2–1)
BILIRUB UR QL STRIP: ABNORMAL
BUN SERPL-MCNC: 11 MG/DL (ref 5–25)
CALCIUM SERPL-MCNC: 9.1 MG/DL (ref 8.4–10.2)
CHLORIDE SERPL-SCNC: 104 MMOL/L (ref 96–108)
CLARITY UR: CLEAR
CO2 SERPL-SCNC: 23 MMOL/L (ref 21–32)
COLOR UR: YELLOW
CREAT SERPL-MCNC: 0.77 MG/DL (ref 0.6–1.3)
EOSINOPHIL # BLD AUTO: 0.07 THOUSAND/ÂΜL (ref 0–0.61)
EOSINOPHIL NFR BLD AUTO: 1 % (ref 0–6)
ERYTHROCYTE [DISTWIDTH] IN BLOOD BY AUTOMATED COUNT: 13.4 % (ref 11.6–15.1)
GFR SERPL CREATININE-BSD FRML MDRD: 124 ML/MIN/1.73SQ M
GLUCOSE SERPL-MCNC: 91 MG/DL (ref 65–140)
GLUCOSE UR STRIP-MCNC: NEGATIVE MG/DL
HCT VFR BLD AUTO: 43.7 % (ref 36.5–49.3)
HGB BLD-MCNC: 14.9 G/DL (ref 12–17)
HGB UR QL STRIP.AUTO: NEGATIVE
IMM GRANULOCYTES # BLD AUTO: 0.01 THOUSAND/UL (ref 0–0.2)
IMM GRANULOCYTES NFR BLD AUTO: 0 % (ref 0–2)
KETONES UR STRIP-MCNC: ABNORMAL MG/DL
LACTATE SERPL-SCNC: 0.9 MMOL/L (ref 0.5–2)
LEUKOCYTE ESTERASE UR QL STRIP: NEGATIVE
LIPASE SERPL-CCNC: <6 U/L (ref 11–82)
LYMPHOCYTES # BLD AUTO: 1.26 THOUSANDS/ÂΜL (ref 0.6–4.47)
LYMPHOCYTES NFR BLD AUTO: 25 % (ref 14–44)
MCH RBC QN AUTO: 30.2 PG (ref 26.8–34.3)
MCHC RBC AUTO-ENTMCNC: 34.1 G/DL (ref 31.4–37.4)
MCV RBC AUTO: 89 FL (ref 82–98)
MONOCYTES # BLD AUTO: 0.55 THOUSAND/ÂΜL (ref 0.17–1.22)
MONOCYTES NFR BLD AUTO: 11 % (ref 4–12)
NEUTROPHILS # BLD AUTO: 3.11 THOUSANDS/ÂΜL (ref 1.85–7.62)
NEUTS SEG NFR BLD AUTO: 63 % (ref 43–75)
NITRITE UR QL STRIP: NEGATIVE
NRBC BLD AUTO-RTO: 0 /100 WBCS
PH UR STRIP.AUTO: 8 [PH]
PLATELET # BLD AUTO: 155 THOUSANDS/UL (ref 149–390)
PMV BLD AUTO: 10.1 FL (ref 8.9–12.7)
POTASSIUM SERPL-SCNC: 3.7 MMOL/L (ref 3.5–5.3)
PROT SERPL-MCNC: 6.5 G/DL (ref 6.4–8.4)
PROT UR STRIP-MCNC: NEGATIVE MG/DL
RBC # BLD AUTO: 4.93 MILLION/UL (ref 3.88–5.62)
SODIUM SERPL-SCNC: 138 MMOL/L (ref 135–147)
SP GR UR STRIP.AUTO: 1.01 (ref 1–1.03)
UROBILINOGEN UR QL STRIP.AUTO: 1 E.U./DL
WBC # BLD AUTO: 5.01 THOUSAND/UL (ref 4.31–10.16)

## 2023-10-17 PROCEDURE — 81003 URINALYSIS AUTO W/O SCOPE: CPT | Performed by: EMERGENCY MEDICINE

## 2023-10-17 PROCEDURE — 96375 TX/PRO/DX INJ NEW DRUG ADDON: CPT

## 2023-10-17 PROCEDURE — 36415 COLL VENOUS BLD VENIPUNCTURE: CPT | Performed by: EMERGENCY MEDICINE

## 2023-10-17 PROCEDURE — 96361 HYDRATE IV INFUSION ADD-ON: CPT

## 2023-10-17 PROCEDURE — 83605 ASSAY OF LACTIC ACID: CPT | Performed by: EMERGENCY MEDICINE

## 2023-10-17 PROCEDURE — 99285 EMERGENCY DEPT VISIT HI MDM: CPT | Performed by: EMERGENCY MEDICINE

## 2023-10-17 PROCEDURE — 96374 THER/PROPH/DIAG INJ IV PUSH: CPT

## 2023-10-17 PROCEDURE — C9113 INJ PANTOPRAZOLE SODIUM, VIA: HCPCS | Performed by: PHYSICIAN ASSISTANT

## 2023-10-17 PROCEDURE — 80053 COMPREHEN METABOLIC PANEL: CPT | Performed by: EMERGENCY MEDICINE

## 2023-10-17 PROCEDURE — 83690 ASSAY OF LIPASE: CPT | Performed by: EMERGENCY MEDICINE

## 2023-10-17 PROCEDURE — 85025 COMPLETE CBC W/AUTO DIFF WBC: CPT | Performed by: EMERGENCY MEDICINE

## 2023-10-17 PROCEDURE — 99283 EMERGENCY DEPT VISIT LOW MDM: CPT

## 2023-10-17 PROCEDURE — 99223 1ST HOSP IP/OBS HIGH 75: CPT | Performed by: PHYSICIAN ASSISTANT

## 2023-10-17 RX ORDER — HYDROXYZINE HYDROCHLORIDE 25 MG/1
25 TABLET, FILM COATED ORAL
Status: DISCONTINUED | OUTPATIENT
Start: 2023-10-17 | End: 2023-10-17

## 2023-10-17 RX ORDER — BISACODYL 10 MG
10 SUPPOSITORY, RECTAL RECTAL DAILY
Status: DISCONTINUED | OUTPATIENT
Start: 2023-10-18 | End: 2023-10-20 | Stop reason: HOSPADM

## 2023-10-17 RX ORDER — DROPERIDOL 2.5 MG/ML
0.62 INJECTION, SOLUTION INTRAMUSCULAR; INTRAVENOUS ONCE
Status: COMPLETED | OUTPATIENT
Start: 2023-10-17 | End: 2023-10-17

## 2023-10-17 RX ORDER — ACETAMINOPHEN 325 MG/1
650 TABLET ORAL EVERY 6 HOURS PRN
Status: DISCONTINUED | OUTPATIENT
Start: 2023-10-17 | End: 2023-10-20 | Stop reason: HOSPADM

## 2023-10-17 RX ORDER — DIAZEPAM 5 MG/ML
2.5 INJECTION, SOLUTION INTRAMUSCULAR; INTRAVENOUS ONCE
Status: COMPLETED | OUTPATIENT
Start: 2023-10-17 | End: 2023-10-17

## 2023-10-17 RX ORDER — PANTOPRAZOLE SODIUM 40 MG/10ML
40 INJECTION, POWDER, LYOPHILIZED, FOR SOLUTION INTRAVENOUS EVERY 12 HOURS SCHEDULED
Status: DISCONTINUED | OUTPATIENT
Start: 2023-10-17 | End: 2023-10-20 | Stop reason: HOSPADM

## 2023-10-17 RX ORDER — METOCLOPRAMIDE HYDROCHLORIDE 5 MG/ML
10 INJECTION INTRAMUSCULAR; INTRAVENOUS ONCE
Status: COMPLETED | OUTPATIENT
Start: 2023-10-17 | End: 2023-10-17

## 2023-10-17 RX ORDER — DIPHENHYDRAMINE HYDROCHLORIDE 50 MG/ML
25 INJECTION INTRAMUSCULAR; INTRAVENOUS ONCE
Status: COMPLETED | OUTPATIENT
Start: 2023-10-17 | End: 2023-10-17

## 2023-10-17 RX ORDER — FAMOTIDINE 10 MG/ML
20 INJECTION, SOLUTION INTRAVENOUS ONCE
Status: COMPLETED | OUTPATIENT
Start: 2023-10-17 | End: 2023-10-17

## 2023-10-17 RX ORDER — SODIUM CHLORIDE, SODIUM GLUCONATE, SODIUM ACETATE, POTASSIUM CHLORIDE, MAGNESIUM CHLORIDE, SODIUM PHOSPHATE, DIBASIC, AND POTASSIUM PHOSPHATE .53; .5; .37; .037; .03; .012; .00082 G/100ML; G/100ML; G/100ML; G/100ML; G/100ML; G/100ML; G/100ML
75 INJECTION, SOLUTION INTRAVENOUS CONTINUOUS
Status: DISCONTINUED | OUTPATIENT
Start: 2023-10-17 | End: 2023-10-19

## 2023-10-17 RX ORDER — ERYTHROMYCIN 250 MG/1
250 TABLET, COATED ORAL
Status: DISCONTINUED | OUTPATIENT
Start: 2023-10-18 | End: 2023-10-20 | Stop reason: HOSPADM

## 2023-10-17 RX ORDER — TAMSULOSIN HYDROCHLORIDE 0.4 MG/1
0.8 CAPSULE ORAL
Status: DISCONTINUED | OUTPATIENT
Start: 2023-10-18 | End: 2023-10-20 | Stop reason: HOSPADM

## 2023-10-17 RX ORDER — HYDROXYZINE HYDROCHLORIDE 25 MG/1
25 TABLET, FILM COATED ORAL
Status: DISCONTINUED | OUTPATIENT
Start: 2023-10-18 | End: 2023-10-17

## 2023-10-17 RX ORDER — ONDANSETRON 2 MG/ML
4 INJECTION INTRAMUSCULAR; INTRAVENOUS ONCE
Status: COMPLETED | OUTPATIENT
Start: 2023-10-17 | End: 2023-10-17

## 2023-10-17 RX ORDER — METOCLOPRAMIDE HYDROCHLORIDE 5 MG/ML
10 INJECTION INTRAMUSCULAR; INTRAVENOUS EVERY 6 HOURS PRN
Status: DISCONTINUED | OUTPATIENT
Start: 2023-10-17 | End: 2023-10-20 | Stop reason: HOSPADM

## 2023-10-17 RX ORDER — ESCITALOPRAM OXALATE 10 MG/1
10 TABLET ORAL EVERY MORNING
Status: DISCONTINUED | OUTPATIENT
Start: 2023-10-18 | End: 2023-10-20 | Stop reason: HOSPADM

## 2023-10-17 RX ORDER — ERYTHROMYCIN 250 MG/1
250 TABLET, COATED ORAL
Status: DISCONTINUED | OUTPATIENT
Start: 2023-10-17 | End: 2023-10-17

## 2023-10-17 RX ORDER — HYDROXYZINE HYDROCHLORIDE 25 MG/1
25 TABLET, FILM COATED ORAL
Status: DISCONTINUED | OUTPATIENT
Start: 2023-10-17 | End: 2023-10-20 | Stop reason: HOSPADM

## 2023-10-17 RX ADMIN — DIPHENHYDRAMINE HYDROCHLORIDE 25 MG: 50 INJECTION, SOLUTION INTRAMUSCULAR; INTRAVENOUS at 20:09

## 2023-10-17 RX ADMIN — SODIUM CHLORIDE, SODIUM GLUCONATE, SODIUM ACETATE, POTASSIUM CHLORIDE, MAGNESIUM CHLORIDE, SODIUM PHOSPHATE, DIBASIC, AND POTASSIUM PHOSPHATE 125 ML/HR: .53; .5; .37; .037; .03; .012; .00082 INJECTION, SOLUTION INTRAVENOUS at 23:01

## 2023-10-17 RX ADMIN — DROPERIDOL 0.62 MG: 2.5 INJECTION, SOLUTION INTRAMUSCULAR; INTRAVENOUS at 19:14

## 2023-10-17 RX ADMIN — DIAZEPAM 2.5 MG: 10 INJECTION, SOLUTION INTRAMUSCULAR; INTRAVENOUS at 22:58

## 2023-10-17 RX ADMIN — ONDANSETRON 4 MG: 2 INJECTION INTRAMUSCULAR; INTRAVENOUS at 18:03

## 2023-10-17 RX ADMIN — FAMOTIDINE 20 MG: 10 INJECTION, SOLUTION INTRAVENOUS at 18:03

## 2023-10-17 RX ADMIN — PANTOPRAZOLE SODIUM 40 MG: 40 INJECTION, POWDER, FOR SOLUTION INTRAVENOUS at 22:58

## 2023-10-17 RX ADMIN — METOCLOPRAMIDE HYDROCHLORIDE 10 MG: 5 INJECTION INTRAMUSCULAR; INTRAVENOUS at 20:09

## 2023-10-17 RX ADMIN — SODIUM CHLORIDE 1000 ML: 0.9 INJECTION, SOLUTION INTRAVENOUS at 18:03

## 2023-10-17 NOTE — ED PROVIDER NOTES
History  Chief Complaint   Patient presents with    Abdominal Pain    Vomiting     Patient presents to the ED with complaints of abdominal pain and vomiting since Sunday. No diarrhea noted. The patient was admitted to the hospital for the same last month. Patient is a 60-year-old male presenting to the emergency department complaining of epigastric abdominal pain with nausea and vomiting since Sunday, patient has a history of intermittent intractable nausea and vomiting in the past, he reports noting some coffee-ground emesis in his vomit, he denies any fevers, no sick contacts, no questionable food intake, he does report some epigastric abdominal pain, he denies diarrhea, no dysuria or hematuria        Prior to Admission Medications   Prescriptions Last Dose Informant Patient Reported?  Taking?   bisacodyl (DULCOLAX) 10 mg suppository   No No   Sig: Insert 1 suppository (10 mg total) into the rectum daily as needed for constipation   docusate sodium (COLACE) 100 mg capsule   No No   Sig: Take 1 capsule (100 mg total) by mouth 2 (two) times a day   erythromycin base 250 mg tablet   No No   Sig: Take 1 tablet (250 mg total) by mouth 3 (three) times a day before meals   escitalopram (LEXAPRO) 10 mg tablet  Self Yes No   Sig: Take 1 tablet by mouth every morning   fluticasone (FLONASE) 50 mcg/act nasal spray  Self Yes No   Sig: INSTILL 2 SPRAYS TO EACH NOSTRIL DAILY   loratadine (CLARITIN) 10 mg tablet  Self Yes No   Sig: Take by mouth   metoclopramide (Reglan) 10 mg tablet   No No   Sig: Take 1 tablet (10 mg total) by mouth every 6 (six) hours as needed (Nausea) for up to 10 doses   pantoprazole (PROTONIX) 40 mg tablet   No No   Sig: Take 1 tablet (40 mg total) by mouth 2 (two) times a day before meals   tamsulosin (FLOMAX) 0.4 mg   No No   Sig: Take 2 capsules (0.8 mg total) by mouth daily with dinner   triamcinolone (KENALOG) 0.1 % oral topical paste  Self Yes No   Sig: Apply 1 application to teeth Facility-Administered Medications: None       Past Medical History:   Diagnosis Date    Autistic disorder     BPH (benign prostatic hypertrophy) 9/8/22    Gastric paresis     GERD (gastroesophageal reflux disease)     Known health problems: none     Urinary retention 9/8/22    Urinary tract infection        Past Surgical History:   Procedure Laterality Date    EYE SURGERY      FOOT SURGERY         Family History   Problem Relation Age of Onset    No Known Problems Mother     No Known Problems Father     Heart attack Paternal Grandfather     Hypertension Paternal Grandfather      I have reviewed and agree with the history as documented. E-Cigarette/Vaping    E-Cigarette Use Never User      E-Cigarette/Vaping Substances    Nicotine No     THC No     CBD No     Flavoring No     Other No     Unknown No      Social History     Tobacco Use    Smoking status: Never    Smokeless tobacco: Never   Vaping Use    Vaping Use: Never used   Substance Use Topics    Alcohol use: Not Currently    Drug use: Never       Review of Systems   Constitutional: Negative. HENT: Negative. Eyes: Negative. Respiratory: Negative. Cardiovascular: Negative. Gastrointestinal:  Positive for abdominal pain, nausea and vomiting. Endocrine: Negative. Genitourinary: Negative. Musculoskeletal: Negative. Skin: Negative. Allergic/Immunologic: Negative. Neurological: Negative. Hematological: Negative. Psychiatric/Behavioral: Negative. Physical Exam  Physical Exam  Constitutional:       Appearance: He is well-developed. HENT:      Head: Normocephalic and atraumatic. Eyes:      Conjunctiva/sclera: Conjunctivae normal.      Pupils: Pupils are equal, round, and reactive to light. Cardiovascular:      Rate and Rhythm: Normal rate. Pulmonary:      Effort: Pulmonary effort is normal.   Abdominal:      Palpations: Abdomen is soft. Tenderness: There is abdominal tenderness in the epigastric area. Musculoskeletal:         General: Normal range of motion. Cervical back: Normal range of motion and neck supple. Skin:     General: Skin is warm and dry. Neurological:      Mental Status: He is alert and oriented to person, place, and time.          Vital Signs  ED Triage Vitals [10/17/23 1737]   Temperature Pulse Respirations Blood Pressure SpO2   98.1 °F (36.7 °C) 73 16 119/69 98 %      Temp Source Heart Rate Source Patient Position - Orthostatic VS BP Location FiO2 (%)   Temporal Monitor Lying Right arm --      Pain Score       10 - Worst Possible Pain           Vitals:    10/17/23 1737 10/17/23 1915 10/17/23 2130   BP: 119/69 111/65 112/70   Pulse: 73 67 65   Patient Position - Orthostatic VS: Lying Lying Lying         Visual Acuity      ED Medications  Medications   sodium chloride 0.9 % bolus 1,000 mL (0 mL Intravenous Stopped 10/17/23 1909)   ondansetron (ZOFRAN) injection 4 mg (4 mg Intravenous Given 10/17/23 1803)   Famotidine (PF) (PEPCID) injection 20 mg (20 mg Intravenous Given 10/17/23 1803)   droperidol (INAPSINE) injection 0.625 mg (0.625 mg Intravenous Given 10/17/23 1914)   metoclopramide (REGLAN) injection 10 mg (10 mg Intravenous Given 10/17/23 2009)   diphenhydrAMINE (BENADRYL) injection 25 mg (25 mg Intravenous Given 10/17/23 2009)       Diagnostic Studies  Results Reviewed       Procedure Component Value Units Date/Time    UA w Reflex to Microscopic w Reflex to Culture [[id number]]  (Abnormal) Collected: 10/17/23 1932    Lab Status: Final result Specimen: Urine, Clean Catch Updated: 10/17/23 1940     Color, UA Yellow     Clarity, UA Clear     Specific Gravity, UA 1.010     pH, UA 8.0     Leukocytes, UA Negative     Nitrite, UA Negative     Protein, UA Negative mg/dl      Glucose, UA Negative mg/dl      Ketones, UA >=80 (3+) mg/dl      Urobilinogen, UA 1.0 E.U./dl      Bilirubin, UA Small     Occult Blood, UA Negative    Lipase [[id number]]  (Abnormal) Collected: 10/17/23 1753    Lab Status: Final result Specimen: Blood from Arm, Right Updated: 10/17/23 1827     Lipase <6 u/L     Comprehensive metabolic panel [[id number]] Collected: 10/17/23 1753    Lab Status: Final result Specimen: Blood from Arm, Right Updated: 10/17/23 1827     Sodium 138 mmol/L      Potassium 3.7 mmol/L      Chloride 104 mmol/L      CO2 23 mmol/L      ANION GAP 11 mmol/L      BUN 11 mg/dL      Creatinine 0.77 mg/dL      Glucose 91 mg/dL      Calcium 9.1 mg/dL      AST 13 U/L      ALT 17 U/L      Alkaline Phosphatase 60 U/L      Total Protein 6.5 g/dL      Albumin 4.3 g/dL      Total Bilirubin 0.61 mg/dL      eGFR 124 ml/min/1.73sq m     Narrative:      [place] guidelines for Chronic Kidney Disease (CKD):     Stage 1 with normal or high GFR (GFR > 90 mL/min/1.73 square meters)    Stage 2 Mild CKD (GFR = 60-89 mL/min/1.73 square meters)    Stage 3A Moderate CKD (GFR = 45-59 mL/min/1.73 square meters)    Stage 3B Moderate CKD (GFR = 30-44 mL/min/1.73 square meters)    Stage 4 Severe CKD (GFR = 15-29 mL/min/1.73 square meters)    Stage 5 End Stage CKD (GFR <15 mL/min/1.73 square meters)  Note: GFR calculation is accurate only with a steady state creatinine    Lactic acid, plasma (w/reflex if result > 2.0) [[id number]]  (Normal) Collected: 10/17/23 1753    Lab Status: Final result Specimen: Blood from Arm, Right Updated: 10/17/23 1817     LACTIC ACID 0.9 mmol/L     Narrative:      Result may be elevated if tourniquet was used during collection.     CBC and differential [[id number]] Collected: 10/17/23 1753    Lab Status: Final result Specimen: Blood from Arm, Right Updated: 10/17/23 1758     WBC 5.01 Thousand/uL      RBC 4.93 Million/uL      Hemoglobin 14.9 g/dL      Hematocrit 43.7 %      MCV 89 fL      MCH 30.2 pg      MCHC 34.1 g/dL      RDW 13.4 %      MPV 10.1 fL      Platelets 760 Thousands/uL      nRBC 0 /100 WBCs      Neutrophils Relative 63 %      Immat GRANS % 0 %      Lymphocytes Relative 25 % Monocytes Relative 11 %      Eosinophils Relative 1 %      Basophils Relative 0 %      Neutrophils Absolute 3.11 Thousands/µL      Immature Grans Absolute 0.01 Thousand/uL      Lymphocytes Absolute 1.26 Thousands/µL      Monocytes Absolute 0.55 Thousand/µL      Eosinophils Absolute 0.07 Thousand/µL      Basophils Absolute 0.01 Thousands/µL                    No orders to display              Procedures  Procedures         ED Course  ED Course as of 10/17/23 2158   Tue Oct 17, 2023   1955 Attempted to p.o. challenge patient, vomited shortly after attempting to eat crackers   2101 Patient is still having nausea and vomiting despite multiple rounds of medications, will refer to hospitalist service for admission   2158 UA w Reflex to Microscopic w Reflex to Culture(!)   2158 Lipase(!)   2158 Comprehensive metabolic panel   3495 Lactic acid, plasma (w/reflex if result > 2.0)   2158 CBC and differential                               SBIRT 22yo+      Flowsheet Row Most Recent Value   Initial Alcohol Screen: US AUDIT-C     1. How often do you have a drink containing alcohol? 0 Filed at: 10/17/2023 1738   2. How many drinks containing alcohol do you have on a typical day you are drinking? 0 Filed at: 10/17/2023 1738   3a. Male UNDER 65: How often do you have five or more drinks on one occasion? 0 Filed at: 10/17/2023 1738   Audit-C Score 0 Filed at: 10/17/2023 1738   [place]: How many times in the past year have you. .. Used an illegal drug or used a prescription medication for non-medical reasons? Never Filed at: 10/17/2023 1738                      Medical Decision Making  Abdominal exam without peritoneal signs. No evidence of acute abdomen at this time. Well-appearing.   Given work-up, low suspicion for acute hepatobiliary disease (including acute cholecystitis or cholangitis), acute pancreatitis (negative lipase), PUD (including gastric perforation), acute infectious processes (pneumonia, hepatitis, pyelonephritis), acute appendicitis, vascular catastrophe, bowel obstruction, viscus perforation, ovarian/testicular torsion, or diverticulitis. Patient with intractable nausea and vomiting despite multiple rounds of medications, admitted last month for similar symptoms, likely psychogenic in nature, lab findings relatively unremarkable, vital signs stable, recommended for admission, hospitalist service will admit as observation    Problems Addressed:  Nausea and vomiting: acute illness or injury    Amount and/or Complexity of Data Reviewed  Labs: ordered. Decision-making details documented in ED Course. Risk  Prescription drug management. Decision regarding hospitalization. Disposition  Final diagnoses:   Nausea and vomiting     Time reflects when diagnosis was documented in both MDM as applicable and the Disposition within this note       Time User Action Codes Description Comment    10/17/2023  9:17 PM Franci Childress [R11.2] Nausea and vomiting           ED Disposition       ED Disposition   Admit    Condition   Stable    Date/Time   Tue Oct 17, 2023 2117    Comment   Case was discussed with Ferdinand Mcwilliams and the patient's admission status was agreed to be Admission Status: observation status to the service of Dr. Cindy Roach .                Follow-up Information    None         Current Discharge Medication List        CONTINUE these medications which have NOT CHANGED    Details   bisacodyl (DULCOLAX) 10 mg suppository Insert 1 suppository (10 mg total) into the rectum daily as needed for constipation  Qty: 12 suppository, Refills: 0    Associated Diagnoses: Intractable nausea and vomiting      docusate sodium (COLACE) 100 mg capsule Take 1 capsule (100 mg total) by mouth 2 (two) times a day  Qty: 60 capsule, Refills: 0    Associated Diagnoses: Intractable nausea and vomiting      erythromycin base 250 mg tablet Take 1 tablet (250 mg total) by mouth 3 (three) times a day before meals  Qty: 90 tablet, Refills: 0    Comments: For gastroparesis  Associated Diagnoses: Intractable nausea and vomiting      escitalopram (LEXAPRO) 10 mg tablet Take 1 tablet by mouth every morning      fluticasone (FLONASE) 50 mcg/act nasal spray INSTILL 2 SPRAYS TO EACH NOSTRIL DAILY      loratadine (CLARITIN) 10 mg tablet Take by mouth      metoclopramide (Reglan) 10 mg tablet Take 1 tablet (10 mg total) by mouth every 6 (six) hours as needed (Nausea) for up to 10 doses  Qty: 10 tablet, Refills: 0    Associated Diagnoses: Nausea and vomiting, unspecified vomiting type      pantoprazole (PROTONIX) 40 mg tablet Take 1 tablet (40 mg total) by mouth 2 (two) times a day before meals  Qty: 60 tablet, Refills: 0    Associated Diagnoses: Intractable nausea and vomiting      tamsulosin (FLOMAX) 0.4 mg Take 2 capsules (0.8 mg total) by mouth daily with dinner  Qty: 60 capsule, Refills: 0    Associated Diagnoses: Acute urinary retention      triamcinolone (KENALOG) 0.1 % oral topical paste Apply 1 application to teeth             No discharge procedures on file.     PDMP Review         Value Time User    PDMP Reviewed  Yes 9/4/2023 11:07 AM Felix Thomason MD            ED Provider  Electronically Signed by             Stew Sheets DO  10/17/23 9059

## 2023-10-18 ENCOUNTER — APPOINTMENT (OUTPATIENT)
Dept: RADIOLOGY | Facility: HOSPITAL | Age: 28
DRG: 254 | End: 2023-10-18
Payer: COMMERCIAL

## 2023-10-18 LAB
ATRIAL RATE: 60 BPM
ATRIAL RATE: 60 BPM
P AXIS: 68 DEGREES
P AXIS: 69 DEGREES
PR INTERVAL: 174 MS
PR INTERVAL: 186 MS
QRS AXIS: 16 DEGREES
QRS AXIS: 19 DEGREES
QRSD INTERVAL: 100 MS
QRSD INTERVAL: 98 MS
QT INTERVAL: 434 MS
QT INTERVAL: 438 MS
QTC INTERVAL: 434 MS
QTC INTERVAL: 438 MS
T WAVE AXIS: 43 DEGREES
T WAVE AXIS: 43 DEGREES
VENTRICULAR RATE: 60 BPM
VENTRICULAR RATE: 60 BPM

## 2023-10-18 PROCEDURE — 99232 SBSQ HOSP IP/OBS MODERATE 35: CPT | Performed by: FAMILY MEDICINE

## 2023-10-18 PROCEDURE — 93005 ELECTROCARDIOGRAM TRACING: CPT

## 2023-10-18 PROCEDURE — 74022 RADEX COMPL AQT ABD SERIES: CPT

## 2023-10-18 PROCEDURE — C9113 INJ PANTOPRAZOLE SODIUM, VIA: HCPCS | Performed by: PHYSICIAN ASSISTANT

## 2023-10-18 RX ORDER — POLYETHYLENE GLYCOL 3350 17 G/17G
17 POWDER, FOR SOLUTION ORAL 2 TIMES DAILY
Status: DISCONTINUED | OUTPATIENT
Start: 2023-10-18 | End: 2023-10-20 | Stop reason: HOSPADM

## 2023-10-18 RX ADMIN — TAMSULOSIN HYDROCHLORIDE 0.8 MG: 0.4 CAPSULE ORAL at 16:34

## 2023-10-18 RX ADMIN — ERYTHROMYCIN 250 MG: 250 TABLET, FILM COATED ORAL at 16:35

## 2023-10-18 RX ADMIN — ERYTHROMYCIN 250 MG: 250 TABLET, FILM COATED ORAL at 07:32

## 2023-10-18 RX ADMIN — PANTOPRAZOLE SODIUM 40 MG: 40 INJECTION, POWDER, FOR SOLUTION INTRAVENOUS at 08:33

## 2023-10-18 RX ADMIN — SODIUM CHLORIDE, SODIUM GLUCONATE, SODIUM ACETATE, POTASSIUM CHLORIDE, MAGNESIUM CHLORIDE, SODIUM PHOSPHATE, DIBASIC, AND POTASSIUM PHOSPHATE 75 ML/HR: .53; .5; .37; .037; .03; .012; .00082 INJECTION, SOLUTION INTRAVENOUS at 14:50

## 2023-10-18 RX ADMIN — ESCITALOPRAM OXALATE 10 MG: 10 TABLET ORAL at 08:33

## 2023-10-18 RX ADMIN — PANTOPRAZOLE SODIUM 40 MG: 40 INJECTION, POWDER, FOR SOLUTION INTRAVENOUS at 20:50

## 2023-10-18 RX ADMIN — MAGNESIUM HYDROXIDE 30 ML: 400 SUSPENSION ORAL at 14:08

## 2023-10-18 RX ADMIN — SODIUM CHLORIDE, SODIUM GLUCONATE, SODIUM ACETATE, POTASSIUM CHLORIDE, MAGNESIUM CHLORIDE, SODIUM PHOSPHATE, DIBASIC, AND POTASSIUM PHOSPHATE 125 ML/HR: .53; .5; .37; .037; .03; .012; .00082 INJECTION, SOLUTION INTRAVENOUS at 06:00

## 2023-10-18 RX ADMIN — ERYTHROMYCIN 250 MG: 250 TABLET, FILM COATED ORAL at 11:20

## 2023-10-18 RX ADMIN — BISACODYL 10 MG: 10 SUPPOSITORY RECTAL at 08:33

## 2023-10-18 RX ADMIN — METOCLOPRAMIDE HYDROCHLORIDE 10 MG: 5 INJECTION INTRAMUSCULAR; INTRAVENOUS at 11:23

## 2023-10-18 NOTE — PROGRESS NOTES
[address] [facility]  Progress Note  Name: Alyssa Salgado  MRN: [id number]  Unit/Bed#: -01 I Date of Admission: 10/17/2023   Date of Service: 10/18/2023 I Hospital Day: 0    Assessment/Plan   * Intractable nausea and vomiting  Assessment & Plan  Recurrent with epigastric pain. Hx of gastritis /coffe ground emesis before. Hx of suspected gastroparesis +/- psychogenic assc with stress +/- IBS  IV Protonix 40 mg BID  IV reglan prn for n/v  Gastroparesis diet of low fiber, small portions and low fat   Cont home erythromycin 250 mg tid   Just had egd with some mild gastritis all started after eating arbys-  Patient ate some breakfast and no vomit mild epigastric pain but no tenderness on palpation , some mild nausea  Will observe through today- decrease fluids       History of urinary retention  Assessment & Plan  Cont home flomax 0.8 mg qd  Urinary retention protocol    Autism  Assessment & Plan  Daily family involvement  Supportive cares  Patient endorses there is an anxiety component /stress triggers his abd pain. Cont home lexapro 10 mg qd. Add Atarax qhs prn while IP    Chronic constipation  Assessment & Plan  Start Dulcolax suppository daily & milk of mag prn  Eventually on dc resume home regimen: Colace BID, Miralax bid. Dulcolax suppositories prn                  VTE Pharmacologic Prophylaxis: VTE Score: 0 Low Risk (Score 0-2) - Encourage Ambulation. Patient Centered Rounds: I performed bedside rounds with nursing staff today. Discussions with Specialists or Other Care Team Provider: cm    Education and Discussions with Family / Patient: patient will update family     Total Time Spent on Date of Encounter in care of patient: >35 mins.  This time was spent on one or more of the following: performing physical exam; counseling and coordination of care; obtaining or reviewing history; documenting in the medical record; reviewing/ordering tests, medications or procedures; communicating with other healthcare professionals and discussing with patient's family/caregivers. Current Length of Stay: 0 day(s)  Current Patient Status: Observation   Certification Statement: The patient will continue to require additional inpatient hospital stay due to n./v  Discharge Plan: Anticipate discharge in 24-48 hrs to home. Code Status: Level 1 - Full Code    Subjective:   Seen and examined ate breakfast only mild nausea. No vomiting mild abd pain     Objective:     Vitals:   Temp (24hrs), Av.2 °F (36.8 °C), Min:97.8 °F (36.6 °C), Max:98.8 °F (37.1 °C)    Temp:  [97.8 °F (36.6 °C)-98.8 °F (37.1 °C)] 98.8 °F (37.1 °C)  HR:  [65-76] 73  Resp:  [16-18] 18  BP: (111-119)/(65-70) 117/70  SpO2:  [94 %-98 %] 95 %  Body mass index is 25.65 kg/m². Input and Output Summary (last 24 hours): Intake/Output Summary (Last 24 hours) at 10/18/2023 1207  Last data filed at 10/18/2023 0640  Gross per 24 hour   Intake 1000 ml   Output 250 ml   Net 750 ml       Physical Exam:   Physical Exam  Vitals and nursing note reviewed. Constitutional:       General: He is not in acute distress. Appearance: He is well-developed. HENT:      Head: Normocephalic and atraumatic. Eyes:      Conjunctiva/sclera: Conjunctivae normal.   Cardiovascular:      Rate and Rhythm: Normal rate and regular rhythm. Heart sounds: No murmur heard. Pulmonary:      Effort: Pulmonary effort is normal. No respiratory distress. Breath sounds: Normal breath sounds. No wheezing or rales. Abdominal:      General: There is no distension. Palpations: Abdomen is soft. Tenderness: There is no abdominal tenderness. Comments: On deep palpation do not appreciate any epigastric tenderness   Musculoskeletal:         General: No swelling. Cervical back: Neck supple. Skin:     General: Skin is warm and dry. Capillary Refill: Capillary refill takes less than 2 seconds. Neurological:      Mental Status: He is alert. Psychiatric:         Mood and Affect: Mood normal.          Additional Data:     Labs:  Results from last 7 days   Lab Units 10/17/23  1753   WBC Thousand/uL 5.01   HEMOGLOBIN g/dL 14.9   HEMATOCRIT % 43.7   PLATELETS Thousands/uL 155   NEUTROS PCT % 63   LYMPHS PCT % 25   MONOS PCT % 11   EOS PCT % 1     Results from last 7 days   Lab Units 10/17/23  1753   SODIUM mmol/L 138   POTASSIUM mmol/L 3.7   CHLORIDE mmol/L 104   CO2 mmol/L 23   BUN mg/dL 11   CREATININE mg/dL 0.77   ANION GAP mmol/L 11   CALCIUM mg/dL 9.1   ALBUMIN g/dL 4.3   TOTAL BILIRUBIN mg/dL 0.61   ALK PHOS U/L 60   ALT U/L 17   AST U/L 13   GLUCOSE RANDOM mg/dL 91                 Results from last 7 days   Lab Units 10/17/23  1753   LACTIC ACID mmol/L 0.9       Lines/Drains:  Invasive Devices       Peripheral Intravenous Line  Duration             Peripheral IV 10/17/23 Right Antecubital <1 day                          Imaging: No pertinent imaging reviewed. Recent Cultures (last 7 days):         Last 24 Hours Medication List:   Current Facility-Administered Medications   Medication Dose Route Frequency Provider Last Rate    acetaminophen  650 mg Oral Q6H PRN Yessi Cote PA-C      bisacodyl  10 mg Rectal Daily Yessi Cote PA-C      erythromycin base  250 mg Oral TID AC Yessi Cote PA-C      escitalopram  10 mg Oral QAM Yessi Cote PA-C      hydrOXYzine HCL  25 mg Oral HS PRN Yessi Cote PA-C      magnesium hydroxide  30 mL Oral Daily PRN Yessi Cote PA-C      metoclopramide  10 mg Intravenous Q6H PRN Yessi Cote PA-C      multi-electrolyte  75 mL/hr Intravenous Continuous Erik Rodriguez  mL/hr (10/18/23 0600)    pantoprazole  40 mg Intravenous Q12H [facility] Yessi Cote PA-C      tamsulosin  0.8 mg Oral Daily With [employer] PriSRIRAM freeman          Today, Patient Was Seen By: Erik Rodriguez MD    **Please Note: This note may have been constructed using a voice recognition system. **

## 2023-10-18 NOTE — PLAN OF CARE
Problem: PAIN - ADULT  Goal: Verbalizes/displays adequate comfort level or baseline comfort level  Description: Interventions:  - Encourage patient to monitor pain and request assistance  - Assess pain using appropriate pain scale  - Administer analgesics based on type and severity of pain and evaluate response  - Implement non-pharmacological measures as appropriate and evaluate response  - Consider cultural and social influences on pain and pain management  - Notify physician/advanced practitioner if interventions unsuccessful or patient reports new pain  Outcome: Progressing     Problem: GASTROINTESTINAL - ADULT  Goal: Minimal or absence of nausea and/or vomiting  Description: INTERVENTIONS:  - Administer IV fluids if ordered to ensure adequate hydration  - Maintain NPO status until nausea and vomiting are resolved  - Nasogastric tube if ordered  - Administer ordered antiemetic medications as needed  - Provide nonpharmacologic comfort measures as appropriate  - Advance diet as tolerated, if ordered  - Consider nutrition services referral to assist patient with adequate nutrition and appropriate food choices  Outcome: Progressing  Goal: Maintains or returns to baseline bowel function  Description: INTERVENTIONS:  - Assess bowel function  - Encourage oral fluids to ensure adequate hydration  - Administer IV fluids if ordered to ensure adequate hydration  - Administer ordered medications as needed  - Encourage mobilization and activity  - Consider nutritional services referral to assist patient with adequate nutrition and appropriate food choices  Outcome: Progressing  Goal: Maintains adequate nutritional intake  Description: INTERVENTIONS:  - Monitor percentage of each meal consumed  - Identify factors contributing to decreased intake, treat as appropriate  - Assist with meals as needed  - Monitor I&O, weight, and lab values if indicated  - Obtain nutrition services referral as needed  Outcome: Progressing  Goal: Establish and maintain optimal ostomy function  Description: INTERVENTIONS:  - Assess bowel function  - Encourage oral fluids to ensure adequate hydration  - Administer IV fluids if ordered to ensure adequate hydration   - Administer ordered medications as needed  - Encourage mobilization and activity  - Nutrition services referral to assist patient with appropriate food choices  - Assess stoma site  - Consider wound care consult   Outcome: Progressing  Goal: Oral mucous membranes remain intact  Description: INTERVENTIONS  - Assess oral mucosa and hygiene practices  - Implement preventative oral hygiene regimen  - Implement oral medicated treatments as ordered  - Initiate Nutrition services referral as needed  Outcome: Progressing     Problem: METABOLIC, FLUID AND ELECTROLYTES - ADULT  Goal: Electrolytes maintained within normal limits  Description: INTERVENTIONS:  - Monitor labs and assess patient for signs and symptoms of electrolyte imbalances  - Administer electrolyte replacement as ordered  - Monitor response to electrolyte replacements, including repeat lab results as appropriate  - Instruct patient on fluid and nutrition as appropriate  Outcome: Progressing  Goal: Fluid balance maintained  Description: INTERVENTIONS:  - Monitor labs   - Monitor I/O and WT  - Instruct patient on fluid and nutrition as appropriate  - Assess for signs & symptoms of volume excess or deficit  Outcome: Progressing  Goal: Glucose maintained within target range  Description: INTERVENTIONS:  - Monitor Blood Glucose as ordered  - Assess for signs and symptoms of hyperglycemia and hypoglycemia  - Administer ordered medications to maintain glucose within target range  - Assess nutritional intake and initiate nutrition service referral as needed  Outcome: Progressing

## 2023-10-18 NOTE — ASSESSMENT & PLAN NOTE
Start Dulcolax suppository daily & milk of mag prn  Eventually on dc resume home regimen: Colace BID, Miralax bid.  Dulcolax suppositories prn

## 2023-10-18 NOTE — ASSESSMENT & PLAN NOTE
Recurrent with epigastric pain. Hx of gastritis /coffe ground emesis before.  Hx of suspected gastroparesis +/- psychogenic assc with stress +/- IBS  IV Protonix 40 mg BID  IV reglan prn for n/v  Gastroparesis diet of low fiber, small portions and low fat   Cont home erythromycin 250 mg tid   Just had egd with some mild gastritis all started after eating arbys-  Patient ate some breakfast and no vomit mild epigastric pain but no tenderness on palpation , some mild nausea  Will observe through today- decrease fluids

## 2023-10-18 NOTE — ASSESSMENT & PLAN NOTE
Recurrent with epigastric pain. Hx of gastritis /coffe ground emesis before. Hx of suspected gastroparesis +/- psychogenic assc with stress +/- IBS  IV Protonix 40 mg BID  IV reglan prn for n/v  Gastroparesis diet of low fiber, small portions and low fat   Cont home erythromycin 250 mg tid   Tylenol prn for abd pain. Can also try IM haldol prn for abd pain/ heating pad. No narcotics given suspected gastroparesis. Limit NSAIDs given h/o gastritis.

## 2023-10-18 NOTE — UTILIZATION REVIEW
Initial Clinical Review    WAS OBSERVATION 10/17/23 @ 2117 CONVERTED TO INPATIENT ADMISSION 10/19/23 @ 1239 DUE TO CONTINUED STAY REQUIRED TO CARE FOR PATIENT WITH DX: INTRACTABLE NAUSEA AND VOMITING. Admission: Date/Time/Statement:   Admission Orders (From admission, onward)       Ordered        10/19/23 1239  Inpatient Admission  Once            10/17/23 2117  Place in Observation  Once                          Orders Placed This Encounter   Procedures    Inpatient Admission     Standing Status:   Standing     Number of Occurrences:   1     Order Specific Question:   Level of Care     Answer:   Med Surg [16]     Order Specific Question:   Estimated length of stay     Answer:   More than 2 Midnights     Order Specific Question:   Certification     Answer:   I certify that inpatient services are medically necessary for this patient for a duration of greater than two midnights. See H&P and MD Progress Notes for additional information about the patient's course of treatment. ED Arrival Information       Expected   -    Arrival   10/17/2023 17:33    Acuity   Urgent              Means of arrival   Walk-In    Escorted by   Family Member    Service   Hospitalist    Admission type   Emergency              Arrival complaint   Vomiting             Chief Complaint   Patient presents with    Abdominal Pain    Vomiting     Patient presents to the ED with complaints of abdominal pain and vomiting since Sunday. No diarrhea noted. The patient was admitted to the hospital for the same last month. Initial Presentation: 33 y.o. male to ED via walk-in from home  Present to ED with vomiting x1 day. Sunday night he had a headache and malaise. Endorses epigastric abdominal pain and emesis on Monday night.  This morning his emesis was brown-colored   PMHX ASD, urinary retention, prostatitis, gastroparesis, chronic abdominal pain, recurring nausea and vomiting ; gastritis /coffe ground emesis   Admitted to OBS with DX: Intractable nausea and vomiting   on exam: abdominal tenderness and epigastric pain; pain 10/10  PLAN: cont ivf; cont iv protonix; serial abdominal exams; monitor labs    Date: 10/18/23     Day 2 - OBSERVATION  Patient ate some breakfast and no vomit mild epigastric pain but no tenderness on palpation , some mild nausea   Plan: cont ivf; cont iv protonix; serial abdominal exams; monitor labs; nausea control (see below)     Date: 10/19/23 - CHANGED TO INPATIENT  feeling much better today tolerating meals no abdominal pain had a BM no vomiting nausea has improved. obstruction series done which did not show any ileus or obstruction just some stool he finally had a bowel movement he is tolerating all meals without any significant nausea or vomiting abdomen is nontender discontinue fluids; Had bm today as axr evident of stool   Plan: cont iv protonix; serial abdominal exams; monitor labs; nausea control (see below)     Date: 10/20/23    Day 2 - INPATIENT   Discharge Summary  Hospital Course:   Neeru Duarte is a 33 y.o. male patient who originally presented to the hospital on 10/17/2023 due to nausea vomiting and epigastric pain which has been recurrent after he ate [employer].   Admitted without any further vomiting with improvement in nausea also evidence of constipation which contributed to it last time started on MiraLAX twice daily with other laxatives had multiple bowel movements he was able to tolerate diet without any evidence of abdominal tenderness no evidence of nausea and he recently just had an EGD with a previous multiple GI work-up and just was found to have mild amount of gastritis continue erythromycin 3 times a day with meals Protonix twice a day otherwise medical cleared to be discharged    Disposition: Home / self    ED Triage Vitals [10/17/23 1737]   Temperature Pulse Respirations Blood Pressure SpO2   98.1 °F (36.7 °C) 73 16 119/69 98 %      Temp Source Heart Rate Source Patient Position - Orthostatic VS BP Location FiO2 (%)   Temporal Monitor Lying Right arm --      Pain Score       10 - Worst Possible Pain          Wt Readings from Last 1 Encounters:   10/17/23 88.2 kg (194 lb 7.1 oz)     Additional Vital Signs:   Date/Time Temp Pulse Resp BP MAP (mmHg) SpO2 O2 Device Patient Position - Orthostatic VS   10/20/23 0810 -- -- -- -- -- -- None (Room air) --   10/20/23 0736 -- 77 -- -- -- -- -- --   10/20/23 0735 -- 79 -- -- -- -- -- --   10/20/23 0732 -- 83 -- -- -- -- -- --   10/20/23 07:31:36 98.4 °F (36.9 °C) 118 Abnormal  17 126/75 85 95 % -- --   10/19/23 22:06:50 97.7 °F (36.5 °C) 68 14 118/67 76 95 % -- --   10/19/23 1511 98.1 °F (36.7 °C) 61 16 119/53 81 95 % -- --     Date/Time Temp Pulse Resp BP MAP (mmHg) SpO2 O2 Device Patient Position - Orthostatic VS   10/19/23 0900 -- -- -- -- -- 95 % None (Room air) --   10/19/23 06:41:04 98.1 °F (36.7 °C) 77 16 110/73 78 95 % -- Lying   10/19/23 0100 -- -- -- -- -- 97 % None (Room air) --   10/18/23 21:45:29 -- 58 18 121/72 83 97 % -- --   10/18/23 1517 98.6 °F (37 °C) 79 18 123/77 -- 96 % -- --   10/18/23 0940 -- -- -- -- -- 95 % None (Room air) --     Date/Time Temp Pulse Resp BP MAP (mmHg) SpO2 O2 Device Patient Position - Orthostatic VS   10/18/23 07:38:54 98.8 °F (37.1 °C) 73 18 117/70 78 94 % -- Lying   10/17/23 2306 -- -- -- -- -- 96 % None (Room air) --   10/17/23 2238 97.8 °F (36.6 °C) 76 18 113/66 77 96 % None (Room air) Lying   10/17/23 2130 -- 65 18 112/70 85 98 % None (Room air) Lying   10/17/23 1915 -- 67 18 111/65 81 98 % None (Room air) Lying   10/17/23 1745 -- -- -- -- -- -- None (Room air) --   10/17/23 1737 98.1 °F (36.7 °C) 73 16 119/69 -- 98 % None (Room air) Lying       EKG: No EKG obtained.        Results from last 7 days   Lab Units 10/17/23  1753   WBC Thousand/uL 5.01   HEMOGLOBIN g/dL 14.9   HEMATOCRIT % 43.7   PLATELETS Thousands/uL 155   NEUTROS ABS Thousands/µL 3.11       Results from last 7 days   Lab Units 10/17/23  1753   SODIUM mmol/L 138   POTASSIUM mmol/L 3.7   CHLORIDE mmol/L 104   CO2 mmol/L 23   ANION GAP mmol/L 11   BUN mg/dL 11   CREATININE mg/dL 0.77   EGFR ml/min/1.73sq m 124   CALCIUM mg/dL 9.1     Results from last 7 days   Lab Units 10/17/23  1753   AST U/L 13   ALT U/L 17   ALK PHOS U/L 60   TOTAL PROTEIN g/dL 6.5   ALBUMIN g/dL 4.3   TOTAL BILIRUBIN mg/dL 0.61       Results from last 7 days   Lab Units 10/17/23  1753   GLUCOSE RANDOM mg/dL 91         Results from last 7 days   Lab Units 10/17/23  1753   LACTIC ACID mmol/L 0.9       Results from last 7 days   Lab Units 10/17/23  1753   LIPASE u/L <6*       Results from last 7 days   Lab Units 10/17/23  1932   CLARITY UA  Clear   COLOR UA  Yellow   SPEC GRAV UA  1.010   PH UA  8.0   GLUCOSE UA mg/dl Negative   KETONES UA mg/dl >=80 (3+)*   BLOOD UA  Negative   PROTEIN UA mg/dl Negative   NITRITE UA  Negative   BILIRUBIN UA  Small*   UROBILINOGEN UA E.U./dl 1.0   LEUKOCYTES UA  Negative       ED Treatment:   Medication Administration from 10/17/2023 1733 to 10/17/2023 2155         Date/Time Order Dose Route Action     10/17/2023 1803 EDT sodium chloride 0.9 % bolus 1,000 mL 1,000 mL Intravenous New Bag     10/17/2023 1803 EDT ondansetron (ZOFRAN) injection 4 mg 4 mg Intravenous Given     10/17/2023 1803 EDT Famotidine (PF) (PEPCID) injection 20 mg 20 mg Intravenous Given     10/17/2023 1914 EDT droperidol (INAPSINE) injection 0.625 mg 0.625 mg Intravenous Given     10/17/2023 2009 EDT metoclopramide (REGLAN) injection 10 mg 10 mg Intravenous Given     10/17/2023 2009 EDT diphenhydrAMINE (BENADRYL) injection 25 mg 25 mg Intravenous Given            Present on Admission:   Autism   Intractable nausea and vomiting   Chronic constipation   History of urinary retention      Admitting Diagnosis: Vomiting [R11.10]  Abdominal pain [R10.9]  Nausea and vomiting [R11.2]    Age/Sex: 33 y.o. male    Admission Orders: ambulatory; surgical soft diet / lo fiber / lo residue / lo fat    Scheduled Medications:  bisacodyl, 10 mg, Rectal, Daily  erythromycin base, 250 mg, Oral, TID AC  escitalopram, 10 mg, Oral, QAM  pantoprazole, 40 mg, Intravenous, Q12H TK  tamsulosin, 0.8 mg, Oral, Daily With Dinner    diazepam (VALIUM) injection 2.5 mg  Dose: 2.5 mg  Freq: Once Route: IV  Start: 10/17/23 2215 End: 10/17/23 2258     Continuous IV Infusions:  multi-electrolyte, 125 mL/hr, Intravenous, Continuous      PRN Meds:  acetaminophen, 650 mg, Oral, Q6H PRN  hydrOXYzine HCL, 25 mg, Oral, HS PRN  magnesium hydroxide, 30 mL, Oral, Daily PRN  metoclopramide, 10 mg, Intravenous, Q6H PRN  (10/18 rec'd x1)         IP CONSULT TO CASE MANAGEMENT    Network Utilization Review Department  ATTENTION: Please call with any questions or concerns to [phone] and carefully listen to the prompts so that you are directed to the right person. All voicemails are confidential.   For Discharge needs, contact Care Management DC Support Team at [phone] opt. 2  Send all requests for admission clinical reviews, approved or denied determinations and any other requests to dedicated fax number below belonging to the campus where the patient is receiving treatment.  List of dedicated fax numbers for the Facilities:  [place] DENIALS (Administrative/Medical Necessity) [phone]   DISCHARGE SUPPORT TEAM (NETWORK) [id number] [facility] (Maternity/NICU/Pediatrics) [phone]   [address] [place] [address] Drive [phone]   [facility] [address] [facility] [phone]   [facility] [address] [place] [address] [address] Beatrice Ryan [facility]  [address] [phone]

## 2023-10-18 NOTE — ASSESSMENT & PLAN NOTE
Daily family involvement  Supportive cares  Patient endorses there is an anxiety component /stress triggers his abd pain. Cont home lexapro 10 mg qd.   Add Atarax qhs prn while IP

## 2023-10-18 NOTE — PLAN OF CARE
Problem: PAIN - ADULT  Goal: Verbalizes/displays adequate comfort level or baseline comfort level  Description: Interventions:  - Encourage patient to monitor pain and request assistance  - Assess pain using appropriate pain scale  - Administer analgesics based on type and severity of pain and evaluate response  - Implement non-pharmacological measures as appropriate and evaluate response  - Consider cultural and social influences on pain and pain management  - Notify physician/advanced practitioner if interventions unsuccessful or patient reports new pain  Outcome: Progressing     Problem: INFECTION - ADULT  Goal: Absence or prevention of progression during hospitalization  Description: INTERVENTIONS:  - Assess and monitor for signs and symptoms of infection  - Monitor lab/diagnostic results  - Monitor all insertion sites, i.e. indwelling lines, tubes, and drains  - Monitor endotracheal if appropriate and nasal secretions for changes in amount and color  - [place] appropriate cooling/warming therapies per order  - Administer medications as ordered  - Instruct and encourage patient and family to use good hand hygiene technique  - Identify and instruct in appropriate isolation precautions for identified infection/condition  Outcome: Progressing  Goal: Absence of fever/infection during neutropenic period  Description: INTERVENTIONS:  - Monitor WBC    Outcome: Progressing     Problem: SAFETY ADULT  Goal: Maintain or return to baseline ADL function  Description: INTERVENTIONS:  - Educate patient/family on patient safety including physical limitations  - Instruct patient to call for assistance with activity   - Consult OT/PT to assist with strengthening/mobility   - Keep Call bell within reach  - Keep bed low and locked with side rails adjusted as appropriate  - Keep care items and personal belongings within reach  - Initiate and maintain comfort rounds  - Make Fall Risk Sign visible to staff  - Apply yellow socks and bracelet for high fall risk patients  - Consider moving patient to room near nurses station  Outcome: Progressing  Goal: Maintains/Returns to pre admission functional level  Description: INTERVENTIONS:  - Perform BMAT or MOVE assessment daily.   - Set and communicate daily mobility goal to care team and patient/family/caregiver. - Collaborate with rehabilitation services on mobility goals if consulted  - Out of bed for toileting  - Record patient progress and toleration of activity level   Outcome: Progressing     Problem: DISCHARGE PLANNING  Goal: Discharge to home or other facility with appropriate resources  Description: INTERVENTIONS:  - Identify barriers to discharge w/patient and caregiver  - Arrange for needed discharge resources and transportation as appropriate  - Identify discharge learning needs (meds, wound care, etc.)  - Arrange for interpretive services to assist at discharge as needed  - Refer to Case Management Department for coordinating discharge planning if the patient needs post-hospital services based on physician/advanced practitioner order or complex needs related to functional status, cognitive ability, or social support system  Outcome: Progressing     Problem: Knowledge Deficit  Goal: Patient/family/caregiver demonstrates understanding of disease process, treatment plan, medications, and discharge instructions  Description: Complete learning assessment and assess knowledge base.   Interventions:  - Provide teaching at level of understanding  - Provide teaching via preferred learning methods  Outcome: Progressing     Problem: GASTROINTESTINAL - ADULT  Goal: Minimal or absence of nausea and/or vomiting  Description: INTERVENTIONS:  - Administer IV fluids if ordered to ensure adequate hydration  - Maintain NPO status until nausea and vomiting are resolved  - Nasogastric tube if ordered  - Administer ordered antiemetic medications as needed  - Provide nonpharmacologic comfort measures as appropriate  - Advance diet as tolerated, if ordered  - Consider nutrition services referral to assist patient with adequate nutrition and appropriate food choices  Outcome: Progressing  Goal: Maintains or returns to baseline bowel function  Description: INTERVENTIONS:  - Assess bowel function  - Encourage oral fluids to ensure adequate hydration  - Administer IV fluids if ordered to ensure adequate hydration  - Administer ordered medications as needed  - Encourage mobilization and activity  - Consider nutritional services referral to assist patient with adequate nutrition and appropriate food choices  Outcome: Progressing  Goal: Maintains adequate nutritional intake  Description: INTERVENTIONS:  - Monitor percentage of each meal consumed  - Identify factors contributing to decreased intake, treat as appropriate  - Assist with meals as needed  - Monitor I&O, weight, and lab values if indicated  - Obtain nutrition services referral as needed  Outcome: Progressing  Goal: Establish and maintain optimal ostomy function  Description: INTERVENTIONS:  - Assess bowel function  - Encourage oral fluids to ensure adequate hydration  - Administer IV fluids if ordered to ensure adequate hydration   - Administer ordered medications as needed  - Encourage mobilization and activity  - Nutrition services referral to assist patient with appropriate food choices  - Assess stoma site  - Consider wound care consult   Outcome: Progressing  Goal: Oral mucous membranes remain intact  Description: INTERVENTIONS  - Assess oral mucosa and hygiene practices  - Implement preventative oral hygiene regimen  - Implement oral medicated treatments as ordered  - Initiate Nutrition services referral as needed  Outcome: Progressing     Problem: METABOLIC, FLUID AND ELECTROLYTES - ADULT  Goal: Electrolytes maintained within normal limits  Description: INTERVENTIONS:  - Monitor labs and assess patient for signs and symptoms of electrolyte imbalances  - Administer electrolyte replacement as ordered  - Monitor response to electrolyte replacements, including repeat lab results as appropriate  - Instruct patient on fluid and nutrition as appropriate  Outcome: Progressing  Goal: Fluid balance maintained  Description: INTERVENTIONS:  - Monitor labs   - Monitor I/O and WT  - Instruct patient on fluid and nutrition as appropriate  - Assess for signs & symptoms of volume excess or deficit  Outcome: Progressing  Goal: Glucose maintained within target range  Description: INTERVENTIONS:  - Monitor Blood Glucose as ordered  - Assess for signs and symptoms of hyperglycemia and hypoglycemia  - Administer ordered medications to maintain glucose within target range  - Assess nutritional intake and initiate nutrition service referral as needed  Outcome: Progressing

## 2023-10-18 NOTE — H&P
[address] [facility]  H&P  Name: Hansel Lobo 33 y.o. male I MRN: [id number]  Unit/Bed#: [facility] I Date of Admission: 10/17/2023   Date of Service: 10/18/2023 I Hospital Day: 0      Assessment/Plan   * Intractable nausea and vomiting  Assessment & Plan  Recurrent with epigastric pain. Hx of gastritis /coffe ground emesis before. Hx of suspected gastroparesis +/- psychogenic assc with stress +/- IBS  IV Protonix 40 mg BID  IV reglan prn for n/v  Gastroparesis diet of low fiber, small portions and low fat   Cont home erythromycin 250 mg tid   Tylenol prn for abd pain. Can also try IM haldol prn for abd pain/ heating pad. No narcotics given suspected gastroparesis. Limit NSAIDs given h/o gastritis. History of urinary retention  Assessment & Plan  Cont home flomax 0.8 mg qd  Urinary retention protocol    Autism  Assessment & Plan  Daily family involvement  Supportive cares  Patient endorses there is an anxiety component /stress triggers his abd pain. Cont home lexapro 10 mg qd. Add Atarax qhs prn while IP    Chronic constipation  Assessment & Plan  Start Dulcolax suppository daily & milk of mag prn  Eventually on dc resume home regimen: Colace BID, Miralax bid. Dulcolax suppositories prn          VTE Pharmacologic Prophylaxis: VTE Score: 0 Low Risk (Score 0-2) - Encourage Ambulation. Code Status: Level 1 - Full Code   Discussion with family:  patient alone. Anticipated Length of Stay: Patient will be admitted on an observation basis with an anticipated length of stay of less than 2 midnights secondary to ivf, manage sxs until he can tolerate diet and po meds. Total Time Spent on Date of Encounter in care of patient: 55 mins.  This time was spent on one or more of the following: performing physical exam; counseling and coordination of care; obtaining or reviewing history; documenting in the medical record; reviewing/ordering tests, medications or procedures; communicating with other healthcare professionals and discussing with patient's family/caregivers. Chief Complaint: emesis x 1 day    History of Present Illness:  Buster Karimi is a 33 y.o. male with a PMH of ASD, urinary retention, prostatitis, gastroparesis, chronic abdominal pain, recurring nausea and vomiting who presents with vomiting x1 day. Patient reports his last meal was on Sunday, he ate [employer]. Sunday night he had a headache and malaise. Took Tylenol. Went to work at [employer] on Monday, did have nausea at work. Started with epigastric abdominal pain and emesis on Monday night. His last bowel movement was on Monday night which was soft quality, brown-colored. This morning his emesis was brown-colored so the PCP told him to come to the emergency room. Patient reports he has been compliant with his medications since discharge from the hospital last time. The bowel movement he made on Monday night was without the assistance of suppository. Out 10 days ago he did have a few hard stools which resulted in pink blood in the toilet bowl and bright red blood on toilet paper. That lasted about 24 hours and the rectum had pain, but those issues have since resolved. Patient reports a stomach bug has been going around at his work. Patient does endorse stress as a trigger for his abdominal pain. He reports he has had abdominal pain and vomiting issues ever since he was a child. He reports when he has intense discussions with his grandfather he gets abdominal pain during that. Review of Systems:  Review of Systems   Constitutional:  Negative for fever. HENT:  Negative for congestion, postnasal drip, rhinorrhea and sore throat. Eyes: Negative. Respiratory: Negative. Cardiovascular: Negative. Gastrointestinal:  Positive for abdominal pain, anal bleeding, nausea and vomiting. Endocrine: Negative. Genitourinary:  Negative for difficulty urinating and dysuria.    Musculoskeletal:  Negative for myalgias. Skin: Negative. Allergic/Immunologic: Negative. Neurological: Negative. Hematological: Negative. Psychiatric/Behavioral: Negative. Past Medical and Surgical History:   Past Medical History:   Diagnosis Date    Autistic disorder     BPH (benign prostatic hypertrophy) 9/8/22    Gastric paresis     GERD (gastroesophageal reflux disease)     Known health problems: none     Urinary retention 9/8/22    Urinary tract infection        Past Surgical History:   Procedure Laterality Date    EYE SURGERY      FOOT SURGERY         Meds/Allergies:  Prior to Admission medications    Medication Sig Start Date End Date Taking?  Authorizing Provider   docusate sodium (COLACE) 100 mg capsule Take 1 capsule (100 mg total) by mouth 2 (two) times a day 9/19/23  Yes Kelly Akbar PA-C   escitalopram (LEXAPRO) 10 mg tablet Take 1 tablet by mouth every morning 8/4/22  Yes Historical Provider, MD   metoclopramide (Reglan) 10 mg tablet Take 1 tablet (10 mg total) by mouth every 6 (six) hours as needed (Nausea) for up to 10 doses 9/19/23  Yes Kelly Akbar PA-C   pantoprazole (PROTONIX) 40 mg tablet Take 1 tablet (40 mg total) by mouth 2 (two) times a day before meals 9/19/23  Yes Kelly Akbar PA-C   tamsulosin (FLOMAX) 0.4 mg Take 2 capsules (0.8 mg total) by mouth daily with dinner 9/19/23  Yes Kelly Akbar PA-C   bisacodyl (DULCOLAX) 10 mg suppository Insert 1 suppository (10 mg total) into the rectum daily as needed for constipation 9/19/23   Farheen Side SRIRAM Novak   erythromycin base 250 mg tablet Take 1 tablet (250 mg total) by mouth 3 (three) times a day before meals 9/19/23 10/19/23  Farheen Side SRIRAM Novak   fluticasone (FLONASE) 50 mcg/act nasal spray INSTILL 2 SPRAYS TO EACH NOSTRIL DAILY 12/29/22   Historical Provider, MD   loratadine (CLARITIN) 10 mg tablet Take by mouth    Historical Provider, MD   triamcinolone (KENALOG) 0.1 % oral topical paste Apply 1 application to teeth 1/16/23 1/16/24  Historical Provider, MD GONZALEZ have reviewed home medications using recent Epic encounter. Allergies: Allergies   Allergen Reactions    Pepto-Bismol [Bismuth Subsalicylate] GI Intolerance    Amoxicillin Rash    Latex Rash       Social History:  Marital Status: Single   Occupation:  at [employer]  Patient Pre-hospital Living Situation: Home w/ his grandfather  Patient Pre-hospital Level of Mobility: walks  Patient Pre-hospital Diet Restrictions: none  Substance Use History:   Social History     Substance and Sexual Activity   Alcohol Use Not Currently     Social History     Tobacco Use   Smoking Status Never   Smokeless Tobacco Never     Social History     Substance and Sexual Activity   Drug Use Never       Family History:  Family History   Problem Relation Age of Onset    No Known Problems Mother     No Known Problems Father     Heart attack Paternal Grandfather     Hypertension Paternal Grandfather    diverticulosis mother  PUD uncle  No FH of IBS of IBD    Physical Exam:     Vitals:   Blood Pressure: 113/66 (10/17/23 2238)  Pulse: 76 (10/17/23 2238)  Temperature: 97.8 °F (36.6 °C) (10/17/23 2238)  Temp Source: Temporal (10/17/23 2238)  Respirations: 18 (10/17/23 2238)  Height: 6' 1" (185.4 cm) (10/17/23 2238)  Weight - Scale: 88.2 kg (194 lb 7.1 oz) (10/17/23 2238)  SpO2: 96 % (10/17/23 2306)    Physical Exam  Vitals reviewed. Constitutional:       Comments: nad   HENT:      Head: Normocephalic and atraumatic. Nose: Nose normal.      Mouth/Throat:      Mouth: Mucous membranes are moist.   Eyes:      Conjunctiva/sclera: Conjunctivae normal.   Cardiovascular:      Rate and Rhythm: Normal rate and regular rhythm. Pulmonary:      Effort: Pulmonary effort is normal.   Abdominal:      General: Abdomen is flat. There is no distension. Tenderness: There is no guarding. Musculoskeletal:         General: Normal range of motion. Cervical back: Normal range of motion. Skin:     General: Skin is warm and dry. Coloration: Skin is not jaundiced. Findings: No rash. Neurological:      General: No focal deficit present. Mental Status: He is alert. Psychiatric:         Behavior: Behavior normal.          Additional Data:     Lab Results:  Results from last 7 days   Lab Units 10/17/23  1753   WBC Thousand/uL 5.01   HEMOGLOBIN g/dL 14.9   HEMATOCRIT % 43.7   PLATELETS Thousands/uL 155   NEUTROS PCT % 63   LYMPHS PCT % 25   MONOS PCT % 11   EOS PCT % 1     Results from last 7 days   Lab Units 10/17/23  1753   SODIUM mmol/L 138   POTASSIUM mmol/L 3.7   CHLORIDE mmol/L 104   CO2 mmol/L 23   BUN mg/dL 11   CREATININE mg/dL 0.77   ANION GAP mmol/L 11   CALCIUM mg/dL 9.1   ALBUMIN g/dL 4.3   TOTAL BILIRUBIN mg/dL 0.61   ALK PHOS U/L 60   ALT U/L 17   AST U/L 13   GLUCOSE RANDOM mg/dL 91                 Results from last 7 days   Lab Units 10/17/23  1753   LACTIC ACID mmol/L 0.9       Lines/Drains:  Invasive Devices       Peripheral Intravenous Line  Duration             Peripheral IV 10/17/23 Right Antecubital <1 day                        Imaging: No pertinent imaging reviewed. No orders to display       EKG and Other Studies Reviewed on Admission:   EKG: No EKG obtained. ** Please Note: This note has been constructed using a voice recognition system.  **

## 2023-10-18 NOTE — CASE MANAGEMENT
Case Management Assessment & Discharge Planning Note    Patient name Rachael Beck  Location /-79 MRN [id number]  : 1995 Date 10/18/2023       Current Admission Date: 10/17/2023  Current Admission Diagnosis:Intractable nausea and vomiting   Patient Active Problem List    Diagnosis Date Noted    Severe protein-calorie malnutrition ([address]) 2023    Intractable nausea and vomiting 2023    History of urinary retention 2023    Microscopic hematuria 2023    Chronic constipation 2022    Autism 2022    Acute prostatitis 2022    Acute urinary retention 2022    Acute gastritis 2022    Acute bronchitis 2019      LOS (days): 0  Geometric Mean LOS (GMLOS) (days):   Days to GMLOS:     OBJECTIVE:              Current admission status: Observation       Preferred Pharmacy:   [address] [place] [address] [facility] [address], [address] [facility]  [address] [facility] [address]  Phone: [phone] Fax: [address] [facility], [address] [place] [address]  Phone: [phone] Fax: [phone]    Primary Care Provider: Janeth Patel MD    Primary Insurance: [facility]  Secondary Insurance:     ASSESSMENT:  [address] , [facility]   Primary Phone: [phone] (Home)                           Readmission Root Cause  30 Day Readmission: Yes  Who directed you to return to the hospital?: Self  Did you understand whom to contact if you had questions or problems?: Yes  Did you get your prescriptions before you left the hospital?: No  Reason[de-identified] Preference for own pharmacy  Were you able to get your prescriptions filled when you left the hospital?: Yes  Did you take your medications as prescribed?: Yes  Were you able to get to your follow-up appointments?: Yes  During previous admission, was a post-acute recommendation made?: No  Patient was readmitted due to: n/v, constipation  Action Plan: tbd    Patient Information  Admitted from[de-identified] Home  Mental Status: Alert  During Assessment patient was accompanied by: Not accompanied during assessment  Assessment information provided by[de-identified] Patient, Other - please comment (charts)  Primary Caregiver: Family  Caregiver's Name[de-identified] grand fatherAnastacia Relationship to Patient[de-identified] Family Member  Support Systems: Family members  [place] of Residence: [facility] entry access options.  Select all that apply.: Stairs  Number of steps to enter home.: One Flight  Type of Current Residence: 2 story home  Upon entering residence, is there a bedroom on the main floor (no further steps)?: Yes  Upon entering residence, is there a bathroom on the main floor (no further steps)?: Yes  In the last 12 months, was there a time when you were not able to pay the mortgage or rent on time?: No  In the last 12 months, how many places have you lived?: 1  In the last 12 months, was there a time when you did not have a steady place to sleep or slept in a shelter (including now)?: No  Living Arrangements: Lives w/ Extended Family    Activities of Daily Living Prior to Admission  Functional Status: Independent  Completes ADLs independently?: Yes  Ambulates independently?: Yes  Does patient use assisted devices?: No  Does patient currently own DME?: No  Does patient have a history of Outpatient Therapy (PT/OT)?: No  Does the patient have a history of Short-Term Rehab?: No  Does patient have a history of HHC?: No  Does patient currently have [address] [facility]?: No         Patient Information Continued  Income Source: Employed (pt works 20hrs/week at [employer])  Does patient have prescription coverage?: Yes  Within the past 12 months, you worried that your food would run out before you got the money to buy more.: Never true  Within the past 12 months, the food you bought just didn't last and you didn't have money to get more.: Never true  Food insecurity resource given?: No  Does patient receive dialysis treatments?: No  Does patient have a history of substance abuse?: No  Does patient have a history of Mental Health Diagnosis?: Yes (depression)  Is patient receiving treatment for mental health?: Yes  Has patient received inpatient treatment related to mental health in the last 2 years?: No         Means of Transportation  Means of Transport to Appts[de-identified] Family transport  In the past 12 months, has lack of transportation kept you from medical appointments or from getting medications?: No  In the past 12 months, has lack of transportation kept you from meetings, work, or from getting things needed for daily living?: No  Was application for public transport provided?: No        DISCHARGE DETAILS:       Freedom of Choice: Yes

## 2023-10-19 PROCEDURE — C9113 INJ PANTOPRAZOLE SODIUM, VIA: HCPCS | Performed by: PHYSICIAN ASSISTANT

## 2023-10-19 PROCEDURE — 99232 SBSQ HOSP IP/OBS MODERATE 35: CPT | Performed by: FAMILY MEDICINE

## 2023-10-19 RX ADMIN — ACETAMINOPHEN 650 MG: 325 TABLET, FILM COATED ORAL at 23:09

## 2023-10-19 RX ADMIN — POLYETHYLENE GLYCOL 3350 17 G: 17 POWDER, FOR SOLUTION ORAL at 16:29

## 2023-10-19 RX ADMIN — SODIUM CHLORIDE, SODIUM GLUCONATE, SODIUM ACETATE, POTASSIUM CHLORIDE, MAGNESIUM CHLORIDE, SODIUM PHOSPHATE, DIBASIC, AND POTASSIUM PHOSPHATE 75 ML/HR: .53; .5; .37; .037; .03; .012; .00082 INJECTION, SOLUTION INTRAVENOUS at 06:38

## 2023-10-19 RX ADMIN — PANTOPRAZOLE SODIUM 40 MG: 40 INJECTION, POWDER, FOR SOLUTION INTRAVENOUS at 20:22

## 2023-10-19 RX ADMIN — ERYTHROMYCIN 250 MG: 250 TABLET, FILM COATED ORAL at 12:17

## 2023-10-19 RX ADMIN — POLYETHYLENE GLYCOL 3350 17 G: 17 POWDER, FOR SOLUTION ORAL at 09:19

## 2023-10-19 RX ADMIN — BISACODYL 10 MG: 10 SUPPOSITORY RECTAL at 09:19

## 2023-10-19 RX ADMIN — ESCITALOPRAM OXALATE 10 MG: 10 TABLET ORAL at 09:19

## 2023-10-19 RX ADMIN — ERYTHROMYCIN 250 MG: 250 TABLET, FILM COATED ORAL at 09:19

## 2023-10-19 RX ADMIN — TAMSULOSIN HYDROCHLORIDE 0.8 MG: 0.4 CAPSULE ORAL at 16:29

## 2023-10-19 RX ADMIN — PANTOPRAZOLE SODIUM 40 MG: 40 INJECTION, POWDER, FOR SOLUTION INTRAVENOUS at 09:19

## 2023-10-19 RX ADMIN — ERYTHROMYCIN 250 MG: 250 TABLET, FILM COATED ORAL at 16:29

## 2023-10-19 NOTE — ASSESSMENT & PLAN NOTE
Daily family involvement  Supportive cares  Patient endorses there is an anxiety component /stress triggers his abd pain. Cont home lexapro 10 mg qd.

## 2023-10-19 NOTE — ASSESSMENT & PLAN NOTE
Recurrent with epigastric pain. Hx of gastritis /coffe ground emesis before.  Hx of suspected gastroparesis +/- psychogenic assc with stress +/- IBS  IV Protonix 40 mg BID  IV reglan prn for n/v  Gastroparesis diet of low fiber, small portions and low fat   Cont home erythromycin 250 mg tid   Just had egd with some mild gastritis all started after eating arbys-  I did do an obstruction series which did not show any ileus or obstruction just some stool he finally had a bowel movement he is tolerating all meals without any significant nausea or vomiting abdomen is nontender discontinue fluids

## 2023-10-19 NOTE — PLAN OF CARE
Problem: PAIN - ADULT  Goal: Verbalizes/displays adequate comfort level or baseline comfort level  Description: Interventions:  - Encourage patient to monitor pain and request assistance  - Assess pain using appropriate pain scale  - Administer analgesics based on type and severity of pain and evaluate response  - Implement non-pharmacological measures as appropriate and evaluate response  - Consider cultural and social influences on pain and pain management  - Notify physician/advanced practitioner if interventions unsuccessful or patient reports new pain  Outcome: Progressing     Problem: INFECTION - ADULT  Goal: Absence or prevention of progression during hospitalization  Description: INTERVENTIONS:  - Assess and monitor for signs and symptoms of infection  - Monitor lab/diagnostic results  - Monitor all insertion sites, i.e. indwelling lines, tubes, and drains  - Monitor endotracheal if appropriate and nasal secretions for changes in amount and color  - [place] appropriate cooling/warming therapies per order  - Administer medications as ordered  - Instruct and encourage patient and family to use good hand hygiene technique  - Identify and instruct in appropriate isolation precautions for identified infection/condition  Outcome: Progressing  Goal: Absence of fever/infection during neutropenic period  Description: INTERVENTIONS:  - Monitor WBC    Outcome: Progressing     Problem: SAFETY ADULT  Goal: Maintain or return to baseline ADL function  Description: INTERVENTIONS:  - Educate patient/family on patient safety including physical limitations  - Instruct patient to call for assistance with activity   - Consult OT/PT to assist with strengthening/mobility   - Keep Call bell within reach  - Keep bed low and locked with side rails adjusted as appropriate  - Keep care items and personal belongings within reach  - Initiate and maintain comfort rounds  - Make Fall Risk Sign visible to staff  - Apply yellow socks and bracelet for high fall risk patients  - Consider moving patient to room near nurses station  Outcome: Progressing  Goal: Maintains/Returns to pre admission functional level  Description: INTERVENTIONS:  - Perform BMAT or MOVE assessment daily.   - Set and communicate daily mobility goal to care team and patient/family/caregiver. - Collaborate with rehabilitation services on mobility goals if consulted  - Perform Range of Motion  times a day. - Reposition patient every  hours. - Dangle patient  times a day  - Stand patient  times a day  - Ambulate patient  times a day  - Out of bed to chair  times a day   - Out of bed for meals times a day  - Out of bed for toileting  - Record patient progress and toleration of activity level   Outcome: Progressing     Problem: DISCHARGE PLANNING  Goal: Discharge to home or other facility with appropriate resources  Description: INTERVENTIONS:  - Identify barriers to discharge w/patient and caregiver  - Arrange for needed discharge resources and transportation as appropriate  - Identify discharge learning needs (meds, wound care, etc.)  - Arrange for interpretive services to assist at discharge as needed  - Refer to Case Management Department for coordinating discharge planning if the patient needs post-hospital services based on physician/advanced practitioner order or complex needs related to functional status, cognitive ability, or social support system  Outcome: Progressing     Problem: Knowledge Deficit  Goal: Patient/family/caregiver demonstrates understanding of disease process, treatment plan, medications, and discharge instructions  Description: Complete learning assessment and assess knowledge base.   Interventions:  - Provide teaching at level of understanding  - Provide teaching via preferred learning methods  Outcome: Progressing     Problem: GASTROINTESTINAL - ADULT  Goal: Minimal or absence of nausea and/or vomiting  Description: INTERVENTIONS:  - Administer IV fluids if ordered to ensure adequate hydration  - Maintain NPO status until nausea and vomiting are resolved  - Nasogastric tube if ordered  - Administer ordered antiemetic medications as needed  - Provide nonpharmacologic comfort measures as appropriate  - Advance diet as tolerated, if ordered  - Consider nutrition services referral to assist patient with adequate nutrition and appropriate food choices  Outcome: Progressing  Goal: Maintains or returns to baseline bowel function  Description: INTERVENTIONS:  - Assess bowel function  - Encourage oral fluids to ensure adequate hydration  - Administer IV fluids if ordered to ensure adequate hydration  - Administer ordered medications as needed  - Encourage mobilization and activity  - Consider nutritional services referral to assist patient with adequate nutrition and appropriate food choices  Outcome: Progressing  Goal: Maintains adequate nutritional intake  Description: INTERVENTIONS:  - Monitor percentage of each meal consumed  - Identify factors contributing to decreased intake, treat as appropriate  - Assist with meals as needed  - Monitor I&O, weight, and lab values if indicated  - Obtain nutrition services referral as needed  Outcome: Progressing  Goal: Establish and maintain optimal ostomy function  Description: INTERVENTIONS:  - Assess bowel function  - Encourage oral fluids to ensure adequate hydration  - Administer IV fluids if ordered to ensure adequate hydration   - Administer ordered medications as needed  - Encourage mobilization and activity  - Nutrition services referral to assist patient with appropriate food choices  - Assess stoma site  - Consider wound care consult   Outcome: Progressing  Goal: Oral mucous membranes remain intact  Description: INTERVENTIONS  - Assess oral mucosa and hygiene practices  - Implement preventative oral hygiene regimen  - Implement oral medicated treatments as ordered  - Initiate Nutrition services referral as needed  Outcome: Progressing     Problem: METABOLIC, FLUID AND ELECTROLYTES - ADULT  Goal: Electrolytes maintained within normal limits  Description: INTERVENTIONS:  - Monitor labs and assess patient for signs and symptoms of electrolyte imbalances  - Administer electrolyte replacement as ordered  - Monitor response to electrolyte replacements, including repeat lab results as appropriate  - Instruct patient on fluid and nutrition as appropriate  Outcome: Progressing  Goal: Fluid balance maintained  Description: INTERVENTIONS:  - Monitor labs   - Monitor I/O and WT  - Instruct patient on fluid and nutrition as appropriate  - Assess for signs & symptoms of volume excess or deficit  Outcome: Progressing  Goal: Glucose maintained within target range  Description: INTERVENTIONS:  - Monitor Blood Glucose as ordered  - Assess for signs and symptoms of hyperglycemia and hypoglycemia  - Administer ordered medications to maintain glucose within target range  - Assess nutritional intake and initiate nutrition service referral as needed  Outcome: Progressing

## 2023-10-19 NOTE — ASSESSMENT & PLAN NOTE
Start Dulcolax suppository daily & milk of mag prn  Eventually on dc resume home regimen: Colace BID, Miralax bid.  Dulcolax suppositories prn   Had bm today as axr evident of stool

## 2023-10-19 NOTE — PROGRESS NOTES
[facility]  Progress Note  Name: Inessa Moore  MRN: [id number]  Unit/Bed#: -Jadiel I Date of Admission: 10/17/2023   Date of Service: 10/19/2023 I Hospital Day: 0    Assessment/Plan   * Intractable nausea and vomiting  Assessment & Plan  Recurrent with epigastric pain. Hx of gastritis /coffe ground emesis before. Hx of suspected gastroparesis +/- psychogenic assc with stress +/- IBS  IV Protonix 40 mg BID  IV reglan prn for n/v  Gastroparesis diet of low fiber, small portions and low fat   Cont home erythromycin 250 mg tid   Just had egd with some mild gastritis all started after eating arbys-  I did do an obstruction series which did not show any ileus or obstruction just some stool he finally had a bowel movement he is tolerating all meals without any significant nausea or vomiting abdomen is nontender discontinue fluids    History of urinary retention  Assessment & Plan  Cont home flomax 0.8 mg qd  Urinary retention protocol    Autism  Assessment & Plan  Daily family involvement  Supportive cares  Patient endorses there is an anxiety component /stress triggers his abd pain. Cont home lexapro 10 mg qd. Chronic constipation  Assessment & Plan  Start Dulcolax suppository daily & milk of mag prn  Eventually on dc resume home regimen: Colace BID, Miralax bid. Dulcolax suppositories prn   Had bm today as axr evident of stool                 VTE Pharmacologic Prophylaxis: VTE Score: 0 Low Risk (Score 0-2) - Encourage Ambulation. Patient Centered Rounds: I performed bedside rounds with nursing staff today. Discussions with Specialists or Other Care Team Provider: sachin    Education and Discussions with Family / Patient: Patient declined call to . Total Time Spent on Date of Encounter in care of patient: >35 mins.  This time was spent on one or more of the following: performing physical exam; counseling and coordination of care; obtaining or reviewing history; documenting in the medical record; reviewing/ordering tests, medications or procedures; communicating with other healthcare professionals and discussing with patient's family/caregivers. Current Length of Stay: 0 day(s)  Current Patient Status: Inpatient   Certification Statement: The patient will continue to require additional inpatient hospital stay due to nausea vomiting  Discharge Plan: Anticipate discharge tomorrow to home. Code Status: Level 1 - Full Code    Subjective:   Patient seen and examined feeling much better today tolerating meals no abdominal pain had a BM no vomiting nausea has improved    Objective:     Vitals:   Temp (24hrs), Av.4 °F (36.9 °C), Min:98.1 °F (36.7 °C), Max:98.6 °F (37 °C)    Temp:  [98.1 °F (36.7 °C)-98.6 °F (37 °C)] 98.1 °F (36.7 °C)  HR:  [58-79] 77  Resp:  [16-18] 16  BP: (110-123)/(72-77) 110/73  SpO2:  [95 %-97 %] 95 %  Body mass index is 25.65 kg/m². Input and Output Summary (last 24 hours): Intake/Output Summary (Last 24 hours) at 10/19/2023 1242  Last data filed at 10/19/2023 7182  Gross per 24 hour   Intake --   Output 1175 ml   Net -1175 ml       Physical Exam:   Physical Exam  Vitals and nursing note reviewed. Constitutional:       General: He is not in acute distress. Appearance: He is well-developed. HENT:      Head: Normocephalic and atraumatic. Eyes:      Conjunctiva/sclera: Conjunctivae normal.   Cardiovascular:      Rate and Rhythm: Normal rate and regular rhythm. Heart sounds: No murmur heard. Pulmonary:      Effort: Pulmonary effort is normal. No respiratory distress. Breath sounds: Normal breath sounds. No wheezing or rales. Abdominal:      General: There is no distension. Palpations: Abdomen is soft. Tenderness: There is no abdominal tenderness. Musculoskeletal:         General: No swelling. Cervical back: Neck supple. Skin:     General: Skin is warm and dry.       Capillary Refill: Capillary refill takes less than 2 seconds. Neurological:      Mental Status: He is alert. Mental status is at baseline.    Psychiatric:         Mood and Affect: Mood normal.          Additional Data:     Labs:  Results from last 7 days   Lab Units 10/17/23  1753   WBC Thousand/uL 5.01   HEMOGLOBIN g/dL 14.9   HEMATOCRIT % 43.7   PLATELETS Thousands/uL 155   NEUTROS PCT % 63   LYMPHS PCT % 25   MONOS PCT % 11   EOS PCT % 1     Results from last 7 days   Lab Units 10/17/23  1753   SODIUM mmol/L 138   POTASSIUM mmol/L 3.7   CHLORIDE mmol/L 104   CO2 mmol/L 23   BUN mg/dL 11   CREATININE mg/dL 0.77   ANION GAP mmol/L 11   CALCIUM mg/dL 9.1   ALBUMIN g/dL 4.3   TOTAL BILIRUBIN mg/dL 0.61   ALK PHOS U/L 60   ALT U/L 17   AST U/L 13   GLUCOSE RANDOM mg/dL 91                 Results from last 7 days   Lab Units 10/17/23  1753   LACTIC ACID mmol/L 0.9       Lines/Drains:  Invasive Devices       Peripheral Intravenous Line  Duration             Peripheral IV 10/17/23 Right Antecubital 1 day                          Imaging: Reviewed radiology reports from this admission including: xray(s)    Recent Cultures (last 7 days):         Last 24 Hours Medication List:   Current Facility-Administered Medications   Medication Dose Route Frequency Provider Last Rate    acetaminophen  650 mg Oral Q6H PRN Yessi Coateso, PA-C      bisacodyl  10 mg Rectal Daily Yessi Coateso, PA-C      erythromycin base  250 mg Oral TID AC Yessijerzy Coateso, PA-C      escitalopram  10 mg Oral QAM Yessi Coateso, PA-C      hydrOXYzine HCL  25 mg Oral HS PRN Yessijerzy Coateso, PA-C      magnesium hydroxide  30 mL Oral Daily PRN Yessijerzy Coateso, PA-C      metoclopramide  10 mg Intravenous Q6H PRN Yessijerzy Coateso, PA-C      pantoprazole  40 mg Intravenous Q12H [address] Yessi Cote, PA-C      polyethylene glycol  17 g Oral BID Larry Spann MD      tamsulosin  0.8 mg Oral Daily With [place] PriSRIRAM freeman          Today, Patient Was Seen By: Larry Spann MD    **Please Note: This note may have been constructed using a voice recognition system. **

## 2023-10-20 VITALS
TEMPERATURE: 98.4 F | OXYGEN SATURATION: 95 % | WEIGHT: 194.45 LBS | HEART RATE: 77 BPM | BODY MASS INDEX: 25.77 KG/M2 | DIASTOLIC BLOOD PRESSURE: 75 MMHG | SYSTOLIC BLOOD PRESSURE: 126 MMHG | RESPIRATION RATE: 17 BRPM | HEIGHT: 73 IN

## 2023-10-20 PROCEDURE — C9113 INJ PANTOPRAZOLE SODIUM, VIA: HCPCS | Performed by: PHYSICIAN ASSISTANT

## 2023-10-20 PROCEDURE — 99239 HOSP IP/OBS DSCHRG MGMT >30: CPT | Performed by: FAMILY MEDICINE

## 2023-10-20 RX ORDER — ERYTHROMYCIN 250 MG/1
250 TABLET, COATED ORAL
Qty: 90 TABLET | Refills: 0 | Status: SHIPPED | OUTPATIENT
Start: 2023-10-20 | End: 2023-11-19

## 2023-10-20 RX ORDER — POLYETHYLENE GLYCOL 3350 17 G/17G
17 POWDER, FOR SOLUTION ORAL 2 TIMES DAILY
Qty: 1020 G | Refills: 0 | Status: SHIPPED | OUTPATIENT
Start: 2023-10-20 | End: 2023-11-19

## 2023-10-20 RX ADMIN — PANTOPRAZOLE SODIUM 40 MG: 40 INJECTION, POWDER, FOR SOLUTION INTRAVENOUS at 08:22

## 2023-10-20 RX ADMIN — ERYTHROMYCIN 250 MG: 250 TABLET, FILM COATED ORAL at 05:29

## 2023-10-20 RX ADMIN — ERYTHROMYCIN 250 MG: 250 TABLET, FILM COATED ORAL at 11:03

## 2023-10-20 RX ADMIN — ESCITALOPRAM OXALATE 10 MG: 10 TABLET ORAL at 08:21

## 2023-10-20 NOTE — QUICK NOTE
See if that erythromycin needs prior authorization. Patient was started on erythromycin which helped his symptoms as Reglan did not last admission and a prescription was supposed to be sent and I called the pharmacy to verify the prescription was never sent and patient never had this medication and actually seems like he lied about taking it as he never had it.   Will be obtaining prior auth for erythromycin as reglan did not help  While on it will need periodic EKG checks with pcp

## 2023-10-20 NOTE — NURSING NOTE
AVS reviewed with patient. Patient verbalized understanding of same. Note provided for return to work.

## 2023-10-20 NOTE — UTILIZATION REVIEW
NOTIFICATION OF INPATIENT ADMISSION   AUTHORIZATION REQUEST   SERVICING FACILITY:   [address]  Tax ID: [id number]  NPI: [id number] ATTENDING PROVIDER:  Attending Name and NPI#: Eduarda Montaño Md [[id number]]  Address: [address] [place] [address]  Phone: [phone]   ADMISSION INFORMATION:  Place of Service: Inpatient [address]  Place of Service Code: 21  Inpatient Admission Date/Time: 10/19/23 12:39 PM  Discharge Date/Time: No discharge date for patient encounter. Admitting Diagnosis Code/Description:  Vomiting [R11.10]  Abdominal pain [R10.9]  Nausea and vomiting [R11.2]     UTILIZATION REVIEW CONTACT:  Aster Perera Utilization   Network Utilization Review Department  Phone: [phone]  Fax [phone]  Email: [email or web address]  Contact for approvals/pending authorizations, clinical reviews, and discharge. PHYSICIAN ADVISORY SERVICES:  Medical Necessity Denial & Tntz-xt-Ntri Review  Phone: [phone]  Fax: [phone]  Email: [email or web address]     DISCHARGE SUPPORT TEAM:  For Patients Discharge Needs & Updates  Phone: [phone] opt. 2 Fax: [phone]  Email: [email or web address]

## 2023-10-20 NOTE — CASE MANAGEMENT
Case Management Discharge Planning Note    Patient name Buster Karimi  Location /-94 MRN [id number]  : 1995 Date 10/20/2023       Current Admission Date: 10/17/2023  Current Admission Diagnosis:Intractable nausea and vomiting   Patient Active Problem List    Diagnosis Date Noted    Severe protein-calorie malnutrition ([address]) 2023    Intractable nausea and vomiting 2023    History of urinary retention 2023    Microscopic hematuria 2023    Chronic constipation 2022    Autism 2022    Acute prostatitis 2022    Acute urinary retention 2022    Acute gastritis 2022    Acute bronchitis 2019      LOS (days): 1  Geometric Mean LOS (GMLOS) (days):   Days to GMLOS:     OBJECTIVE:  Risk of Unplanned Readmission Score: 11.83         Current admission status: Inpatient   Preferred Pharmacy:   [address] [place] [address], [address] [place] [address],[address]  [address] [place] [id number]  Phone: [phone] Fax: [address] [facility], [address] [address]  Phone: [phone] Fax: [phone]    Primary Care Provider: Manjeet Dorado MD    Primary Insurance: [employer]  Secondary Insurance:     DISCHARGE DETAILS:           Cm making a follow up PCP appointment for pt on  at 0830, AVS updated

## 2023-10-20 NOTE — PLAN OF CARE
Problem: PAIN - ADULT  Goal: Verbalizes/displays adequate comfort level or baseline comfort level  Description: Interventions:  - Encourage patient to monitor pain and request assistance  - Assess pain using appropriate pain scale  - Administer analgesics based on type and severity of pain and evaluate response  - Implement non-pharmacological measures as appropriate and evaluate response  - Consider cultural and social influences on pain and pain management  - Notify physician/advanced practitioner if interventions unsuccessful or patient reports new pain  Outcome: Progressing     Problem: INFECTION - ADULT  Goal: Absence or prevention of progression during hospitalization  Description: INTERVENTIONS:  - Assess and monitor for signs and symptoms of infection  - Monitor lab/diagnostic results  - Monitor all insertion sites, i.e. indwelling lines, tubes, and drains  - Monitor endotracheal if appropriate and nasal secretions for changes in amount and color  - [place] appropriate cooling/warming therapies per order  - Administer medications as ordered  - Instruct and encourage patient and family to use good hand hygiene technique  - Identify and instruct in appropriate isolation precautions for identified infection/condition  Outcome: Progressing  Goal: Absence of fever/infection during neutropenic period  Description: INTERVENTIONS:  - Monitor WBC    Outcome: Progressing     Problem: DISCHARGE PLANNING  Goal: Discharge to home or other facility with appropriate resources  Description: INTERVENTIONS:  - Identify barriers to discharge w/patient and caregiver  - Arrange for needed discharge resources and transportation as appropriate  - Identify discharge learning needs (meds, wound care, etc.)  - Arrange for interpretive services to assist at discharge as needed  - Refer to Case Management Department for coordinating discharge planning if the patient needs post-hospital services based on physician/advanced practitioner order or complex needs related to functional status, cognitive ability, or social support system  Outcome: Progressing     Problem: Knowledge Deficit  Goal: Patient/family/caregiver demonstrates understanding of disease process, treatment plan, medications, and discharge instructions  Description: Complete learning assessment and assess knowledge base.   Interventions:  - Provide teaching at level of understanding  - Provide teaching via preferred learning methods  Outcome: Progressing

## 2023-10-20 NOTE — ASSESSMENT & PLAN NOTE
Recurrent with epigastric pain. Hx of gastritis /coffe ground emesis before. Hx of suspected gastroparesis +/- psychogenic assc with stress +/- IBS  Protonix 40 mg p.o. twice daily  Gastroparesis diet of low fiber, small portions and low fat   Cont home erythromycin 250 mg tid   Just had egd with some mild gastritis all started after eating arbys-  Obstruction series did show evidence of stool. Had multiple bowel movements eating and drinking without nausea vomiting or abdominal pain. Discharged home on erythromycin Protonix twice daily discussed with him to try to avoid greasy fried meals as this is what happened after that meal.  Small meals multiple times a day.

## 2023-10-20 NOTE — DISCHARGE SUMMARY
[address] [facility]  Discharge- Artur Day 1995, 33 y.o. male MRN: [id number]  Unit/Bed#: -Jadiel Encounter: [id number]  Primary Care Provider: Verito Hurley MD   Date and time admitted to hospital: 10/17/2023  5:35 PM    * Intractable nausea and vomiting  Assessment & Plan  Recurrent with epigastric pain. Hx of gastritis /coffe ground emesis before. Hx of suspected gastroparesis +/- psychogenic assc with stress +/- IBS  Protonix 40 mg p.o. twice daily  Gastroparesis diet of low fiber, small portions and low fat   Cont home erythromycin 250 mg tid   Just had egd with some mild gastritis all started after eating [employer]-  Obstruction series did show evidence of stool. Had multiple bowel movements eating and drinking without nausea vomiting or abdominal pain. Discharged home on erythromycin Protonix twice daily discussed with him to try to avoid greasy fried meals as this is what happened after that meal.  Small meals multiple times a day. History of urinary retention  Assessment & Plan  Cont home flomax 0.8 mg qd  Urinary retention protocol    Autism  Assessment & Plan  Daily family involvement  Supportive cares  Patient endorses there is an anxiety component /stress triggers his abd pain. Cont home lexapro 10 mg qd. Chronic constipation  Assessment & Plan  Start Dulcolax suppository daily & milk of mag prn  Eventually on dc resume home regimen: Colace BID, Miralax bid.  Dulcolax suppositories prn   Had bm today as axr evident of stool          Medical Problems       Resolved Problems  Date Reviewed: 10/20/2023   None       Discharging Physician / Practitioner: Kayc Keller MD  PCP: Verito Hurley MD  Admission Date:   Admission Orders (From admission, onward)       Ordered        10/19/23 1239  Inpatient Admission  Once            10/17/23 2117  Place in Observation  Once                          Discharge Date: 10/20/23    [address] [facility] [place] [facility]"" Stay:  none    Procedures Performed:   none    Significant Findings / Test Results:   Axr-occasional nondistended gas-filled small bowel loops and colon with small amount of colonic fecal material    Incidental Findings:   none    Test Results Pending at Discharge (will require follow up):   none     Outpatient Tests Requested:  none    Complications:  none    Reason for Admission: Nausea vomiting and epigastric pain    Hospital Course:   Mini Huerta is a 33 y.o. male patient who originally presented to the hospital on 10/17/2023 due to nausea vomiting and epigastric pain which has been recurrent after he ate [employer]. Admitted without any further vomiting with improvement in nausea also evidence of constipation which contributed to it last time started on MiraLAX twice daily with other laxatives had multiple bowel movements he was able to tolerate diet without any evidence of abdominal tenderness no evidence of nausea and he recently just had an EGD with a previous multiple GI work-up and just was found to have mild amount of gastritis continue erythromycin 3 times a day with meals Protonix twice a day otherwise medical cleared to be discharged        Please see above list of diagnoses and related plan for additional information. Condition at Discharge: stable    Discharge Day Visit / Exam:   Subjective: Patient seen and examined denies any abdominal pain nausea vomiting tolerated all meals multiple bowel movements  Vitals: Blood Pressure: 126/75 (10/20/23 0731)  Pulse: 77 (10/20/23 0736)  Temperature: 98.4 °F (36.9 °C) (10/20/23 0731)  Temp Source: Temporal (10/18/23 1517)  Respirations: 17 (10/20/23 0731)  Height: 6' 1" (185.4 cm) (10/17/23 2238)  Weight - Scale: 88.2 kg (194 lb 7.1 oz) (10/17/23 2238)  SpO2: 95 % (10/20/23 0731)  Exam:   Physical Exam  Vitals and nursing note reviewed. Constitutional:       General: He is not in acute distress. Appearance: He is well-developed.    HENT:      Head: Normocephalic and atraumatic. Eyes:      Conjunctiva/sclera: Conjunctivae normal.   Cardiovascular:      Rate and Rhythm: Normal rate and regular rhythm. Heart sounds: No murmur heard. Pulmonary:      Effort: Pulmonary effort is normal. No respiratory distress. Breath sounds: Normal breath sounds. No wheezing or rales. Abdominal:      General: There is no distension. Palpations: Abdomen is soft. Tenderness: There is no abdominal tenderness. Musculoskeletal:         General: No swelling. Cervical back: Neck supple. Skin:     General: Skin is warm and dry. Capillary Refill: Capillary refill takes less than 2 seconds. Neurological:      Mental Status: He is alert. Psychiatric:         Mood and Affect: Mood normal.          Discussion with Family: Patient declined call to . Discharge instructions/Information to patient and family:   See after visit summary for information provided to patient and family. Provisions for Follow-Up Care:  See after visit summary for information related to follow-up care and any pertinent home health orders. Disposition:   Home    Planned Readmission: Anticipate no     Discharge Statement:  I spent >35 minutes discharging the patient. This time was spent on the day of discharge. I had direct contact with the patient on the day of discharge. Greater than 50% of the total time was spent examining patient, answering all patient questions, arranging and discussing plan of care with patient as well as directly providing post-discharge instructions. Additional time then spent on discharge activities. Discharge Medications:  See after visit summary for reconciled discharge medications provided to patient and/or family.       **Please Note: This note may have been constructed using a voice recognition system**

## 2023-10-23 NOTE — UTILIZATION REVIEW
NOTIFICATION OF ADMISSION DISCHARGE   This is a Notification of Discharge from [address] [facility]. Please be advised that this patient has been discharge from our facility. Below you will find the admission and discharge date and time including the patient’s disposition. UTILIZATION REVIEW CONTACT:  Naomi Álvarez  Utilization   Network Utilization Review Department  Phone: [phone] x carefully listen to the prompts. All voicemails are confidential.  Email: [email or web address]     ADMISSION INFORMATION  PRESENTATION DATE: 10/17/2023  5:35 PM  OBERVATION ADMISSION DATE:   INPATIENT ADMISSION DATE: 10/19/23 12:39 PM   DISCHARGE DATE: 10/20/2023 12:00 PM   DISPOSITION:Home/Self Care    Network Utilization Review Department  ATTENTION: Please call with any questions or concerns to [phone] and carefully listen to the prompts so that you are directed to the right person. All voicemails are confidential.   For Discharge needs, contact Care Management DC Support Team at [phone] opt. 2  Send all requests for admission clinical reviews, approved or denied determinations and any other requests to dedicated fax number below belonging to the campus where the patient is receiving treatment.  List of dedicated fax numbers for the Facilities:  [place] DENIALS (Administrative/Medical Necessity) [phone]   DISCHARGE SUPPORT TEAM (Network) [phone] [facility] [phone] [address] [facility] [address] [place] [address] [address]  [phone]

## 2023-12-01 ENCOUNTER — TELEPHONE (OUTPATIENT)
Dept: UROLOGY | Facility: CLINIC | Age: 28
End: 2023-12-01

## 2023-12-01 DIAGNOSIS — R33.8 ACUTE URINARY RETENTION: ICD-10-CM

## 2023-12-01 RX ORDER — TAMSULOSIN HYDROCHLORIDE 0.4 MG/1
0.8 CAPSULE ORAL
Qty: 60 CAPSULE | Refills: 6 | Status: SHIPPED | OUTPATIENT
Start: 2023-12-01

## 2023-12-01 NOTE — TELEPHONE ENCOUNTER
----- Message from Kajal Butcher sent at 12/1/2023  2:04 PM EST -----  Regarding: Flomax Refills   Contact: [phone]  Hello! I was just curious if I could get some refills on my Flomax. I have been out for a few weeks. Thank You!

## 2024-01-03 ENCOUNTER — OFFICE VISIT (OUTPATIENT)
Dept: URGENT CARE | Facility: CLINIC | Age: 29
End: 2024-01-03
Payer: COMMERCIAL

## 2024-01-03 VITALS
WEIGHT: 180 LBS | OXYGEN SATURATION: 100 % | HEART RATE: 107 BPM | BODY MASS INDEX: 27.28 KG/M2 | RESPIRATION RATE: 18 BRPM | DIASTOLIC BLOOD PRESSURE: 67 MMHG | TEMPERATURE: 100.7 F | HEIGHT: 68 IN | SYSTOLIC BLOOD PRESSURE: 131 MMHG

## 2024-01-03 DIAGNOSIS — R68.89 FLU-LIKE SYMPTOMS: Primary | ICD-10-CM

## 2024-01-03 DIAGNOSIS — R11.10 POST-TUSSIVE VOMITING: ICD-10-CM

## 2024-01-03 LAB
SARS-COV-2 AG UPPER RESP QL IA: NEGATIVE
VALID CONTROL: NORMAL

## 2024-01-03 PROCEDURE — 99213 OFFICE O/P EST LOW 20 MIN: CPT | Performed by: PHYSICIAN ASSISTANT

## 2024-01-03 PROCEDURE — 87811 SARS-COV-2 COVID19 W/OPTIC: CPT | Performed by: PHYSICIAN ASSISTANT

## 2024-01-03 RX ORDER — AZITHROMYCIN 250 MG/1
TABLET, FILM COATED ORAL
Qty: 6 TABLET | Refills: 0 | Status: SHIPPED | OUTPATIENT
Start: 2024-01-03 | End: 2024-01-07

## 2024-01-03 NOTE — PROGRESS NOTES
[facility]        NAME: Getachew Luna is a 28 y.o. male  : 1995    MRN: [id number]  DATE: January 3, 2024  TIME: 4:54 PM    Assessment and Plan   Flu-like symptoms [R68.89]  1. Flu-like symptoms  Poct Covid 19 Rapid Antigen Test      2. Post-tussive vomiting  azithromycin (ZITHROMAX) 250 mg tablet            Patient Instructions   Drink plenty of fluids.  May use over the counter cold medications for symptomatic treatment. Do not use medications with Pseudoephedrine or Phenylphrine if you have high blood pressure because it may worsen your blood pressure. Follow up with your PCP in 3-5 days if your symptoms do not improve or if you have any concerns. Go to the ER if symptoms become severe.      Follow up with PCP in 3-5 days.  Proceed to  ER if symptoms worsen.    Chief Complaint     Chief Complaint   Patient presents with    Cough     Symptoms started today with a cough         History of Present Illness       Patient is a 28-year-old male with past medical history of autism since the office c/o of cough since today. States he gets strong coughing fits that make him gag and sometimes vomit. Denies fevers, chills, congestion, sore throat, ear pain, nausea, vomiting, abdominal pain, or rashes.        Review of Systems   Review of Systems   Constitutional:  Negative for fever.   HENT:  Negative for congestion and sore throat.    Respiratory:  Positive for cough. Negative for shortness of breath.    Cardiovascular:  Negative for chest pain and palpitations.   Gastrointestinal:  Negative for abdominal pain, diarrhea, nausea and vomiting.   Musculoskeletal:  Positive for myalgias.   Skin:  Negative for rash.   Neurological:  Positive for headaches. Negative for dizziness and light-headedness.         Current Medications       Current Outpatient Medications:     azithromycin (ZITHROMAX) 250 mg tablet, Take 2 tablets today then 1 tablet daily x 4 days, Disp: 6 tablet, Rfl: 0    bisacodyl (DULCOLAX) 10  mg suppository, Insert 1 suppository (10 mg total) into the rectum daily as needed for constipation, Disp: 12 suppository, Rfl: 0    docusate sodium (COLACE) 100 mg capsule, Take 1 capsule (100 mg total) by mouth 2 (two) times a day, Disp: 60 capsule, Rfl: 0    escitalopram (LEXAPRO) 10 mg tablet, Take 1 tablet by mouth every morning, Disp: , Rfl:     fluticasone (FLONASE) 50 mcg/act nasal spray, INSTILL 2 SPRAYS TO EACH NOSTRIL DAILY, Disp: , Rfl:     loratadine (CLARITIN) 10 mg tablet, Take by mouth, Disp: , Rfl:     metoclopramide (Reglan) 10 mg tablet, Take 1 tablet (10 mg total) by mouth every 6 (six) hours as needed (Nausea) for up to 10 doses, Disp: 10 tablet, Rfl: 0    pantoprazole (PROTONIX) 40 mg tablet, Take 1 tablet (40 mg total) by mouth 2 (two) times a day before meals, Disp: 60 tablet, Rfl: 0    tamsulosin (FLOMAX) 0.4 mg, Take 2 capsules (0.8 mg total) by mouth daily with dinner, Disp: 60 capsule, Rfl: 6    triamcinolone (KENALOG) 0.1 % oral topical paste, Apply 1 application to teeth, Disp: , Rfl:     polyethylene glycol (MIRALAX) 17 g packet, Take 17 g by mouth 2 (two) times a day, Disp: 1020 g, Rfl: 0    Current Allergies     Allergies as of 01/03/2024 - Reviewed 01/03/2024   Allergen Reaction Noted    Pepto-bismol [bismuth subsalicylate] GI Intolerance 01/06/2019    Amoxicillin Rash 01/06/2019    Latex Rash 01/06/2019            The following portions of the patient's history were reviewed and updated as appropriate: allergies, current medications, past family history, past medical history, past social history, past surgical history and problem list.     Past Medical History:   Diagnosis Date    Autistic disorder     BPH (benign prostatic hypertrophy) 9/8/22    Gastric paresis     GERD (gastroesophageal reflux disease)     Known health problems: none     Urinary retention 9/8/22    Urinary tract infection        Past Surgical History:   Procedure Laterality Date    EYE SURGERY      FOOT SURGERY    "      Family History   Problem Relation Age of Onset    No Known Problems Mother     No Known Problems Father     Heart attack Paternal Grandfather     Hypertension Paternal Grandfather          Medications have been verified.        Objective   /67   Pulse (!) 107   Temp (!) 100.7 °F (38.2 °C)   Resp 18   Ht 5' 8\" (1.727 m)   Wt 81.6 kg (180 lb)   SpO2 100%   BMI 27.37 kg/m²   No LMP for male patient.       Physical Exam     Physical Exam  Vitals and nursing note reviewed.   Constitutional:       Appearance: Normal appearance. He is well-developed.   HENT:      Head: Normocephalic and atraumatic.      Right Ear: Tympanic membrane, ear canal and external ear normal.      Left Ear: Tympanic membrane, ear canal and external ear normal.      Nose: Nose normal.      Mouth/Throat:      Pharynx: Uvula midline.   Eyes:      General: Lids are normal.      Conjunctiva/sclera: Conjunctivae normal.      Pupils: Pupils are equal, round, and reactive to light.   Cardiovascular:      Rate and Rhythm: Normal rate and regular rhythm.      Heart sounds: Normal heart sounds. No murmur heard.     No friction rub. No gallop.   Pulmonary:      Effort: Pulmonary effort is normal.      Breath sounds: Normal breath sounds. No stridor. No wheezing or rales.   Musculoskeletal:         General: Normal range of motion.      Cervical back: Neck supple.   Skin:     General: Skin is warm and dry.      Capillary Refill: Capillary refill takes less than 2 seconds.   Neurological:      Mental Status: He is alert.         POC rapid COVID-19 negative          "

## 2024-01-30 ENCOUNTER — PATIENT MESSAGE (OUTPATIENT)
Dept: UROLOGY | Facility: CLINIC | Age: 29
End: 2024-01-30

## 2024-01-30 DIAGNOSIS — R33.8 ACUTE URINARY RETENTION: ICD-10-CM

## 2024-01-30 RX ORDER — TAMSULOSIN HYDROCHLORIDE 0.4 MG/1
0.8 CAPSULE ORAL
Qty: 90 CAPSULE | Refills: 3 | Status: SHIPPED | OUTPATIENT
Start: 2024-01-30

## 2024-03-11 ENCOUNTER — PATIENT MESSAGE (OUTPATIENT)
Dept: UROLOGY | Facility: CLINIC | Age: 29
End: 2024-03-11

## 2024-03-11 ENCOUNTER — TELEPHONE (OUTPATIENT)
Dept: UROLOGY | Facility: CLINIC | Age: 29
End: 2024-03-11

## 2024-03-11 DIAGNOSIS — R33.8 ACUTE URINARY RETENTION: ICD-10-CM

## 2024-03-11 RX ORDER — TAMSULOSIN HYDROCHLORIDE 0.4 MG/1
0.4 CAPSULE ORAL
Qty: 30 CAPSULE | Refills: 1 | Status: SHIPPED | OUTPATIENT
Start: 2024-03-11

## 2024-03-11 NOTE — TELEPHONE ENCOUNTER
Called pt and left vm of Dr. Davis's recommendations of taking flomax once a day, to see Dr. Juárez if he needs it bid.  Refill was called into his pharmacy.  Asked him to call back if he has questions or concerns.

## 2024-03-11 NOTE — TELEPHONE ENCOUNTER
----- Message from Phylicia Roman MA sent at 3/11/2024  8:31 AM EDT -----  Regarding: FW: Flomax  Contact: [phone]    ----- Message -----  From: Getachew Luna  Sent: 3/11/2024  12:53 AM EDT  To: [facility]  Subject: Flomax                                           Hello!  I have been out of flomax for over a week now. [facility] says it is delayed. Can you please help me with that I’m not sure if you guys can do anything or not. I’m starting to have bladder issues without it.    Thank you

## 2024-03-11 NOTE — TELEPHONE ENCOUNTER
VM left for pt to call back, need to ask if he is taking the flomax once or twice a day. Then will have a provider send script to [facility] pharmacy in [place].

## 2024-03-14 ENCOUNTER — PATIENT MESSAGE (OUTPATIENT)
Dept: UROLOGY | Facility: CLINIC | Age: 29
End: 2024-03-14

## 2024-03-14 ENCOUNTER — TELEPHONE (OUTPATIENT)
Dept: UROLOGY | Facility: CLINIC | Age: 29
End: 2024-03-14

## 2024-03-14 DIAGNOSIS — R33.9 URINARY RETENTION: Primary | ICD-10-CM

## 2024-03-14 NOTE — PATIENT COMMUNICATION
Called and left message for patient to call the office back regarding below message from Dr. D'Amico. Awaiting return call. Also sent message via the patient portal.     Message from Dr. D'Amico: Please let him know I just got the message today.  Ask him if he wants the Flomax sent to a different pharmacy.  Thank you

## 2024-03-14 NOTE — TELEPHONE ENCOUNTER
----- Message from Frank D'Amico, MD sent at 3/13/2024  3:21 PM EDT -----  Regarding: FW: Flomax  Contact: [phone]  Please let him know I just got the message today.  Ask him if he wants the Flomax sent to a different pharmacy.  Thank you  ----- Message -----  From: Phylicia Roman MA  Sent: 3/11/2024   8:31 AM EDT  To: Frank D'Amico, MD; #  Subject: FW: Flomax                                         ----- Message -----  From: Getachew Luna  Sent: 3/11/2024  12:53 AM EDT  To: [facility]rology [facility]  Subject: Flomax                                           Hello!  I have been out of flomax for over a week now. [facility] it is [facility]. Can you please help me with that I’m not sure if you guys can do anything or not. I’m starting to have bladder issues without it.    Thank you

## 2024-03-14 NOTE — PATIENT COMMUNICATION
Please see message from patient in the portal. He said that you can send it to the same pharmacy[facility].

## 2024-03-15 RX ORDER — TAMSULOSIN HYDROCHLORIDE 0.4 MG/1
0.4 CAPSULE ORAL
Qty: 180 CAPSULE | Refills: 3 | Status: SHIPPED | OUTPATIENT
Start: 2024-03-15

## 2024-03-15 NOTE — PATIENT COMMUNICATION
Dr. Damico. Dr. Davis sent medication in for once a day. Pt was on twice on a day. Please sign pended medication if you would like pt on twice a day.

## 2024-05-29 ENCOUNTER — PATIENT MESSAGE (OUTPATIENT)
Dept: UROLOGY | Facility: CLINIC | Age: 29
End: 2024-05-29

## 2024-05-29 ENCOUNTER — TELEPHONE (OUTPATIENT)
Dept: UROLOGY | Facility: CLINIC | Age: 29
End: 2024-05-29

## 2024-05-29 DIAGNOSIS — R33.9 URINARY RETENTION: Primary | ICD-10-CM

## 2024-05-29 NOTE — TELEPHONE ENCOUNTER
Tried calling pt again, vm left to call us back. Advised of ER protocol. If he calls back, please ask him his urinary issues offer a follow up appt.

## 2024-05-30 ENCOUNTER — CLINICAL SUPPORT (OUTPATIENT)
Dept: UROLOGY | Facility: CLINIC | Age: 29
End: 2024-05-30
Payer: COMMERCIAL

## 2024-05-30 VITALS
BODY MASS INDEX: 30.89 KG/M2 | HEIGHT: 68 IN | OXYGEN SATURATION: 99 % | DIASTOLIC BLOOD PRESSURE: 70 MMHG | HEART RATE: 88 BPM | TEMPERATURE: 98.3 F | WEIGHT: 203.8 LBS | SYSTOLIC BLOOD PRESSURE: 140 MMHG

## 2024-05-30 DIAGNOSIS — R33.8 ACUTE URINARY RETENTION: Primary | ICD-10-CM

## 2024-05-30 DIAGNOSIS — R33.9 URINARY RETENTION: ICD-10-CM

## 2024-05-30 LAB
POST-VOID RESIDUAL VOLUME, ML POC: 9 ML
SL AMB  POCT GLUCOSE, UA: ABNORMAL
SL AMB LEUKOCYTE ESTERASE,UA: ABNORMAL
SL AMB POCT BILIRUBIN,UA: ABNORMAL
SL AMB POCT BLOOD,UA: ABNORMAL
SL AMB POCT CLARITY,UA: CLEAR
SL AMB POCT COLOR,UA: YELLOW
SL AMB POCT KETONES,UA: ABNORMAL
SL AMB POCT NITRITE,UA: ABNORMAL
SL AMB POCT PH,UA: 5
SL AMB POCT SPECIFIC GRAVITY,UA: 1.03
SL AMB POCT URINE PROTEIN: ABNORMAL
SL AMB POCT UROBILINOGEN: 0.2

## 2024-05-30 PROCEDURE — 87086 URINE CULTURE/COLONY COUNT: CPT | Performed by: UROLOGY

## 2024-05-30 PROCEDURE — 51798 US URINE CAPACITY MEASURE: CPT

## 2024-05-30 PROCEDURE — 81003 URINALYSIS AUTO W/O SCOPE: CPT

## 2024-05-31 LAB — BACTERIA UR CULT: NORMAL

## 2024-06-03 ENCOUNTER — TELEPHONE (OUTPATIENT)
Dept: UROLOGY | Facility: CLINIC | Age: 29
End: 2024-06-03

## 2024-06-03 NOTE — TELEPHONE ENCOUNTER
VM left for pt to call us back for more info regarding his urinary symptoms. If he calls back, please have him talk to a nurse.

## 2024-06-04 ENCOUNTER — TELEPHONE (OUTPATIENT)
Age: 29
End: 2024-06-04

## 2024-06-04 ENCOUNTER — TELEPHONE (OUTPATIENT)
Dept: UROLOGY | Facility: CLINIC | Age: 29
End: 2024-06-04

## 2024-06-04 ENCOUNTER — TELEPHONE (OUTPATIENT)
Dept: UROLOGY | Facility: AMBULATORY SURGERY CENTER | Age: 29
End: 2024-06-04

## 2024-06-04 NOTE — TELEPHONE ENCOUNTER
Could the individual who saw the patient on 5/30 please sign the note for the appointment.  Thank you.

## 2024-06-04 NOTE — TELEPHONE ENCOUNTER
VM left for pt to call us back if he needs any further questions answered regarding urinary symptoms.

## 2024-06-04 NOTE — TELEPHONE ENCOUNTER
Left a voicemail to have patient call back and speak to clinical staff for further triage from his my chart message. Office phone number given.

## 2024-06-05 ENCOUNTER — TELEPHONE (OUTPATIENT)
Dept: UROLOGY | Facility: CLINIC | Age: 29
End: 2024-06-05

## 2024-06-05 NOTE — TELEPHONE ENCOUNTER
Please ask Getachew if he is still using Reglan as well as Flomax.  Has there been any change in any of his medications? I saw a prior message where his pharmacy was not able to supply his Flomax.  If he needs this we will be happy to fill or order it for him

## 2024-06-11 ENCOUNTER — TELEPHONE (OUTPATIENT)
Age: 29
End: 2024-06-11

## 2024-06-12 ENCOUNTER — TELEPHONE (OUTPATIENT)
Dept: UROLOGY | Facility: CLINIC | Age: 29
End: 2024-06-12

## 2024-06-12 ENCOUNTER — APPOINTMENT (OUTPATIENT)
Dept: LAB | Facility: HOSPITAL | Age: 29
End: 2024-06-12
Payer: COMMERCIAL

## 2024-06-12 DIAGNOSIS — R33.9 URINARY RETENTION: Primary | ICD-10-CM

## 2024-06-12 LAB
BACTERIA UR QL AUTO: NORMAL /HPF
BILIRUB UR QL STRIP: NEGATIVE
CLARITY UR: CLEAR
COLOR UR: YELLOW
GLUCOSE UR STRIP-MCNC: NEGATIVE MG/DL
HGB UR QL STRIP.AUTO: NEGATIVE
KETONES UR STRIP-MCNC: NEGATIVE MG/DL
LEUKOCYTE ESTERASE UR QL STRIP: NEGATIVE
NITRITE UR QL STRIP: NEGATIVE
NON-SQ EPI CELLS URNS QL MICRO: NORMAL /HPF
PH UR STRIP.AUTO: 6 [PH]
PROT UR STRIP-MCNC: NEGATIVE MG/DL
RBC #/AREA URNS AUTO: NORMAL /HPF
SP GR UR STRIP.AUTO: <=1.005 (ref 1–1.03)
UROBILINOGEN UR QL STRIP.AUTO: 0.2 E.U./DL
WBC #/AREA URNS AUTO: NORMAL /HPF

## 2024-06-12 NOTE — TELEPHONE ENCOUNTER
VM left for pt, asked to call back to book an appt with Urologist. Pt has ongoing urinary symptoms. If he calls back, please book an appt.

## 2024-06-13 LAB — BACTERIA UR CULT: NORMAL

## 2024-06-18 ENCOUNTER — TELEPHONE (OUTPATIENT)
Dept: UROLOGY | Facility: CLINIC | Age: 29
End: 2024-06-18

## 2024-06-18 NOTE — TELEPHONE ENCOUNTER
----- Message from SENDY Green sent at 6/18/2024  8:40 AM EDT -----  Please let patient know that his urine culture is negative.

## 2024-07-01 DIAGNOSIS — R33.9 URINARY RETENTION: ICD-10-CM

## 2024-07-01 RX ORDER — TAMSULOSIN HYDROCHLORIDE 0.4 MG/1
CAPSULE ORAL
Qty: 180 CAPSULE | Refills: 1 | Status: SHIPPED | OUTPATIENT
Start: 2024-07-01

## 2024-07-11 ENCOUNTER — TELEPHONE (OUTPATIENT)
Dept: UROLOGY | Facility: CLINIC | Age: 29
End: 2024-07-11

## 2024-07-11 NOTE — TELEPHONE ENCOUNTER
Left message for patient to call back and schedule a follow up appointment for urinary symptoms with either Dr. D'Amico or SENDY Mcfadden

## 2024-07-22 NOTE — ASSESSMENT & PLAN NOTE
Necessary use of anticholinergics Lexapro and Reglan  Continue Flomax 0.4mg  Increase Flomax to 0.8mg when needed for intermittent hesitancy  Urine POCT negative  PVR 13mL  Call if symptoms change

## 2024-07-22 NOTE — PROGRESS NOTES
"7/26/2024    No chief complaint on file.      Assessment and Plan    29 y.o. male manage by Dr. D'Amico    1. History of urinary retention  Assessment & Plan:  Necessary use of anticholinergics Lexapro and Reglan  Continue Flomax 0.4mg  Increase Flomax to 0.8mg when needed for intermittent hesitancy  Urine POCT negative  PVR 13mL  Call if symptoms change  Orders:  -     POCT urine dip auto non-scope  -     POCT Measure PVR  2. Frequency of urination  -     POCT urine dip auto non-scope  -     POCT Measure PVR  3. Urinary retention  -     tamsulosin (FLOMAX) 0.4 mg; Take 1 capsule (0.4 mg total) by mouth daily with dinner Can increase to two tablets when experiencing decreased urine flow and frequency         Subjective:    History of Present Illness  Getachew Luna is a 29 y.o. male here for evaluation of chronic \"urine issues\"  PMH includes hypokalemia, GERD, Asperger's syndrome. Urological history of urinary retention likely due to anticholinergics.  Historically this responds well to Flomax but today he states he has been having intermittent urinary frequency and dribbling.  He does endorse similar occurrences of this which responded well to adding a second Flomax.  Discussed that this is an acceptable treatment due to his prior good results and lack of issues with low blood pressure.  He would like to continue this regimen and let us know if he is not getting relief. He denies chest pain, SOB, fevers, chills flank pain, abdominal pain, hematuria, or dysuria.  His urine dip was negative today and his PVR was 13mL.  He is otherwise doing well, and we can see him in one year.  He understands that he should call us with any problems, if the urinary symptoms change, get worse or if Flomax is not helping as it once was.          Review of Systems   Constitutional: Negative.    HENT: Negative.     Eyes: Negative.    Respiratory:  Negative for chest tightness and shortness of breath.    Cardiovascular: Negative.  " Negative for chest pain.   Gastrointestinal:  Negative for abdominal distention, nausea and vomiting.        Suprapubic tenderness   Endocrine: Negative.    Genitourinary:  Positive for difficulty urinating and frequency. Negative for flank pain and hematuria.   Musculoskeletal: Negative.    Allergic/Immunologic: Negative.    Hematological: Negative.    Psychiatric/Behavioral: Negative.                 Vitals  Vitals:    07/26/24 1450   BP: 124/82   Pulse: 76   Weight: 92.5 kg (204 lb)       Physical Exam  Vitals and nursing note reviewed.   Constitutional:       General: He is not in acute distress.     Appearance: He is well-developed. He is not toxic-appearing or diaphoretic.   HENT:      Head: Normocephalic and atraumatic.      Right Ear: External ear normal.      Left Ear: External ear normal.      Nose: Nose normal.      Mouth/Throat:      Pharynx: Uvula midline.   Eyes:      Conjunctiva/sclera: Conjunctivae normal.      Pupils: Pupils are equal, round, and reactive to light.   Cardiovascular:      Rate and Rhythm: Normal rate.   Pulmonary:      Effort: Pulmonary effort is normal. No respiratory distress.      Breath sounds: Normal breath sounds.   Abdominal:      Palpations: Abdomen is soft.      Tenderness: There is no right CVA tenderness, left CVA tenderness or guarding.   Musculoskeletal:         General: No tenderness. Normal range of motion.      Cervical back: Normal range of motion and neck supple.   Skin:     General: Skin is warm and dry.      Findings: No rash.   Neurological:      General: No focal deficit present.      Mental Status: He is alert and oriented to person, place, and time. Mental status is at baseline.      GCS: GCS eye subscore is 4. GCS verbal subscore is 5. GCS motor subscore is 6.   Psychiatric:         Mood and Affect: Mood normal.         Behavior: Behavior normal. Behavior is cooperative.         Thought Content: Thought content normal.         Judgment: Judgment normal.          Past History  Past Medical History:   Diagnosis Date    Autistic disorder     BPH (benign prostatic hypertrophy) 9/8/22    Gastric paresis     GERD (gastroesophageal reflux disease)     Known health problems: none     Urinary retention 9/8/22    Urinary tract infection      Social History     Socioeconomic History    Marital status: Single     Spouse name: None    Number of children: None    Years of education: None    Highest education level: None   Occupational History    None   Tobacco Use    Smoking status: Never    Smokeless tobacco: Never   Vaping Use    Vaping status: Never Used   Substance and Sexual Activity    Alcohol use: Not Currently    Drug use: Never    Sexual activity: Not Currently   Other Topics Concern    None   Social History Narrative    None     Social Determinants of Health     Financial Resource Strain: Low Risk  (7/23/2024)    Received from [facility]    Overall Financial Resource Strain (CARDIA)     Difficulty of Paying Living Expenses: Not hard at all   Food Insecurity: No Food Insecurity (7/23/2024)    Received from [facility]    Hunger Vital Sign     Worried About Running Out of Food in the Last Year: Never true     Ran Out of Food in the Last Year: Never true   Transportation Needs: No Transportation Needs (7/23/2024)    Received from [facility]    PRAPARE - Transportation     Lack of Transportation (Medical): No     Lack of Transportation (Non-Medical): No   Physical Activity: Inactive (12/17/2020)    Received from [facility]    Exercise Vital Sign     Days of Exercise per Week: 0 days     Minutes of Exercise per Session: 0 min   Stress: No Stress Concern Present (12/17/2020)    Received from [facility]    [employer] [place] [employer] - Occupational Stress Questionnaire     Feeling of Stress : Only a little   Social Connections: Unknown (6/18/2024)    Received from [facility]    Social [employer]     How  "often do you feel lonely or isolated from those around you? (Adult - for ages 18 years and over): Not on file   Intimate Partner Violence: Not At Risk (12/17/2020)    [facility]    Humiliation, Afraid, Rape, and Kick questionnaire     Fear of Current or Ex-Partner: No     Emotionally Abused: No     Physically Abused: No     Sexually Abused: No   Housing Stability: Unknown (7/23/2024)    Received from [facility]    Housing Stability Vital Sign     Unable to Pay for Housing in the Last Year: No     Number of Times Moved in the Last Year: Not on file     Homeless in the Last Year: No     Social History     Tobacco Use   Smoking Status Never   Smokeless Tobacco Never     Family History   Problem Relation Age of Onset    No Known Problems Mother     No Known Problems Father     Heart attack Paternal Grandfather     Hypertension Paternal Grandfather        The following portions of the patient's history were reviewed and updated as appropriate allergies, current medications, past medical history, past social history, past surgical history and problem list    Imaging:    Results  Recent Results (from the past 1 hour(s))   POCT urine dip auto non-scope    Collection Time: 07/26/24  3:03 PM   Result Value Ref Range     COLOR,UA yellow     CLARITY,UA clear     SPECIFIC GRAVITY,UA 1.010      PH,UA 5.5     LEUKOCYTE ESTERASE,UA neg     NITRITE,UA neg     GLUCOSE, UA neg     KETONES,UA neg     BILIRUBIN,UA neg     BLOOD,UA neg     POCT URINE PROTEIN neg     SL AMB POCT UROBILINOGEN 0.2    POCT Measure PVR    Collection Time: 07/26/24  3:05 PM   Result Value Ref Range    POST-VOID RESIDUAL VOLUME, ML POC 13 mL   ]  No results found for: \"PSA\"  Lab Results   Component Value Date    CALCIUM 9.3 04/01/2024    K 3.9 04/01/2024    CO2 23.0 04/01/2024     10/17/2023    BUN 18 04/01/2024    CREATININE 0.92 04/01/2024     Lab Results   Component Value Date    WBC 5.01 10/17/2023    HGB 14.9 " 10/17/2023    HCT 43.7 10/17/2023    MCV 89 10/17/2023     10/17/2023       Please Note:  Voice dictation software has been used to create this document. There may be inadvertent transcriptions errors.     SENDY Mcfadden 07/26/24

## 2024-07-26 ENCOUNTER — OFFICE VISIT (OUTPATIENT)
Dept: UROLOGY | Facility: CLINIC | Age: 29
End: 2024-07-26

## 2024-07-26 VITALS
SYSTOLIC BLOOD PRESSURE: 124 MMHG | BODY MASS INDEX: 31.02 KG/M2 | HEART RATE: 76 BPM | DIASTOLIC BLOOD PRESSURE: 82 MMHG | WEIGHT: 204 LBS

## 2024-07-26 DIAGNOSIS — R33.9 URINARY RETENTION: ICD-10-CM

## 2024-07-26 DIAGNOSIS — R35.0 FREQUENCY OF URINATION: ICD-10-CM

## 2024-07-26 DIAGNOSIS — Z87.898 HISTORY OF URINARY RETENTION: Primary | ICD-10-CM

## 2024-07-26 LAB
POST-VOID RESIDUAL VOLUME, ML POC: 13 ML
SL AMB  POCT GLUCOSE, UA: NORMAL
SL AMB LEUKOCYTE ESTERASE,UA: NORMAL
SL AMB POCT BILIRUBIN,UA: NORMAL
SL AMB POCT BLOOD,UA: NORMAL
SL AMB POCT CLARITY,UA: CLEAR
SL AMB POCT COLOR,UA: YELLOW
SL AMB POCT KETONES,UA: NORMAL
SL AMB POCT NITRITE,UA: NORMAL
SL AMB POCT PH,UA: 5.5
SL AMB POCT SPECIFIC GRAVITY,UA: 1.01
SL AMB POCT URINE PROTEIN: NORMAL
SL AMB POCT UROBILINOGEN: 0.2

## 2024-07-26 RX ORDER — TAMSULOSIN HYDROCHLORIDE 0.4 MG/1
0.4 CAPSULE ORAL
Qty: 180 CAPSULE | Refills: 1 | Status: SHIPPED | OUTPATIENT
Start: 2024-07-26

## 2025-03-24 NOTE — ASSESSMENT & PLAN NOTE
· Just had castaneda placed in er 9/9 hx of same  · Keep castaneda ua rechecked today positive for nitrates and some leuks did not reflect to cultute  · Will start iv rocephin 1g iv daily    · Will give flomax hope able to tolerate  · Outpatient follow up with urology [facility]: Preventive Care And Screening: Tobacco Use: Screening And Cessation Intervention: Patient screened for tobacco use and is an ex/non-smoker Detail Level: Detailed

## 2025-06-05 ENCOUNTER — OCCMED (OUTPATIENT)
Dept: URGENT CARE | Facility: CLINIC | Age: 30
End: 2025-06-05
Payer: OTHER MISCELLANEOUS

## 2025-06-05 ENCOUNTER — APPOINTMENT (OUTPATIENT)
Dept: RADIOLOGY | Facility: CLINIC | Age: 30
End: 2025-06-05
Payer: COMMERCIAL

## 2025-06-05 DIAGNOSIS — S09.92XA NASAL INJURY, INITIAL ENCOUNTER: ICD-10-CM

## 2025-06-05 DIAGNOSIS — S09.92XA NASAL INJURY, INITIAL ENCOUNTER: Primary | ICD-10-CM

## 2025-06-05 PROCEDURE — 99283 EMERGENCY DEPT VISIT LOW MDM: CPT

## 2025-06-05 PROCEDURE — G0382 LEV 3 HOSP TYPE B ED VISIT: HCPCS

## 2025-06-05 PROCEDURE — 70160 X-RAY EXAM OF NASAL BONES: CPT

## 2025-06-06 ENCOUNTER — RESULTS FOLLOW-UP (OUTPATIENT)
Dept: URGENT CARE | Facility: CLINIC | Age: 30
End: 2025-06-06

## 2025-06-11 ENCOUNTER — APPOINTMENT (OUTPATIENT)
Dept: URGENT CARE | Facility: CLINIC | Age: 30
End: 2025-06-11
Payer: OTHER MISCELLANEOUS

## 2025-06-11 PROCEDURE — 99213 OFFICE O/P EST LOW 20 MIN: CPT | Performed by: PHYSICIAN ASSISTANT

## 2025-06-18 ENCOUNTER — OCCMED (OUTPATIENT)
Dept: URGENT CARE | Facility: CLINIC | Age: 30
End: 2025-06-18
Payer: OTHER MISCELLANEOUS
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
## 2025-06-18 PROCEDURE — 99213 OFFICE O/P EST LOW 20 MIN: CPT
